# Patient Record
Sex: MALE | Race: WHITE | NOT HISPANIC OR LATINO | Employment: OTHER | URBAN - METROPOLITAN AREA
[De-identification: names, ages, dates, MRNs, and addresses within clinical notes are randomized per-mention and may not be internally consistent; named-entity substitution may affect disease eponyms.]

---

## 2014-10-08 LAB — CRC RECOMMENDATION EXT: NORMAL

## 2018-05-15 ENCOUNTER — HISTORICAL (OUTPATIENT)
Dept: ADMINISTRATIVE | Facility: HOSPITAL | Age: 68
End: 2018-05-15

## 2018-06-08 ENCOUNTER — HISTORICAL (OUTPATIENT)
Dept: RADIOLOGY | Facility: HOSPITAL | Age: 68
End: 2018-06-08

## 2018-06-14 ENCOUNTER — HISTORICAL (OUTPATIENT)
Dept: RADIOLOGY | Facility: HOSPITAL | Age: 68
End: 2018-06-14

## 2018-06-25 ENCOUNTER — HISTORICAL (OUTPATIENT)
Dept: ADMINISTRATIVE | Facility: HOSPITAL | Age: 68
End: 2018-06-25

## 2020-05-29 ENCOUNTER — HISTORICAL (OUTPATIENT)
Dept: ADMINISTRATIVE | Facility: HOSPITAL | Age: 70
End: 2020-05-29

## 2020-05-29 LAB — POC CREATININE: 0.6 MG/DL (ref 0.6–1.3)

## 2020-06-05 ENCOUNTER — HISTORICAL (OUTPATIENT)
Dept: ADMINISTRATIVE | Facility: HOSPITAL | Age: 70
End: 2020-06-05

## 2020-06-05 LAB
ALBUMIN SERPL-MCNC: 3.7 G/DL (ref 3.8–4.8)
ALBUMIN/GLOB SERPL: 1 {RATIO} (ref 1.2–2.2)
ALP SERPL-CCNC: 113 IU/L (ref 39–117)
ALT SERPL-CCNC: 19 IU/L (ref 0–44)
AST SERPL-CCNC: 17 IU/L (ref 0–40)
BILIRUB SERPL-MCNC: 0.3 MG/DL (ref 0–1.2)
BUN SERPL-MCNC: 7 MG/DL (ref 8–27)
CALCIUM SERPL-MCNC: 9.2 MG/DL (ref 8.6–10.2)
CHLORIDE SERPL-SCNC: 104 MMOL/L (ref 96–106)
CHOLEST SERPL-MCNC: 128 MG/DL (ref 100–199)
CHOLEST/HDLC SERPL: 3.7 RATIO (ref 0–5)
CO2 SERPL-SCNC: 21 MMOL/L (ref 20–29)
CREAT SERPL-MCNC: 0.69 MG/DL (ref 0.76–1.27)
CREAT/UREA NIT SERPL: 10 (ref 10–24)
GLOBULIN SER-MCNC: 3.6 G/DL (ref 1.5–4.5)
GLUCOSE SERPL-MCNC: 100 MG/DL (ref 65–99)
HDLC SERPL-MCNC: 35 MG/DL
LDLC SERPL CALC-MCNC: 70 MG/DL (ref 0–99)
POTASSIUM SERPL-SCNC: 4.4 MMOL/L (ref 3.5–5.2)
PROT SERPL-MCNC: 7.3 G/DL (ref 6–8.5)
SODIUM SERPL-SCNC: 141 MMOL/L (ref 134–144)
TRIGL SERPL-MCNC: 115 MG/DL (ref 0–149)
TSH SERPL-ACNC: 0.03 MIU/ML (ref 0.45–4.5)
VLDLC SERPL CALC-MCNC: 23 MG/DL (ref 5–40)

## 2020-07-02 ENCOUNTER — HISTORICAL (OUTPATIENT)
Dept: INFUSION THERAPY | Facility: HOSPITAL | Age: 70
End: 2020-07-02

## 2020-07-02 LAB
ALBUMIN SERPL-MCNC: 3.1 GM/DL (ref 3.4–5)
ALP SERPL-CCNC: 113 UNIT/L (ref 40–150)
ALT SERPL-CCNC: 14 UNIT/L (ref 0–55)
AST SERPL-CCNC: 15 UNIT/L (ref 5–34)
BILIRUB SERPL-MCNC: 0.5 MG/DL
BILIRUBIN DIRECT+TOT PNL SERPL-MCNC: 0.2 MG/DL (ref 0–0.5)
BILIRUBIN DIRECT+TOT PNL SERPL-MCNC: 0.3 MG/DL (ref 0–0.8)
LIVER PROFILE INTERP: ABNORMAL
PROT SERPL-MCNC: 7.9 GM/DL (ref 5.8–7.6)

## 2020-07-09 ENCOUNTER — HISTORICAL (OUTPATIENT)
Dept: INFUSION THERAPY | Facility: HOSPITAL | Age: 70
End: 2020-07-09

## 2020-07-09 LAB
ALBUMIN SERPL-MCNC: 3.3 GM/DL (ref 3.4–5)
ALP SERPL-CCNC: 108 UNIT/L (ref 40–150)
ALT SERPL-CCNC: 40 UNIT/L (ref 0–55)
AST SERPL-CCNC: 35 UNIT/L (ref 5–34)
BILIRUB SERPL-MCNC: 0.5 MG/DL
BILIRUBIN DIRECT+TOT PNL SERPL-MCNC: 0.1 MG/DL (ref 0–0.5)
BILIRUBIN DIRECT+TOT PNL SERPL-MCNC: 0.4 MG/DL (ref 0–0.8)
LIVER PROFILE INTERP: ABNORMAL
PROT SERPL-MCNC: 8.3 GM/DL (ref 5.8–7.6)

## 2020-07-16 ENCOUNTER — HISTORICAL (OUTPATIENT)
Dept: INFUSION THERAPY | Facility: HOSPITAL | Age: 70
End: 2020-07-16

## 2020-07-23 ENCOUNTER — HISTORICAL (OUTPATIENT)
Dept: INFUSION THERAPY | Facility: HOSPITAL | Age: 70
End: 2020-07-23

## 2020-08-19 ENCOUNTER — HISTORICAL (OUTPATIENT)
Dept: INFUSION THERAPY | Facility: HOSPITAL | Age: 70
End: 2020-08-19

## 2020-09-09 ENCOUNTER — HISTORICAL (OUTPATIENT)
Dept: INFUSION THERAPY | Facility: HOSPITAL | Age: 70
End: 2020-09-09

## 2020-09-30 ENCOUNTER — HISTORICAL (OUTPATIENT)
Dept: INFUSION THERAPY | Facility: HOSPITAL | Age: 70
End: 2020-09-30

## 2020-10-21 ENCOUNTER — HISTORICAL (OUTPATIENT)
Dept: INFUSION THERAPY | Facility: HOSPITAL | Age: 70
End: 2020-10-21

## 2020-11-11 ENCOUNTER — HISTORICAL (OUTPATIENT)
Dept: INFUSION THERAPY | Facility: HOSPITAL | Age: 70
End: 2020-11-11

## 2020-12-02 ENCOUNTER — HISTORICAL (OUTPATIENT)
Dept: INFUSION THERAPY | Facility: HOSPITAL | Age: 70
End: 2020-12-02

## 2020-12-23 ENCOUNTER — HISTORICAL (OUTPATIENT)
Dept: INFUSION THERAPY | Facility: HOSPITAL | Age: 70
End: 2020-12-23

## 2021-10-07 ENCOUNTER — HISTORICAL (OUTPATIENT)
Dept: CARDIOLOGY | Facility: HOSPITAL | Age: 71
End: 2021-10-07

## 2022-04-07 ENCOUNTER — HISTORICAL (OUTPATIENT)
Dept: ADMINISTRATIVE | Facility: HOSPITAL | Age: 72
End: 2022-04-07

## 2022-04-23 ENCOUNTER — HISTORICAL (OUTPATIENT)
Dept: ADMINISTRATIVE | Facility: HOSPITAL | Age: 72
End: 2022-04-23
Payer: MEDICARE

## 2022-04-23 VITALS
SYSTOLIC BLOOD PRESSURE: 136 MMHG | HEIGHT: 67 IN | BODY MASS INDEX: 29.34 KG/M2 | DIASTOLIC BLOOD PRESSURE: 90 MMHG | OXYGEN SATURATION: 96 % | WEIGHT: 186.94 LBS

## 2022-04-26 ENCOUNTER — HISTORICAL (OUTPATIENT)
Dept: ADMINISTRATIVE | Facility: HOSPITAL | Age: 72
End: 2022-04-26
Payer: MEDICARE

## 2022-04-26 ENCOUNTER — HOSPITAL ENCOUNTER (INPATIENT)
Facility: HOSPITAL | Age: 72
LOS: 7 days | Discharge: NURSING, REHAB OR LONG-TERM CARE | DRG: 065 | End: 2022-05-03
Attending: INTERNAL MEDICINE | Admitting: INTERNAL MEDICINE
Payer: MEDICARE

## 2022-04-26 PROCEDURE — 81001 URINALYSIS AUTO W/SCOPE: CPT

## 2022-04-26 PROCEDURE — 84439 ASSAY OF FREE THYROXINE: CPT

## 2022-04-26 PROCEDURE — 99284 EMERGENCY DEPT VISIT MOD MDM: CPT | Mod: 25

## 2022-04-26 PROCEDURE — 96360 HYDRATION IV INFUSION INIT: CPT

## 2022-04-26 PROCEDURE — 85025 COMPLETE CBC W/AUTO DIFF WBC: CPT

## 2022-04-26 PROCEDURE — 80053 COMPREHEN METABOLIC PANEL: CPT

## 2022-04-26 PROCEDURE — 70450 CT HEAD/BRAIN W/O DYE: CPT

## 2022-04-26 PROCEDURE — 84443 ASSAY THYROID STIM HORMONE: CPT

## 2022-04-26 PROCEDURE — 99990 CHARGE CONVERSION: CPT | Mod: 25

## 2022-04-26 PROCEDURE — 83690 ASSAY OF LIPASE: CPT

## 2022-04-26 PROCEDURE — 94761 N-INVAS EAR/PLS OXIMETRY MLT: CPT

## 2022-04-26 PROCEDURE — 93005 ELECTROCARDIOGRAM TRACING: CPT

## 2022-04-26 PROCEDURE — 84481 FREE ASSAY (FT-3): CPT

## 2022-04-26 PROCEDURE — 36415 COLL VENOUS BLD VENIPUNCTURE: CPT

## 2022-04-27 PROCEDURE — 84481 FREE ASSAY (FT-3): CPT

## 2022-04-27 PROCEDURE — 80048 BASIC METABOLIC PNL TOTAL CA: CPT

## 2022-04-27 PROCEDURE — 99990 CHARGE CONVERSION: CPT | Mod: GO

## 2022-04-27 PROCEDURE — 93880 EXTRACRANIAL BILAT STUDY: CPT

## 2022-04-27 PROCEDURE — 97166 OT EVAL MOD COMPLEX 45 MIN: CPT | Mod: GO

## 2022-04-27 PROCEDURE — 85025 COMPLETE CBC W/AUTO DIFF WBC: CPT

## 2022-04-27 PROCEDURE — 93306 TTE W/DOPPLER COMPLETE: CPT

## 2022-04-27 PROCEDURE — 70551 MRI BRAIN STEM W/O DYE: CPT | Mod: 52

## 2022-04-27 PROCEDURE — 84443 ASSAY THYROID STIM HORMONE: CPT

## 2022-04-27 PROCEDURE — 97162 PT EVAL MOD COMPLEX 30 MIN: CPT | Mod: GP

## 2022-04-27 PROCEDURE — A9152 SINGLE VITAMIN NOS: HCPCS

## 2022-04-27 PROCEDURE — A9150 MISC/EXPER NON-PRESCRIPT DRU: HCPCS

## 2022-04-27 PROCEDURE — 36415 COLL VENOUS BLD VENIPUNCTURE: CPT

## 2022-04-28 PROCEDURE — A9150 MISC/EXPER NON-PRESCRIPT DRU: HCPCS

## 2022-04-28 PROCEDURE — 97535 SELF CARE MNGMENT TRAINING: CPT | Mod: GO

## 2022-04-28 PROCEDURE — 80061 LIPID PANEL: CPT

## 2022-04-28 PROCEDURE — 36415 COLL VENOUS BLD VENIPUNCTURE: CPT

## 2022-04-28 PROCEDURE — 70498 CT ANGIOGRAPHY NECK: CPT

## 2022-04-28 PROCEDURE — A9152 SINGLE VITAMIN NOS: HCPCS

## 2022-04-28 PROCEDURE — 92523 SPEECH SOUND LANG COMPREHEN: CPT | Mod: GN

## 2022-04-28 PROCEDURE — 70496 CT ANGIOGRAPHY HEAD: CPT

## 2022-04-28 PROCEDURE — 97110 THERAPEUTIC EXERCISES: CPT | Mod: GP

## 2022-04-28 PROCEDURE — 84439 ASSAY OF FREE THYROXINE: CPT

## 2022-04-28 PROCEDURE — 97116 GAIT TRAINING THERAPY: CPT | Mod: GP

## 2022-04-28 PROCEDURE — 99990 CHARGE CONVERSION: CPT | Mod: GN

## 2022-04-28 PROCEDURE — 83036 HEMOGLOBIN GLYCOSYLATED A1C: CPT

## 2022-04-28 PROCEDURE — 94761 N-INVAS EAR/PLS OXIMETRY MLT: CPT

## 2022-04-29 DIAGNOSIS — E05.90 THYROTOXICOSIS WITHOUT THYROID STORM, UNSPECIFIED THYROTOXICOSIS TYPE: ICD-10-CM

## 2022-04-29 DIAGNOSIS — I10 PRIMARY HYPERTENSION: ICD-10-CM

## 2022-04-29 DIAGNOSIS — I25.5 ISCHEMIC CARDIOMYOPATHY: ICD-10-CM

## 2022-04-29 DIAGNOSIS — R19.7 DIARRHEA: ICD-10-CM

## 2022-04-29 DIAGNOSIS — I63.522: Primary | ICD-10-CM

## 2022-04-29 DIAGNOSIS — I48.11 LONGSTANDING PERSISTENT ATRIAL FIBRILLATION: ICD-10-CM

## 2022-04-29 PROCEDURE — 99990 CHARGE CONVERSION: CPT

## 2022-04-29 PROCEDURE — A9152 SINGLE VITAMIN NOS: HCPCS

## 2022-04-29 PROCEDURE — 97535 SELF CARE MNGMENT TRAINING: CPT | Mod: GO

## 2022-04-29 PROCEDURE — A9150 MISC/EXPER NON-PRESCRIPT DRU: HCPCS

## 2022-04-29 PROCEDURE — 36415 COLL VENOUS BLD VENIPUNCTURE: CPT

## 2022-04-29 PROCEDURE — 97530 THERAPEUTIC ACTIVITIES: CPT | Mod: GP

## 2022-04-29 PROCEDURE — 80048 BASIC METABOLIC PNL TOTAL CA: CPT

## 2022-04-29 PROCEDURE — 85027 COMPLETE CBC AUTOMATED: CPT

## 2022-04-29 PROCEDURE — 97116 GAIT TRAINING THERAPY: CPT | Mod: GP

## 2022-04-29 NOTE — OP NOTE
Patient:   Quan Contreras            MRN: [id number]            FIN: [id number]               Age:   67 years     Sex:  Male     :  1950   Associated Diagnoses:   None   Author:   Mark Bhagat MD      Cardiologist: Dr. Mark Bhagat    Assistant: _    Preoperative Diagnosis(es):  [ X ] Artial fibrillation  [ _ ] Artial flutter  [ _ ] Artial tachycardia    Postoperative Diagnosis(es):  [ _ ] Normal Sinus rhythm  [ _ ] Sinus bradycardia  [ X ] Artial fibrillation after initially converting to sinus rhythmon 2 occasions  [ _ ] Sinus with third-degree atrioventricular block  [ _ ] A-V sequential pacing/capture    Procedure(s):  Electrical cardioversion    Complications:  None    Description of Procedure:  After informed consent was obtained and permit signed, the patient was transported to the Cardiac Electrophysiology Laboratory. The EKG electrodes and [ _ ]cardioverter / [ _ ]defibrillator pads were applied to the patient. Pulse oximetry and ventilation were monitored, with oxygen and ventilation assistance given as needed. Adequate sedation for the procedure was accomplished by [X]the Anesthsia service/[_] IV Versend and Fentanyl. Synchronized direct-current energy was delivered at a level of 200 joules biphasic. Additional energy was [_] not indicated/[X] indicated at 200 joules biphasic. Sinus rhythm[_]was/[X] was not restored. Upon awakening, the patient was transferred in a stable condition to a monitored bed for recovery.

## 2022-04-29 NOTE — OP NOTE
Patient:   Quan Contreras            MRN: [id number]            FIN: [id number]               Age:   67 years     Sex:  Male     :  1950   Associated Diagnoses:   None   Author:   Mark Bhagat MD      Cardiologist: Dr. Mark Bhagat    Assistant: _    Preoperative Diagnosis(es):  [ X ] Artial fibrillation  [ _ ] Artial flutter  [ _ ] Artial tachycardia    Postoperative Diagnosis(es):  [ _ ] Normal Sinus rhythm  [ _ ] Sinus bradycardia  [ X ] Artial fibrillation  [ _ ] Sinus with third-degree atrioventricular block  [ _ ] A-V sequential pacing/capture    Procedure(s):  Electrical cardioversion    Complications:  None    Description of Procedure:  After informed consent was obtained and permit signed, the patient was transported to the Cardiac Electrophysiology Laboratory. The EKG electrodes and [ _ ]cardioverter / [ _ ]defibrillator pads were applied to the patient. Pulse oximetry and ventilation were monitored, with oxygen and ventilation assistance given as needed. Adequate sedation for the procedure was accomplished by [X]the Anesthsia service/[_] IV Versend and Fentanyl. Synchronized direct-current energy was delivered at a level of 200 joules biphasic. Additional energy was [_] not indicated/[X] indicated at 200 joules biphasic. Sinus rhythm[_]was/[X] was not restored. Upon awakening, the patient was transferred in a stable condition to a monitored bed for recovery.

## 2022-04-30 PROCEDURE — 99990 CHARGE CONVERSION: CPT

## 2022-04-30 PROCEDURE — A9152 SINGLE VITAMIN NOS: HCPCS

## 2022-04-30 PROCEDURE — 36415 COLL VENOUS BLD VENIPUNCTURE: CPT

## 2022-04-30 PROCEDURE — 97530 THERAPEUTIC ACTIVITIES: CPT | Mod: GP

## 2022-04-30 PROCEDURE — 97116 GAIT TRAINING THERAPY: CPT | Mod: GP

## 2022-04-30 PROCEDURE — 80048 BASIC METABOLIC PNL TOTAL CA: CPT

## 2022-04-30 PROCEDURE — A9150 MISC/EXPER NON-PRESCRIPT DRU: HCPCS

## 2022-04-30 PROCEDURE — 11000001 HC ACUTE MED/SURG PRIVATE ROOM

## 2022-04-30 RX ORDER — CHOLECALCIFEROL (VITAMIN D3) 25 MCG
1000 TABLET ORAL DAILY
Status: DISCONTINUED | OUTPATIENT
Start: 2022-04-30 | End: 2022-05-03 | Stop reason: HOSPADM

## 2022-04-30 RX ORDER — METHIMAZOLE 10 MG/1
20 TABLET ORAL DAILY
Status: DISCONTINUED | OUTPATIENT
Start: 2022-04-30 | End: 2022-05-02

## 2022-04-30 RX ORDER — ACETAMINOPHEN 650 MG/20.3ML
650 LIQUID ORAL EVERY 6 HOURS PRN
Status: DISCONTINUED | OUTPATIENT
Start: 2022-04-30 | End: 2022-05-03 | Stop reason: HOSPADM

## 2022-04-30 RX ORDER — EZETIMIBE 10 MG/1
10 TABLET ORAL NIGHTLY
Status: DISCONTINUED | OUTPATIENT
Start: 2022-04-30 | End: 2022-05-03 | Stop reason: HOSPADM

## 2022-04-30 RX ORDER — PANTOPRAZOLE SODIUM 40 MG/1
40 TABLET, DELAYED RELEASE ORAL DAILY
Status: ON HOLD | COMMUNITY
End: 2022-06-28

## 2022-04-30 RX ORDER — ONDANSETRON 2 MG/ML
4 INJECTION INTRAMUSCULAR; INTRAVENOUS EVERY 4 HOURS PRN
Status: DISCONTINUED | OUTPATIENT
Start: 2022-04-30 | End: 2022-05-03 | Stop reason: HOSPADM

## 2022-04-30 RX ORDER — EZETIMIBE 10 MG/1
1 TABLET ORAL DAILY
Status: ON HOLD | COMMUNITY
Start: 2022-03-09 | End: 2022-07-14

## 2022-04-30 RX ORDER — ZIPRASIDONE MESYLATE 20 MG/ML
20 INJECTION, POWDER, LYOPHILIZED, FOR SOLUTION INTRAMUSCULAR EVERY 6 HOURS PRN
Status: DISCONTINUED | OUTPATIENT
Start: 2022-04-30 | End: 2022-05-03 | Stop reason: HOSPADM

## 2022-04-30 RX ORDER — ACETAMINOPHEN 500 MG
1000 TABLET ORAL EVERY 6 HOURS PRN
Status: DISCONTINUED | OUTPATIENT
Start: 2022-04-30 | End: 2022-05-03 | Stop reason: HOSPADM

## 2022-04-30 RX ORDER — PROPRANOLOL HYDROCHLORIDE 60 MG/1
60 CAPSULE, EXTENDED RELEASE ORAL 2 TIMES DAILY
Status: DISCONTINUED | OUTPATIENT
Start: 2022-04-30 | End: 2022-05-03 | Stop reason: HOSPADM

## 2022-04-30 RX ORDER — BACLOFEN 10 MG/1
1 TABLET ORAL NIGHTLY
Status: ON HOLD | COMMUNITY
Start: 2021-11-09 | End: 2022-05-03 | Stop reason: HOSPADM

## 2022-04-30 RX ORDER — METOPROLOL SUCCINATE 100 MG/1
100 TABLET, EXTENDED RELEASE ORAL 2 TIMES DAILY
Status: ON HOLD | COMMUNITY
End: 2022-05-03 | Stop reason: HOSPADM

## 2022-04-30 RX ORDER — ATORVASTATIN CALCIUM 40 MG/1
40 TABLET, FILM COATED ORAL DAILY
Status: DISCONTINUED | OUTPATIENT
Start: 2022-04-30 | End: 2022-05-03 | Stop reason: HOSPADM

## 2022-04-30 RX ORDER — DIGOXIN 125 MCG
0.25 TABLET ORAL DAILY
Status: DISCONTINUED | OUTPATIENT
Start: 2022-04-30 | End: 2022-05-03 | Stop reason: HOSPADM

## 2022-04-30 RX ORDER — TRAZODONE HYDROCHLORIDE 100 MG/1
100 TABLET ORAL NIGHTLY
Status: DISCONTINUED | OUTPATIENT
Start: 2022-04-30 | End: 2022-05-03 | Stop reason: HOSPADM

## 2022-04-30 RX ORDER — METHIMAZOLE 10 MG/1
4 TABLET ORAL DAILY
Status: ON HOLD | COMMUNITY
Start: 2021-12-10 | End: 2022-07-06 | Stop reason: HOSPADM

## 2022-04-30 RX ORDER — DULOXETINE HYDROCHLORIDE 30 MG/1
1 CAPSULE, DELAYED RELEASE ORAL DAILY
Status: ON HOLD | COMMUNITY
Start: 2022-03-09 | End: 2022-07-14

## 2022-04-30 RX ORDER — MONTELUKAST SODIUM 10 MG/1
1 TABLET ORAL DAILY
Status: ON HOLD | COMMUNITY
Start: 2022-03-09 | End: 2022-07-14

## 2022-04-30 RX ORDER — RIVAROXABAN 20 MG/1
1 TABLET, FILM COATED ORAL DAILY
Status: ON HOLD | COMMUNITY
Start: 2022-03-09 | End: 2022-05-03 | Stop reason: HOSPADM

## 2022-04-30 RX ORDER — DIPHENHYDRAMINE HCL 50 MG
50 CAPSULE ORAL NIGHTLY PRN
Status: ON HOLD | COMMUNITY
End: 2022-05-03 | Stop reason: HOSPADM

## 2022-04-30 RX ORDER — HYDROCODONE BITARTRATE AND ACETAMINOPHEN 10; 325 MG/1; MG/1
1 TABLET ORAL EVERY 8 HOURS PRN
Status: DISCONTINUED | OUTPATIENT
Start: 2022-04-30 | End: 2022-05-03 | Stop reason: HOSPADM

## 2022-04-30 RX ORDER — CHOLECALCIFEROL (VITAMIN D3) 25 MCG
1000 TABLET ORAL DAILY
COMMUNITY
End: 2022-05-12 | Stop reason: SDUPTHER

## 2022-04-30 RX ORDER — PANTOPRAZOLE SODIUM 40 MG/1
40 TABLET, DELAYED RELEASE ORAL DAILY
Status: DISCONTINUED | OUTPATIENT
Start: 2022-04-30 | End: 2022-05-03 | Stop reason: HOSPADM

## 2022-04-30 RX ORDER — ASPIRIN 325 MG
325 TABLET, DELAYED RELEASE (ENTERIC COATED) ORAL DAILY
Status: DISCONTINUED | OUTPATIENT
Start: 2022-04-30 | End: 2022-05-03 | Stop reason: HOSPADM

## 2022-04-30 RX ORDER — ROSUVASTATIN CALCIUM 40 MG/1
40 TABLET, COATED ORAL NIGHTLY
Status: ON HOLD | COMMUNITY
End: 2022-07-14

## 2022-05-01 PROCEDURE — 11000001 HC ACUTE MED/SURG PRIVATE ROOM

## 2022-05-01 PROCEDURE — 25000003 PHARM REV CODE 250

## 2022-05-01 PROCEDURE — 99999 HC NO LEVEL OF SERVICE - ED ONLY: CPT

## 2022-05-01 PROCEDURE — 99990 CHARGE CONVERSION: CPT

## 2022-05-01 RX ADMIN — ATORVASTATIN CALCIUM 40 MG: 40 TABLET, FILM COATED ORAL at 08:05

## 2022-05-01 RX ADMIN — DIGOXIN 0.25 MG: 125 TABLET ORAL at 08:05

## 2022-05-01 RX ADMIN — PROPRANOLOL HYDROCHLORIDE 60 MG: 60 CAPSULE, EXTENDED RELEASE ORAL at 08:05

## 2022-05-01 RX ADMIN — Medication 1000 UNITS: at 08:05

## 2022-05-01 RX ADMIN — TRAZODONE HYDROCHLORIDE 100 MG: 100 TABLET ORAL at 09:05

## 2022-05-01 RX ADMIN — PROPRANOLOL HYDROCHLORIDE 60 MG: 60 CAPSULE, EXTENDED RELEASE ORAL at 09:05

## 2022-05-01 RX ADMIN — PANTOPRAZOLE SODIUM 40 MG: 40 TABLET, DELAYED RELEASE ORAL at 08:05

## 2022-05-01 RX ADMIN — EZETIMIBE 10 MG: 10 TABLET ORAL at 09:05

## 2022-05-01 RX ADMIN — METHIMAZOLE 20 MG: 10 TABLET ORAL at 02:05

## 2022-05-01 RX ADMIN — ASPIRIN 325 MG: 325 TABLET, COATED ORAL at 08:05

## 2022-05-01 NOTE — PROGRESS NOTES
[facility] Hospital Medicine Progress Note        Chief Complaint: Inpatient Follow-up for acute stroke     HPI:   Patient is a 71-year-old  male with a medical history of hypertension, hyperlipidemia, atrial fibrillation on Xarelto, coronary artery disease, ischemic cardiomyopathy, hyperthyroidism, rheumatoid arthritis on Cimzia infusions monthly and anxiety/depression who presents to the ED with complaints of thyroid problems and left extremity weakness. Patient's wife who is at bedside gives most of history stating approximately 3 weeks ago patient ran out of his thyroid medication for about a week. He presented to his PCP about 2 weeks ago and methimazole was increased from 30 mg daily to 40 mg daily. She states for the last week he has been confused and experiencing diarrhea. He presented to the ED on 4/23/2022 for confusion and diarrhea and he was was told to be dehydrated. CT of the head was negative for acute processes. He received IV fluid hydration and discharged home. Over the last 3 days, patient's wife states patient's left leg and arm has been weak. Patient has been holding his arm near his chest. On exam, patient is not talking much but wife states patient has been speaking fine prior to exam. At baseline patient ambulates independently and completes activities of daily living without assistance.  Initial vitals upon presentation to the ED patient temperature 99.7F, tachycardic 109, normotensive and SPO2 96% on room air. Labs notable for WBC 14, glucose 78. Other indices of the CBC and CMP unremarkable. TSH less than 0.0083, Free T4 2.50 and Free T3 11.42. CT of the head negative for acute intracranial process but revealed right frontal encephalomalacia/chronic infarct. UA negative for infectious processes. EKG with atrial fibrillation with rapid ventricular rate of 107 and age undetermined inferior and anterior infarct, prolonged QT interval. ED patient  "received 6 mg of IV Decadron and methimazole 25 mg by mouth. Patient is admitted to hospital medicine services for further medical management.  On 4/27, patient pulled out his IV line, still confused and wearing mittens, brain MRI shows left anterior cerebral artery acute infarct 4 x 2 cm    Interval Hx:   Laying in bed, no complaints. Had a good BM this am, appetite is good   Daughter is at bedside  She has discussed with patient's nurse on the sole    Objective/physical exam:  Vitals can be found below  General: In no acute distress, afebrile, elderly male sitting in bed  Chest: Clear to auscultation bilaterally  Heart: S1, S2, irregularly irregular  Abdomen: Soft, nontender, BS +  MSK: Warm, no lower extremity edema, no clubbing or cyanosis, ulnar deviation both hands noted  Neurologic: Alert and oriented x4    Blood pressure (!) 145/78, pulse 75, temperature 97.9 °F (36.6 °C), resp. rate 18, height 5' 7" (1.702 m), weight 79 kg (174 lb 2.6 oz), SpO2 98 %.    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 hyperthyroid medication  Leukocytosis likely secondary to above  Left extremity weakness  Right frontal encephalomalacia  Essential hypertension  Atrial fibrillation with rapid ventricular rate-was on Xarelto at home  Ischemic cardiomyopathy  Rheumatoid arthritis on Cimzia  Anxiety/depression  Moderate malnutrition  Mild Hypokalemia-resolved      Admitted to " hospitalist service as inpatient on 4/26/2022  MRI brain --> Acute infarct in the left [place] vascular territory  Neuro stroke on board, stroke w/u done  Echo--> bubble study negative  Carotid US--> <50% stenosis B.L  PT/OT--> inpatient rehab once medically cleared. Probably after Monday after neuro stroke evaluate the patient  Cont Methimazole 20mg daily  Decadron discontinued  Cont Propranolol 60mg daily  Cont home Lipitor  Case personally discussed with Dr. Montgomery, recommended to discontinued Plavix and continue aspirin daily. She will reevaluate the patient on Monday and if stable will probably start Eliqujo ann Montgomery we will discuss the images with neuroradiology, given concern for multiple small stroke, concerning for embolic phenomena  Wife confirmed pt was taking his Xarelto at home daily, ? Xarelto failure ?  Lipid profile at goal, HDL very low  Cont Telemetry monitoring  Resume appropriate home medications  Morning CBC wo, BMP ordered       Anticipated discharge and Disposition:  Planned discharge early next week to inpatient rehab once cleared by neurology    All diagnosis and differential diagnosis have been reviewed; assessment and plan has been documented; I have personally reviewed the labs and test results that are presently available; I have reviewed the patients medication list; I have reviewed the consulting providers response and recommendations. I have reviewed or attempted to review medical records based upon their availability    All of the patient's questions have been  addressed and answered. Patient's is agreeable to the above stated plan. I will continue to monitor closely and make adjustments to medical management as needed.      Nutrition Status:  Measurements:  Height: 170 cm (04/26/2022 17:11)   Weight: 76.02 kg (04/28/2022 06:00)   BMI: 26.06 kg/m2 (04/26/2022 17:11)     Malnutrition Acute Illness or Injury (04/28/2022 13:27)  Degree of Malnutrition: Non-severe (moderate)  malnutrition  Energy Intake: <75% of estimated energy requirement for >7 days  Interpretation of Weight Loss: >2% in 1 week  Body Fat: Does not meet criteria  Muscle Mass: Mild  Areas Of Muscle Mass Loss: Temples (temporalis muscle), Interosseous muscles    Malnutrition Score: 2 (04/26/2022 17:11)     Patient has been screened and assessed by RD. See nutrition recommendations documented in Adult Nutrition Powerform. RD will follow patient.        Evaristo Motta MD   05/01/2022

## 2022-05-02 LAB
ANION GAP SERPL CALC-SCNC: 10 MEQ/L
BUN SERPL-MCNC: 10.7 MG/DL (ref 8.4–25.7)
CALCIUM SERPL-MCNC: 9.7 MG/DL (ref 8.8–10)
CHLORIDE SERPL-SCNC: 106 MMOL/L (ref 98–107)
CO2 SERPL-SCNC: 21 MMOL/L (ref 23–31)
CREAT SERPL-MCNC: 0.57 MG/DL (ref 0.73–1.18)
CREAT/UREA NIT SERPL: 19
ERYTHROCYTE [DISTWIDTH] IN BLOOD BY AUTOMATED COUNT: 12.6 % (ref 11.5–17)
GLUCOSE SERPL-MCNC: 98 MG/DL (ref 82–115)
HCT VFR BLD AUTO: 46.2 % (ref 42–52)
HGB BLD-MCNC: 14.9 GM/DL (ref 14–18)
MCH RBC QN AUTO: 29.2 PG (ref 27–31)
MCHC RBC AUTO-ENTMCNC: 32.3 MG/DL (ref 33–36)
MCV RBC AUTO: 90.4 FL (ref 80–94)
NRBC BLD AUTO-RTO: 0 %
PLATELET # BLD AUTO: 418 X10(3)/MCL (ref 130–400)
PMV BLD AUTO: 10.4 FL (ref 9.4–12.4)
POTASSIUM SERPL-SCNC: 4.1 MMOL/L (ref 3.5–5.1)
RBC # BLD AUTO: 5.11 X10(6)/MCL (ref 4.7–6.1)
SODIUM SERPL-SCNC: 137 MMOL/L (ref 136–145)
WBC # SPEC AUTO: 11.1 X10(3)/MCL (ref 4.5–11.5)

## 2022-05-02 PROCEDURE — 97116 GAIT TRAINING THERAPY: CPT | Mod: CQ

## 2022-05-02 PROCEDURE — 94761 N-INVAS EAR/PLS OXIMETRY MLT: CPT

## 2022-05-02 PROCEDURE — 80048 BASIC METABOLIC PNL TOTAL CA: CPT | Performed by: INTERNAL MEDICINE

## 2022-05-02 PROCEDURE — 25000003 PHARM REV CODE 250

## 2022-05-02 PROCEDURE — 97530 THERAPEUTIC ACTIVITIES: CPT | Mod: CQ

## 2022-05-02 PROCEDURE — 11000001 HC ACUTE MED/SURG PRIVATE ROOM

## 2022-05-02 PROCEDURE — 97535 SELF CARE MNGMENT TRAINING: CPT | Mod: CO

## 2022-05-02 PROCEDURE — 85027 COMPLETE CBC AUTOMATED: CPT | Performed by: INTERNAL MEDICINE

## 2022-05-02 PROCEDURE — 36415 COLL VENOUS BLD VENIPUNCTURE: CPT | Performed by: INTERNAL MEDICINE

## 2022-05-02 RX ORDER — METHIMAZOLE 10 MG/1
40 TABLET ORAL DAILY
Status: DISCONTINUED | OUTPATIENT
Start: 2022-05-03 | End: 2022-05-03 | Stop reason: HOSPADM

## 2022-05-02 RX ADMIN — DIGOXIN 0.25 MG: 125 TABLET ORAL at 03:05

## 2022-05-02 RX ADMIN — PROPRANOLOL HYDROCHLORIDE 60 MG: 60 CAPSULE, EXTENDED RELEASE ORAL at 09:05

## 2022-05-02 RX ADMIN — Medication 1000 UNITS: at 10:05

## 2022-05-02 RX ADMIN — PANTOPRAZOLE SODIUM 40 MG: 40 TABLET, DELAYED RELEASE ORAL at 10:05

## 2022-05-02 RX ADMIN — TRAZODONE HYDROCHLORIDE 100 MG: 100 TABLET ORAL at 09:05

## 2022-05-02 RX ADMIN — METHIMAZOLE 20 MG: 10 TABLET ORAL at 03:05

## 2022-05-02 RX ADMIN — PROPRANOLOL HYDROCHLORIDE 60 MG: 60 CAPSULE, EXTENDED RELEASE ORAL at 10:05

## 2022-05-02 RX ADMIN — ASPIRIN 325 MG: 325 TABLET, COATED ORAL at 10:05

## 2022-05-02 RX ADMIN — EZETIMIBE 10 MG: 10 TABLET ORAL at 09:05

## 2022-05-02 RX ADMIN — ATORVASTATIN CALCIUM 40 MG: 40 TABLET, FILM COATED ORAL at 10:05

## 2022-05-02 NOTE — PROGRESS NOTES
[facility] Hospital Medicine Progress Note        Chief Complaint: Inpatient Follow-up for acute stroke     HPI:   Patient is a 71-year-old  male with a medical history of hypertension, hyperlipidemia, atrial fibrillation on Xarelto, coronary artery disease, ischemic cardiomyopathy, hyperthyroidism, rheumatoid arthritis on Cimzia infusions monthly and anxiety/depression who presents to the ED with complaints of thyroid problems and left extremity weakness. Patient's wife who is at bedside gives most of history stating approximately 3 weeks ago patient ran out of his thyroid medication for about a week. He presented to his PCP about 2 weeks ago and methimazole was increased from 30 mg daily to 40 mg daily. She states for the last week he has been confused and experiencing diarrhea. He presented to the ED on 4/23/2022 for confusion and diarrhea and he was was told to be dehydrated. CT of the head was negative for acute processes. He received IV fluid hydration and discharged home. Over the last 3 days, patient's wife states patient's left leg and arm has been weak. Patient has been holding his arm near his chest. On exam, patient is not talking much but wife states patient has been speaking fine prior to exam. At baseline patient ambulates independently and completes activities of daily living without assistance.  Initial vitals upon presentation to the ED patient temperature 99.7F, tachycardic 109, normotensive and SPO2 96% on room air. Labs notable for WBC 14, glucose 78. Other indices of the CBC and CMP unremarkable. TSH less than 0.0083, Free T4 2.50 and Free T3 11.42. CT of the head negative for acute intracranial process but revealed right frontal encephalomalacia/chronic infarct. UA negative for infectious processes. EKG with atrial fibrillation with rapid ventricular rate of 107 and age undetermined inferior and anterior infarct, prolonged QT interval. ED patient  "received 6 mg of IV Decadron and methimazole 25 mg by mouth. Patient is admitted to hospital medicine services for further medical management.  On 4/27, patient pulled out his IV line, still confused and wearing mittens, brain MRI shows left anterior cerebral artery acute infarct 4 x 2 cm    Interval Hx:   Laying in bed, no complaints. Had a good BM this am, appetite is good   Daughter is at bedside  She has discussed with patient's nurse on the sole    Objective/physical exam:  Vitals can be found below  General: In no acute distress, afebrile, elderly male sitting in bed  Chest: Clear to auscultation bilaterally  Heart: S1, S2, irregularly irregular  Abdomen: Soft, nontender, BS +  MSK: Warm, no lower extremity edema, no clubbing or cyanosis, ulnar deviation both hands noted  Neurologic: Alert and oriented x4    Blood pressure 116/80, pulse 91, temperature 97.9 °F (36.6 °C), resp. rate 20, height 5' 7" (1.702 m), weight 79 kg (174 lb 2.6 oz), SpO2 97 %.    Admission on 04/26/2022   Component Date Value    WBC 05/02/2022 11.1     RBC 05/02/2022 5.11     Hgb 05/02/2022 14.9     Hct 05/02/2022 46.2     Platelet 05/02/2022 418 (A)    MCV 05/02/2022 90.4     MCH 05/02/2022 29.2     MCHC 05/02/2022 32.3 (A)    RDW 05/02/2022 12.6     MPV 05/02/2022 10.4     NRBC% 05/02/2022 0.0     Sodium Level 05/02/2022 137     Potassium Level 05/02/2022 4.1     Chloride 05/02/2022 106     Carbon Dioxide 05/02/2022 21 (A)    Glucose Level 05/02/2022 98     Blood Urea Nitrogen 05/02/2022 10.7     Creatinine 05/02/2022 0.57 (A)    BUN/Creatinine Ratio 05/02/2022 19     Calcium Level Total 05/02/2022 9.7     Estimated GFR-Non Letty* 05/02/2022 >60     Anion Gap 05/02/2022 10.0          Scheduled Med:   aspirin  325 mg Oral Daily    atorvastatin  40 mg Oral Daily    digoxin  0.25 mg Oral Daily    ezetimibe  10 mg Oral QHS    methIMAzole  20 mg Oral Daily    pantoprazole  40 mg Oral Daily    propranoloL  60 mg " Oral BID    trazodone  100 mg Oral QHS    vitamin D  1,000 Units Oral Daily      Continuous Infusions: none      PRN Meds:  acetaminophen, acetaminophen, HYDROcodone-acetaminophen, ondansetron, zinc oxide-cod liver oil, ziprasidone       Assessment/Plan:  Left anterior cerebral artery acute infarct  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 hyperthyroid medication  Leukocytosis likely secondary to above  Left extremity weakness  Right frontal encephalomalacia  Essential hypertension  Atrial fibrillation with rapid ventricular rate-was on Xarelto at home  Ischemic cardiomyopathy  Rheumatoid arthritis on Cimzia  Anxiety/depression  Moderate malnutrition  Mild Hypokalemia-resolved      Admitted to hospitalist service as inpatient on 4/26/2022  MRI brain --> Acute infarct in the left [place] vascular territory  Neuro stroke on board, stroke w/u done  Echo--> bubble study negative  Carotid US--> <50% stenosis B.L  PT/OT--> inpatient rehab once medically cleared. Probably after Monday after neuro stroke evaluate the patient  Cont Methimazole 20mg daily  Decadron discontinued  Cont Propranolol 60mg daily  Cont home Lipitor  Case personally discussed with Dr. Montgomery, recommended to discontinued Plavix and continue aspirin daily. She will reevaluate the patient on Monday and if stable will probably start Ramses Montgomery we will discuss the images with neuroradiology, given concern for multiple small stroke, concerning for embolic phenomena  Wife confirmed pt was taking his Xarelto at home daily, ? Xarelto failure ?  Lipid profile at goal, HDL very low  Cont Telemetry monitoring  Resume appropriate home medications  Morning labs stable       Anticipated discharge and Disposition:  Planned discharge early next week to inpatient rehab once cleared by neurology    All diagnosis and differential diagnosis have been reviewed; assessment and plan has been documented; I have personally reviewed the labs and test results  that are presently available; I have reviewed the patients medication list; I have reviewed the consulting providers response and recommendations. I have reviewed or attempted to review medical records based upon their availability    All of the patient's questions have been  addressed and answered. Patient's is agreeable to the above stated plan. I will continue to monitor closely and make adjustments to medical management as needed.      Nutrition Status:  Measurements:  Height: 170 cm (04/26/2022 17:11)   Weight: 76.02 kg (04/28/2022 06:00)   BMI: 26.06 kg/m2 (04/26/2022 17:11)     Malnutrition Acute Illness or Injury (04/28/2022 13:27)  Degree of Malnutrition: Non-severe (moderate) malnutrition  Energy Intake: <75% of estimated energy requirement for >7 days  Interpretation of Weight Loss: >2% in 1 week  Body Fat: Does not meet criteria  Muscle Mass: Mild  Areas Of Muscle Mass Loss: Temples (temporalis muscle), Interosseous muscles    Malnutrition Score: 2 (04/26/2022 17:11)     Patient has been screened and assessed by RD. See nutrition recommendations documented in Adult Nutrition Powerform. RD will follow patient.        Evaristo Motta MD   05/02/2022

## 2022-05-02 NOTE — PT/OT/SLP PROGRESS
Physical Therapy  Treatment    Quan Contreras   MRN: [id number]   Admitting Diagnosis: <principal problem not specified>       PT Start Time: 1030     PT Stop Time: 1054    PT Total Time (min): 24 min       Billable Minutes:  Gait Training 12 and Therapeutic Activity 12    Treatment Type: Treatment  PT/PTA: PTA     PTA Visit Number: 4       General Precautions: Standard,    Orthopedic Precautions:     Braces:    Respiratory Status: Room air         Subjective:  Communicated with NSG prior to session.    Pain/Comfort  Pain Rating 1: 0/10    Objective:    Seen BS. Pt sitting up in bed with wife present in room. Pt reports no issues and agreeable session.     Functional Mobility:  Bed Mobility: Pt able to come to sitting EOB with SBA. LE therex completed in sitting x 15 reps       Transfers: Pt completed sit to/from stand t/f with Min A.         Gait: Pt ambulated ~100ft x 2 bouts. No AD utilized. Pt completed side stepping in alfreda directions with HHA x 1 and backwards walking all for 15ft. Speeding walking x 30s. Some unsteadiness but no overt LOB. Pt demos good improvement with balance.             AM-PAC 6 CLICK MOBILITY  How much help from another person does this patient currently need?   1 = Unable, Total/Dependent Assistance  2 = A lot, Maximum/Moderate Assistance  3 = A little, Minimum/Contact Guard/Supervision  4 = None, Modified [place]/Independent    Turning over in bed (including adjusting bedclothes, sheets and blankets)?: 4  Sitting down on and standing up from a chair with arms (e.g., wheelchair, bedside commode, etc.): 4  Moving from lying on back to sitting on the side of the bed?: 3  Moving to and from a bed to a chair (including a wheelchair)?: 3  Need to walk in hospital room?: 3  Climbing 3-5 steps with a railing?: 1  Basic Mobility Total Score: 18    AM-PAC Raw Score CMS G-Code Modifier Level of Impairment Assistance   6 % Total / Unable   7 - 9 CM 80 - 100% Maximal Assist   10 - 14 CL  60 - 80% Moderate Assist   15 - 19 CK 40 - 60% Moderate Assist   20 - 22 CJ 20 - 40% Minimal Assist   23 CI 1-20% SBA / CGA   24 CH 0% Independent/ Mod I     Patient left up in chair with call button in reach.        Rehab identified problem list/impairments: Rehab identified problem list/impairments: impaired balance, impaired cognition    Rehab potential is excellent.    Activity tolerance: Excellent    Discharge recommendations: Discharge Facility/Level of Care Needs: rehabilitation facility            05/02/2022

## 2022-05-03 VITALS
HEART RATE: 90 BPM | OXYGEN SATURATION: 99 % | BODY MASS INDEX: 27.34 KG/M2 | TEMPERATURE: 98 F | HEIGHT: 67 IN | WEIGHT: 174.19 LBS | RESPIRATION RATE: 18 BRPM | DIASTOLIC BLOOD PRESSURE: 72 MMHG | SYSTOLIC BLOOD PRESSURE: 107 MMHG

## 2022-05-03 PROBLEM — I10 PRIMARY HYPERTENSION: Status: ACTIVE | Noted: 2022-05-03

## 2022-05-03 PROBLEM — E05.90 THYROTOXICOSIS: Status: ACTIVE | Noted: 2022-05-03

## 2022-05-03 PROBLEM — I63.522: Status: ACTIVE | Noted: 2022-05-03

## 2022-05-03 PROBLEM — I25.5 ISCHEMIC CARDIOMYOPATHY: Status: ACTIVE | Noted: 2022-05-03

## 2022-05-03 PROBLEM — I48.91 ATRIAL FIBRILLATION: Status: ACTIVE | Noted: 2022-05-03

## 2022-05-03 PROCEDURE — 97535 SELF CARE MNGMENT TRAINING: CPT | Mod: CO

## 2022-05-03 PROCEDURE — 97116 GAIT TRAINING THERAPY: CPT | Mod: CQ

## 2022-05-03 PROCEDURE — 97110 THERAPEUTIC EXERCISES: CPT | Mod: CQ

## 2022-05-03 PROCEDURE — 25000003 PHARM REV CODE 250

## 2022-05-03 RX ORDER — PROPRANOLOL HYDROCHLORIDE 60 MG/1
60 CAPSULE, EXTENDED RELEASE ORAL 2 TIMES DAILY
Qty: 60 CAPSULE | Refills: 11 | Status: ON HOLD
Start: 2022-05-03 | End: 2022-07-14

## 2022-05-03 RX ADMIN — ASPIRIN 325 MG: 325 TABLET, COATED ORAL at 09:05

## 2022-05-03 RX ADMIN — Medication 1000 UNITS: at 09:05

## 2022-05-03 RX ADMIN — ATORVASTATIN CALCIUM 40 MG: 40 TABLET, FILM COATED ORAL at 09:05

## 2022-05-03 RX ADMIN — PANTOPRAZOLE SODIUM 40 MG: 40 TABLET, DELAYED RELEASE ORAL at 09:05

## 2022-05-03 RX ADMIN — DIGOXIN 0.25 MG: 125 TABLET ORAL at 09:05

## 2022-05-03 RX ADMIN — PROPRANOLOL HYDROCHLORIDE 60 MG: 60 CAPSULE, EXTENDED RELEASE ORAL at 09:05

## 2022-05-03 NOTE — DISCHARGE SUMMARY
[facility] Hospital Medicine Discharge Summary    Admit Date: 4/26/2022  Discharge Date and Time: 5/3/62492:37 AM  Admitting Physician: Dr Bernardo  Discharging Physician: Evaristo Motta MD.  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 hyperthyroid medication  Leukocytosis likely secondary to above  Left extremity weakness  Right frontal encephalomalacia  Essential hypertension  Atrial fibrillation with rapid ventricular rate-was on Xarelto at home  Ischemic cardiomyopathy  Rheumatoid arthritis on Cimzia  Anxiety/depression  Moderate malnutrition  Mild Hypokalemia-resolved    Hospital Course:   Patient is a 71-year-old  male with a medical history of hypertension, hyperlipidemia, atrial fibrillation on Xarelto, coronary artery disease, ischemic cardiomyopathy, hyperthyroidism, rheumatoid arthritis on Cimzia infusions monthly and anxiety/depression who presents to the ED with complaints of thyroid problems and left extremity weakness. Patient's wife who is at bedside gives most of history stating approximately 3 weeks ago patient ran out of his thyroid medication for about a week. He presented to his PCP about 2 weeks ago and methimazole was increased from 30 mg daily to 40 mg daily. She states for the last week he has been confused and experiencing diarrhea. He presented to the ED on 4/23/2022 for confusion and diarrhea and he was was told to be dehydrated. CT of the head was negative for acute processes. He received IV fluid hydration and discharged home. Over the last 3 days, patient's wife states patient's left leg and arm has been weak. Patient has been holding his arm near his chest. On exam, patient is not talking much but wife states patient has been speaking fine prior to exam. At baseline patient ambulates independently and completes  activities of daily living without assistance.  Initial vitals upon presentation to the ED patient temperature 99.7F, tachycardic 109, normotensive and SPO2 96% on room air. Labs notable for WBC 14, glucose 78. Other indices of the CBC and CMP unremarkable. TSH less than 0.0083, Free T4 2.50 and Free T3 11.42. CT of the head negative for acute intracranial process but revealed right frontal encephalomalacia/chronic infarct. UA negative for infectious processes. EKG with atrial fibrillation with rapid ventricular rate of 107 and age undetermined inferior and anterior infarct, prolonged QT interval. ED patient received 6 mg of IV Decadron and methimazole 25 mg by mouth. Patient is admitted to hospital medicine services for further medical management.  On 4/27, patient pulled out his IV line, still confused and wearing mittens, brain MRI shows left anterior cerebral artery acute infarct 4 x 2 cm  Admitted to hospitalist service as inpatient on 4/26/2022  MRI brain --> Acute infarct in the left KINGA vascular territory  Neuro stroke on board, stroke w/u done  Echo--> bubble study negative  Carotid US--> <50% stenosis B.L  PT/OT--> inpatient rehab once medically cleared. Probably after Monday after neuro stroke evaluate the patient  Cont Methimazole 40mg daily  Decadron discontinued  Cont Propranolol 60mg daily  Cont home crestor  Case personally discussed with Dr. Montgomery, recommended to discontinued Plavix and continue aspirin daily. She reevaluate the patient on Monday and recommended to start Eliquis 5 mg BID given concern for multiple small stroke, concerning for embolic phenomena,  Xarelto failure ?  Wife confirmed pt was taking his Xarelto at home daily  Lipid profile at goal, HDL very low  Pt was seen and examined today, had been walking with physical therapy in the halls.  Are long conversation with patient's wife in the room agrees with the discharge plan.  Understands that patient needs to be on Eliquis.  Also  "advised patient's wife to make an appoint her doctor on Friday for follow-up and update.  Verbalized understanding    Vitals:  Blood pressure 122/83, pulse 93, temperature 98.2 °F (36.8 °C), temperature source Oral, resp. rate 18, height 5' 7" (1.702 m), weight 79 kg (174 lb 2.6 oz), SpO2 96 %..    Physical Exam:  General: In no acute distress, afebrile, elderly male sitting in bed  Chest: Clear to auscultation bilaterally  Heart: S1, S2, irregularly irregular  Abdomen: Soft, nontender, BS +  MSK: Warm, no lower extremity edema, no clubbing or cyanosis, ulnar deviation both hands noted  Neurologic: Alert and oriented x4    Procedures Performed: No admission procedures for hospital encounter.     Significant Diagnostic Studies: See Full reports for all details  Admission on 04/26/2022   Component Date Value    WBC 05/02/2022 11.1     RBC 05/02/2022 5.11     Hgb 05/02/2022 14.9     Hct 05/02/2022 46.2     Platelet 05/02/2022 418 (A)    MCV 05/02/2022 90.4     MCH 05/02/2022 29.2     MCHC 05/02/2022 32.3 (A)    RDW 05/02/2022 12.6     MPV 05/02/2022 10.4     NRBC% 05/02/2022 0.0     Sodium Level 05/02/2022 137     Potassium Level 05/02/2022 4.1     Chloride 05/02/2022 106     Carbon Dioxide 05/02/2022 21 (A)    Glucose Level 05/02/2022 98     Blood Urea Nitrogen 05/02/2022 10.7     Creatinine 05/02/2022 0.57 (A)    BUN/Creatinine Ratio 05/02/2022 19     Calcium Level Total 05/02/2022 9.7     Estimated GFR-Non Letty* 05/02/2022 >60     Anion Gap 05/02/2022 10.0         Microbiology Results (last 7 days)     ** No results found for the last 168 hours. **           No results found.- pulls last radiology orders     Explained in detail to the patient about the discharge plan, medications, and follow-up visits. Pt understands and agrees with the treatment plan  Discharged Condition: stable  Diet- cardiac  Medications Per DC med rec  Activities as tolerated  Follow-up - with your PCP in 2 weeks, with " Dr. Breen in 2 weeks, Dr. Montgomery within 2 weeks   Follow-up Information     Bran Hansen MD. Schedule an appointment as soon as possible for a visit in 2 week(s).    Specialty: Internal Medicine  Contact information:  [address] [place] [address]  [phone]             Karen Breen MD Follow up in 1 week(s).    Specialties: Cardiovascular Disease, Cardiology  Why: post hospital d'/c, note pt is switched to eliquis due to treatment failure on Xarelto  Contact information:  [address] [facility]  [address]  [place] [address]  [phone]             Damian Montgomery MD. Schedule an appointment as soon as possible for a visit in 2 week(s).    Specialty: Neurology  Why: post hospital discharge  Contact information:  [address] [place] [address] [address]  [place] [address]  [phone]                       For further questions contact hospitalist office    Discharge time 33 minutes    For worsening symptoms, chest pain, shortness of breath, increased abdominal pain, high grade fever, stroke or stroke like symptoms, immediately go to the nearest Emergency Room or call 911 as soon as possible.      Evaristo Muhammad M.D on 5/3/2022. at 9:37 AM.

## 2022-05-03 NOTE — PT/OT/SLP PROGRESS
Physical Therapy         Treatment        Quan Contreras   MRN: [id number]        PT Start Time: 0900     PT Stop Time: 0924    PT Total Time (min): 24 min       Billable Minutes:  Gait [id number] and Therapeutic Activity 12  Total Minutes: 24    Treatment Type: Treatment  PT/PTA: PTA     PTA Visit Number: 4       General Precautions: Standard,    Orthopedic Precautions:     Braces:           Subjective:  Communicated with NSG prior to session.    Pain/Comfort  Pain Rating 1: 0/10    Objective:  Patient found sitting UIC watching television.    Functional Mobility:  Bed Mobility:   Supine to sit: Modified Independent   Sit to supine: Modified Independent   Rolling: Modified Independent   Scooting: Modified Independent    Balance:   Static Sit: NORMAL: No deviations seen in posture held statically  Dynamic Sit:  NORMAL: No deviations seen in posture held dynamically  Static Stand: NORMAL: No deviations seen in posture held statically  Dynamic stand: GOOD: Needs SUPERVISION only during gait and able to self right with moderate LOB    Transfer Training:  Sit to stand:Stand-by Assistance with No Assistive Device to t/f.      Gait Training:  Patient gait trained FWB/WBAT: bilateral lower extremity 300  feet on level tile with No Assistive Device with Stand-by Assistance.  Pt with demonstarting a  Step through gait pattern. Slow but steady gait. Pt demos an antalgic gait pattern.           Additional Treatment: Pt completed standing there ex x 10 reps.     Activity Tolerance:  Patient tolerated treatment well    Patient left up in chair with call button in reach and wife present.    Assessment:  Quan Contreras is a 71 y.o. male with a medical diagnosis of Acute left arterial ischemic stroke, KINGA (anterior cerebral artery).    Rehab potential is excellent.    Activity tolerance: Excellent    Discharge recommendations: Discharge Facility/Level of Care Needs: rehabilitation facility     Equipment recommendations:       GOALS:    Multidisciplinary Problems     Physical Therapy Goals     Not on file                PLAN:    Patient to be seen daily  to address the above listed problems via gait training, therapeutic activities, therapeutic exercises  Plan of Care expires:    Plan of Care reviewed with:           5/3/2022

## 2022-05-03 NOTE — PLAN OF CARE
Spoke to Hanane at [facility] states that she did not get my referral in [facility] and that the fax number connected is not their fax number. Will hard fax requested documents. Notified hospitalist that patient potentially can be admitted today.

## 2022-05-03 NOTE — PT/OT/SLP PROGRESS
Occupational Therapy  Treatment    Quan Contreras   MRN: [id number]   Admitting Diagnosis: Acute left arterial ischemic stroke, KINGA (anterior cerebral artery)    OT Date of Treatment: 05/03/22   OT Start Time: 0957  OT Stop Time: 1020  OT Total Time (min): 23 min     Billable Minutes:  Self Care/Home Management 23  Total Minutes: 23     OT/VERONICA: VERONICA     VERONICA Visit Number: 3    General Precautions: Standard, fall  Orthopedic Precautions:    Braces:           Subjective:  Communicated with RN prior to session.  Pt. Presented alert, distracted at times with redirection required.    Objective:  Patient found with: telemetry    Functional Mobility:  Pt. Up in chair upon entry.  Transfer Training:   Toilet Transfer:  Pt Step Transfer with Supervision or Set-up Assistance with Rolling Walker .    Grooming:  Patient performed oral hygeine with Supervision or Set-up Assistance at standing at sink.    UE Dressing:  Pt. Performed UB dressing with assistance required for setup seated in BS chair.    LE Dressing:  Patient performed don/doffed pants with Supervision or Set-up Assistance with CGA for standing balance with RW for UE support with balance.    Toilet Training:  Pt. Performing toilet t/f with CGA for balance with Max A for pericare in supported stance.    Balance:   Static Stand: SBA  Dynamic stand: CGA with RW  Additional Treatment:      Patient left up in chair with all lines intact    ASSESSMENT:  Quan Contreras is a 71 y.o. male with a medical diagnosis of Acute left arterial ischemic stroke, KINGA (anterior cerebral artery) and presents with safety deficits.    Rehab potential is excellent    Activity tolerance: Excellent    Discharge recommendations: rehabilitation facility     Equipment recommendations:       GOALS:   Goals previewed in previous EMR.    Plan:  Patient to be seen 5 x/week to address the above listed problems via    Plan of Care expires:    Plan of Care reviewed with:           05/03/2022

## 2022-05-11 PROBLEM — K21.9 ESOPHAGEAL REFLUX: Chronic | Status: ACTIVE | Noted: 2022-05-11

## 2022-05-11 PROBLEM — M86.10 ACUTE OSTEOMYELITIS: Status: ACTIVE | Noted: 2022-05-11

## 2022-05-11 PROBLEM — I48.91 ATRIAL FIBRILLATION: Chronic | Status: ACTIVE | Noted: 2022-05-03

## 2022-05-11 PROBLEM — M06.9 RHEUMATOID ARTHRITIS: Status: ACTIVE | Noted: 2022-05-11

## 2022-05-11 PROBLEM — E78.2 MIXED HYPERLIPIDEMIA: Status: ACTIVE | Noted: 2022-05-11

## 2022-05-11 PROBLEM — E05.90 THYROTOXICOSIS: Chronic | Status: ACTIVE | Noted: 2022-05-03

## 2022-05-11 PROBLEM — M86.10 ACUTE OSTEOMYELITIS: Chronic | Status: ACTIVE | Noted: 2022-05-11

## 2022-05-11 PROBLEM — I42.8 OTHER CARDIOMYOPATHY: Status: ACTIVE | Noted: 2022-05-11

## 2022-05-11 PROBLEM — E78.2 MIXED HYPERLIPIDEMIA: Chronic | Status: ACTIVE | Noted: 2022-05-11

## 2022-05-11 PROBLEM — I63.522: Chronic | Status: ACTIVE | Noted: 2022-05-03

## 2022-05-11 PROBLEM — I25.5 ISCHEMIC CARDIOMYOPATHY: Chronic | Status: ACTIVE | Noted: 2022-05-03

## 2022-05-11 PROBLEM — G60.9 IDIOPATHIC PERIPHERAL NEUROPATHY: Chronic | Status: ACTIVE | Noted: 2022-05-11

## 2022-05-11 PROBLEM — I25.10 ATHEROSCLEROSIS OF CORONARY ARTERY: Status: ACTIVE | Noted: 2022-05-11

## 2022-05-11 PROBLEM — G60.9 IDIOPATHIC PERIPHERAL NEUROPATHY: Status: ACTIVE | Noted: 2022-05-11

## 2022-05-11 PROBLEM — I10 PRIMARY HYPERTENSION: Chronic | Status: ACTIVE | Noted: 2022-05-03

## 2022-05-11 PROBLEM — I25.10 ATHEROSCLEROSIS OF CORONARY ARTERY: Chronic | Status: ACTIVE | Noted: 2022-05-11

## 2022-05-11 PROBLEM — M06.9 RHEUMATOID ARTHRITIS: Chronic | Status: ACTIVE | Noted: 2022-05-11

## 2022-05-11 PROBLEM — K21.9 ESOPHAGEAL REFLUX: Status: ACTIVE | Noted: 2022-05-11

## 2022-05-12 PROBLEM — I42.8 OTHER CARDIOMYOPATHY: Chronic | Status: ACTIVE | Noted: 2022-05-11

## 2022-05-15 ENCOUNTER — HOSPITAL ENCOUNTER (INPATIENT)
Facility: HOSPITAL | Age: 72
LOS: 2 days | Discharge: HOME | DRG: 101 | End: 2022-05-17
Attending: EMERGENCY MEDICINE | Admitting: HOSPITALIST
Payer: MEDICARE

## 2022-05-15 DIAGNOSIS — I69.30 SEQUELA, POST-STROKE: ICD-10-CM

## 2022-05-15 DIAGNOSIS — R56.9 FOCAL SEIZURE: Primary | ICD-10-CM

## 2022-05-15 LAB
ALBUMIN SERPL-MCNC: 2.6 GM/DL (ref 3.4–4.8)
ALBUMIN/GLOB SERPL: 0.6 RATIO (ref 1.1–2)
ALP SERPL-CCNC: 109 UNIT/L (ref 40–150)
ALT SERPL-CCNC: 21 UNIT/L (ref 0–55)
APPEARANCE UR: CLEAR
AST SERPL-CCNC: 25 UNIT/L (ref 5–34)
BACTERIA #/AREA URNS AUTO: NORMAL /HPF
BASOPHILS # BLD AUTO: 0.03 X10(3)/MCL (ref 0–0.2)
BASOPHILS NFR BLD AUTO: 0.3 %
BILIRUB UR QL STRIP.AUTO: NEGATIVE MG/DL
BILIRUBIN DIRECT+TOT PNL SERPL-MCNC: 0.5 MG/DL
BUN SERPL-MCNC: 9.9 MG/DL (ref 8.4–25.7)
CALCIUM SERPL-MCNC: 9.3 MG/DL (ref 8.8–10)
CHLORIDE SERPL-SCNC: 107 MMOL/L (ref 98–107)
CO2 SERPL-SCNC: 24 MMOL/L (ref 23–31)
COLOR UR AUTO: ABNORMAL
CREAT SERPL-MCNC: 0.55 MG/DL (ref 0.73–1.18)
EOSINOPHIL # BLD AUTO: 0.17 X10(3)/MCL (ref 0–0.9)
EOSINOPHIL NFR BLD AUTO: 1.6 %
ERYTHROCYTE [DISTWIDTH] IN BLOOD BY AUTOMATED COUNT: 12.9 % (ref 11.5–17)
GLOBULIN SER-MCNC: 4.5 GM/DL (ref 2.4–3.5)
GLUCOSE SERPL-MCNC: 77 MG/DL (ref 82–115)
GLUCOSE UR QL STRIP.AUTO: NEGATIVE MG/DL
HCT VFR BLD AUTO: 43.1 % (ref 42–52)
HGB BLD-MCNC: 13.7 GM/DL (ref 14–18)
IMM GRANULOCYTES # BLD AUTO: 0.03 X10(3)/MCL (ref 0–0.02)
IMM GRANULOCYTES NFR BLD AUTO: 0.3 % (ref 0–0.43)
KETONES UR QL STRIP.AUTO: NEGATIVE MG/DL
LEUKOCYTE ESTERASE UR QL STRIP.AUTO: ABNORMAL UNIT/L
LYMPHOCYTES # BLD AUTO: 3.02 X10(3)/MCL (ref 0.6–4.6)
LYMPHOCYTES NFR BLD AUTO: 29.2 %
MCH RBC QN AUTO: 28.7 PG (ref 27–31)
MCHC RBC AUTO-ENTMCNC: 31.8 MG/DL (ref 33–36)
MCV RBC AUTO: 90.2 FL (ref 80–94)
MONOCYTES # BLD AUTO: 1.19 X10(3)/MCL (ref 0.1–1.3)
MONOCYTES NFR BLD AUTO: 11.5 %
NEUTROPHILS # BLD AUTO: 5.9 X10(3)/MCL (ref 2.1–9.2)
NEUTROPHILS NFR BLD AUTO: 57.1 %
NITRITE UR QL STRIP.AUTO: NEGATIVE
NRBC BLD AUTO-RTO: 0 %
PH UR STRIP.AUTO: 7 [PH]
PLATELET # BLD AUTO: 274 X10(3)/MCL (ref 130–400)
PMV BLD AUTO: 9.9 FL (ref 9.4–12.4)
POTASSIUM SERPL-SCNC: 3.7 MMOL/L (ref 3.5–5.1)
PROT SERPL-MCNC: 7.1 GM/DL (ref 5.8–7.6)
PROT UR QL STRIP.AUTO: NEGATIVE MG/DL
RBC # BLD AUTO: 4.78 X10(6)/MCL (ref 4.7–6.1)
RBC #/AREA URNS AUTO: <5 /HPF
RBC UR QL AUTO: NEGATIVE UNIT/L
SODIUM SERPL-SCNC: 140 MMOL/L (ref 136–145)
SP GR UR STRIP.AUTO: 1.02 (ref 1–1.03)
SQUAMOUS #/AREA URNS AUTO: <4 /LPF
TROPONIN I SERPL-MCNC: <0.01 NG/ML (ref 0–0.04)
UROBILINOGEN UR STRIP-ACNC: 1 MG/DL
WBC # SPEC AUTO: 10.3 X10(3)/MCL (ref 4.5–11.5)
WBC #/AREA URNS AUTO: <5 /HPF

## 2022-05-15 PROCEDURE — 11000001 HC ACUTE MED/SURG PRIVATE ROOM

## 2022-05-15 PROCEDURE — 85025 COMPLETE CBC W/AUTO DIFF WBC: CPT | Performed by: STUDENT IN AN ORGANIZED HEALTH CARE EDUCATION/TRAINING PROGRAM

## 2022-05-15 PROCEDURE — 25000003 PHARM REV CODE 250: Performed by: HOSPITALIST

## 2022-05-15 PROCEDURE — 81001 URINALYSIS AUTO W/SCOPE: CPT | Performed by: STUDENT IN AN ORGANIZED HEALTH CARE EDUCATION/TRAINING PROGRAM

## 2022-05-15 PROCEDURE — 63600175 PHARM REV CODE 636 W HCPCS: Performed by: STUDENT IN AN ORGANIZED HEALTH CARE EDUCATION/TRAINING PROGRAM

## 2022-05-15 PROCEDURE — 99285 EMERGENCY DEPT VISIT HI MDM: CPT | Mod: 25

## 2022-05-15 PROCEDURE — 80053 COMPREHEN METABOLIC PANEL: CPT | Performed by: STUDENT IN AN ORGANIZED HEALTH CARE EDUCATION/TRAINING PROGRAM

## 2022-05-15 PROCEDURE — 36415 COLL VENOUS BLD VENIPUNCTURE: CPT | Performed by: STUDENT IN AN ORGANIZED HEALTH CARE EDUCATION/TRAINING PROGRAM

## 2022-05-15 PROCEDURE — 96374 THER/PROPH/DIAG INJ IV PUSH: CPT

## 2022-05-15 PROCEDURE — 84484 ASSAY OF TROPONIN QUANT: CPT | Performed by: STUDENT IN AN ORGANIZED HEALTH CARE EDUCATION/TRAINING PROGRAM

## 2022-05-15 RX ORDER — METHIMAZOLE 10 MG/1
40 TABLET ORAL DAILY
Status: DISCONTINUED | OUTPATIENT
Start: 2022-05-16 | End: 2022-05-17 | Stop reason: HOSPADM

## 2022-05-15 RX ORDER — PREDNISONE 5 MG/1
5 TABLET ORAL DAILY
Status: DISCONTINUED | OUTPATIENT
Start: 2022-05-16 | End: 2022-05-17 | Stop reason: HOSPADM

## 2022-05-15 RX ORDER — LORAZEPAM 2 MG/ML
1 INJECTION INTRAMUSCULAR
Status: DISCONTINUED | OUTPATIENT
Start: 2022-05-15 | End: 2022-05-17 | Stop reason: HOSPADM

## 2022-05-15 RX ORDER — ATORVASTATIN CALCIUM 40 MG/1
40 TABLET, FILM COATED ORAL DAILY
Status: DISCONTINUED | OUTPATIENT
Start: 2022-05-15 | End: 2022-05-17 | Stop reason: HOSPADM

## 2022-05-15 RX ORDER — DIGOXIN 250 MCG
0.25 TABLET ORAL DAILY
Status: DISCONTINUED | OUTPATIENT
Start: 2022-05-16 | End: 2022-05-17 | Stop reason: HOSPADM

## 2022-05-15 RX ORDER — TALC
6 POWDER (GRAM) TOPICAL NIGHTLY PRN
Status: DISCONTINUED | OUTPATIENT
Start: 2022-05-15 | End: 2022-05-17 | Stop reason: HOSPADM

## 2022-05-15 RX ORDER — ASPIRIN 325 MG
325 TABLET ORAL DAILY
Status: DISCONTINUED | OUTPATIENT
Start: 2022-05-16 | End: 2022-05-17 | Stop reason: HOSPADM

## 2022-05-15 RX ORDER — MONTELUKAST SODIUM 10 MG/1
10 TABLET ORAL DAILY
Status: DISCONTINUED | OUTPATIENT
Start: 2022-05-16 | End: 2022-05-17 | Stop reason: HOSPADM

## 2022-05-15 RX ORDER — PANTOPRAZOLE SODIUM 40 MG/1
40 TABLET, DELAYED RELEASE ORAL DAILY
Status: DISCONTINUED | OUTPATIENT
Start: 2022-05-16 | End: 2022-05-17 | Stop reason: HOSPADM

## 2022-05-15 RX ORDER — PROPRANOLOL HYDROCHLORIDE 60 MG/1
60 CAPSULE, EXTENDED RELEASE ORAL 2 TIMES DAILY
Status: DISCONTINUED | OUTPATIENT
Start: 2022-05-15 | End: 2022-05-17 | Stop reason: HOSPADM

## 2022-05-15 RX ORDER — SODIUM CHLORIDE 0.9 % (FLUSH) 0.9 %
10 SYRINGE (ML) INJECTION
Status: DISCONTINUED | OUTPATIENT
Start: 2022-05-15 | End: 2022-05-17 | Stop reason: HOSPADM

## 2022-05-15 RX ORDER — LEVETIRACETAM 500 MG/1
500 TABLET ORAL 2 TIMES DAILY
Status: DISCONTINUED | OUTPATIENT
Start: 2022-05-15 | End: 2022-05-17 | Stop reason: HOSPADM

## 2022-05-15 RX ORDER — LEVETIRACETAM 500 MG/5ML
1000 INJECTION, SOLUTION, CONCENTRATE INTRAVENOUS
Status: COMPLETED | OUTPATIENT
Start: 2022-05-15 | End: 2022-05-15

## 2022-05-15 RX ADMIN — LEVETIRACETAM 500 MG: 500 TABLET, FILM COATED ORAL at 08:05

## 2022-05-15 RX ADMIN — APIXABAN 5 MG: 5 TABLET, FILM COATED ORAL at 08:05

## 2022-05-15 RX ADMIN — ATORVASTATIN CALCIUM 40 MG: 40 TABLET, FILM COATED ORAL at 06:05

## 2022-05-15 RX ADMIN — PROPRANOLOL HYDROCHLORIDE 60 MG: 60 CAPSULE, EXTENDED RELEASE ORAL at 08:05

## 2022-05-15 RX ADMIN — LEVETIRACETAM 1000 MG: 100 INJECTION, SOLUTION INTRAVENOUS at 04:05

## 2022-05-15 RX ADMIN — MELATONIN TAB 3 MG 6 MG: 3 TAB at 08:05

## 2022-05-15 NOTE — ED PROVIDER NOTES
Encounter Date: 5/15/2022       History     Chief Complaint   Patient presents with    Extremity Weakness     Pt to ED via [facility] for tingling to L hand and pain to L lateral rib cage lasting a few mins, resolved at present. PMH includes CVA about 6 weeks ago, no new deficits, pt at baseline. Canones neg .     HPI  Review of patient's allergies indicates:   Allergen Reactions    Ace inhibitors Swelling     Lip swelling    Morphine sulfate     Nafcillin Itching    Pradaxa [dabigatran etexilate] Other (See Comments)     Abdominal cramping    Sulfamethoxazole Rash     Past Medical History:   Diagnosis Date    Acute left arterial ischemic stroke, KINGA (anterior cerebral artery) 5/3/2022    Acute osteomyelitis 5/11/2022    Atherosclerosis of coronary artery 5/11/2022    Atrial fibrillation 5/3/2022    Benign essential hypertension 5/3/2022    Cardiomyopathy     Coronary artery disease     Diverticulosis     Esophageal reflux 5/11/2022    Fatigue     Generalized anxiety disorder     GERD (gastroesophageal reflux disease)     Graves disease     HTN (hypertension)     Hyperthyroidism     Idiopathic peripheral neuropathy 5/11/2022    Irritable bowel syndrome without diarrhea     Ischemic cardiomyopathy 5/3/2022    Mixed hyperlipidemia 5/11/2022    Paroxysmal atrial fibrillation     Rheumatoid arthritis 5/11/2022    Rheumatoid arthritis, unspecified     Shingles     Staph aureus infection     Stroke     Thyroiditis, unspecified     Thyrotoxicosis 5/3/2022     Past Surgical History:   Procedure Laterality Date    CATARACT EXTRACTION      CYST REMOVAL Left     TONSILLECTOMY      TOTAL KNEE ARTHROPLASTY      VASECTOMY       Family History   Family history unknown: Yes     Social History     Tobacco Use    Smoking status: Never Smoker    Smokeless tobacco: Never Used   Substance Use Topics    Alcohol use: Never    Drug use: Never     Review of Systems    Physical Exam     Initial  Vitals [05/15/22 1306]   BP Pulse Resp Temp SpO2   118/89 97 18 98.6 °F (37 °C) 97 %      MAP       --         Physical Exam    ED Course   Procedures  Labs Reviewed   COMPREHENSIVE METABOLIC PANEL - Abnormal; Notable for the following components:       Result Value    Glucose Level 77 (*)     Creatinine 0.55 (*)     Albumin Level 2.6 (*)     Globulin 4.5 (*)     Albumin/Globulin Ratio 0.6 (*)     All other components within normal limits   CBC WITH DIFFERENTIAL - Abnormal; Notable for the following components:    Hgb 13.7 (*)     MCHC 31.8 (*)     IG# 0.03 (*)     All other components within normal limits   CBC W/ AUTO DIFFERENTIAL    Narrative:     The following orders were created for panel order CBC auto differential.  Procedure                               Abnormality         Status                     ---------                               -----------         ------                     CBC with Differential[[id number]]        Abnormal            Final result                 Please view results for these tests on the individual orders.   TROPONIN I   URINALYSIS, REFLEX TO URINE CULTURE          Imaging Results          CT Head Without Contrast (Final result)  Result time 05/15/22 14:29:34    Final result by Michel Ge MD (05/15/22 14:29:34)                 Impression:      No acute intracranial abnormalities.      Electronically signed by: Michel Ge  Date:    05/15/2022  Time:    14:29             Narrative:    EXAMINATION:  CT HEAD WITHOUT CONTRAST    CLINICAL HISTORY:  Memory loss;    TECHNIQUE:  Axial scans were obtained from skull base to the vertex.    Coronal and sagittal reconstructions obtained from the axial data.    Automatic exposure control was utilized to limit radiation dose.    Contrast: None    Radiation Dose:    Total DLP: 1136 mGy*cm    COMPARISON:  Head CT/CTA 04/28/2022    FINDINGS:  Scalp/Skull:    No abnormalities.    Brain sulci: Appropriate for patient's age.    Ventricles:  Normal in size and configuration. No hydrocephalus.    Extra-axial spaces:    No masses or fluid collections.    Parenchyma:    Bilateral (R>L) superior frontal foci of cortical based encephalomalacia compatible with remote insults.    Mild-moderate chronic microangiopathy in the supratentorial white matter.    No mass, hemorrhage or CT evidence of an acute vascular insult.    Dural sinuses: No abnormal densities.    Sellar/Suprasellar region: No abnormalities.    Skull base and Craniocervical junction: No abnormalities.    Incidental findings:    Carotid siphon and vertebrobasilar atherosclerotic vascular calcifications.    Status post bilateral cataract surgery.                                 Medications   levETIRAcetam injection 1,000 mg (has no administration in time range)                Attending Attestation:   Physician Attestation Statement for Resident:  As the supervising MD   Physician Attestation Statement: I have personally seen and examined this patient.   I agree with the above history. -: Pt presents with intermittent confusion and body tremors since being discharged last week after CVA.  Currently asymptomatic.  Wife notes episode of urinary incontinence, which is new.   As the supervising MD I agree with the above PE.   -: Left hemiparesis   As the supervising MD I agree with the above treatment, course, plan, and disposition.   -: Strongly suspect focal seizures.  Will load with Keppra and D/C on same - pt has neurology F/U on 5/18.  I provided tthe substantive car for this patient encounter.  I have reviewed and agree with the residents interpretation of the following: lab data and CT scans.  I have reviewed the following: CT reports.                ED Course as of 05/17/22 2330   Sun May 15, 2022   1511 CT head w/out negative.  From wife's story and patients recent stroke history he is likely having focal seizures.  Will load with Keppra 1g IV in ED and send home with 500mg po bid.  Pt has  follow up with Neurology on 5/18/2022. [TP]   1624 Wife does not feel comfortable taking patient home at this time.  Would like for neurology work up inpatient.  [TP]      ED Course User Index  [TP] Hyacinth Nolasco MD             Clinical Impression:   Final diagnoses:  [R56.9] Focal seizure (Primary)                 Jean Mitchell MD  05/17/22 5728

## 2022-05-15 NOTE — ED PROVIDER NOTES
"Encounter Date: 5/15/2022       History     Chief Complaint   Patient presents with    Extremity Weakness     Pt to ED via [facility] for tingling to L hand and pain to L lateral rib cage lasting a few mins, resolved at present. PMH includes CVA about 6 weeks ago, no new deficits, pt at baseline. Polo neg .     70 yo male hypertension, hyperlipidemia, atrial fibrillation on Xarelto, coronary artery disease, ischemic cardiomyopathy, hyperthyroidism, rheumatoid arthritis on Cimzia infusions monthly and anxiety/depression presents to ED by EMS by his wife stating that he has been acting differently over the last 2 days.  He wet the bed last night.  Arguing with her over little things.  Also having what she describes as "twitches on left side" over the last two days. In ED patient has no complaints and states that nothing is bothering him currently.  He is unsure why he is here.  Upon chart review patient was recently discharged from [facility] on 5/2 after being treated for stroke and was sent to neuro rehab.  Has neuro f/u on 5/18        The history is provided by the patient, the spouse, the EMS personnel and medical records. No  was used.     Review of patient's allergies indicates:   Allergen Reactions    Ace inhibitors Swelling     Lip swelling    Morphine sulfate     Nafcillin Itching    Pradaxa [dabigatran etexilate] Other (See Comments)     Abdominal cramping    Sulfamethoxazole Rash     Past Medical History:   Diagnosis Date    Acute left arterial ischemic stroke, KINGA (anterior cerebral artery) 5/3/2022    Acute osteomyelitis 5/11/2022    Atherosclerosis of coronary artery 5/11/2022    Atrial fibrillation 5/3/2022    Benign essential hypertension 5/3/2022    Cardiomyopathy     Coronary artery disease     Diverticulosis     Esophageal reflux 5/11/2022    Fatigue     Generalized anxiety disorder     GERD (gastroesophageal reflux disease)     Graves disease     HTN " (hypertension)     Hyperthyroidism     Idiopathic peripheral neuropathy 5/11/2022    Irritable bowel syndrome without diarrhea     Ischemic cardiomyopathy 5/3/2022    Mixed hyperlipidemia 5/11/2022    Paroxysmal atrial fibrillation     Rheumatoid arthritis 5/11/2022    Rheumatoid arthritis, unspecified     Shingles     Staph aureus infection     Stroke     Thyroiditis, unspecified     Thyrotoxicosis 5/3/2022     Past Surgical History:   Procedure Laterality Date    CATARACT EXTRACTION      CYST REMOVAL Left     TONSILLECTOMY      TOTAL KNEE ARTHROPLASTY      VASECTOMY       Family History   Family history unknown: Yes     Social History     Tobacco Use    Smoking status: Never Smoker    Smokeless tobacco: Never Used   Substance Use Topics    Alcohol use: Never    Drug use: Never     Review of Systems   Constitutional: Positive for activity change. Negative for appetite change, chills, fatigue and fever.   HENT: Negative for congestion, ear pain, postnasal drip, rhinorrhea, sinus pressure, sinus pain, sneezing and sore throat.    Eyes: Negative for discharge.   Respiratory: Negative for cough, choking, chest tightness, shortness of breath, wheezing and stridor.    Cardiovascular: Negative for chest pain, palpitations and leg swelling.   Gastrointestinal: Negative for abdominal distention, abdominal pain, blood in stool, constipation, diarrhea, nausea and vomiting.   Genitourinary: Negative for dysuria, hematuria and urgency.   Musculoskeletal: Negative for neck pain.   Skin: Negative for color change.   Neurological: Negative for dizziness, tremors, seizures, syncope, speech difficulty, weakness, numbness and headaches.   Psychiatric/Behavioral: Positive for agitation, behavioral problems and confusion.       Physical Exam     Initial Vitals [05/15/22 1306]   BP Pulse Resp Temp SpO2   118/89 97 18 98.6 °F (37 °C) 97 %      MAP       --         Physical Exam    Nursing note and vitals  reviewed.  Constitutional: He appears well-developed and well-nourished. He is not diaphoretic. No distress.   HENT:   Head: Normocephalic and atraumatic.   Right Ear: External ear normal.   Left Ear: External ear normal.   Nose: Nose normal.   Mouth/Throat: Oropharynx is clear and moist. No oropharyngeal exudate.   Eyes: Conjunctivae and EOM are normal. Pupils are equal, round, and reactive to light. Right eye exhibits no discharge. Left eye exhibits no discharge. No scleral icterus.   Neck: Neck supple. No JVD present.   Normal range of motion.  Cardiovascular: Normal rate, regular rhythm, normal heart sounds and intact distal pulses. Exam reveals no gallop and no friction rub.    No murmur heard.  Pulmonary/Chest: Breath sounds normal. No respiratory distress. He has no wheezes. He has no rhonchi. He has no rales. He exhibits no tenderness.   Abdominal: Abdomen is soft. Bowel sounds are normal. He exhibits no distension and no mass. There is no abdominal tenderness. There is no rebound and no guarding.   Musculoskeletal:         General: No tenderness or edema. Normal range of motion.      Cervical back: Normal range of motion and neck supple.      Comments: 4/5 strength LUE.  5/5 strength RUE, LLE and RLE.     Neurological: He is alert and oriented to person, place, and time. He has normal strength. No cranial nerve deficit.   Skin: Skin is warm and dry. Capillary refill takes less than 2 seconds. No erythema. No pallor.   Psychiatric: He has a normal mood and affect.         ED Course   Procedures  Labs Reviewed   COMPREHENSIVE METABOLIC PANEL - Abnormal; Notable for the following components:       Result Value    Glucose Level 77 (*)     Creatinine 0.55 (*)     Albumin Level 2.6 (*)     Globulin 4.5 (*)     Albumin/Globulin Ratio 0.6 (*)     All other components within normal limits   CBC WITH DIFFERENTIAL - Abnormal; Notable for the following components:    Hgb 13.7 (*)     MCHC 31.8 (*)     IG# 0.03 (*)      All other components within normal limits   TROPONIN I - Normal   CBC W/ AUTO DIFFERENTIAL    Narrative:     The following orders were created for panel order CBC auto differential.  Procedure                               Abnormality         Status                     ---------                               -----------         ------                     CBC with Differential[[id number]]        Abnormal            Final result                 Please view results for these tests on the individual orders.   URINALYSIS, REFLEX TO URINE CULTURE          Imaging Results          CT Head Without Contrast (Final result)  Result time 05/15/22 14:29:34    Final result by Michel Ge MD (05/15/22 14:29:34)                 Impression:      No acute intracranial abnormalities.      Electronically signed by: Michel Ge  Date:    05/15/2022  Time:    14:29             Narrative:    EXAMINATION:  CT HEAD WITHOUT CONTRAST    CLINICAL HISTORY:  Memory loss;    TECHNIQUE:  Axial scans were obtained from skull base to the vertex.    Coronal and sagittal reconstructions obtained from the axial data.    Automatic exposure control was utilized to limit radiation dose.    Contrast: None    Radiation Dose:    Total DLP: 1136 mGy*cm    COMPARISON:  Head CT/CTA 04/28/2022    FINDINGS:  Scalp/Skull:    No abnormalities.    Brain sulci: Appropriate for patient's age.    Ventricles: Normal in size and configuration. No hydrocephalus.    Extra-axial spaces:    No masses or fluid collections.    Parenchyma:    Bilateral (R>L) superior frontal foci of cortical based encephalomalacia compatible with remote insults.    Mild-moderate chronic microangiopathy in the supratentorial white matter.    No mass, hemorrhage or CT evidence of an acute vascular insult.    Dural sinuses: No abnormal densities.    Sellar/Suprasellar region: No abnormalities.    Skull base and Craniocervical junction: No abnormalities.    Incidental findings:    Carotid  siphon and vertebrobasilar atherosclerotic vascular calcifications.    Status post bilateral cataract surgery.                                 Medications   levETIRAcetam injection 1,000 mg (1,000 mg Intravenous Given 5/15/22 1611)     Medical Decision Making:   History:   Old Records Summarized: records from clinic visits and records from previous admission(s).  Differential Diagnosis:   UTI, stroke, dehydration  Clinical Tests:   Lab Tests: Ordered  Radiological Study: Ordered  Other:   I have discussed this case with another health care provider.       <> Summary of the Discussion: hospitalist agree to admit with EEG in AM             ED Course as of 05/15/22 1625   Sun May 15, 2022   1511 CT head w/out negative.  From wife's story and patients recent stroke history he is likely having focal seizures.  Will load with Keppra 1g IV in ED and send home with 500mg po bid.  Pt has follow up with Neurology on 5/18/2022. [TP]   1624 Wife does not feel comfortable taking patient home at this time.  Would like for neurology work up inpatient.  [TP]      ED Course User Index  [TP] Hyacinth Nolasco MD             Clinical Impression:   Final diagnoses:  [R56.9] Focal seizure (Primary)  [I69.30] Sequela, post-stroke          ED Disposition Condition    Admit               Hyacinth Nolasco MD  Resident  05/15/22 162

## 2022-05-15 NOTE — H&P
[facility] Hospital Medicine History & Physical Examination       Patient Name: Quan Contreras  MRN: [id number]  Patient Class: IP- Inpatient   Admission Date: 5/15/2022   Admitting Physician: [facility]   Length of Stay: 0  Attending Physician: Franklin Dave MD  Primary Care Provider: Bran Hansen MD  Face-to-Face encounter date: 05/15/2022  Code Status:<CODESTATUS>   Chief Complaint: Extremity Weakness (Pt to ED via [facility] for tingling to L hand and pain to L lateral rib cage lasting a few mins, resolved at present. PMH includes CVA about 6 weeks ago, no new deficits, pt at baseline. Ismay neg .)        Patient information was obtained from patient, patient's family, past medical records and ER records.     HISTORY OF PRESENT ILLNESS:   Quan Contreras is a 71 y.o. male who  has a past medical history of Acute left arterial ischemic stroke, KINGA (anterior cerebral artery) (5/3/2022), Acute osteomyelitis (5/11/2022), Atherosclerosis of coronary artery (5/11/2022), Atrial fibrillation (5/3/2022), Benign essential hypertension (5/3/2022), Cardiomyopathy, Coronary artery disease, Diverticulosis, Esophageal reflux (5/11/2022), Fatigue, Generalized anxiety disorder, GERD (gastroesophageal reflux disease), Graves disease, HTN (hypertension), Hyperthyroidism, Idiopathic peripheral neuropathy (5/11/2022), Irritable bowel syndrome without diarrhea, Ischemic cardiomyopathy (5/3/2022), Mixed hyperlipidemia (5/11/2022), Paroxysmal atrial fibrillation, Rheumatoid arthritis (5/11/2022), Rheumatoid arthritis, unspecified, Shingles, Staph aureus infection, Stroke, Thyroiditis, unspecified, and Thyrotoxicosis (5/3/2022).. The patient presented to [facility] on [facility]/15/2022 with a primary complaint of numbness to L hand.  Patient recently admitted for acute CVA on a month and a half ago.  Wife is at the bedside providing majority of the medical history.  She states that after he was discharged he  was sent to rehab for several weeks and then discharged home.  Upon discharge from rehab he was ambulating freely and is at his mental baseline.  She states 3 days prior to admission he had an episode of extreme weakness where he could get out of the bathtub.  Neighbors and EMS were called.  Seem to be favoring his right side.  Denies any jerking movements.  There was again a melatonin and bed.  States over the weekend he continued to have some bizarre thought processes and movement.  She noticed some twitching on the left side and some violent jerks.  He did confused and episodes last 15-20 minutes.  She states that she tried giving him his home baclofen for spasms within seem to help.  This morning it was very confused and combative.  Was disoriented and not moving his left side.  She decided to call EMS and bring him back to the hospital.  Patient had some witnessed jerking movements on the left in the emergency room and he was loaded with Keppra.  His mental status still waxing waning.  He is oriented to person at the time of the exam but sluggish to respond.  CT of the head done in the ER not show any acute findings.  Weakness on the left side comes and goes.  Denies any incontinence of bowel or bladder.  He does have a follow-up appointment with Neurology scheduled for later this week the due to concern on his confusion decision was made to admit have Neurology see him here.  The hospitalist group was asked to assume care.    PAST MEDICAL HISTORY:     Past Medical History:   Diagnosis Date    Acute left arterial ischemic stroke, KINGA (anterior cerebral artery) 5/3/2022    Acute osteomyelitis 5/11/2022    Atherosclerosis of coronary artery 5/11/2022    Atrial fibrillation 5/3/2022    Benign essential hypertension 5/3/2022    Cardiomyopathy     Coronary artery disease     Diverticulosis     Esophageal reflux 5/11/2022    Fatigue     Generalized anxiety disorder     GERD (gastroesophageal reflux  disease)     Graves disease     HTN (hypertension)     Hyperthyroidism     Idiopathic peripheral neuropathy 5/11/2022    Irritable bowel syndrome without diarrhea     Ischemic cardiomyopathy 5/3/2022    Mixed hyperlipidemia 5/11/2022    Paroxysmal atrial fibrillation     Rheumatoid arthritis 5/11/2022    Rheumatoid arthritis, unspecified     Shingles     Staph aureus infection     Stroke     Thyroiditis, unspecified     Thyrotoxicosis 5/3/2022       PAST SURGICAL HISTORY:     Past Surgical History:   Procedure Laterality Date    CATARACT EXTRACTION      CYST REMOVAL Left     TONSILLECTOMY      TOTAL KNEE ARTHROPLASTY      VASECTOMY         ALLERGIES:   Ace inhibitors, Morphine sulfate, Nafcillin, Pradaxa [dabigatran etexilate], and Sulfamethoxazole    FAMILY HISTORY:   Reviewed and negative    SOCIAL HISTORY:     Social History     Tobacco Use    Smoking status: Never Smoker    Smokeless tobacco: Never Used   Substance Use Topics    Alcohol use: Never        HOME MEDICATIONS:     Prior to Admission medications    Medication Sig Start Date End Date Taking? Authorizing Provider   apixaban (ELIQUIS) 5 mg Tab Take 1 tablet (5 mg total) by mouth 2 (two) times daily. 5/3/22   Evaristo Motta MD   apixaban (ELIQUIS) 5 mg Tab Take 5 mg by mouth 2 (two) times daily.    Historical Provider   aspirin 325 MG tablet Take 325 mg by mouth once daily.    Historical Provider   certolizumab pegol (CIMZIA SUBQ) Inject into the skin.    Historical Provider   DIGITEK 250 mcg (0.25 mg) tablet Take 1 tablet by mouth Daily. 3/9/22   Historical Provider   ergocalciferol (ERGOCALCIFEROL) 50,000 unit Cap Take 50,000 Units by mouth every 7 days. 5/10/22   Historical Provider   ezetimibe (ZETIA) 10 mg tablet Take 1 tablet by mouth once daily at 6am. 3/9/22   Historical Provider   methIMAzole (TAPAZOLE) 10 MG Tab Take 4 tablets by mouth Daily. 12/10/21   Historical Provider   montelukast (SINGULAIR) 10 mg tablet Take 1 tablet  by mouth Daily. 3/9/22   Historical Provider   pantoprazole (PROTONIX) 40 MG tablet Take 40 mg by mouth once daily.    Historical Provider   predniSONE (DELTASONE) 5 MG tablet Take 5 mg by mouth once daily. At bedtime    Historical Provider   propranoloL (INDERAL LA) 60 MG 24 hr capsule Take 1 capsule (60 mg total) by mouth 2 (two) times a day. 5/3/22 5/3/23  Evaristo Motta MD   rosuvastatin (CRESTOR) 40 MG Tab Take 10 mg by mouth every evening.    Historical Provider   baclofen (LIORESAL) 10 MG tablet Take 1 tablet by mouth nightly. 11/9/21 5/3/22  Historical Provider   metoprolol succinate (TOPROL-XL) 100 MG 24 hr tablet Take 100 mg by mouth 2 (two) times daily.  5/3/22  Historical Provider       REVIEW OF SYSTEMS:   Except as documented, all other systems reviewed and negative     PHYSICAL EXAM:     VITAL SIGNS: 24 HRS MIN & MAX LAST   Temp  Min: 98.6 °F (37 °C)  Max: 98.6 °F (37 °C) 98.6 °F (37 °C)   BP  Min: 118/89  Max: 129/72 126/85   Pulse  Min: 87  Max: 97  87   Resp  Min: 18  Max: 18 18   SpO2  Min: 95 %  Max: 97 % 95 %       General appearance: Well-developed, well-nourished male in no apparent distress.  HENT: Atraumatic head. Moist mucous membranes of oral cavity.  Eyes: Normal extraocular movements.   Neck: Supple.   Lungs: Clear to auscultation bilaterally. No wheezing present.   Heart: Regular rate and rhythm. S1 and S2 present with no murmurs/gallop/rub. No pedal edema. No JVD present.   Abdomen: Soft, non-distended, non-tender. No rebound tenderness/guarding. Bowel sounds are normal.   Extremities: No cyanosis, clubbing, or edema.  Skin: No Rash.   Neuro: Motor and sensory exams grossly intact. Good tone. Muscle strength 4/5 on the left upper and lower extremities.  Right side normal.  Oriented to person and place.  Recognizes his wife.  States that nothing wrong.  No recollection of events.      Psych/mental status: Appropriate mood and affect. Responds appropriately to questions.     LABS AND  IMAGING:     Recent Labs   Lab 05/15/22  1448   WBC 10.3   RBC 4.78   HGB 13.7*   HCT 43.1   MCV 90.2   MCH 28.7   MCHC 31.8*   RDW 12.9      MPV 9.9       Recent Labs   Lab 05/15/22  1448      K 3.7   CO2 24   BUN 9.9   CREATININE 0.55*   CALCIUM 9.3   ALBUMIN 2.6*   ALKPHOS 109   ALT 21   AST 25   BILITOT 0.5       Microbiology Results (last 7 days)     ** No results found for the last 168 hours. **           CT Head Without Contrast  Narrative: EXAMINATION:  CT HEAD WITHOUT CONTRAST    CLINICAL HISTORY:  Memory loss;    TECHNIQUE:  Axial scans were obtained from skull base to the vertex.    Coronal and sagittal reconstructions obtained from the axial data.    Automatic exposure control was utilized to limit radiation dose.    Contrast: None    Radiation Dose:    Total DLP: 1136 mGy*cm    COMPARISON:  Head CT/CTA 04/28/2022    FINDINGS:  Scalp/Skull:    No abnormalities.    Brain sulci: Appropriate for patient's age.    Ventricles: Normal in size and configuration. No hydrocephalus.    Extra-axial spaces:    No masses or fluid collections.    Parenchyma:    Bilateral (R>L) superior frontal foci of cortical based encephalomalacia compatible with remote insults.    Mild-moderate chronic microangiopathy in the supratentorial white matter.    No mass, hemorrhage or CT evidence of an acute vascular insult.    Dural sinuses: No abnormal densities.    Sellar/Suprasellar region: No abnormalities.    Skull base and Craniocervical junction: No abnormalities.    Incidental findings:    Carotid siphon and vertebrobasilar atherosclerotic vascular calcifications.    Status post bilateral cataract surgery.  Impression: No acute intracranial abnormalities.    Electronically signed by: Michel Ge  Date:    05/15/2022  Time:    14:29        ASSESSMENT & PLAN:   Ischemic encephalopathy  History of CVA 6 weeks ago  Possible focal seizures  History of:  Generalized anxiety disorder, thyrotoxicosis/Graves disease,  peripheral neuropathy, IBS, ischemic cardiomyopathy, coronary artery disease, hypertension, atrial fibrillation    Initial workup in emergency room pretty benign.  Vital signs are stable.  Atrial fibrillation rate controlled.  Currently sinus.  Wife reports compliance with his blood thinners.  Concern for possible newly developed seizure disorder in setting of recent previous stroke.  Loaded with Keppra in the emergency room.  Neurology has been consulted.  Will follow up any further recommendations.  May benefit from a repeat MRI.  Will continue his home medications for now including blood pressure meds and blood thinners.  EEG has been ordered.  Continue methimazole for history of Graves disease.  No current cardiac symptoms.  Need to find previous echocardiogram.  Wife states he got 1 at previous admit.        VTE Prophylaxis: Eliquis, home med  Patient condition:  Stable  __________________________________________________________________________  INPATIENT LIST OF MEDICATIONS   No current facility-administered medications for this encounter.    Current Outpatient Medications:     apixaban (ELIQUIS) 5 mg Tab, Take 1 tablet (5 mg total) by mouth 2 (two) times daily., Disp: , Rfl:     apixaban (ELIQUIS) 5 mg Tab, Take 5 mg by mouth 2 (two) times daily., Disp: , Rfl:     aspirin 325 MG tablet, Take 325 mg by mouth once daily., Disp: , Rfl:     certolizumab pegol (CIMZIA SUBQ), Inject into the skin., Disp: , Rfl:     DIGITEK 250 mcg (0.25 mg) tablet, Take 1 tablet by mouth Daily., Disp: , Rfl:     ergocalciferol (ERGOCALCIFEROL) 50,000 unit Cap, Take 50,000 Units by mouth every 7 days., Disp: , Rfl:     ezetimibe (ZETIA) 10 mg tablet, Take 1 tablet by mouth once daily at 6am., Disp: , Rfl:     methIMAzole (TAPAZOLE) 10 MG Tab, Take 4 tablets by mouth Daily., Disp: , Rfl:     montelukast (SINGULAIR) 10 mg tablet, Take 1 tablet by mouth Daily., Disp: , Rfl:     pantoprazole (PROTONIX) 40 MG tablet, Take 40 mg  by mouth once daily., Disp: , Rfl:     predniSONE (DELTASONE) 5 MG tablet, Take 5 mg by mouth once daily. At bedtime, Disp: , Rfl:     propranoloL (INDERAL LA) 60 MG 24 hr capsule, Take 1 capsule (60 mg total) by mouth 2 (two) times a day., Disp: 60 capsule, Rfl: 11    rosuvastatin (CRESTOR) 40 MG Tab, Take 10 mg by mouth every evening., Disp: , Rfl:     All diagnosis and differential diagnosis have been reviewed; assessment and plan has been documented; I have personally reviewed the labs and test results that are presently available; I have reviewed the patients medication list; I have reviewed the consulting providers response and recommendations. I have reviewed or attempted to review medical records based upon their availability.    All of the patient and family questions have been addressed and answered. Patient's is agreeable to the above stated plan. I will continue to monitor closely and make adjustments to medical management as needed.    Franklin Dave MD   05/15/2022

## 2022-05-15 NOTE — ED NOTES
Patient family at bedside. Reports patient altered mental status, last known well Friday. States patient has had obsessive behaviors and acting strange.

## 2022-05-15 NOTE — ED NOTES
Assumed care of patient, patient arrived via EMS after complaints of family.  EMS reports patient having left arm tingling and numbness an hour ago. Patient denies any pain or left arm tingling at this moment. Patient hx of previous MI and of previous stroke with residual left sided deficits that are mostly resolved through therapy. Currently on eliquis.

## 2022-05-16 PROCEDURE — 97162 PT EVAL MOD COMPLEX 30 MIN: CPT

## 2022-05-16 PROCEDURE — 97165 OT EVAL LOW COMPLEX 30 MIN: CPT

## 2022-05-16 PROCEDURE — 94761 N-INVAS EAR/PLS OXIMETRY MLT: CPT

## 2022-05-16 PROCEDURE — 25000003 PHARM REV CODE 250: Performed by: HOSPITALIST

## 2022-05-16 PROCEDURE — 11000001 HC ACUTE MED/SURG PRIVATE ROOM

## 2022-05-16 PROCEDURE — 63600175 PHARM REV CODE 636 W HCPCS: Performed by: HOSPITALIST

## 2022-05-16 RX ADMIN — PROPRANOLOL HYDROCHLORIDE 60 MG: 60 CAPSULE, EXTENDED RELEASE ORAL at 08:05

## 2022-05-16 RX ADMIN — PREDNISONE 5 MG: 5 TABLET ORAL at 08:05

## 2022-05-16 RX ADMIN — ASPIRIN 325 MG ORAL TABLET 325 MG: 325 PILL ORAL at 08:05

## 2022-05-16 RX ADMIN — ATORVASTATIN CALCIUM 40 MG: 40 TABLET, FILM COATED ORAL at 04:05

## 2022-05-16 RX ADMIN — DIGOXIN 0.25 MG: 250 TABLET ORAL at 04:05

## 2022-05-16 RX ADMIN — METHIMAZOLE 40 MG: 10 TABLET ORAL at 04:05

## 2022-05-16 RX ADMIN — APIXABAN 5 MG: 5 TABLET, FILM COATED ORAL at 08:05

## 2022-05-16 RX ADMIN — MELATONIN TAB 3 MG 6 MG: 3 TAB at 11:05

## 2022-05-16 RX ADMIN — PANTOPRAZOLE SODIUM 40 MG: 40 TABLET, DELAYED RELEASE ORAL at 08:05

## 2022-05-16 RX ADMIN — MONTELUKAST 10 MG: 10 TABLET, FILM COATED ORAL at 04:05

## 2022-05-16 RX ADMIN — LEVETIRACETAM 500 MG: 500 TABLET, FILM COATED ORAL at 08:05

## 2022-05-16 NOTE — PROGRESS NOTES
[facility]  Hospital Medicine Progress Note        HPI:   71 y.o. male who  has a past medical history of Acute left arterial ischemic stroke, KINGA (anterior cerebral artery) (5/3/2022), Acute osteomyelitis (5/11/2022), Atherosclerosis of coronary artery (5/11/2022), Atrial fibrillation (5/3/2022), Benign essential hypertension (5/3/2022), Cardiomyopathy, Coronary artery disease, Diverticulosis, Esophageal reflux (5/11/2022), Fatigue, Generalized anxiety disorder, GERD (gastroesophageal reflux disease), Graves disease, HTN (hypertension), Hyperthyroidism, Idiopathic peripheral neuropathy (5/11/2022), Irritable bowel syndrome without diarrhea, Ischemic cardiomyopathy (5/3/2022), Mixed hyperlipidemia (5/11/2022), Paroxysmal atrial fibrillation, Rheumatoid arthritis (5/11/2022), Rheumatoid arthritis, unspecified, Shingles, Staph aureus infection, Stroke, Thyroiditis, unspecified, and Thyrotoxicosis (5/3/2022).. The patient presented to [facility] on 5/15/2022 with a primary complaint of numbness to L hand.  Patient recently admitted for acute CVA on a month and a half ago.  Wife is at the bedside providing majority of the medical history.  She states that after he was discharged he was sent to rehab for several weeks and then discharged home.  Upon discharge from rehab he was ambulating freely and is at his mental baseline.  She states 3 days prior to admission he had an episode of extreme weakness where he could get out of the bathtub.  Neighbors and EMS were called.  Seem to be favoring his right side.  Denies any jerking movements.  There was again a melatonin and bed.  States over the weekend he continued to have some bizarre thought processes and movement.  She noticed some twitching on the left side and some violent jerks.  He did confused and episodes last 15-20 minutes.  She states that she tried giving him his home baclofen for spasms within seem to help.  This morning it was very  "confused and combative.  Was disoriented and not moving his left side.  She decided to call EMS and bring him back to the hospital.  Patient had some witnessed jerking movements on the left in the emergency room and he was loaded with Keppra.  His mental status still waxing waning.  He is oriented to person at the time of the exam but sluggish to respond.  CT of the head done in the ER not show any acute findings.  Weakness on the left side comes and goes.  Denies any incontinence of bowel or bladder.  He does have a follow-up appointment with Neurology scheduled for later this week the due to concern on his confusion decision was made to admit have Neurology see him here.  The hospitalist group was asked to assume care.  Eeg perform the evening of 5/15 was negative for any seizure    Interval Hx:   Activity.  Patient doing well this morning.  Seems to be at baseline.  His wife is at bedside reports that he does have some weakness on the left side that seems to be stable.  Mental status also stable overnight.  He is awake alert oriented x3.  Still with some twitching but not this severe jerking motions that wife had noticed last week.    Vitals:  Blood pressure 124/74, pulse 92, temperature 98.1 °F (36.7 °C), resp. rate 20, height 5' 9.02" (1.753 m), weight 74.4 kg (164 lb), SpO2 (!) 93 %.    Objective/physical exam:  Gen: NC AT, Awake, alert, and oriented x4  HEENT: PERRL EOMI normal conjunctiva, neck supple  Cardiac: Regular rhythm and rate, no murmur, rubs, or gallops  Respiratory: Clear to auscultation bilaterally. No wheezes, rales, or crackles  Gastrointestinal: soft, nondistended, nontender, +bowel sounds  Musculoskeletal: Normal ROM, no pertinent deformities  Neuro:  Left-sided weakness 4/5, lower greater than upper  Psych: appropriate mood/affect      Latest labs:    Recent Labs   Lab 05/15/22  1448   WBC 10.3   RBC 4.78   HGB 13.7*   HCT 43.1   MCV 90.2   MCH 28.7   MCHC 31.8*   RDW 12.9      MPV " 9.9       Recent Labs   Lab 05/15/22  1448      K 3.7   CO2 24   BUN 9.9   CREATININE 0.55*   CALCIUM 9.3   ALBUMIN 2.6*   ALKPHOS 109   ALT 21   AST 25   BILITOT 0.5          Scheduled Med:   apixaban  5 mg Oral BID    aspirin  325 mg Oral Daily    atorvastatin  40 mg Oral Daily    digoxin  0.25 mg Oral Daily    levETIRAcetam  500 mg Oral BID    methIMAzole  40 mg Oral Daily    montelukast  10 mg Oral Daily    pantoprazole  40 mg Oral Daily    predniSONE  5 mg Oral Daily    propranoloL  60 mg Oral BID      Continuous Infusions:     PRN Meds:  lorazepam, melatonin, sodium chloride 0.9%       Assessment/Plan:  Ischemic encephalopathy  History of CVA 6 weeks ago  Possible focal seizures  History of:  Generalized anxiety disorder, thyrotoxicosis/Graves disease, peripheral neuropathy, IBS, ischemic cardiomyopathy, coronary artery disease, hypertension, atrial fibrillation     EEG negative for any seizure activity.  He was loaded with Keppra in the emergency room which was continued on the floor.  No seizure activity over night her patient's nurse and wife.  Vital signs are stable and patient seems to be closer to baseline.  Neurology has been consulted.  Will see if have any further recommendations.  Does have an appointment in the Neurology Clinic in a couple days.  Otherwise will continue his home medications.  PT and OT have been consulted.  A follow-up therapy evaluation.  He was getting home health with therapy services as an outpatient.  Continue his Eliquis for atrial fibrillation.      Franklin Dave MD       All diagnosis and differential diagnosis have been reviewed; assessment and plan has been documented; I have personally reviewed the labs and test results that are presently available; I have reviewed the patients medication list; I have reviewed the consulting providers response and recommendations. I have reviewed or attempted to review medical records based upon their availability    All  of the patient's questions have been  addressed and answered. Patient's is agreeable to the above stated plan. I will continue to monitor closely and make adjustments to medical management as needed.

## 2022-05-16 NOTE — PLAN OF CARE
Problem: Adult Inpatient Plan of Care  Goal: Plan of Care Review  Outcome: Ongoing, Progressing  Goal: Patient-Specific Goal (Individualized)  Outcome: Ongoing, Progressing  Goal: Absence of Hospital-Acquired Illness or Injury  Outcome: Ongoing, Progressing  Goal: Optimal Comfort and Wellbeing  Outcome: Ongoing, Progressing  Goal: Readiness for Transition of Care  Outcome: Ongoing, Progressing     Problem: Fall Injury Risk  Goal: Absence of Fall and Fall-Related Injury  Outcome: Ongoing, Progressing     Problem: Mobility Impairment  Goal: Optimal Mobility  Outcome: Ongoing, Progressing     Problem: Seizure, Active Management  Goal: Absence of Seizure/Seizure-Related Injury  Outcome: Ongoing, Progressing

## 2022-05-16 NOTE — PROCEDURES
Quan Contreras is a 71 y.o. male patient.    This is Dr. ANDRADE dictating an EEG for this the daughter did treat of the study was done for seizure workup this study was done using 10/20 system using 21 channels the background activity is consistent of 9 hertz of moderate amplitude noted as does her physical form discharges conclusion this is a normal awake drowsy and sleep EEG no evidence of any epileptiform discharges clinical correlation suggested .       Procedures    5/15/2022

## 2022-05-16 NOTE — PT/OT/SLP EVAL
"Occupational Therapy   Evaluation    Name: Quan Contreras  MRN: [id number]  Admitting Diagnosis:  Ischemic encephalopathy, hx of CVA 6 weeks ago, hx of anxiety, Grave's disease, HTN, possible focal seizures  Recent Surgery: * No surgery found *      Recommendations:     Discharge Recommendations: home with home health  Discharge Equipment Recommendations:  none  Barriers to discharge:  None    Assessment:     Quan Contreras is a 71 y.o. male with a medical diagnosis of Ischemic encephalopathy, hx of CVA 6 weeks ago, hx of anxiety, Grave's disease, HTN, possible focal seizures. Pt lives w/ wife in a SS home, and is IND w/ all ADLs except bathing, and ambulates w/ a cane. Pt was recently d/c from [facility] after experiencing a stroke, and was not using any AD for ambulation. Wife reports pt has appeared more unsteady and "not like himself" over the past few days. Pt's wife describes "obsessive" behavior and muscle spasms. Performance deficits noted during OT eval affecting function: weakness, impaired endurance, impaired balance, impaired self care skills, impaired functional mobilty. Pt performed all mobility w/ SBA. Pt reports feeling "zero percent" at his baseline. Pt would benefit from 1-2 additional skilled OT sessions in order to ensure safety and IND w/ ADLs and mobility, and ensure safe d/c dispo.     Rehab Prognosis: Good; patient would benefit from acute skilled OT services to address these deficits and reach maximum level of function.       Plan:     Patient to be seen 1 x/week, 2 x/week to address the above listed problems via self-care/home management, therapeutic activities, therapeutic exercises  · Plan of Care Expires: 05/30/22  · Plan of Care Reviewed with: patient, spouse    Subjective     Patient/Family Comments/goals: To get well    Occupational Profile:  Living Environment: Lives in SS home w/ wife, 1 step to enter, walk-in shower  Previous level of function: IND w/ ADLs, except bathing in which pt " requires mod A, ambulates w/ cane  Equipment Used at Home:  cane, straight, walker, rolling, wheelchair, bath bench, shower chair  Assistance upon Discharge: HH and assistance from wife    Pain/Comfort:  · Pain Rating 1: 0/10      Objective:     Communicated with: RN prior to session.  Patient found supine with telemetry upon OT entry to room.    General Precautions: Standard, fall, Hard of hearing   Respiratory Status: Room air    Occupational Performance:    Bed Mobility:    · Patient completed Supine to Sit with stand by assistance  · Patient completed Sit to Supine with stand by assistance    Functional Mobility/Transfers:  · Patient completed Sit <> Stand Transfer with stand by assistance  with  rolling walker   · Functional Mobility: Pt ambulated approx. 4 steps forward and backward requiring SBA using RW. Pt declined further mobility 2/2 fatigue    Activities of Daily Living:  · Lower Body Dressing: stand by assistance to don/dof socks while seated EOB    Cognitive/Visual Perceptual:  Cognitive/Psychosocial Skills:     -       Oriented to: Person, Place, Time and Situation   -       Follows Commands/attention:Follows multistep  commands  -       Safety awareness/insight to disability: intact   -       Mood/Affect: Flat, Alert, Appropriate  Visual/Perceptual:      -Impaired  Pt reports chronic double vision 2/2 issues w/ thyroid    Physical Exam:  Balance: -       SBA for all static and dynamic balance  Sensation:    -       Intact  Upper Extremity Range of Motion:     -       Right Upper Extremity: WNL  -       Left Upper Extremity: WFL  Upper Extremity Strength:    -       Right Upper Extremity: 4/5  -       Left Upper Extremity: 4-/5    AMPAC 6 Click ADL:  AMPAC Total Score: 19      Patient left supine with call button in reach, RN notified and wife present    GOALS:   Multidisciplinary Problems     Occupational Therapy Goals        Problem: Occupational Therapy    Goal Priority Disciplines Outcome  Interventions   Occupational Therapy Goal     OT, PT/OT Ongoing, Progressing    Description: Goals to be met by: 05/30/2022    Patient will increase functional independence with ADLs by performing:    Grooming while standing with Modified [place].  Lower body dressing with Modified [place].   Pt will be able to perform dynamic standing balance with Modified [place] during functional ax by time of d/c.                     History:     Past Medical History:   Diagnosis Date    Acute left arterial ischemic stroke, KINGA (anterior cerebral artery) 5/3/2022    Acute osteomyelitis 5/11/2022    Atherosclerosis of coronary artery 5/11/2022    Atrial fibrillation 5/3/2022    Benign essential hypertension 5/3/2022    Cardiomyopathy     Coronary artery disease     Diverticulosis     Esophageal reflux 5/11/2022    Fatigue     Generalized anxiety disorder     GERD (gastroesophageal reflux disease)     Graves disease     HTN (hypertension)     Hyperthyroidism     Idiopathic peripheral neuropathy 5/11/2022    Irritable bowel syndrome without diarrhea     Ischemic cardiomyopathy 5/3/2022    Mixed hyperlipidemia 5/11/2022    Paroxysmal atrial fibrillation     Rheumatoid arthritis 5/11/2022    Rheumatoid arthritis, unspecified     Shingles     Staph aureus infection     Stroke     Thyroiditis, unspecified     Thyrotoxicosis 5/3/2022       Past Surgical History:   Procedure Laterality Date    CATARACT EXTRACTION      CYST REMOVAL Left     TONSILLECTOMY      TOTAL KNEE ARTHROPLASTY      VASECTOMY         Time Tracking:     OT Date of Treatment: 05/16/22  OT Start Time: 1050  OT Stop Time: 1110  OT Total Time (min): 20 min    Billable Minutes:Evaluation 20 Minute Mod Complex    5/16/2022

## 2022-05-16 NOTE — PLAN OF CARE
Problem: Physical Therapy  Goal: Physical Therapy Goal  Description: Goals to be met by: 22     Patient will increase functional independence with mobility by performin. Sit to stand transfer with Modified [place]  2. Gait  x 200 feet with Modified [place] using Rolling Walker.     Outcome: Ongoing, Progressing

## 2022-05-16 NOTE — PT/OT/SLP EVAL
Physical Therapy Evaluation    Patient Name:  Quan Contreras   MRN:  [id number]    Recommendations:     Discharge Recommendations:  home with home health   Discharge Equipment Recommendations: none   Barriers to discharge: impaired mobility    Assessment:     Quan Contreras is a 71 y.o. male admitted with a medical diagnosis of AMS, recent h/o CVA.  He presents with the following impairments/functional limitations:  weakness, impaired endurance, impaired functional mobilty, gait instability, impaired balance, decreased safety awareness. Per wife, when patient d/c from inpatient rehab last week, he was ambulatory with RW and supervision. Patient with acute mental status change at home, prompting visit to hospital. Upon evaluation, patient follows all commands, presents with slightly weaker LLE as compared to RLE, mobilizes w/ Roshni and RW. PT recommending d/c home vs IP rehab pending progress.     Rehab Prognosis: Good; patient would benefit from acute skilled PT services to address these deficits and reach maximum level of function.    Recent Surgery: * No surgery found *      Plan:     During this hospitalization, patient to be seen daily to address the identified rehab impairments via gait training, therapeutic activities, therapeutic exercises, neuromuscular re-education and progress toward the following goals:    · Plan of Care Expires:  06/06/22    Subjective     Chief Complaint: none stated  Patient/Family Comments/goals: to return to independent gait, PLOF  Pain/Comfort:  · Pain Rating 1: 0/10    Patients cultural, spiritual, Buddhist conflicts given the current situation: no    Living Environment:  Pt lives in [facility] with wife.   Prior to admission, patients level of function was independent with gait and RW.  Equipment used at home: cane, straight, walker, rolling, wheelchair, shower chair.  DME owned (not currently used): none.  Upon discharge, patient will have assistance from wife.    Objective:      Communicated with RN prior to session.  Patient found supine with peripheral IV, telemetry  upon PT entry to room.    General Precautions: Standard, fall   Orthopedic Precautions:    Braces:    Respiratory Status: Room air    Exams:  · Cognitive Exam:  Patient is oriented to Person, Place, Time and Situation  · Sensation: -       Intact  · RLE ROM: WFL  · RLE Strength: 5/5  · LLE ROM: WFL  · LLE Strength: 4/5    Functional Mobility:  · Bed Mobility:  Supine to Sit: stand by assistance  · Transfers:  Sit to Stand:  minimum assistance with rolling walker  · Gait: Pt ambulated in hallway x80ft with Roshni, decreased gait speed, cues for attention to environment.      AM-PAC 6 CLICK MOBILITY  Total Score:18     Patient left supine with all lines intact, call button in reach and wife present.    GOALS:   Multidisciplinary Problems     Physical Therapy Goals        Problem: Physical Therapy    Goal Priority Disciplines Outcome Goal Variances Interventions   Physical Therapy Goal     PT, PT/OT Ongoing, Progressing     Description: Goals to be met by: 22     Patient will increase functional independence with mobility by performin. Sit to stand transfer with Modified [place]  2. Gait  x 200 feet with Modified [place] using Rolling Walker.                      History:     Past Medical History:   Diagnosis Date    Acute left arterial ischemic stroke, KINGA (anterior cerebral artery) 5/3/2022    Acute osteomyelitis 2022    Atherosclerosis of coronary artery 2022    Atrial fibrillation 5/3/2022    Benign essential hypertension 5/3/2022    Cardiomyopathy     Coronary artery disease     Diverticulosis     Esophageal reflux 2022    Fatigue     Generalized anxiety disorder     GERD (gastroesophageal reflux disease)     Graves disease     HTN (hypertension)     Hyperthyroidism     Idiopathic peripheral neuropathy 2022    Irritable bowel syndrome without diarrhea      Ischemic cardiomyopathy 5/3/2022    Mixed hyperlipidemia 5/11/2022    Paroxysmal atrial fibrillation     Rheumatoid arthritis 5/11/2022    Rheumatoid arthritis, unspecified     Shingles     Staph aureus infection     Stroke     Thyroiditis, unspecified     Thyrotoxicosis 5/3/2022       Past Surgical History:   Procedure Laterality Date    CATARACT EXTRACTION      CYST REMOVAL Left     TONSILLECTOMY      TOTAL KNEE ARTHROPLASTY      VASECTOMY         Time Tracking:     PT Received On: 05/16/22  PT Start Time: 0940     PT Stop Time: 1000  PT Total Time (min): 20 min     Billable Minutes: Evaluation Mod complexity, 20min.       05/16/2022

## 2022-05-16 NOTE — PLAN OF CARE
Problem: Occupational Therapy  Goal: Occupational Therapy Goal  Description: Goals to be met by: 05/30/2022    Patient will increase functional independence with ADLs by performing:    Grooming while standing with Modified [place].  Lower body dressing with Modified [place].   Pt will be able to perform dynamic standing balance with Modified [place] during functional ax by time of d/c.    Outcome: Ongoing, Progressing

## 2022-05-16 NOTE — PLAN OF CARE
EEG completed this afternoon. No seizure activity reported or noted during shift. Pt tolerated PT/OT. No complaints offered during shift. POC discussed with patient and spouse who remains at bedside.

## 2022-05-17 VITALS
HEIGHT: 69 IN | SYSTOLIC BLOOD PRESSURE: 122 MMHG | TEMPERATURE: 98 F | RESPIRATION RATE: 18 BRPM | DIASTOLIC BLOOD PRESSURE: 65 MMHG | OXYGEN SATURATION: 96 % | WEIGHT: 164 LBS | BODY MASS INDEX: 24.29 KG/M2 | HEART RATE: 92 BPM

## 2022-05-17 PROBLEM — I69.998 OTHER SEQUELAE FOLLOWING UNSPECIFIED CEREBROVASCULAR DISEASE: Status: ACTIVE | Noted: 2022-05-09

## 2022-05-17 PROBLEM — I69.998 OTHER SEQUELAE FOLLOWING UNSPECIFIED CEREBROVASCULAR DISEASE: Chronic | Status: ACTIVE | Noted: 2022-05-09

## 2022-05-17 PROBLEM — R56.9 FOCAL SEIZURE: Status: ACTIVE | Noted: 2022-05-17

## 2022-05-17 PROBLEM — R56.9 FOCAL SEIZURE: Chronic | Status: ACTIVE | Noted: 2022-05-17

## 2022-05-17 PROCEDURE — 97530 THERAPEUTIC ACTIVITIES: CPT

## 2022-05-17 PROCEDURE — 63600175 PHARM REV CODE 636 W HCPCS: Performed by: HOSPITALIST

## 2022-05-17 PROCEDURE — 25000003 PHARM REV CODE 250: Performed by: HOSPITALIST

## 2022-05-17 RX ORDER — LEVETIRACETAM 500 MG/1
500 TABLET ORAL 2 TIMES DAILY
Qty: 60 TABLET | Refills: 11 | Status: ON HOLD | OUTPATIENT
Start: 2022-05-17 | End: 2022-07-06

## 2022-05-17 RX ORDER — LOPERAMIDE HYDROCHLORIDE 2 MG/1
2 CAPSULE ORAL 4 TIMES DAILY PRN
Status: DISCONTINUED | OUTPATIENT
Start: 2022-05-17 | End: 2022-05-17 | Stop reason: HOSPADM

## 2022-05-17 RX ADMIN — LOPERAMIDE HYDROCHLORIDE 2 MG: 2 CAPSULE ORAL at 09:05

## 2022-05-17 RX ADMIN — PROPRANOLOL HYDROCHLORIDE 60 MG: 60 CAPSULE, EXTENDED RELEASE ORAL at 09:05

## 2022-05-17 RX ADMIN — PANTOPRAZOLE SODIUM 40 MG: 40 TABLET, DELAYED RELEASE ORAL at 09:05

## 2022-05-17 RX ADMIN — LEVETIRACETAM 500 MG: 500 TABLET, FILM COATED ORAL at 09:05

## 2022-05-17 RX ADMIN — APIXABAN 5 MG: 5 TABLET, FILM COATED ORAL at 09:05

## 2022-05-17 RX ADMIN — PREDNISONE 5 MG: 5 TABLET ORAL at 09:05

## 2022-05-17 RX ADMIN — ASPIRIN 325 MG ORAL TABLET 325 MG: 325 PILL ORAL at 09:05

## 2022-05-17 NOTE — DISCHARGE SUMMARY
[facility] Hospital Medicine Discharge Summary    Admit Date: 5/15/2022  Discharge Date and Time: 5/17/20228:35 AM  Admitting Physician: Hospitalist team   Discharging Physician: Franklin Dave MD.  Primary Care Physician: Bran Hansen MD      Discharge Diagnoses:  Focal seizure  History of CVA 6 weeks ago  Possible focal seizures  History of:  Generalized anxiety disorder, thyrotoxicosis/Graves disease, peripheral neuropathy, IBS, ischemic cardiomyopathy, coronary artery disease, hypertension, atrial fibrillation    Hospital Course:   71 y.o. male who  has a past medical history of Acute left arterial ischemic stroke, KINGA (anterior cerebral artery) (5/3/2022), Acute osteomyelitis (5/11/2022), Atherosclerosis of coronary artery (5/11/2022), Atrial fibrillation (5/3/2022), Benign essential hypertension (5/3/2022), Cardiomyopathy, Coronary artery disease, Diverticulosis, Esophageal reflux (5/11/2022), Fatigue, Generalized anxiety disorder, GERD (gastroesophageal reflux disease), Graves disease, HTN (hypertension), Hyperthyroidism, Idiopathic peripheral neuropathy (5/11/2022), Irritable bowel syndrome without diarrhea, Ischemic cardiomyopathy (5/3/2022), Mixed hyperlipidemia (5/11/2022), Paroxysmal atrial fibrillation, Rheumatoid arthritis (5/11/2022), Rheumatoid arthritis, unspecified, Shingles, Staph aureus infection, Stroke, Thyroiditis, unspecified, and Thyrotoxicosis (5/3/2022).. The patient presented to [facility] on 5/15/2022 with a primary complaint of numbness to L hand.  Patient recently admitted for acute CVA on a month and a half ago.  Wife is at the bedside providing majority of the medical history.  She states that after he was discharged he was sent to rehab for several weeks and then discharged home.  Upon discharge from rehab he was ambulating freely and is at his mental baseline.  She states 3 days prior to admission he had an episode of extreme weakness where he could  "get out of the bathtub.  Neighbors and EMS were called.  Seem to be favoring his right side.  Denies any jerking movements.  There was again a melatonin and bed.  States over the weekend he continued to have some bizarre thought processes and movement.  She noticed some twitching on the left side and some violent jerks.  He did confused and episodes last 15-20 minutes.  She states that she tried giving him his home baclofen for spasms within seem to help.  This morning it was very confused and combative.  Was disoriented and not moving his left side.  She decided to call EMS and bring him back to the hospital.  Patient had some witnessed jerking movements on the left in the emergency room and he was loaded with Keppra.  His mental status still waxing waning.  He is oriented to person at the time of the exam but sluggish to respond.  CT of the head done in the ER not show any acute findings.  Weakness on the left side comes and goes.  Denies any incontinence of bowel or bladder.  He does have a follow-up appointment with Neurology scheduled for later this week the due to concern on his confusion decision was made to admit have Neurology see him here.  The hospitalist group was asked to assume care.  Eeg perform the evening of 5/15 was negative for any seizure.  Neurology evaluated the patient and reviewed the EKG.  Recommending continue Keppra 500 mg b.i.d. for now.  DC home trazodone as this can lower seizure threshold.  They have an appointment with their regular neurologist tomorrow morning.  Plan to discuss further monitoring as an outpatient.  Currently patient is feeling well and at baseline.  His wife said bedside rate 80 take patient home.  All questions were answered to the best my ability.  They are in agreement with discharge plan    Vitals:  Blood pressure 122/65, pulse 92, temperature 98 °F (36.7 °C), temperature source Oral, resp. rate 18, height 5' 9.02" (1.753 m), weight 74.4 kg (164 lb), SpO2 96 " %..    Physical Exam:  Awake, Alert, Oriented x 3, No new focal Neurologic deficit, Normal Affect  NC/AT, PERRLA, EOMI  Supple neck, no JVD, No cervical lymphadenopathy  Symmetrical chest, Good air entry bilaterally. No rhonchi, wheezes, crackles appreciated  RRR, No gallop, rub or murmur  +ve Bowel sounds x4, Abd soft and non tender, no rebound, guarding or rigidity  No Cyanosis, cludding or edema, No new rash or bruises    Procedures Performed: No admission procedures for hospital encounter.     Significant Diagnostic Studies: See Full reports for all details  Admission on 05/15/2022   Component Date Value    Sodium Level 05/15/2022 140     Potassium Level 05/15/2022 3.7     Chloride 05/15/2022 107     Carbon Dioxide 05/15/2022 24     Glucose Level 05/15/2022 77 (A)    Blood Urea Nitrogen 05/15/2022 9.9     Creatinine 05/15/2022 0.55 (A)    Calcium Level Total 05/15/2022 9.3     Protein Total 05/15/2022 7.1     Albumin Level 05/15/2022 2.6 (A)    Globulin 05/15/2022 4.5 (A)    Albumin/Globulin Ratio 05/15/2022 0.6 (A)    Bilirubin Total 05/15/2022 0.5     Alkaline Phosphatase 05/15/2022 109     Alanine Aminotransferase 05/15/2022 21     Aspartate Aminotransfera* 05/15/2022 25     Estimated GFR-Non Letty* 05/15/2022 >60     Troponin-I 05/15/2022 <0.010     WBC 05/15/2022 10.3     RBC 05/15/2022 4.78     Hgb 05/15/2022 13.7 (A)    Hct 05/15/2022 43.1     MCV 05/15/2022 90.2     MCH 05/15/2022 28.7     MCHC 05/15/2022 31.8 (A)    RDW 05/15/2022 12.9     Platelet 05/15/2022 274     MPV 05/15/2022 9.9     Neut % 05/15/2022 57.1     Lymph % 05/15/2022 29.2     Mono Auto 05/15/2022 11.5     Eos Auto 05/15/2022 1.6     Basophil Auto 05/15/2022 0.3     Lymph # 05/15/2022 3.02     Neut # 05/15/2022 5.9     Abs Mono 05/15/2022 1.19     Abs Eos 05/15/2022 0.17     Abs Baso 05/15/2022 0.03     IG# 05/15/2022 0.03 (A)    IG% 05/15/2022 0.3     NRBC% 05/15/2022 0.0     Color, UA  05/15/2022 Dark Yellow (A)    Appearance, UA 05/15/2022 Clear     Specific Gravity, UA 05/15/2022 1.020     pH, UA 05/15/2022 7.0     Protein, UA 05/15/2022 Negative     Glucose, UA 05/15/2022 Negative     Ketones, UA 05/15/2022 Negative     Blood, UA 05/15/2022 Negative     Bilirubin, UA 05/15/2022 Negative     Urobilinogen, UA 05/15/2022 1.0     Nitrites, UA 05/15/2022 Negative     Leukocyte Esterase, UA 05/15/2022 Trace (A)    RBC, UA 05/15/2022 <5     WBC, UA 05/15/2022 <5     Squamous Epithelial Cell* 05/15/2022 <4     Bacteria, UA 05/15/2022 None Seen         Microbiology Results (last 7 days)     ** No results found for the last 168 hours. **           CT Head Without Contrast    Result Date: 5/15/2022  EXAMINATION: CT HEAD WITHOUT CONTRAST CLINICAL HISTORY: Memory loss; TECHNIQUE: Axial scans were obtained from skull base to the vertex. Coronal and sagittal reconstructions obtained from the axial data. Automatic exposure control was utilized to limit radiation dose. Contrast: None Radiation Dose: Total DLP: 1136 mGy*cm COMPARISON: Head CT/CTA 04/28/2022 FINDINGS: Scalp/Skull: No abnormalities. Brain sulci: Appropriate for patient's age. Ventricles: Normal in size and configuration. No hydrocephalus. Extra-axial spaces: No masses or fluid collections. Parenchyma: Bilateral (R>L) superior frontal foci of cortical based encephalomalacia compatible with remote insults. Mild-moderate chronic microangiopathy in the supratentorial white matter. No mass, hemorrhage or CT evidence of an acute vascular insult. Dural sinuses: No abnormal densities. Sellar/Suprasellar region: No abnormalities. Skull base and Craniocervical junction: No abnormalities. Incidental findings: Carotid siphon and vertebrobasilar atherosclerotic vascular calcifications. Status post bilateral cataract surgery.     No acute intracranial abnormalities. Electronically signed by: Michel Ge Date:    05/15/2022 Time:    14:29  -  pulls last radiology orders        Medication List      START taking these medications    levETIRAcetam 500 MG Tab  Commonly known as: KEPPRA  Take 1 tablet (500 mg total) by mouth 2 (two) times daily.        CHANGE how you take these medications    apixaban 5 mg Tab  Commonly known as: ELIQUIS  Take 1 tablet (5 mg total) by mouth 2 (two) times daily.  What changed: Another medication with the same name was removed. Continue taking this medication, and follow the directions you see here.        CONTINUE taking these medications    aspirin 325 MG tablet     CIMZIA SUBQ     DIGITEK 250 mcg tablet  Generic drug: digoxin     ergocalciferol 50,000 unit Cap  Commonly known as: ERGOCALCIFEROL     ezetimibe 10 mg tablet  Commonly known as: ZETIA     methIMAzole 10 MG Tab  Commonly known as: TAPAZOLE     montelukast 10 mg tablet  Commonly known as: SINGULAIR     pantoprazole 40 MG tablet  Commonly known as: PROTONIX     predniSONE 5 MG tablet  Commonly known as: DELTASONE     propranoloL 60 MG 24 hr capsule  Commonly known as: INDERAL LA  Take 1 capsule (60 mg total) by mouth 2 (two) times a day.     rosuvastatin 40 MG Tab  Commonly known as: CRESTOR        STOP taking these medications    baclofen 10 MG tablet  Commonly known as: LIORESAL     metoprolol succinate 100 MG 24 hr tablet  Commonly known as: TOPROL-XL           Where to Get Your Medications      These medications were sent to [facility] [place] - [address]  [address] [address]    Phone: [phone]   · levETIRAcetam 500 MG Tab          Explained in detail to the patient about the discharge plan, medications, and follow-up visits. Pt understands and agrees with the treatment plan  Discharged Condition: stable  Diet: cardiac  Disposition: Home    Medications Per DC med rec  Activities as tolerated  Follow up with your PCP in 2 wks   For further questions contact hospitalist office    Discharge time 33 minutes    For  worsening symptoms, chest pain, shortness of breath, increased abdominal pain, high grade fever, stroke or stroke like symptoms, immediately go to the nearest Emergency Room or call 911 as soon as possible.      Franklin Dasilva M.D, on 5/17/2022. at 8:35 AM.

## 2022-05-17 NOTE — CONSULTS
[facility]                       [address] [place] [id number]    PATIENT NAME:       PETER HERNANDEZ  YOB: 1950  CSN:                [id number]   MRN:                [id number]  ADMIT DATE:         05/15/2022 13:17:00  PHYSICIAN:          Eliud Woody MD                            CONSULTATION    DATE OF CONSULT:      STUDY:  EEG.    REASON FOR STUDY:  Seizure workup.    DESCRIPTION:  This electroencephalogram was done using 10/20 system using 21   channels.  The background activity is consistent with between 8.5-9 hertz of   moderate amplitude.  No clear evidence of epileptiform discharges.    CONCLUSION:  This is a normal awake, drowsy, and sleep EEG.  No evidence of   epileptiform discharges.  Clinical correlation suggested.        ______________________________  MD VIMAL Simmons/AQS  DD:  05/16/2022  Time:  10:14PM  DT:  05/17/2022  Time:  01:39AM  Job #:  [id number]      CONSULTATION

## 2022-05-17 NOTE — PT/OT/SLP PROGRESS
Physical Therapy Treatment    Patient Name:  Quan Contreras   MRN:  [id number]    Recommendations:     Discharge Recommendations:  home with home health   Discharge Equipment Recommendations: none   Barriers to discharge: impaired mobility    Assessment:     Quan Contreras is a 71 y.o. male admitted with a medical diagnosis of Focal seizure.  He presents with the following impairments/functional limitations:  impaired endurance, impaired functional mobilty, gait instability.Per patient and wife, they are expected to d/c home with RW and  PT services. Patient and wife are comfortable with patient's mobility level. PT educated patient and wife on importance of safety with mobility and fall precautions upon d/c home.     Rehab Prognosis: Good; patient would benefit from acute skilled PT services to address these deficits and reach maximum level of function.    Recent Surgery: * No surgery found *      Plan:     During this hospitalization, patient to be seen 5 x/week to address the identified rehab impairments via gait training, therapeutic activities, therapeutic exercises, neuromuscular re-education and progress toward the following goals:    · Plan of Care Expires:  06/06/22    Subjective     Chief Complaint: none stated  Patient/Family Comments/goals: to get stronger  Pain/Comfort:  · Pain Rating 1: 0/10  · Pain Rating Post-Intervention 1: 0/10      Objective:     Communicated with RN prior to session.  Patient found up in chair with peripheral IV, telemetry upon PT entry to room.     General Precautions: Standard, fall   Orthopedic Precautions:    Braces:    Respiratory Status: Room air     Functional Mobility:  · Transfers:  Sit to Stand:  stand by assistance with rolling walker  · Gait: Pt ambulated 200ft w/ RW and SBA, decreased gait speed, 1 standing rest break.       AM-PAC 6 CLICK MOBILITY  Turning over in bed (including adjusting bedclothes, sheets and blankets)?: 4  Sitting down on and standing up from a  chair with arms (e.g., wheelchair, bedside commode, etc.): 4  Moving from lying on back to sitting on the side of the bed?: 4  Moving to and from a bed to a chair (including a wheelchair)?: 4  Need to walk in hospital room?: 4  Climbing 3-5 steps with a railing?: 3  Basic Mobility Total Score: 23       Patient left up in chair with all lines intact and wife present..    GOALS:   Multidisciplinary Problems     Physical Therapy Goals        Problem: Physical Therapy    Goal Priority Disciplines Outcome Goal Variances Interventions   Physical Therapy Goal     PT, PT/OT Ongoing, Progressing     Description: Goals to be met by: 22     Patient will increase functional independence with mobility by performin. Sit to stand transfer with Modified [place]  2. Gait  x 200 feet with Modified [place] using Rolling Walker.                      Time Tracking:     PT Received On: 22  PT Start Time: 1025     PT Stop Time: 1035  PT Total Time (min): 10 min     Billable Minutes: Therapeutic Activity 10min    Treatment Type: Treatment  PT/PTA: PT     PTA Visit Number: 1     2022

## 2022-05-17 NOTE — PLAN OF CARE
Problem: Adult Inpatient Plan of Care  Goal: Plan of Care Review  Outcome: Ongoing, Progressing  Goal: Patient-Specific Goal (Individualized)  Outcome: Ongoing, Progressing  Goal: Absence of Hospital-Acquired Illness or Injury  Outcome: Ongoing, Progressing  Goal: Optimal Comfort and Wellbeing  Outcome: Ongoing, Progressing  Goal: Readiness for Transition of Care  Outcome: Ongoing, Progressing     Problem: Skin Injury Risk Increased  Goal: Skin Health and Integrity  Outcome: Ongoing, Progressing     Problem: Fall Injury Risk  Goal: Absence of Fall and Fall-Related Injury  Outcome: Ongoing, Progressing     Problem: Mobility Impairment  Goal: Optimal Mobility  Outcome: Ongoing, Progressing     Problem: Seizure, Active Management  Goal: Absence of Seizure/Seizure-Related Injury  Outcome: Ongoing, Progressing

## 2022-05-17 NOTE — PT/OT/SLP DISCHARGE
Occupational Therapy Discharge Summary    Quan Contreras  MRN: [id number]   Principal Problem: Focal seizure      Patient Discharged from acute Occupational Therapy on 05/17/2022.  Please refer to prior OT note dated 05/17/2022 for functional status.    Assessment:      Goals partially met. Pt is at his baseline functional status.    Objective:     GOALS:   Multidisciplinary Problems     Occupational Therapy Goals        Problem: Occupational Therapy    Goal Priority Disciplines Outcome Interventions   Occupational Therapy Goal     OT, PT/OT Discontinued    Description: Goals to be met by: 05/30/2022    Patient will increase functional independence with ADLs by performing:    Grooming while standing with Modified [place].  Lower body dressing with Modified [place].   Pt will be able to perform dynamic standing balance with Modified [place] during functional ax by time of d/c.                     Reasons for Discontinuation of Therapy Services  Therapist determines that the patient will no longer benefit from therapy services.      Plan:     Patient Discharged to: Home with Home Health Service and assistance from wife    5/17/2022

## 2022-05-17 NOTE — PT/OT/SLP PROGRESS
Occupational Therapy   Treatment    Name: Quan Contreras  MRN: [id number]  Admitting Diagnosis:  Focal seizure       Recommendations:     Discharge Recommendations: home with home health  Discharge Equipment Recommendations:  none  Barriers to discharge:  None    Assessment:     Quan Contreras is a 71 y.o. male with a medical diagnosis of Focal seizure.  Performance deficits affecting function are gait instability, weakness, impaired endurance, impaired functional mobilty. Pt at his functional baseline. Pt cont to require SBA w/ RW, cues for technique w/ RW. Discussed recs for d/c including home w/ HH services, cont assistance from wife, and potential need for appointment to an opthalmologist to address issues w/ diplopia. Pt and pt's wife do not report any concerns regarding mobility or ADL performance once d/c home. Acute skilled OT services to sign off.    Rehab Prognosis:  N/A     Plan:     Patient to be discharged from acute skilled OT services  · Plan of Care Expires: 05/30/22  · Plan of Care Reviewed with: patient, spouse    Subjective     Pain/Comfort:  · Pain Rating 1: 0/10    Objective:     Communicated with: RN prior to session.  Patient found up in chair with telemetry upon OT entry to room.    General Precautions: Standard, fall   Respiratory Status: Room air     Occupational Performance:     Functional Mobility/Transfers:  · Patient completed Sit <> Stand Transfer with supervision  with  rolling walker   · Functional Mobility: Pt ambulated approx. 150 feet w/ SBA and RW. No LOB noted    [facility] 6 Click ADL: 23    Treatment & Education:  Discussed d/c recs, home safety strategies, and safety technique w/ RW    Patient left up in chair with call button in reach and wife presentEducation:      GOALS:   Multidisciplinary Problems     Occupational Therapy Goals        Problem: Occupational Therapy    Goal Priority Disciplines Outcome Interventions   Occupational Therapy Goal     OT, PT/OT Discontinued     Description: Goals to be met by: 05/30/2022    Patient will increase functional independence with ADLs by performing:    Grooming while standing with Modified [place].  Lower body dressing with Modified [place].   Pt will be able to perform dynamic standing balance with Modified [place] during functional ax by time of d/c.                     Time Tracking:     OT Date of Treatment: 05/17/22  OT Start Time: 1000  OT Stop Time: 1015  OT Total Time (min): 15 min    Billable Minutes:Therapeutic Activity 15 Minutes    OT/VERONICA: OT     VERONICA Visit Number: 1    5/17/2022

## 2022-05-17 NOTE — CONSULTS
[facility]                       [address] [id number]    PATIENT NAME:       PETER HERNANDEZ  YOB: 1950  CSN:                [id number]   MRN:                [id number]  ADMIT DATE:         05/15/2022 13:17:00  PHYSICIAN:          Eliud Woody MD                            CONSULTATION    DATE OF CONSULT:      HISTORY OF PRESENT ILLNESS:  This patient is 71 years old, had a stroke last   month affecting his left upper extremity and lower extremity and was in rehab.    Apparently, the wife witnessed that the patient had some jerking movement and   twitching activity in the left arm and left leg and maybe the left face.    Patient never had that, was admitted to the hospital for further workup for   seizure-like activity.  CT scan of the brain came back negative for any acute   abnormality.  It showed the old stroke that the patient had.  Patient has been   started on Keppra 500 mg b.i.d.  Patient follows simple commands.  His speech is   fine.  He denied having any loss of vision.  No diplopia.  No dysarthria, no   dysphagia.  He is moving all extremities, except for some weakness in the left   arm and left leg according to him.  Gait was not tested.    PAST MEDICAL HISTORY:  Has been reviewed.    FAMILY HISTORY:  Has been reviewed.    SOCIAL HISTORY:  Has been reviewed.    MEDICATIONS:  Reviewed. alert awake oriented x3 current.    EXAMINATION:  NEURO:  Alert, awake, oriented x3.  Cranial nerves 2-12 are   intact.  He does have some left pronator drift.  I witnessed a few shaking   activity and jerking movement, looks like myoclonus activity on the left upper   extremity.  Babinski sign is negative on the right, positive on the left.  Gait   was not tested.  Finger-to-nose with mild dysmetria on the left.    ASSESSMENT:  Possible seizure like activity.  This could be secondary to the   stroke, could be  myoclonus seizure which is not epileptic, but we need to rule   out epilepsy.    PLAN:    1. Continue on the Keppra for now.    2. We will do the EEG.  If the EEG is negative then we will keep him on the   Keppra just as a precaution, but if the EEG is positive, we might need to raise   the Keppra dose and put him also on another agent.    3. If it keeps happening, could give him Ativan for breakthrough seizure.    4. DVT prophylaxis.    5. Aspiration precaution.    6. Continue with physical therapy.        ______________________________  MD VIMAL Simmons/FELI  DD:  05/16/2022  Time:  10:06PM  DT:  05/17/2022  Time:  01:09AM  Job #:  [id number]      CONSULTATION

## 2022-05-17 NOTE — NURSING
Informed of follow up appointments and importance of attending. Educated on signs and symptoms to report to healthcare provider. Instructed to seek medical attention should signs and symptoms return or worsen. Verbalized understanding.

## 2022-05-18 ENCOUNTER — OFFICE VISIT (OUTPATIENT)
Dept: NEUROLOGY | Facility: CLINIC | Age: 72
End: 2022-05-18
Payer: MEDICARE

## 2022-05-18 ENCOUNTER — PATIENT OUTREACH (OUTPATIENT)
Dept: ADMINISTRATIVE | Facility: CLINIC | Age: 72
End: 2022-05-18
Payer: MEDICARE

## 2022-05-18 VITALS
SYSTOLIC BLOOD PRESSURE: 110 MMHG | HEIGHT: 68 IN | WEIGHT: 162 LBS | BODY MASS INDEX: 24.55 KG/M2 | DIASTOLIC BLOOD PRESSURE: 84 MMHG

## 2022-05-18 DIAGNOSIS — I69.398 SEIZURE AS LATE EFFECT OF CEREBROVASCULAR ACCIDENT (CVA): ICD-10-CM

## 2022-05-18 DIAGNOSIS — I63.9 CEREBROVASCULAR ACCIDENT (CVA), UNSPECIFIED MECHANISM: Primary | ICD-10-CM

## 2022-05-18 DIAGNOSIS — R56.9 SEIZURE AS LATE EFFECT OF CEREBROVASCULAR ACCIDENT (CVA): ICD-10-CM

## 2022-05-18 PROCEDURE — 99999 PR PBB SHADOW E&M-EST. PATIENT-LVL III: CPT | Mod: PBBFAC,,, | Performed by: NURSE PRACTITIONER

## 2022-05-18 PROCEDURE — 99213 OFFICE O/P EST LOW 20 MIN: CPT | Mod: S$PBB,,, | Performed by: NURSE PRACTITIONER

## 2022-05-18 PROCEDURE — 99213 PR OFFICE/OUTPT VISIT, EST, LEVL III, 20-29 MIN: ICD-10-PCS | Mod: S$PBB,,, | Performed by: NURSE PRACTITIONER

## 2022-05-18 PROCEDURE — 99213 OFFICE O/P EST LOW 20 MIN: CPT | Mod: PBBFAC | Performed by: NURSE PRACTITIONER

## 2022-05-18 PROCEDURE — 99999 PR PBB SHADOW E&M-EST. PATIENT-LVL III: ICD-10-PCS | Mod: PBBFAC,,, | Performed by: NURSE PRACTITIONER

## 2022-05-18 NOTE — PROGRESS NOTES
C3 nurse attempted to contact Quan Contreras for a TCC post hospital discharge follow up call. No answer, left voicemail. The patient has a scheduled HOSFU appointment with Bran Hansen MD on 5/23 @ 2pm, F/U appt with Dr. Montgomery on 5/18 @ 1:30pm & a F/U appt with Dr. Breen on 5/25 @ 11:15am.

## 2022-05-18 NOTE — PROGRESS NOTES
Subjective:       Patient ID: Quan Contreras is a 71 y.o. male.    Chief Complaint:  Hospital Follow Up: CVA (Patient [place] [facility] [place]2022 due to CVA, readmitted on 05/15/2022 due to extremity weakness and seizure episode. Denies history of stroke. Denies history of seizure episode. )      History of Present Illness  71 year old male with medical history of HTN, HLD, RA, Afib (on Xarelto), CAD, Cardiomyopathy and hyperthyroidism presents for hospital follow up. On 4/26/22 patient presented to [facility] for left sided weakness. MRI brain revealed a left cingulate cortical/subcortical acute infarct in the left KINGA. Echo bubble study was negative, CUS revealed bilaterally <50% stenosis. Stroke thought to be from ? Xarelto failure as stroke looked embolic in nature. Patient was discharged to rehab. On 5/15, patient presented to ED with witnessed jerking movements and twitching of the left arm, leg and face. Repeat CT head negative. Patient started on Keppra 500 mg BID. Since then patient has had no more episodes of jerking/seizure activity. Denies any new onset of numbness, tingling or weakness.         Review of Systems  Review of Systems   Constitutional: Negative.    HENT: Negative.    Eyes: Negative.    Respiratory: Negative.    Cardiovascular: Negative.    Gastrointestinal: Negative.    Endocrine: Negative.    Genitourinary: Negative.    Musculoskeletal: Negative.    Skin: Negative.    Allergic/Immunologic: Negative.    Neurological: Positive for seizures and weakness.   Hematological: Negative.    Psychiatric/Behavioral: Negative.        Objective:      Neurologic Exam     Mental Status   Oriented to person, place, and time.   Follows 3 step commands.   Attention: normal. Concentration: normal.   Speech: speech is normal   Level of consciousness: alert  Knowledge: good.     Cranial Nerves     CN II   Visual fields full to confrontation.     CN III, IV, VI   Pupils are equal, round, and reactive to light.    CN  V   Facial sensation intact.     CN VIII   Hearing: impaired    CN XII   CN XII normal.     Gait, Coordination, and Reflexes     Gait  Gait: normal      Physical Exam  Vitals and nursing note reviewed.   Constitutional:       Appearance: Normal appearance. He is normal weight.   HENT:      Head: Normocephalic.   Eyes:      Extraocular Movements: Extraocular movements intact.      Conjunctiva/sclera: Conjunctivae normal.      Pupils: Pupils are equal, round, and reactive to light.   Pulmonary:      Effort: Pulmonary effort is normal.   Musculoskeletal:         General: Normal range of motion.      Cervical back: Normal range of motion.   Skin:     General: Skin is warm and dry.   Neurological:      General: No focal deficit present.      Mental Status: He is alert and oriented to person, place, and time. Mental status is at baseline.      Cranial Nerves: Cranial nerves are intact.      Sensory: Sensation is intact.      Motor: Motor function is intact.      Coordination: Coordination is intact.      Gait: Gait is intact.      Deep Tendon Reflexes: Reflexes are normal and symmetric.   Psychiatric:         Attention and Perception: Attention normal.         Mood and Affect: Mood normal.         Speech: Speech normal.         Behavior: Behavior normal. Behavior is cooperative.         Thought Content: Thought content normal.         Cognition and Memory: Cognition normal.         Judgment: Judgment normal.           Assessment:        1. Cerebrovascular accident (CVA), unspecified mechanism    2. Seizure as late effect of cerebrovascular accident (CVA)        Plan:     -discussed with patient and wife that patient should remain on Keppra for 6 months to 1 year. If no seizure activity may consider dc of keppra  -Advised no driving for 6 months following seizure activity. Advised no tub baths. Take medications as prescribed to prevent any breakthrough seizure activity.   -Secondary stroke prevention measures discussed.  Education provided on signs and symptoms of stroke; advised to call 9-1-1 with any new onset of numbness, tingling or weakness, difficulty with speech or facial droop.   -Continue Eliquis/ASA; keep follow up with Dr. Breen regarding anticoagulation  -continue statin        MDM low

## 2022-05-19 NOTE — PROGRESS NOTES
C3 nurse spoke with Quan Contreras   for a TCC post hospital discharge follow up call. The patient has a scheduled HOSFU appointment with Bran Hansen MD   on June 1,2022 @ 0130pm.

## 2022-05-20 PROBLEM — M86.10 ACUTE OSTEOMYELITIS: Chronic | Status: RESOLVED | Noted: 2022-05-11 | Resolved: 2022-05-20

## 2022-05-23 PROBLEM — I69.354 HEMIPLGA FOLLOWING CEREBRAL INFRC AFFECTING LEFT NONDOM SIDE: Status: ACTIVE | Noted: 2022-05-08

## 2022-05-23 PROBLEM — I69.354 HEMIPLGA FOLLOWING CEREBRAL INFRC AFFECTING LEFT NONDOM SIDE: Chronic | Status: ACTIVE | Noted: 2022-05-08

## 2022-06-06 ENCOUNTER — HOSPITAL ENCOUNTER (EMERGENCY)
Facility: HOSPITAL | Age: 72
Discharge: HOME | End: 2022-06-07
Attending: STUDENT IN AN ORGANIZED HEALTH CARE EDUCATION/TRAINING PROGRAM
Payer: MEDICARE

## 2022-06-06 DIAGNOSIS — Z86.73 HISTORY OF CARDIOEMBOLIC CEREBROVASCULAR ACCIDENT (CVA): ICD-10-CM

## 2022-06-06 DIAGNOSIS — R05.9 COUGH: ICD-10-CM

## 2022-06-06 DIAGNOSIS — R41.0 CONFUSION: ICD-10-CM

## 2022-06-06 DIAGNOSIS — J18.9 PNEUMONIA OF LEFT LOWER LOBE DUE TO INFECTIOUS ORGANISM: Primary | ICD-10-CM

## 2022-06-06 DIAGNOSIS — R50.9 FEVER, UNSPECIFIED FEVER CAUSE: ICD-10-CM

## 2022-06-06 LAB
ALBUMIN SERPL-MCNC: 2.5 GM/DL (ref 3.4–4.8)
ALBUMIN/GLOB SERPL: 0.5 RATIO (ref 1.1–2)
ALP SERPL-CCNC: 95 UNIT/L (ref 40–150)
ALT SERPL-CCNC: 26 UNIT/L (ref 0–55)
AST SERPL-CCNC: 37 UNIT/L (ref 5–34)
BASOPHILS # BLD AUTO: 0.03 X10(3)/MCL (ref 0–0.2)
BASOPHILS NFR BLD AUTO: 0.2 %
BILIRUBIN DIRECT+TOT PNL SERPL-MCNC: 0.6 MG/DL
BUN SERPL-MCNC: 9.3 MG/DL (ref 8.4–25.7)
CALCIUM SERPL-MCNC: 9.1 MG/DL (ref 8.8–10)
CHLORIDE SERPL-SCNC: 99 MMOL/L (ref 98–107)
CO2 SERPL-SCNC: 23 MMOL/L (ref 23–31)
CREAT SERPL-MCNC: 0.65 MG/DL (ref 0.73–1.18)
EOSINOPHIL # BLD AUTO: 0.02 X10(3)/MCL (ref 0–0.9)
EOSINOPHIL NFR BLD AUTO: 0.2 %
ERYTHROCYTE [DISTWIDTH] IN BLOOD BY AUTOMATED COUNT: 14.1 % (ref 11.5–17)
GLOBULIN SER-MCNC: 4.8 GM/DL (ref 2.4–3.5)
GLUCOSE SERPL-MCNC: 101 MG/DL (ref 82–115)
HCT VFR BLD AUTO: 37.6 % (ref 42–52)
HGB BLD-MCNC: 12.1 GM/DL (ref 14–18)
IMM GRANULOCYTES # BLD AUTO: 0.07 X10(3)/MCL (ref 0–0.02)
IMM GRANULOCYTES NFR BLD AUTO: 0.6 % (ref 0–0.43)
LACTATE SERPL-SCNC: 1.4 MMOL/L (ref 0.5–2.2)
LYMPHOCYTES # BLD AUTO: 4.04 X10(3)/MCL (ref 0.6–4.6)
LYMPHOCYTES NFR BLD AUTO: 32.2 %
MAGNESIUM SERPL-MCNC: 1.6 MG/DL (ref 1.6–2.6)
MCH RBC QN AUTO: 28.7 PG (ref 27–31)
MCHC RBC AUTO-ENTMCNC: 32.2 MG/DL (ref 33–36)
MCV RBC AUTO: 89.1 FL (ref 80–94)
MONOCYTES # BLD AUTO: 1.21 X10(3)/MCL (ref 0.1–1.3)
MONOCYTES NFR BLD AUTO: 9.6 %
NEUTROPHILS # BLD AUTO: 7.2 X10(3)/MCL (ref 2.1–9.2)
NEUTROPHILS NFR BLD AUTO: 57.2 %
NRBC BLD AUTO-RTO: 0 %
PLATELET # BLD AUTO: 329 X10(3)/MCL (ref 130–400)
PMV BLD AUTO: 10.3 FL (ref 9.4–12.4)
POTASSIUM SERPL-SCNC: 3.7 MMOL/L (ref 3.5–5.1)
PROT SERPL-MCNC: 7.3 GM/DL (ref 5.8–7.6)
RBC # BLD AUTO: 4.22 X10(6)/MCL (ref 4.7–6.1)
SODIUM SERPL-SCNC: 134 MMOL/L (ref 136–145)
TROPONIN I SERPL-MCNC: 0.01 NG/ML (ref 0–0.04)
WBC # SPEC AUTO: 12.6 X10(3)/MCL (ref 4.5–11.5)

## 2022-06-06 PROCEDURE — 84484 ASSAY OF TROPONIN QUANT: CPT | Performed by: NURSE PRACTITIONER

## 2022-06-06 PROCEDURE — 87040 BLOOD CULTURE FOR BACTERIA: CPT | Performed by: NURSE PRACTITIONER

## 2022-06-06 PROCEDURE — 83605 ASSAY OF LACTIC ACID: CPT | Performed by: NURSE PRACTITIONER

## 2022-06-06 PROCEDURE — 36415 COLL VENOUS BLD VENIPUNCTURE: CPT | Performed by: NURSE PRACTITIONER

## 2022-06-06 PROCEDURE — 85025 COMPLETE CBC W/AUTO DIFF WBC: CPT | Performed by: NURSE PRACTITIONER

## 2022-06-06 PROCEDURE — 83735 ASSAY OF MAGNESIUM: CPT | Performed by: NURSE PRACTITIONER

## 2022-06-06 PROCEDURE — 99285 EMERGENCY DEPT VISIT HI MDM: CPT | Mod: 25

## 2022-06-06 PROCEDURE — 80053 COMPREHEN METABOLIC PANEL: CPT | Performed by: NURSE PRACTITIONER

## 2022-06-07 VITALS
TEMPERATURE: 100 F | OXYGEN SATURATION: 96 % | DIASTOLIC BLOOD PRESSURE: 76 MMHG | SYSTOLIC BLOOD PRESSURE: 112 MMHG | BODY MASS INDEX: 23.54 KG/M2 | HEIGHT: 67 IN | RESPIRATION RATE: 16 BRPM | WEIGHT: 150 LBS | HEART RATE: 99 BPM

## 2022-06-07 LAB
APPEARANCE UR: CLEAR
BACTERIA #/AREA URNS AUTO: NORMAL /HPF
BILIRUB UR QL STRIP.AUTO: NEGATIVE MG/DL
COLOR UR AUTO: YELLOW
FLUAV AG UPPER RESP QL IA.RAPID: NOT DETECTED
FLUBV AG UPPER RESP QL IA.RAPID: NOT DETECTED
GLUCOSE UR QL STRIP.AUTO: NEGATIVE MG/DL
KETONES UR QL STRIP.AUTO: NEGATIVE MG/DL
LEUKOCYTE ESTERASE UR QL STRIP.AUTO: NEGATIVE UNIT/L
NITRITE UR QL STRIP.AUTO: NEGATIVE
PH UR STRIP.AUTO: 6.5 [PH]
PROT UR QL STRIP.AUTO: NEGATIVE MG/DL
RBC #/AREA URNS AUTO: <5 /HPF
RBC UR QL AUTO: NEGATIVE UNIT/L
SARS-COV-2 RNA RESP QL NAA+PROBE: NOT DETECTED
SP GR UR STRIP.AUTO: 1.01 (ref 1–1.03)
SQUAMOUS #/AREA URNS AUTO: <4 /LPF
UROBILINOGEN UR STRIP-ACNC: 0.2 MG/DL
WBC #/AREA URNS AUTO: <5 /HPF

## 2022-06-07 PROCEDURE — 63600175 PHARM REV CODE 636 W HCPCS: Performed by: NURSE PRACTITIONER

## 2022-06-07 PROCEDURE — 81001 URINALYSIS AUTO W/SCOPE: CPT | Performed by: NURSE PRACTITIONER

## 2022-06-07 PROCEDURE — 87636 SARSCOV2 & INF A&B AMP PRB: CPT | Performed by: NURSE PRACTITIONER

## 2022-06-07 RX ORDER — LEVOFLOXACIN 500 MG/1
750 TABLET, FILM COATED ORAL DAILY
Qty: 11 TABLET | Refills: 0 | Status: ON HOLD | OUTPATIENT
Start: 2022-06-07 | End: 2022-06-27 | Stop reason: HOSPADM

## 2022-06-07 RX ADMIN — SODIUM CHLORIDE, POTASSIUM CHLORIDE, SODIUM LACTATE AND CALCIUM CHLORIDE 1000 ML: 600; 310; 30; 20 INJECTION, SOLUTION INTRAVENOUS at 06:06

## 2022-06-07 NOTE — ED NOTES
Pt to ED with complaints of fever and increased confusion that began on 06/06/22. Reports that was recently on ABx for gum abscess. Finished ABx on 06/05/22. Pt denies pain. GCS 14 at this time; pt and wife updated on plan of care, verbalizes understanding

## 2022-06-07 NOTE — ED PROVIDER NOTES
Encounter Date: 6/6/2022    SCRIBE #1 NOTE: I, Steven Vang , am scribing for, and in the presence of,  Larry Dior . I have scribed the following portions of the note - Other sections scribed: HPI, ROS, PE, MDM.       History     Chief Complaint   Patient presents with    Fever     Family called 911 for fever and confusion; recently finished abx for oral abscess; pt is aaox4; hx of stroke     I, Larrysienna Rader, assumed care at 0603    72 y/o M with hx of a stroke in April and seizures presents to ED for c/o worsening confusion. Pt wife reports that the pt was in rehab doing better since being discharged from the hospital the last time, but began to become more confused throughout this week. She reports the pt has been doing strange things like going to bed around 4 PM and his eating has also decreased. She also reports that the pt has been running a fever; home health came to see them on Monday morning to check temp and it was around 102, so they were advised to call a doctor who informed them to come into the ED. Pt also has a dry cough, strong urine smell, left sided weakness (from prior stroke), and teeth fritz. Pt wife also reports giving the pt tylenol around 5 PM (6/6/22). Pt states that he is feeling fine.     The history is provided by the patient and the spouse. No  was used.   Fever  Primary symptoms of the febrile illness include fever and cough. Primary symptoms do not include shortness of breath, abdominal pain, nausea, vomiting, dysuria or rash. This is a new problem. The problem has been resolved.   The maximum temperature recorded prior to his arrival was 102 to 102.9 F.   The cough is chronic. The cough is dry. There is nondescript sputum produced. Primary symptoms comment: confusion .     Review of patient's allergies indicates:   Allergen Reactions    Ace inhibitors Swelling     Lip swelling    Morphine     Morphine sulfate     Nafcillin Itching    Pradaxa [dabigatran  etexilate] Other (See Comments)     Abdominal cramping    Sulfamethoxazole Rash     Past Medical History:   Diagnosis Date    Acute left arterial ischemic stroke, KINGA (anterior cerebral artery) 5/3/2022    Acute osteomyelitis 5/11/2022    Atherosclerosis of coronary artery 5/11/2022    Atrial fibrillation 5/3/2022    Benign essential hypertension 5/3/2022    Cardiomyopathy     Coronary artery disease     Diverticulosis     Esophageal reflux 5/11/2022    Fatigue     Focal seizure 5/17/2022    Generalized anxiety disorder     GERD (gastroesophageal reflux disease)     Graves disease     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/2022    HTN (hypertension)     Hyperthyroidism     Idiopathic peripheral neuropathy 5/11/2022    Irritable bowel syndrome without diarrhea     Ischemic cardiomyopathy 5/3/2022    Mixed hyperlipidemia 5/11/2022    Other sequelae following unspecified cerebrovascular disease 5/9/2022    Paroxysmal atrial fibrillation     Rheumatoid arthritis 5/11/2022    Rheumatoid arthritis, unspecified     Shingles     Staph aureus infection     Stroke     Thyroiditis, unspecified     Thyrotoxicosis 5/3/2022     Past Surgical History:   Procedure Laterality Date    CATARACT EXTRACTION      CYST REMOVAL Left     TONSILLECTOMY      TOTAL KNEE ARTHROPLASTY      VASECTOMY       Family History   Family history unknown: Yes     Social History     Tobacco Use    Smoking status: Never Smoker    Smokeless tobacco: Never Used   Substance Use Topics    Alcohol use: Never    Drug use: Never     Review of Systems   Constitutional: Positive for appetite change and fever. Negative for chills.        Decreased eating    HENT: Negative for congestion, rhinorrhea and sore throat.         Teeth fritz    Eyes: Negative for visual disturbance.   Respiratory: Positive for cough. Negative for shortness of breath.    Cardiovascular: Negative for chest pain.    Gastrointestinal: Negative for abdominal pain, nausea and vomiting.   Genitourinary: Negative for dysuria and hematuria.   Musculoskeletal: Negative for joint swelling.   Skin: Negative for rash.   Neurological: Positive for weakness (chronic).        Worsening confusion       Psychiatric/Behavioral: Positive for confusion.   All other systems reviewed and are negative.      Physical Exam     Initial Vitals [06/06/22 1918]   BP Pulse Resp Temp SpO2   126/78 89 18 99.5 °F (37.5 °C) 98 %      MAP       --         Physical Exam    Nursing note and vitals reviewed.  Constitutional: He is not diaphoretic. No distress.   HENT:   Head: Normocephalic and atraumatic.   No intraoral swelling   Eyes: EOM are normal. Pupils are equal, round, and reactive to light.   Neck: Neck supple.   Normal range of motion.  Cardiovascular: Normal rate and regular rhythm.   No murmur heard.  Pulmonary/Chest: Breath sounds normal. No respiratory distress. He has no wheezes. He has no rales.   Abdominal: Abdomen is soft. He exhibits no distension. There is no abdominal tenderness.   Musculoskeletal:         General: No edema. Normal range of motion.      Cervical back: Normal range of motion and neck supple.     Neurological: He is alert. No cranial nerve deficit.   Residual left side weakness, no facial swelling    Skin: Skin is warm. No rash noted.   Psychiatric: He has a normal mood and affect.         ED Course   Procedures  Labs Reviewed   CBC WITH DIFFERENTIAL - Abnormal; Notable for the following components:       Result Value    WBC 12.6 (*)     RBC 4.22 (*)     Hgb 12.1 (*)     Hct 37.6 (*)     MCHC 32.2 (*)     IG# 0.07 (*)     IG% 0.6 (*)     All other components within normal limits   COMPREHENSIVE METABOLIC PANEL - Abnormal; Notable for the following components:    Sodium Level 134 (*)     Creatinine 0.65 (*)     Albumin Level 2.5 (*)     Globulin 4.8 (*)     Albumin/Globulin Ratio 0.5 (*)     Aspartate Aminotransferase 37 (*)      All other components within normal limits   URINALYSIS, REFLEX TO URINE CULTURE - Normal   TROPONIN I - Normal   LACTIC ACID, PLASMA - Normal   MAGNESIUM - Normal   COVID/FLU A&B PCR - Normal   URINALYSIS, MICROSCOPIC - Normal   BLOOD CULTURE OLG   BLOOD CULTURE OLG          Imaging Results          X-Ray Chest 1 View (Final result)  Result time 06/07/22 07:51:44    Final result by Den Minaya MD (06/07/22 07:51:44)                 Impression:      Blunting of the right costophrenic angle is similar to prior. Trace effusion is suspected.      Electronically signed by: Den Minaya  Date:    06/07/2022  Time:    07:51             Narrative:    EXAMINATION:  XR CHEST 1 VIEW    CLINICAL HISTORY:  fever;    TECHNIQUE:  Single view of the chest    COMPARISON:  02/20/2021    FINDINGS:  Blunting of the right costophrenic angle is similar to prior.  Trace effusion is suspected.    The cardiomediastinal silhouette is within normal limits.    No acute osseous abnormality.                              X-Rays:   Independently Interpreted Readings:   Chest X-Ray: LLL  silhouette sign      Medications   lactated ringers bolus 1,000 mL (1,000 mLs Intravenous New Bag 6/7/22 0620)     Medical Decision Making:   History:   Old Medical Records: I decided to obtain old medical records.  Initial Assessment:   70 yo with history of CVA, residual L sided weakness - presenting for intermittent confusion, fevers, cough. CXR with ossible LLL pneumonia. Will treat outpatient, have follow up with PCP. Well appearing, normal vitals , at baseline neuro status on exam.  No increased WOB or hypoxia.   Independently Interpreted Test(s):   I have ordered and independently interpreted X-rays - see prior notes.  I have ordered and independently interpreted EKG Reading(s) - see prior notes  Clinical Tests:   Lab Tests: Ordered and Reviewed  Radiological Study: Ordered and Reviewed          Scribe Attestation:   Scribe #1: I performed the  above scribed service and the documentation accurately describes the services I performed. I attest to the accuracy of the note.    Attending Attestation:           Physician Attestation for Scribe:  Physician Attestation Statement for Scribe #1: ILarry, reviewed documentation, as scribed by Steven Vang  in my presence, and it is both accurate and complete.             ED Course as of 06/07/22 0938   Tue Jun 07, 2022   0827 CXR with possible LLL PNA - otherwise workup unrevealing. Afebrile and well appearing here. Will discharge with PO abx.  [AC]      ED Course User Index  [AC] Larry Rader IV, MD             Clinical Impression:   Final diagnoses:  [R41.0] Confusion  [R05.9] Cough  [R50.9] Fever, unspecified fever cause  [J18.9] Pneumonia of left lower lobe due to infectious organism (Primary)  [Z86.73] History of cardioembolic cerebrovascular accident (CVA)          ED Disposition Condition    Discharge Stable        ED Prescriptions     Medication Sig Dispense Start Date End Date Auth. Provider    levoFLOXacin (LEVAQUIN) 500 MG tablet Take 1.5 tablets (750 mg total) by mouth once daily. for 7 days 11 tablet 6/7/2022 6/14/2022 Larry Rader IV, MD        Follow-up Information     Follow up With Specialties Details Why Contact Info    Bran Hansen MD Internal Medicine Schedule an appointment as soon as possible for a visit   [address] .  [facility] [address]  [phone]      [facility] - Emergency Dept Emergency Medicine Go to  If symptoms worsen [facility] [facility] [id number] [phone]      Larry LANE MD personally performed the history, PE, MDM, and procedures as documented above and agree with the scribe's documentation.        Larry Rader IV, MD  06/07/22 7942

## 2022-06-07 NOTE — FIRST PROVIDER EVALUATION
"Medical screening exam completed.  I have conducted a focused provider triage encounter, findings are as follows:  Chief Complaint   Patient presents with    Fever     Family called 911 for fever and confusion; recently finished abx for oral abscess; pt is aaox4; hx of stroke       Brief history of present illness:  70 y/o male (who is currently confused) per EMS is here per wife for confusion and fever. Recently finished abx per EMS    Vitals:    06/06/22 1918   BP: 126/78   Pulse: 89   Resp: 18   Temp: 99.5 °F (37.5 °C)   TempSrc: Oral   SpO2: 98%   Weight: 68 kg (150 lb)   Height: 5' 7" (1.702 m)       Pertinent physical exam:  Alert, pleasantly confused, figidity                          Brief workup plan:  Labs, ekg, imaging    Preliminary workup initiated; this workup will be continued and followed by the physician or advanced practice provider that is assigned to the patient when roomed.  "

## 2022-06-12 LAB
BACTERIA BLD CULT: NORMAL
BACTERIA BLD CULT: NORMAL

## 2022-06-13 ENCOUNTER — HOSPITAL ENCOUNTER (INPATIENT)
Facility: HOSPITAL | Age: 72
LOS: 14 days | Discharge: OTHER INSTITUTION | DRG: 094 | End: 2022-06-27
Attending: STUDENT IN AN ORGANIZED HEALTH CARE EDUCATION/TRAINING PROGRAM | Admitting: INTERNAL MEDICINE
Payer: MEDICARE

## 2022-06-13 DIAGNOSIS — R50.9 FEVER: ICD-10-CM

## 2022-06-13 DIAGNOSIS — R41.82 ALTERED MENTAL STATUS, UNSPECIFIED ALTERED MENTAL STATUS TYPE: ICD-10-CM

## 2022-06-13 DIAGNOSIS — R41.0 CONFUSION: ICD-10-CM

## 2022-06-13 DIAGNOSIS — G93.40 ENCEPHALOPATHY: ICD-10-CM

## 2022-06-13 DIAGNOSIS — J18.9 PNEUMONIA: ICD-10-CM

## 2022-06-13 DIAGNOSIS — K04.7 DENTAL ABSCESS: ICD-10-CM

## 2022-06-13 DIAGNOSIS — J18.9 PNEUMONIA DUE TO INFECTIOUS ORGANISM, UNSPECIFIED LATERALITY, UNSPECIFIED PART OF LUNG: Primary | ICD-10-CM

## 2022-06-13 DIAGNOSIS — R50.9 FEVER, UNSPECIFIED FEVER CAUSE: ICD-10-CM

## 2022-06-13 LAB
ALBUMIN SERPL-MCNC: 2.5 GM/DL (ref 3.4–4.8)
ALBUMIN/GLOB SERPL: 0.6 RATIO (ref 1.1–2)
ALP SERPL-CCNC: 91 UNIT/L (ref 40–150)
ALT SERPL-CCNC: 53 UNIT/L (ref 0–55)
APPEARANCE UR: CLEAR
AST SERPL-CCNC: 59 UNIT/L (ref 5–34)
BACTERIA #/AREA URNS AUTO: NORMAL /HPF
BASOPHILS # BLD AUTO: 0.04 X10(3)/MCL (ref 0–0.2)
BASOPHILS NFR BLD AUTO: 0.3 %
BILIRUB UR QL STRIP.AUTO: ABNORMAL MG/DL
BILIRUBIN DIRECT+TOT PNL SERPL-MCNC: 0.5 MG/DL
BNP BLD-MCNC: 274.2 PG/ML
BUN SERPL-MCNC: 10.1 MG/DL (ref 8.4–25.7)
CALCIUM SERPL-MCNC: 8.7 MG/DL (ref 8.8–10)
CHLORIDE SERPL-SCNC: 103 MMOL/L (ref 98–107)
CO2 SERPL-SCNC: 22 MMOL/L (ref 23–31)
COLOR UR AUTO: ABNORMAL
CREAT SERPL-MCNC: 0.58 MG/DL (ref 0.73–1.18)
EOSINOPHIL # BLD AUTO: 0.03 X10(3)/MCL (ref 0–0.9)
EOSINOPHIL NFR BLD AUTO: 0.2 %
ERYTHROCYTE [DISTWIDTH] IN BLOOD BY AUTOMATED COUNT: 14.4 % (ref 11.5–17)
FLUAV AG UPPER RESP QL IA.RAPID: NOT DETECTED
FLUBV AG UPPER RESP QL IA.RAPID: NOT DETECTED
GLOBULIN SER-MCNC: 4.1 GM/DL (ref 2.4–3.5)
GLUCOSE SERPL-MCNC: 158 MG/DL (ref 82–115)
GLUCOSE UR QL STRIP.AUTO: NEGATIVE MG/DL
HCT VFR BLD AUTO: 38.8 % (ref 42–52)
HGB BLD-MCNC: 12.7 GM/DL (ref 14–18)
IMM GRANULOCYTES # BLD AUTO: 0.1 X10(3)/MCL (ref 0–0.02)
IMM GRANULOCYTES NFR BLD AUTO: 0.8 % (ref 0–0.43)
KETONES UR QL STRIP.AUTO: NEGATIVE MG/DL
LACTATE SERPL-SCNC: 1.5 MMOL/L (ref 0.5–2.2)
LEUKOCYTE ESTERASE UR QL STRIP.AUTO: NEGATIVE UNIT/L
LYMPHOCYTES # BLD AUTO: 2.91 X10(3)/MCL (ref 0.6–4.6)
LYMPHOCYTES NFR BLD AUTO: 22.7 %
MCH RBC QN AUTO: 28.5 PG (ref 27–31)
MCHC RBC AUTO-ENTMCNC: 32.7 MG/DL (ref 33–36)
MCV RBC AUTO: 87.2 FL (ref 80–94)
MONOCYTES # BLD AUTO: 1.11 X10(3)/MCL (ref 0.1–1.3)
MONOCYTES NFR BLD AUTO: 8.7 %
NEUTROPHILS # BLD AUTO: 8.6 X10(3)/MCL (ref 2.1–9.2)
NEUTROPHILS NFR BLD AUTO: 67.3 %
NITRITE UR QL STRIP.AUTO: NEGATIVE
NRBC BLD AUTO-RTO: 0 %
PH UR STRIP.AUTO: 6.5 [PH]
PLATELET # BLD AUTO: 376 X10(3)/MCL (ref 130–400)
PMV BLD AUTO: 10.1 FL (ref 9.4–12.4)
POTASSIUM SERPL-SCNC: 3.6 MMOL/L (ref 3.5–5.1)
PROT SERPL-MCNC: 6.6 GM/DL (ref 5.8–7.6)
PROT UR QL STRIP.AUTO: ABNORMAL MG/DL
RBC # BLD AUTO: 4.45 X10(6)/MCL (ref 4.7–6.1)
RBC #/AREA URNS AUTO: <5 /HPF
RBC UR QL AUTO: NEGATIVE UNIT/L
SARS-COV-2 RNA RESP QL NAA+PROBE: NOT DETECTED
SODIUM SERPL-SCNC: 137 MMOL/L (ref 136–145)
SP GR UR STRIP.AUTO: 1.02 (ref 1–1.03)
SQUAMOUS #/AREA URNS AUTO: <4 /LPF
TROPONIN I SERPL-MCNC: <0.01 NG/ML (ref 0–0.04)
UROBILINOGEN UR STRIP-ACNC: 1 MG/DL
WBC # SPEC AUTO: 12.8 X10(3)/MCL (ref 4.5–11.5)
WBC #/AREA URNS AUTO: <5 /HPF

## 2022-06-13 PROCEDURE — 11000001 HC ACUTE MED/SURG PRIVATE ROOM

## 2022-06-13 PROCEDURE — 96365 THER/PROPH/DIAG IV INF INIT: CPT

## 2022-06-13 PROCEDURE — 25000003 PHARM REV CODE 250: Performed by: STUDENT IN AN ORGANIZED HEALTH CARE EDUCATION/TRAINING PROGRAM

## 2022-06-13 PROCEDURE — 83880 ASSAY OF NATRIURETIC PEPTIDE: CPT | Performed by: STUDENT IN AN ORGANIZED HEALTH CARE EDUCATION/TRAINING PROGRAM

## 2022-06-13 PROCEDURE — 84484 ASSAY OF TROPONIN QUANT: CPT | Performed by: STUDENT IN AN ORGANIZED HEALTH CARE EDUCATION/TRAINING PROGRAM

## 2022-06-13 PROCEDURE — 87040 BLOOD CULTURE FOR BACTERIA: CPT | Performed by: STUDENT IN AN ORGANIZED HEALTH CARE EDUCATION/TRAINING PROGRAM

## 2022-06-13 PROCEDURE — 99285 EMERGENCY DEPT VISIT HI MDM: CPT | Mod: 25

## 2022-06-13 PROCEDURE — 81001 URINALYSIS AUTO W/SCOPE: CPT | Performed by: STUDENT IN AN ORGANIZED HEALTH CARE EDUCATION/TRAINING PROGRAM

## 2022-06-13 PROCEDURE — 80053 COMPREHEN METABOLIC PANEL: CPT | Performed by: STUDENT IN AN ORGANIZED HEALTH CARE EDUCATION/TRAINING PROGRAM

## 2022-06-13 PROCEDURE — 36415 COLL VENOUS BLD VENIPUNCTURE: CPT | Performed by: STUDENT IN AN ORGANIZED HEALTH CARE EDUCATION/TRAINING PROGRAM

## 2022-06-13 PROCEDURE — 87636 SARSCOV2 & INF A&B AMP PRB: CPT | Performed by: STUDENT IN AN ORGANIZED HEALTH CARE EDUCATION/TRAINING PROGRAM

## 2022-06-13 PROCEDURE — 83605 ASSAY OF LACTIC ACID: CPT | Performed by: STUDENT IN AN ORGANIZED HEALTH CARE EDUCATION/TRAINING PROGRAM

## 2022-06-13 PROCEDURE — 85025 COMPLETE CBC W/AUTO DIFF WBC: CPT | Performed by: STUDENT IN AN ORGANIZED HEALTH CARE EDUCATION/TRAINING PROGRAM

## 2022-06-13 PROCEDURE — 63600175 PHARM REV CODE 636 W HCPCS: Performed by: STUDENT IN AN ORGANIZED HEALTH CARE EDUCATION/TRAINING PROGRAM

## 2022-06-13 PROCEDURE — 93005 ELECTROCARDIOGRAM TRACING: CPT

## 2022-06-13 RX ORDER — ACETAMINOPHEN 325 MG/1
650 TABLET ORAL EVERY 6 HOURS PRN
Status: DISCONTINUED | OUTPATIENT
Start: 2022-06-13 | End: 2022-06-27 | Stop reason: HOSPADM

## 2022-06-13 RX ORDER — ONDANSETRON 2 MG/ML
4 INJECTION INTRAMUSCULAR; INTRAVENOUS EVERY 6 HOURS PRN
Status: DISCONTINUED | OUTPATIENT
Start: 2022-06-13 | End: 2022-06-27 | Stop reason: HOSPADM

## 2022-06-13 RX ADMIN — ACYCLOVIR SODIUM 730 MG: 500 INJECTION, SOLUTION INTRAVENOUS at 09:06

## 2022-06-13 RX ADMIN — CEFTRIAXONE SODIUM 2 G: 2 INJECTION, POWDER, FOR SOLUTION INTRAMUSCULAR; INTRAVENOUS at 08:06

## 2022-06-13 RX ADMIN — VANCOMYCIN HYDROCHLORIDE 2000 MG: 1 INJECTION, POWDER, LYOPHILIZED, FOR SOLUTION INTRAVENOUS at 11:06

## 2022-06-13 NOTE — ED PROVIDER NOTES
Encounter Date: 6/13/2022    SCRIBE #1 NOTE: I, Maria L House, am scribing for, and in the presence of,  Dr. Goodwin. I have scribed the entire note.       History     Chief Complaint   Patient presents with    Altered Mental Status     From home, wife called ems for fever of 102, confusion; received tylenol prior to ems arrival; currently on levaquin for pneumonia     71 year old male with a hx of CVA, A fib, and dementia presents to the ED via EMS for altered mental status. Pt's wife called EMS services on the pt when she noticed the pt had a fever of 102 degrees Fahrenheit. Pt is currently on day 6 of 7 of Levaquin for his pneumonia. Pt was given Tylenol PTA. Pt denies any current pain. Pt's ROS is limited due to his altered mental status.    The history is provided by the patient and the EMS personnel. The history is limited by the condition of the patient. No  was used.   Altered Mental Status  This is a chronic problem. The current episode started more than 2 days ago. The problem occurs constantly. The problem has not changed since onset.Nothing aggravates the symptoms. Nothing relieves the symptoms. He has tried acetaminophen for the symptoms. The treatment provided mild relief.     Review of patient's allergies indicates:   Allergen Reactions    Ace inhibitors Swelling     Lip swelling    Morphine     Morphine sulfate     Nafcillin Itching    Pradaxa [dabigatran etexilate] Other (See Comments)     Abdominal cramping    Sulfamethoxazole Rash     Past Medical History:   Diagnosis Date    Acute left arterial ischemic stroke, KINGA (anterior cerebral artery) 5/3/2022    Acute osteomyelitis 5/11/2022    Atherosclerosis of coronary artery 5/11/2022    Atrial fibrillation 5/3/2022    Benign essential hypertension 5/3/2022    Cardiomyopathy     Coronary artery disease     Diverticulosis     Esophageal reflux 5/11/2022    Fatigue     Focal seizure 5/17/2022    Generalized anxiety  disorder     GERD (gastroesophageal reflux disease)     Graves disease     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/2022    HTN (hypertension)     Hyperthyroidism     Idiopathic peripheral neuropathy 5/11/2022    Irritable bowel syndrome without diarrhea     Ischemic cardiomyopathy 5/3/2022    Mixed hyperlipidemia 5/11/2022    Other sequelae following unspecified cerebrovascular disease 5/9/2022    Paroxysmal atrial fibrillation     Rheumatoid arthritis 5/11/2022    Rheumatoid arthritis, unspecified     Shingles     Staph aureus infection     Stroke     Thyroiditis, unspecified     Thyrotoxicosis 5/3/2022     Past Surgical History:   Procedure Laterality Date    CATARACT EXTRACTION      CYST REMOVAL Left     TONSILLECTOMY      TOTAL KNEE ARTHROPLASTY      VASECTOMY       Family History   Family history unknown: Yes     Social History     Tobacco Use    Smoking status: Never Smoker    Smokeless tobacco: Never Used   Substance Use Topics    Alcohol use: Never    Drug use: Never     Review of Systems   Unable to perform ROS: Mental status change   Constitutional: Positive for fever.   HENT: Negative.    Eyes: Negative.    Respiratory: Negative.    Cardiovascular: Negative.    Gastrointestinal: Negative.    Endocrine: Negative.    Genitourinary: Negative.    Musculoskeletal: Negative.    Skin: Negative.    Allergic/Immunologic: Negative.    Neurological: Negative.         Altered mental status   Hematological: Negative.    All other systems reviewed and are negative.      Physical Exam     Initial Vitals [06/13/22 1536]   BP Pulse Resp Temp SpO2   122/65 100 14 98.6 °F (37 °C) 98 %      MAP       --         Physical Exam    Nursing note and vitals reviewed.  Constitutional: He appears well-developed and well-nourished. He is not diaphoretic. He does not appear ill. No distress.   HENT:   Head: Normocephalic and atraumatic.   Right Ear: External ear normal.   Left Ear:  External ear normal.   Small peridental ulcer to left central incisor   Eyes: Conjunctivae and EOM are normal. Pupils are equal, round, and reactive to light.   Neck: Neck supple.   Normal range of motion.  Cardiovascular: Normal rate, normal heart sounds and intact distal pulses. An irregularly irregular rhythm present.    No murmur heard.  Tachycardiac.    Pulmonary/Chest: Breath sounds normal. No respiratory distress. He has no wheezes. He has no rales.   Abdominal: Abdomen is soft. He exhibits no distension. There is no abdominal tenderness.   Musculoskeletal:         General: No tenderness or edema.      Cervical back: Normal range of motion and neck supple.      Lumbar back: Normal. No tenderness. Normal range of motion.     Neurological: He is alert. No cranial nerve deficit or sensory deficit.   Oriented to person   Skin: Skin is warm and dry. Capillary refill takes less than 2 seconds. No rash noted. No erythema.   Psychiatric: He has a normal mood and affect. His mood appears not anxious.         ED Course   Procedures  Labs Reviewed   COMPREHENSIVE METABOLIC PANEL - Abnormal; Notable for the following components:       Result Value    Carbon Dioxide 22 (*)     Glucose Level 158 (*)     Creatinine 0.58 (*)     Calcium Level Total 8.7 (*)     Albumin Level 2.5 (*)     Globulin 4.1 (*)     Albumin/Globulin Ratio 0.6 (*)     Aspartate Aminotransferase 59 (*)     All other components within normal limits   B-TYPE NATRIURETIC PEPTIDE - Abnormal; Notable for the following components:    Natriuretic Peptide 274.2 (*)     All other components within normal limits   URINALYSIS, REFLEX TO URINE CULTURE - Abnormal; Notable for the following components:    Color, UA Dark Yellow (*)     Protein, UA Trace (*)     Bilirubin, UA 1+ (*)     All other components within normal limits   CBC WITH DIFFERENTIAL - Abnormal; Notable for the following components:    WBC 12.8 (*)     RBC 4.45 (*)     Hgb 12.7 (*)     Hct 38.8 (*)      MCHC 32.7 (*)     IG# 0.10 (*)     IG% 0.8 (*)     All other components within normal limits   TROPONIN I - Normal   COVID/FLU A&B PCR - Normal   LACTIC ACID, PLASMA - Normal   URINALYSIS, MICROSCOPIC - Normal   CBC W/ AUTO DIFFERENTIAL    Narrative:     The following orders were created for panel order CBC auto differential.  Procedure                               Abnormality         Status                     ---------                               -----------         ------                     CBC with Differential[[id number]]        Abnormal            Final result                 Please view results for these tests on the individual orders.     EKG Readings: (Independently Interpreted)   Initial Reading: No STEMI. Rhythm: Atrial Fibrillation. Heart Rate: 98.   Done on 6/13/22 at 1550.     ECG Results          EKG 12-lead (Edited Result - FINAL)  Result time 06/13/22 17:03:12    Edited Result - FINAL by Interface, Lab In [facility] (06/13/22 17:03:12)                 Narrative:    Test Reason : R41.0,    Vent. Rate : 098 BPM     Atrial Rate : 000 BPM     P-R Int : 000 ms          QRS Dur : 074 ms      QT Int : 356 ms       P-R-T Axes : 000 -08 230 degrees     QTc Int : 454 ms    Coase  Atrial fibrillation  Nonspecific ST and T wave abnormality  consider inferior ischemia  Abnormal ECG  No previous ECGs available  Reconfirmed by Jose Mejia MD ([id number]) on 6/13/2022 5:03:04 PM    Referred By: [email or web address]   SELF           Confirmed By:Jose Mejia MD                             EKG 12-LEAD (Final result)  Result time 07/01/22 11:03:02    Final result by Unknown User (07/01/22 11:03:02)                                Imaging Results          CT Head Without Contrast (Final result)  Result time 06/13/22 17:27:43    Final result by Ethan Molina MD (06/13/22 17:27:43)                 Impression:      1.  No acute intracranial findings identified.    2.  Old infarcts, chronic microangiopathic ischemia and  atrophy.      Electronically signed by: Ethan Molina  Date:    06/13/2022  Time:    17:27             Narrative:    EXAMINATION:  CT HEAD WITHOUT CONTRAST    CLINICAL HISTORY:  Mental status change, unknown cause;    TECHNIQUE:  Sequential axial images were performed of the brain without contrast.    Dose product length of 1167 mGycm. Automated exposure control was utilized to minimize radiation dose.    COMPARISON:  CT brain without contrast May 15, 2022 in MRI brain April 27, 2022.    FINDINGS:  There is no intracranial mass effect, midline shift, hydrocephalus or hemorrhage. There is no sulcal effacement or low attenuation changes to suggest recent large vessel territory infarction.  There are old infarcts which involve the right frontal lobe and the left cingulate cortical/subcortical location.  Chronic microvascular ischemic changes are mild.  The ventricular system and sulcal markings prominence is consistent with atrophy. There is no acute extra axial fluid collection. Visualized paranasal sinuses are clear without mucosal thickening, polypoidal abnormality or air-fluid levels. Mastoid air cells aeration is optimal.                               X-Ray Chest AP Portable (Final result)  Result time 06/13/22 17:09:45    Final result by Ethan Molina MD (06/13/22 17:09:45)                 Impression:      NO ACUTE CARDIOPULMONARY PROCESS IDENTIFIED.      Electronically signed by: Ethan Molina  Date:    06/13/2022  Time:    17:09             Narrative:    EXAMINATION:  XR CHEST AP PORTABLE    CLINICAL HISTORY:  Fever, unspecified    TECHNIQUE:  One view    COMPARISON:  June 6, 2022.    FINDINGS:  Cardiopericardial silhouette is within normal limits.  No acute dense focal or segmental consolidation, congestive process, pleural effusions or pneumothorax.                               RADIOLOGY REPORT (Final result)  Result time 06/30/22 14:55:27    Final result by Unknown User (06/30/22 14:55:27)                                    Medications   dextrose 5 % and 0.45 % NaCl with KCl 20 mEq infusion (0 mL/hr Intravenous Stopped 6/16/22 1540)   (Magic mouthwash) 1:1:1 diphenhydramine(Benadryl) 12.5mg/5ml liq, aluminum & magnesium hydroxide-simethicone (Maalox), LIDOcaine viscous 2% (10 mLs Swish & Spit Not Given 6/21/22 1651)   (Magic mouthwash) 1:1:1 diphenhydramine(Benadryl) 12.5mg/5ml liq, aluminum & magnesium hydroxide-simethicone (Maalox), LIDOcaine viscous 2% (10 mLs Swish & Spit Given 6/26/22 1256)   vancomycin (VANCOCIN) 2,000 mg in dextrose 5 % 500 mL IVPB (0 mg Intravenous Stopped 6/14/22 0117)   cefTRIAXone (ROCEPHIN) 2 g in dextrose 5 % in water (D5W) 5 % 50 mL IVPB (MB+) (0 g Intravenous Stopped 6/13/22 2104)   haloperidol lactate injection 2 mg (2 mg Intramuscular Given 6/14/22 0035)   vancomycin (VANCOCIN) 2,000 mg in dextrose 5 % 500 mL IVPB (0 mg Intravenous Stopped 6/15/22 1856)   iopamidoL (ISOVUE-370) injection 100 mL (100 mLs Intravenous Given 6/16/22 1432)   potassium chloride SA CR tablet 40 mEq (40 mEq Oral Given 6/18/22 1555)   magnesium sulfate 2g in water 50mL IVPB (premix) (0 g Intravenous Stopped 6/19/22 1335)   metoprolol injection 5 mg (5 mg Intravenous Given 6/20/22 1356)   levETIRAcetam (KEPPRA) 500 mg/5 mL injection (500 mg  Given 6/20/22 2300)     Medical Decision Making:   ED Management:  Patient is a 72 y/o male presents for fever, altered mental status. Currently on Tx for PNA with levaquin.  See HPI/exam as noted.  Labs and imaging as noted.  No obvious source of infection.  Consideration for LP to r/o meningitis; patient did take eliquis earlier today. Will hold eliquis and defer LP at this time. Started on abx/treatment.  All results discussed with patient and family.  Verbalized understanding and agreed to plan.            Scribe Attestation:   Scribe #1: I performed the above scribed service and the documentation accurately describes the services I performed. I attest to the accuracy of  the note.    Attending Attestation:           Physician Attestation for Scribe:  Physician Attestation Statement for Scribe #1: I, Dr. Goodwin , reviewed documentation, as scribed by Maria L House in my presence, and it is both accurate and complete.             ED Course as of 07/01/22 2215 Mon Jun 13, 2022 1945 Consented for LP.  Unable to perform due to on eliquis. Did take today.  [RP]   2038 Hospital medicine paged [RT]   2127 Hospitalist will admit patient [RT]      ED Course User Index  [RP] Sourav Goodwin MD  [RT] Maria L House             Clinical Impression:   Final diagnoses:  [R50.9] Fever  [R41.82] Altered mental status, unspecified altered mental status type  [R41.0] Confusion  [K04.7] Dental abscess          ED Disposition Condition    Admit               Sourav Goodwin MD  07/01/22 2213

## 2022-06-14 PROBLEM — R50.9 FEVER: Status: ACTIVE | Noted: 2022-06-14

## 2022-06-14 LAB
ALBUMIN SERPL-MCNC: 2.5 GM/DL (ref 3.4–4.8)
ALBUMIN/GLOB SERPL: 0.6 RATIO (ref 1.1–2)
ALP SERPL-CCNC: 85 UNIT/L (ref 40–150)
ALT SERPL-CCNC: 50 UNIT/L (ref 0–55)
APTT PPP: 35.3 SECONDS (ref 23.2–33.7)
AST SERPL-CCNC: 53 UNIT/L (ref 5–34)
BASOPHILS # BLD AUTO: 0.02 X10(3)/MCL (ref 0–0.2)
BASOPHILS NFR BLD AUTO: 0.2 %
BILIRUBIN DIRECT+TOT PNL SERPL-MCNC: 0.6 MG/DL
BUN SERPL-MCNC: 7.8 MG/DL (ref 8.4–25.7)
CALCIUM SERPL-MCNC: 8.9 MG/DL (ref 8.8–10)
CHLORIDE SERPL-SCNC: 102 MMOL/L (ref 98–107)
CO2 SERPL-SCNC: 22 MMOL/L (ref 23–31)
CREAT SERPL-MCNC: 0.49 MG/DL (ref 0.73–1.18)
EOSINOPHIL # BLD AUTO: 0.04 X10(3)/MCL (ref 0–0.9)
EOSINOPHIL NFR BLD AUTO: 0.3 %
ERYTHROCYTE [DISTWIDTH] IN BLOOD BY AUTOMATED COUNT: 14.5 % (ref 11.5–17)
GLOBULIN SER-MCNC: 4 GM/DL (ref 2.4–3.5)
GLUCOSE SERPL-MCNC: 119 MG/DL (ref 82–115)
HCT VFR BLD AUTO: 38.1 % (ref 42–52)
HGB BLD-MCNC: 12.5 GM/DL (ref 14–18)
IMM GRANULOCYTES # BLD AUTO: 0.08 X10(3)/MCL (ref 0–0.02)
IMM GRANULOCYTES NFR BLD AUTO: 0.7 % (ref 0–0.43)
LYMPHOCYTES # BLD AUTO: 2.34 X10(3)/MCL (ref 0.6–4.6)
LYMPHOCYTES NFR BLD AUTO: 19.6 %
MCH RBC QN AUTO: 28.7 PG (ref 27–31)
MCHC RBC AUTO-ENTMCNC: 32.8 MG/DL (ref 33–36)
MCV RBC AUTO: 87.4 FL (ref 80–94)
MONOCYTES # BLD AUTO: 1.01 X10(3)/MCL (ref 0.1–1.3)
MONOCYTES NFR BLD AUTO: 8.5 %
NEUTROPHILS # BLD AUTO: 8.4 X10(3)/MCL (ref 2.1–9.2)
NEUTROPHILS NFR BLD AUTO: 70.7 %
NRBC BLD AUTO-RTO: 0 %
PLATELET # BLD AUTO: 367 X10(3)/MCL (ref 130–400)
PMV BLD AUTO: 10 FL (ref 9.4–12.4)
POTASSIUM SERPL-SCNC: 3.6 MMOL/L (ref 3.5–5.1)
PROT SERPL-MCNC: 6.5 GM/DL (ref 5.8–7.6)
RBC # BLD AUTO: 4.36 X10(6)/MCL (ref 4.7–6.1)
SODIUM SERPL-SCNC: 134 MMOL/L (ref 136–145)
T3FREE SERPL-MCNC: 4.61 PG/ML (ref 1.57–3.91)
T4 FREE SERPL-MCNC: 2.21 NG/DL (ref 0.7–1.48)
TSH SERPL-ACNC: <0.0083 UIU/ML (ref 0.35–4.94)
WBC # SPEC AUTO: 11.9 X10(3)/MCL (ref 4.5–11.5)

## 2022-06-14 PROCEDURE — 84481 FREE ASSAY (FT-3): CPT | Performed by: INTERNAL MEDICINE

## 2022-06-14 PROCEDURE — 25000003 PHARM REV CODE 250: Performed by: INTERNAL MEDICINE

## 2022-06-14 PROCEDURE — 11000001 HC ACUTE MED/SURG PRIVATE ROOM

## 2022-06-14 PROCEDURE — 85025 COMPLETE CBC W/AUTO DIFF WBC: CPT | Performed by: NURSE PRACTITIONER

## 2022-06-14 PROCEDURE — 25000003 PHARM REV CODE 250: Performed by: STUDENT IN AN ORGANIZED HEALTH CARE EDUCATION/TRAINING PROGRAM

## 2022-06-14 PROCEDURE — 63600175 PHARM REV CODE 636 W HCPCS: Performed by: INTERNAL MEDICINE

## 2022-06-14 PROCEDURE — 84439 ASSAY OF FREE THYROXINE: CPT | Performed by: INTERNAL MEDICINE

## 2022-06-14 PROCEDURE — 80053 COMPREHEN METABOLIC PANEL: CPT | Performed by: NURSE PRACTITIONER

## 2022-06-14 PROCEDURE — 36415 COLL VENOUS BLD VENIPUNCTURE: CPT | Performed by: NURSE PRACTITIONER

## 2022-06-14 PROCEDURE — 85730 THROMBOPLASTIN TIME PARTIAL: CPT | Performed by: NURSE PRACTITIONER

## 2022-06-14 PROCEDURE — 63600175 PHARM REV CODE 636 W HCPCS: Performed by: STUDENT IN AN ORGANIZED HEALTH CARE EDUCATION/TRAINING PROGRAM

## 2022-06-14 PROCEDURE — 92610 EVALUATE SWALLOWING FUNCTION: CPT

## 2022-06-14 PROCEDURE — 36415 COLL VENOUS BLD VENIPUNCTURE: CPT | Performed by: INTERNAL MEDICINE

## 2022-06-14 PROCEDURE — 84443 ASSAY THYROID STIM HORMONE: CPT | Performed by: INTERNAL MEDICINE

## 2022-06-14 RX ORDER — EZETIMIBE 10 MG/1
10 TABLET ORAL DAILY
Status: DISCONTINUED | OUTPATIENT
Start: 2022-06-15 | End: 2022-06-27 | Stop reason: HOSPADM

## 2022-06-14 RX ORDER — TALC
6 POWDER (GRAM) TOPICAL NIGHTLY PRN
Status: DISCONTINUED | OUTPATIENT
Start: 2022-06-14 | End: 2022-06-27 | Stop reason: HOSPADM

## 2022-06-14 RX ORDER — MONTELUKAST SODIUM 10 MG/1
10 TABLET ORAL DAILY
Status: DISCONTINUED | OUTPATIENT
Start: 2022-06-14 | End: 2022-06-27 | Stop reason: HOSPADM

## 2022-06-14 RX ORDER — PROPRANOLOL HYDROCHLORIDE 60 MG/1
60 CAPSULE, EXTENDED RELEASE ORAL 2 TIMES DAILY
Status: DISCONTINUED | OUTPATIENT
Start: 2022-06-14 | End: 2022-06-17

## 2022-06-14 RX ORDER — HALOPERIDOL 5 MG/ML
5 INJECTION INTRAMUSCULAR
Status: DISCONTINUED | OUTPATIENT
Start: 2022-06-14 | End: 2022-06-14

## 2022-06-14 RX ORDER — SODIUM CHLORIDE 0.9 % (FLUSH) 0.9 %
10 SYRINGE (ML) INJECTION
Status: DISCONTINUED | OUTPATIENT
Start: 2022-06-14 | End: 2022-06-27 | Stop reason: HOSPADM

## 2022-06-14 RX ORDER — ACETAMINOPHEN 325 MG/1
650 TABLET ORAL EVERY 8 HOURS PRN
Status: DISCONTINUED | OUTPATIENT
Start: 2022-06-14 | End: 2022-06-27 | Stop reason: HOSPADM

## 2022-06-14 RX ORDER — ASPIRIN 325 MG
325 TABLET ORAL DAILY
Status: DISCONTINUED | OUTPATIENT
Start: 2022-06-14 | End: 2022-06-27 | Stop reason: HOSPADM

## 2022-06-14 RX ORDER — METHIMAZOLE 10 MG/1
40 TABLET ORAL DAILY
Status: DISCONTINUED | OUTPATIENT
Start: 2022-06-14 | End: 2022-06-16

## 2022-06-14 RX ORDER — ATORVASTATIN CALCIUM 10 MG/1
10 TABLET, FILM COATED ORAL NIGHTLY
Status: DISCONTINUED | OUTPATIENT
Start: 2022-06-14 | End: 2022-06-27 | Stop reason: HOSPADM

## 2022-06-14 RX ORDER — TRAZODONE HYDROCHLORIDE 100 MG/1
100 TABLET ORAL NIGHTLY
Status: ON HOLD | COMMUNITY
End: 2022-06-14

## 2022-06-14 RX ORDER — VANCOMYCIN HCL IN 5 % DEXTROSE 1G/250ML
1000 PLASTIC BAG, INJECTION (ML) INTRAVENOUS
Status: CANCELLED | OUTPATIENT
Start: 2022-06-14

## 2022-06-14 RX ORDER — HYDROCODONE BITARTRATE AND ACETAMINOPHEN 5; 325 MG/1; MG/1
1 TABLET ORAL EVERY 8 HOURS PRN
Status: DISCONTINUED | OUTPATIENT
Start: 2022-06-14 | End: 2022-06-21

## 2022-06-14 RX ORDER — PREDNISONE 5 MG/1
5 TABLET ORAL DAILY
Status: DISCONTINUED | OUTPATIENT
Start: 2022-06-14 | End: 2022-06-27 | Stop reason: HOSPADM

## 2022-06-14 RX ORDER — HALOPERIDOL 5 MG/ML
2 INJECTION INTRAMUSCULAR
Status: COMPLETED | OUTPATIENT
Start: 2022-06-14 | End: 2022-06-14

## 2022-06-14 RX ORDER — LEVETIRACETAM 500 MG/1
500 TABLET ORAL 2 TIMES DAILY
Status: DISCONTINUED | OUTPATIENT
Start: 2022-06-14 | End: 2022-06-20

## 2022-06-14 RX ORDER — PANTOPRAZOLE SODIUM 40 MG/1
40 TABLET, DELAYED RELEASE ORAL DAILY
Status: DISCONTINUED | OUTPATIENT
Start: 2022-06-14 | End: 2022-06-27 | Stop reason: HOSPADM

## 2022-06-14 RX ORDER — DIGOXIN 250 MCG
0.25 TABLET ORAL DAILY
Status: DISCONTINUED | OUTPATIENT
Start: 2022-06-14 | End: 2022-06-27 | Stop reason: HOSPADM

## 2022-06-14 RX ADMIN — VANCOMYCIN HYDROCHLORIDE 1000 MG: 1 INJECTION, POWDER, LYOPHILIZED, FOR SOLUTION INTRAVENOUS at 11:06

## 2022-06-14 RX ADMIN — AMPICILLIN SODIUM 2 G: 2 INJECTION, POWDER, FOR SOLUTION INTRAMUSCULAR; INTRAVENOUS at 06:06

## 2022-06-14 RX ADMIN — ACYCLOVIR SODIUM 730 MG: 500 INJECTION, SOLUTION INTRAVENOUS at 11:06

## 2022-06-14 RX ADMIN — PROPRANOLOL HYDROCHLORIDE 60 MG: 60 CAPSULE, EXTENDED RELEASE ORAL at 11:06

## 2022-06-14 RX ADMIN — PREDNISONE 5 MG: 5 TABLET ORAL at 11:06

## 2022-06-14 RX ADMIN — CEFTRIAXONE SODIUM 2 G: 2 INJECTION, POWDER, FOR SOLUTION INTRAMUSCULAR; INTRAVENOUS at 09:06

## 2022-06-14 RX ADMIN — DIGOXIN 0.25 MG: 250 TABLET ORAL at 05:06

## 2022-06-14 RX ADMIN — AMPICILLIN SODIUM 2 G: 2 INJECTION, POWDER, FOR SOLUTION INTRAMUSCULAR; INTRAVENOUS at 09:06

## 2022-06-14 RX ADMIN — ACYCLOVIR SODIUM 730 MG: 500 INJECTION, SOLUTION INTRAVENOUS at 04:06

## 2022-06-14 RX ADMIN — CEFTRIAXONE SODIUM 2 G: 2 INJECTION, POWDER, FOR SOLUTION INTRAMUSCULAR; INTRAVENOUS at 10:06

## 2022-06-14 RX ADMIN — VANCOMYCIN HYDROCHLORIDE 1000 MG: 1 INJECTION, POWDER, LYOPHILIZED, FOR SOLUTION INTRAVENOUS at 01:06

## 2022-06-14 RX ADMIN — LEVETIRACETAM 500 MG: 500 TABLET, FILM COATED ORAL at 10:06

## 2022-06-14 RX ADMIN — AMPICILLIN SODIUM 2 G: 2 INJECTION, POWDER, FOR SOLUTION INTRAMUSCULAR; INTRAVENOUS at 11:06

## 2022-06-14 RX ADMIN — MONTELUKAST SODIUM 10 MG: 10 TABLET, COATED ORAL at 05:06

## 2022-06-14 RX ADMIN — PROPRANOLOL HYDROCHLORIDE 60 MG: 60 CAPSULE, EXTENDED RELEASE ORAL at 10:06

## 2022-06-14 RX ADMIN — AMPICILLIN SODIUM 2 G: 2 INJECTION, POWDER, FOR SOLUTION INTRAMUSCULAR; INTRAVENOUS at 01:06

## 2022-06-14 RX ADMIN — ATORVASTATIN CALCIUM 10 MG: 10 TABLET, FILM COATED ORAL at 10:06

## 2022-06-14 RX ADMIN — PANTOPRAZOLE SODIUM 40 MG: 40 TABLET, DELAYED RELEASE ORAL at 11:06

## 2022-06-14 RX ADMIN — LEVETIRACETAM 500 MG: 500 TABLET, FILM COATED ORAL at 11:06

## 2022-06-14 RX ADMIN — HYDROCODONE BITARTRATE AND ACETAMINOPHEN 1 TABLET: 5; 325 TABLET ORAL at 03:06

## 2022-06-14 RX ADMIN — ASPIRIN 325 MG ORAL TABLET 325 MG: 325 PILL ORAL at 11:06

## 2022-06-14 RX ADMIN — HALOPERIDOL LACTATE 2 MG: 5 INJECTION, SOLUTION INTRAMUSCULAR at 12:06

## 2022-06-14 RX ADMIN — METHIMAZOLE 40 MG: 10 TABLET ORAL at 05:06

## 2022-06-14 RX ADMIN — ACYCLOVIR SODIUM 730 MG: 500 INJECTION, SOLUTION INTRAVENOUS at 07:06

## 2022-06-14 RX ADMIN — HYDROCODONE BITARTRATE AND ACETAMINOPHEN 1 TABLET: 5; 325 TABLET ORAL at 11:06

## 2022-06-14 NOTE — PROGRESS NOTES
Pharmacokinetic Initial Assessment: IV Vancomycin    Assessment/Plan:    Initiate intravenous vancomycin with loading dose of 2000 mg once followed by a maintenance dose of vancomycin 1000mg IV every 8 hours  Desired empiric serum trough concentration is 15 to 20 mcg/mL  Draw vancomycin trough level 60 min prior to fourth dose on 6/15 at approximately 0600  Pharmacy will continue to follow and monitor vancomycin.      Please contact pharmacy at extension [phone] with any questions regarding this assessment.     Thank you for the consult,   Dayanna Mendoza       Patient brief summary:  Quan Contreras is a 71 y.o. male initiated on antimicrobial therapy with IV Vancomycin for treatment of suspected meningitis    Drug Allergies:   Review of patient's allergies indicates:   Allergen Reactions    Ace inhibitors Swelling     Lip swelling    Morphine     Morphine sulfate     Nafcillin Itching    Pradaxa [dabigatran etexilate] Other (See Comments)     Abdominal cramping    Sulfamethoxazole Rash       Actual Body Weight:   90.7 kg    Renal Function:   Estimated Creatinine Clearance: 134.3 mL/min (A) (based on SCr of 0.58 mg/dL (L)).,     Dialysis Method (if applicable):  N/A    CBC (last 72 hours):  Recent Labs   Lab Result Units 06/13/22  1618   WBC x10(3)/mcL 12.8*   Hgb gm/dL 12.7*   Hct % 38.8*   Platelet x10(3)/mcL 376   Mono % % 8.7   Eos % % 0.2   Basophil % % 0.3       Metabolic Panel (last 72 hours):  Recent Labs   Lab Result Units 06/13/22  1618 06/13/22  1650   Sodium Level mmol/L 137  --    Potassium Level mmol/L 3.6  --    Chloride mmol/L 103  --    Carbon Dioxide mmol/L 22*  --    Glucose Level mg/dL 158*  --    Glucose, UA mg/dL  --  Negative   Blood Urea Nitrogen mg/dL 10.1  --    Creatinine mg/dL 0.58*  --    Albumin Level gm/dL 2.5*  --    Bilirubin Total mg/dL 0.5  --    Alkaline Phosphatase unit/L 91  --    Aspartate Aminotransferase unit/L 59*  --    Alanine Aminotransferase unit/L 53  --        Drug  levels (last 3 results):  No results for input(s): VANCOMYCINRA, VANCORANDOM, VANCOMYCINPE, VANCOPEAK, VANCOMYCINTR, VANCOTROUGH in the last 72 hours.    Microbiologic Results:  Microbiology Results (last 7 days)       Procedure Component Value Units Date/Time    Cryptococcal antigen, CSF (Tube 3) [[id number]]     Order Status: Sent Specimen: CSF (Spinal Fluid) from CSF Tap, Tube 3     Blood culture #1 **CANNOT BE ORDERED STAT** [[id number]] Collected: 06/13/22 1618    Order Status: Resulted Specimen: Blood Updated: 06/13/22 1630    Blood culture #2 **CANNOT BE ORDERED STAT** [[id number]] Collected: 06/13/22 1618    Order Status: Resulted Specimen: Blood Updated: 06/13/22 1623

## 2022-06-14 NOTE — H&P
[facility] Hospital Medicine History & Physical Examination       Patient Name: Quan Contreras  MRN: [id number]  Patient Class: IP- Inpatient   Admission Date: 6/13/2022  3:39 PM  Length of Stay: 1  Admitting Service: Hospital Medicine   Attending Physician: Jean Ferguson MD   Primary Care Provider: Bran Hansen MD  History source: EMR, patient and/or patient's family    CHIEF COMPLAINT   Altered mental status and fever    HISTORY OF PRESENT ILLNESS:   Patient is a 71-year-old male with multiple medical comorbidities including paroxysmal atrial fibrillation on Eliquis, CAD, HTN, cardiomyopathy, hyperthyroidism on methimazole, prior CVA/TIAs, seizure disorder and rheumatoid arthritis on Cimzia/prednisone who was hospitalized in late April with an acute CVA as well as thyrotoxicosis secondary to noncompliance with methimazole.  He was discharged to rehab facility and when he returned home started to have twitching and worsening altered mental status not behaving himself so was brought back to the ER in mid May where he was seen by Neurology and had a negative EEG but was started on Keppra out of concern for possible seizure-like activity in the setting of his recent stroke.  He then was brought back to the ER last week when he developed fevers and worsening of his baseline confusion and his chest x-ray was significant for a possible left lower lobe infiltrate and patient was started on oral Levaquin and discharged to follow-up with PCP.  Wife at bedside states that since then he has been having episodic on and off fevers as high as 102. This morning home health nurse noted patient was extremely off from his baseline mental status and had a fever of 102 so was referred to the ER for further evaluation.      He received Tylenol prior to arrival in here he has been afebrile, hemodynamically stable maintaining normal sats on room air.  His laboratory work was overall  unremarkable, SARS-COVID-2 testing and influenza negative.  Blood cultures were obtained.  Due to him being on oral anticoagulation on LP was not performed in the ER.  CT of the head was unremarkable for acute process.    PAST MEDICAL HISTORY:   Paroxysmal atrial fibrillation on Eliquis  Coronary artery disease  Hypertension  Hyperlipidemia  Ischemic cardiomyopathy with preserved EF TTE 04/2022  Hyperthyroidism on methimazole  Recent CVAs and prior TIAs  Seizure disorder  Rheumatoid arthritis on Cimzia/prednisone    PAST SURGICAL HISTORY:   Cataract surgery  Knee replacement  Cyst removal  Vasectomy    ALLERGIES:   Ace inhibitors, Morphine, Morphine sulfate, Nafcillin, Pradaxa [dabigatran etexilate], and Sulfamethoxazole    FAMILY HISTORY:   Reviewed and non-contributory     SOCIAL HISTORY:   No current tobacco drug or alcohol use    HOME MEDICATIONS:     apixaban (ELIQUIS) 5 mg Tab Take 1 tablet (5 mg total) by mouth 2 (two) times daily.   aspirin 325 MG tablet Take 325 mg by mouth once daily.   certolizumab pegol (CIMZIA SUBQ) Inject into the skin.   DIGITEK 250 mcg (0.25 mg) tablet Take 1 tablet by mouth Daily.   ergocalciferol (ERGOCALCIFEROL) 50,000 unit Cap Take 50,000 Units by mouth every 7 days.   ezetimibe (ZETIA) 10 mg tablet Take 1 tablet by mouth once daily at 6am.   HYDROcodone-acetaminophen (NORCO)  mg per tablet Take 1 tablet by mouth 3 (three) times daily. PRN   levETIRAcetam (KEPPRA) 500 MG Tab Take 1 tablet (500 mg total) by mouth 2 (two) times daily.   levoFLOXacin (LEVAQUIN) 500 MG tablet Take 1.5 tablets (750 mg total) by mouth once daily. for 7 days   methIMAzole (TAPAZOLE) 10 MG Tab Take 4 tablets by mouth Daily.   montelukast (SINGULAIR) 10 mg tablet Take 1 tablet by mouth Daily.   mv, min #36-iron,carbonyl-FA 16 mg iron- 0.38 mg Tab Take 1 tablet by mouth once daily.   pantoprazole (PROTONIX) 40 MG tablet Take 40 mg by mouth once daily.   predniSONE (DELTASONE) 5 MG tablet Take 5 mg by  mouth once daily. At bedtime   propranoloL (INDERAL LA) 60 MG 24 hr capsule Take 1 capsule (60 mg total) by mouth 2 (two) times a day.   rosuvastatin (CRESTOR) 40 MG Tab Take 40 mg by mouth every evening.   traZODone (DESYREL) 100 MG tablet Take 100 mg by mouth every evening.   baclofen (LIORESAL) 10 MG tablet Take 1 tablet by mouth nightly.   metoprolol succinate (TOPROL-XL) 100 MG 24 hr tablet Take 100 mg by mouth 2 (two) times daily.       REVIEW OF SYSTEMS:   Except as documented, all other systems reviewed and negative     PHYSICAL EXAM:   T 99.5 °F (37.5 °C)   BP (!) 151/85   P 96   RR 16   O2 97 %  GENERAL: awake, alert, orientedx2, slowed mentation  HEENT: normocephalic atraumatic   NECK: supple   LUNGS: Clear bilaterally, no wheezing or rales, no accessory muscle use   CVS:  Irregularly irregular, normal peripheral perfusion  ABD: Soft, non-tender, non-distended, bowel sounds present  EXTREMITIES: no clubbing or cyanosis  SKIN: Warm, dry.   NEURO:  Confused, grossly without lateralizing deficits  PSYCHIATRIC: Cooperative    LABS AND IMAGIN/13/22  1618 22  0521   WBC 12.8* 11.9*   RBC 4.45* 4.36*   HGB 12.7* 12.5*   HCT 38.8* 38.1*   MCV 87.2 87.4   MCH 28.5 28.7   MCHC 32.7* 32.8*   RDW 14.4 14.5    367       22  1619   LACTIC 1.5        22  1618      K 3.6   CHLORIDE 103   CO2 22*   BUN 10.1   CREATININE 0.58*   GLUCOSE 158*   CALCIUM 8.7*   ALBUMIN 2.5*   GLOBULIN 4.1*   ALKPHOS 91   ALT 53   AST 59*   BILITOT 0.5       22  1618   .2*   TROPONINI <0.010        CT Head Without Contrast  Impression: 1.  No acute intracranial findings identified.  2.  Old infarcts, chronic microangiopathic ischemia and atrophy.    X-Ray Chest AP Portable  Impression: NO ACUTE CARDIOPULMONARY PROCESS IDENTIFIED.      ASSESSMENT & PLAN:   Febrile illness of uncertain etiology  Acute metabolic encephalopathy on top of vascular dementia  Paroxysmal atrial fibrillation on  Eliquis  Coronary artery disease  Hypertension  Hyperlipidemia  Ischemic cardiomyopathy with preserved EF TTE 04/2022  Hyperthyroidism on methimazole  Recent CVAs and prior TIAs  Seizure disorder  Rheumatoid arthritis on Cimzia/prednisone    - suspect the patient has dementia likely vascular dementia that is being worsened now secondary to febrile illness with possible sources including recent diagnosis of tooth abscess for which he completed antibiotics and is awaiting a root canal, recent diagnosis of pneumonia for which he completed a course of oral Levaquin, but since these have all been treated expect that they would be resolved.  It is reasonable to hold his anticoagulation and obtain a lumbar puncture to evaluate for possible meningitis; note he is immunocompromised secondary to chronic prednisone/Cimzia use.   - hold anticoagulation today, consult IR tomorrow for LP  - in the meantime continue vancomycin/ceftriaxone/ampicillin/acyclovir  - will have speech re-evaluate for possible aspiration  - check TSH/t3/t4  - resume home medications as appropriate    DVT prophylaxis: holding OAC for LP   Code status: full code    If patient was admitted under observational status it is with my approval/permission.     At least 55 min was spent on this history and physical.  Time seen: 4 AM   Jean Ferguson MD

## 2022-06-14 NOTE — PT/OT/SLP EVAL
"Speech Language Pathology Evaluation  Bedside Swallow    Patient Name:  Quan Contreras   MRN:  [id number]  Admitting Diagnosis: Fever    Recommendations:                 General Recommendations:  Cognitive-linguistic evaluation  Diet recommendations:  Easy to Chew Diet - IDDSI Level 7, Thin liquids - IDDSI Level 0   Aspiration Precautions: HOB elevated   General Precautions: Standard,    Communication strategies:  pt is hard of hearing    History:     Past Medical History:   Diagnosis Date    Acute left arterial ischemic stroke, KINGA (anterior cerebral artery) 5/3/2022    Acute osteomyelitis 5/11/2022    Atherosclerosis of coronary artery 5/11/2022    Atrial fibrillation 5/3/2022    Benign essential hypertension 5/3/2022    Cardiomyopathy     Coronary artery disease     Diverticulosis     Esophageal reflux 5/11/2022    Fatigue     Focal seizure 5/17/2022    Generalized anxiety disorder     GERD (gastroesophageal reflux disease)     Graves disease     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/2022    HTN (hypertension)     Hyperthyroidism     Idiopathic peripheral neuropathy 5/11/2022    Irritable bowel syndrome without diarrhea     Ischemic cardiomyopathy 5/3/2022    Mixed hyperlipidemia 5/11/2022    Other sequelae following unspecified cerebrovascular disease 5/9/2022    Paroxysmal atrial fibrillation     Rheumatoid arthritis 5/11/2022    Rheumatoid arthritis, unspecified     Shingles     Staph aureus infection     Stroke     Thyroiditis, unspecified     Thyrotoxicosis 5/3/2022       Past Surgical History:   Procedure Laterality Date    CATARACT EXTRACTION      CYST REMOVAL Left     TONSILLECTOMY      TOTAL KNEE ARTHROPLASTY      VASECTOMY         Social History: Patient lives with wife.      Prior diet: easy to chew solids, thin liquids.        Subjective       Patient goals: Per wife, "To get better."     Pain/Comfort:  · Pain Rating 1: 0/10    Pt known to " this department from recent admission in 4/28/22 at which time pt tolerating regular solids and thin liquids.  Speech, language, cognitive evaluation completed and results WFL.  Wife at bedside reports pt with no difficulty eating/drinking.  Pt occasionally eats/drinks when not in upright position at home.    Objective:     Oral Musculature Evaluation  · Oral Musculature: WFL  · Oral Labial Strength and Mobility: WFL  · Voice Prior to PO Intake: adequate    Bedside Swallow Eval:   Consistencies Assessed:  · Thin liquids via cup and straw, 4oz each  · Puree via spoon  · Solids easy to chew     Oral Phase:   · Prolonged mastication    Pharyngeal Phase:   · no overt clinical signs/symptoms of aspiration    Compensatory Strategies  · None        Assessment:     Quan Contreras is a 71 y.o. male with an SLP diagnosis of mild Dysphagia characterized by prolonged mastication.  Rec: easy to chew solids, thin liquids, meds per pt preference.    Goals:   Multidisciplinary Problems     SLP Goals     Not on file                Plan:     · Patient to be seen:    6/15/22  · Plan of Care expires:     · Plan of Care reviewed with:  patient, spouse   · SLP Follow-Up:  Yes       Discharge recommendations:      Barriers to Discharge:  severity of impairment    Time Tracking:     SLP Treatment Date:   06/14/22  Speech Start Time:  1000  Speech Stop Time:  1020     Speech Total Time (min):  20 min    Billable Minutes: Eval Swallow and Oral Function 20 minutes    06/14/2022

## 2022-06-15 PROBLEM — G93.40 ENCEPHALOPATHY: Status: ACTIVE | Noted: 2022-06-15

## 2022-06-15 LAB
ALBUMIN SERPL-MCNC: 2.5 GM/DL (ref 3.4–4.8)
ALBUMIN SERPL-MCNC: 2.6 GM/DL (ref 3.4–4.8)
ALBUMIN/GLOB SERPL: 0.6 RATIO (ref 1.1–2)
ALBUMIN/GLOB SERPL: 0.6 RATIO (ref 1.1–2)
ALP SERPL-CCNC: 88 UNIT/L (ref 40–150)
ALP SERPL-CCNC: 91 UNIT/L (ref 40–150)
ALT SERPL-CCNC: 54 UNIT/L (ref 0–55)
ALT SERPL-CCNC: 60 UNIT/L (ref 0–55)
AMMONIA PLAS-MSCNC: 49 UMOL/L (ref 18–72)
APTT PPP: 31.1 SECONDS (ref 23.2–33.7)
AST SERPL-CCNC: 59 UNIT/L (ref 5–34)
AST SERPL-CCNC: 62 UNIT/L (ref 5–34)
BASOPHILS # BLD AUTO: 0.03 X10(3)/MCL (ref 0–0.2)
BASOPHILS NFR BLD AUTO: 0.3 %
BILIRUBIN DIRECT+TOT PNL SERPL-MCNC: 0.6 MG/DL
BILIRUBIN DIRECT+TOT PNL SERPL-MCNC: 0.8 MG/DL
BUN SERPL-MCNC: 6 MG/DL (ref 8.4–25.7)
BUN SERPL-MCNC: 6.5 MG/DL (ref 8.4–25.7)
CALCIUM SERPL-MCNC: 9 MG/DL (ref 8.8–10)
CALCIUM SERPL-MCNC: 9.3 MG/DL (ref 8.8–10)
CHLORIDE SERPL-SCNC: 101 MMOL/L (ref 98–107)
CHLORIDE SERPL-SCNC: 102 MMOL/L (ref 98–107)
CO2 SERPL-SCNC: 21 MMOL/L (ref 23–31)
CO2 SERPL-SCNC: 22 MMOL/L (ref 23–31)
CREAT SERPL-MCNC: 0.46 MG/DL (ref 0.73–1.18)
CREAT SERPL-MCNC: 0.48 MG/DL (ref 0.73–1.18)
CRYPTOC AG CSF QL IA.RAPID: NEGATIVE
CRYPTOC AG TITR CSF IA: NORMAL {TITER}
EOSINOPHIL # BLD AUTO: 0.07 X10(3)/MCL (ref 0–0.9)
EOSINOPHIL NFR BLD AUTO: 0.7 %
ERYTHROCYTE [DISTWIDTH] IN BLOOD BY AUTOMATED COUNT: 14.5 % (ref 11.5–17)
GLOBULIN SER-MCNC: 4.3 GM/DL (ref 2.4–3.5)
GLOBULIN SER-MCNC: 4.5 GM/DL (ref 2.4–3.5)
GLUCOSE CSF-MCNC: 35 MG/DL (ref 40–70)
GLUCOSE SERPL-MCNC: 82 MG/DL (ref 82–115)
GLUCOSE SERPL-MCNC: 83 MG/DL (ref 82–115)
HCT VFR BLD AUTO: 41.4 % (ref 42–52)
HGB BLD-MCNC: 13.5 GM/DL (ref 14–18)
IMM GRANULOCYTES # BLD AUTO: 0.05 X10(3)/MCL (ref 0–0.02)
IMM GRANULOCYTES NFR BLD AUTO: 0.5 % (ref 0–0.43)
INR BLD: 1.35 (ref 0–1.3)
LDH CSF-CCNC: 61 U/L
LYMPHOCYTES # BLD AUTO: 3.59 X10(3)/MCL (ref 0.6–4.6)
LYMPHOCYTES NFR BLD AUTO: 33.7 %
MCH RBC QN AUTO: 28.2 PG (ref 27–31)
MCHC RBC AUTO-ENTMCNC: 32.6 MG/DL (ref 33–36)
MCV RBC AUTO: 86.6 FL (ref 80–94)
MONOCYTES # BLD AUTO: 0.99 X10(3)/MCL (ref 0.1–1.3)
MONOCYTES NFR BLD AUTO: 9.3 %
NEUTROPHILS # BLD AUTO: 5.9 X10(3)/MCL (ref 2.1–9.2)
NEUTROPHILS NFR BLD AUTO: 55.5 %
NRBC BLD AUTO-RTO: 0 %
PLATELET # BLD AUTO: 379 X10(3)/MCL (ref 130–400)
PMV BLD AUTO: 10.4 FL (ref 9.4–12.4)
POTASSIUM SERPL-SCNC: 3.9 MMOL/L (ref 3.5–5.1)
POTASSIUM SERPL-SCNC: 4 MMOL/L (ref 3.5–5.1)
PROT CSF-MCNC: 91.4 MG/DL (ref 15–45)
PROT SERPL-MCNC: 6.8 GM/DL (ref 5.8–7.6)
PROT SERPL-MCNC: 7.1 GM/DL (ref 5.8–7.6)
PROTHROMBIN TIME: 16.5 SECONDS (ref 12.5–14.5)
RBC # BLD AUTO: 4.78 X10(6)/MCL (ref 4.7–6.1)
SODIUM SERPL-SCNC: 135 MMOL/L (ref 136–145)
SODIUM SERPL-SCNC: 136 MMOL/L (ref 136–145)
VANCOMYCIN TROUGH SERPL-MCNC: 5.9 UG/ML (ref 15–20)
WBC # SPEC AUTO: 10.7 X10(3)/MCL (ref 4.5–11.5)

## 2022-06-15 PROCEDURE — 85610 PROTHROMBIN TIME: CPT | Performed by: INTERNAL MEDICINE

## 2022-06-15 PROCEDURE — 80053 COMPREHEN METABOLIC PANEL: CPT | Performed by: INTERNAL MEDICINE

## 2022-06-15 PROCEDURE — 85025 COMPLETE CBC W/AUTO DIFF WBC: CPT | Performed by: INTERNAL MEDICINE

## 2022-06-15 PROCEDURE — 80053 COMPREHEN METABOLIC PANEL: CPT | Performed by: HOSPITALIST

## 2022-06-15 PROCEDURE — 63600175 PHARM REV CODE 636 W HCPCS: Performed by: INTERNAL MEDICINE

## 2022-06-15 PROCEDURE — 87070 CULTURE OTHR SPECIMN AEROBIC: CPT

## 2022-06-15 PROCEDURE — 87102 FUNGUS ISOLATION CULTURE: CPT

## 2022-06-15 PROCEDURE — 99223 1ST HOSP IP/OBS HIGH 75: CPT | Mod: ,,, | Performed by: PSYCHIATRY & NEUROLOGY

## 2022-06-15 PROCEDURE — 83615 LACTATE (LD) (LDH) ENZYME: CPT | Performed by: INTERNAL MEDICINE

## 2022-06-15 PROCEDURE — 85730 THROMBOPLASTIN TIME PARTIAL: CPT | Performed by: INTERNAL MEDICINE

## 2022-06-15 PROCEDURE — 87483 CNS DNA AMP PROBE TYPE 12-25: CPT | Performed by: INTERNAL MEDICINE

## 2022-06-15 PROCEDURE — 25000003 PHARM REV CODE 250: Performed by: INTERNAL MEDICINE

## 2022-06-15 PROCEDURE — 82945 GLUCOSE OTHER FLUID: CPT | Performed by: INTERNAL MEDICINE

## 2022-06-15 PROCEDURE — 89051 BODY FLUID CELL COUNT: CPT

## 2022-06-15 PROCEDURE — 87899 AGENT NOS ASSAY W/OPTIC: CPT | Performed by: INTERNAL MEDICINE

## 2022-06-15 PROCEDURE — 63600175 PHARM REV CODE 636 W HCPCS: Performed by: STUDENT IN AN ORGANIZED HEALTH CARE EDUCATION/TRAINING PROGRAM

## 2022-06-15 PROCEDURE — 95819 EEG AWAKE AND ASLEEP: CPT | Mod: 26,,, | Performed by: PSYCHIATRY & NEUROLOGY

## 2022-06-15 PROCEDURE — 63600175 PHARM REV CODE 636 W HCPCS: Performed by: HOSPITALIST

## 2022-06-15 PROCEDURE — 82140 ASSAY OF AMMONIA: CPT | Performed by: HOSPITALIST

## 2022-06-15 PROCEDURE — 95819 PR EEG,W/AWAKE & ASLEEP RECORD: ICD-10-PCS | Mod: 26,,, | Performed by: PSYCHIATRY & NEUROLOGY

## 2022-06-15 PROCEDURE — 36415 COLL VENOUS BLD VENIPUNCTURE: CPT | Performed by: HOSPITALIST

## 2022-06-15 PROCEDURE — 11000001 HC ACUTE MED/SURG PRIVATE ROOM

## 2022-06-15 PROCEDURE — 87206 SMEAR FLUORESCENT/ACID STAI: CPT | Performed by: PSYCHIATRY & NEUROLOGY

## 2022-06-15 PROCEDURE — 36415 COLL VENOUS BLD VENIPUNCTURE: CPT | Performed by: INTERNAL MEDICINE

## 2022-06-15 PROCEDURE — 84157 ASSAY OF PROTEIN OTHER: CPT | Performed by: INTERNAL MEDICINE

## 2022-06-15 PROCEDURE — 25000003 PHARM REV CODE 250: Performed by: STUDENT IN AN ORGANIZED HEALTH CARE EDUCATION/TRAINING PROGRAM

## 2022-06-15 PROCEDURE — 80202 ASSAY OF VANCOMYCIN: CPT | Performed by: INTERNAL MEDICINE

## 2022-06-15 PROCEDURE — 99223 PR INITIAL HOSPITAL CARE,LEVL III: ICD-10-PCS | Mod: ,,, | Performed by: PSYCHIATRY & NEUROLOGY

## 2022-06-15 PROCEDURE — 92523 SPEECH SOUND LANG COMPREHEN: CPT

## 2022-06-15 RX ORDER — DEXTROSE MONOHYDRATE, SODIUM CHLORIDE, AND POTASSIUM CHLORIDE 50; 1.49; 4.5 G/1000ML; G/1000ML; G/1000ML
INJECTION, SOLUTION INTRAVENOUS CONTINUOUS
Status: DISPENSED | OUTPATIENT
Start: 2022-06-15 | End: 2022-06-16

## 2022-06-15 RX ADMIN — CEFTRIAXONE SODIUM 2 G: 2 INJECTION, POWDER, FOR SOLUTION INTRAMUSCULAR; INTRAVENOUS at 05:06

## 2022-06-15 RX ADMIN — HYDROCODONE BITARTRATE AND ACETAMINOPHEN 1 TABLET: 5; 325 TABLET ORAL at 09:06

## 2022-06-15 RX ADMIN — PROPRANOLOL HYDROCHLORIDE 60 MG: 60 CAPSULE, EXTENDED RELEASE ORAL at 09:06

## 2022-06-15 RX ADMIN — EZETIMIBE 10 MG: 10 TABLET ORAL at 05:06

## 2022-06-15 RX ADMIN — ACYCLOVIR SODIUM 730 MG: 500 INJECTION, SOLUTION INTRAVENOUS at 02:06

## 2022-06-15 RX ADMIN — DIGOXIN 0.25 MG: 250 TABLET ORAL at 06:06

## 2022-06-15 RX ADMIN — ACYCLOVIR SODIUM 730 MG: 500 INJECTION, SOLUTION INTRAVENOUS at 09:06

## 2022-06-15 RX ADMIN — AMPICILLIN SODIUM 2 G: 2 INJECTION, POWDER, FOR SOLUTION INTRAMUSCULAR; INTRAVENOUS at 05:06

## 2022-06-15 RX ADMIN — ASPIRIN 325 MG ORAL TABLET 325 MG: 325 PILL ORAL at 01:06

## 2022-06-15 RX ADMIN — PANTOPRAZOLE SODIUM 40 MG: 40 TABLET, DELAYED RELEASE ORAL at 01:06

## 2022-06-15 RX ADMIN — ATORVASTATIN CALCIUM 10 MG: 10 TABLET, FILM COATED ORAL at 09:06

## 2022-06-15 RX ADMIN — AMPICILLIN SODIUM 2 G: 2 INJECTION, POWDER, FOR SOLUTION INTRAMUSCULAR; INTRAVENOUS at 09:06

## 2022-06-15 RX ADMIN — LEVETIRACETAM 500 MG: 500 TABLET, FILM COATED ORAL at 09:06

## 2022-06-15 RX ADMIN — POTASSIUM CHLORIDE, DEXTROSE MONOHYDRATE AND SODIUM CHLORIDE: 150; 5; 450 INJECTION, SOLUTION INTRAVENOUS at 02:06

## 2022-06-15 RX ADMIN — VANCOMYCIN HYDROCHLORIDE 2000 MG: 1 INJECTION, POWDER, LYOPHILIZED, FOR SOLUTION INTRAVENOUS at 04:06

## 2022-06-15 RX ADMIN — AMPICILLIN SODIUM 2 G: 2 INJECTION, POWDER, FOR SOLUTION INTRAMUSCULAR; INTRAVENOUS at 02:06

## 2022-06-15 RX ADMIN — AMPICILLIN SODIUM 2 G: 2 INJECTION, POWDER, FOR SOLUTION INTRAMUSCULAR; INTRAVENOUS at 06:06

## 2022-06-15 RX ADMIN — PREDNISONE 5 MG: 5 TABLET ORAL at 01:06

## 2022-06-15 RX ADMIN — METHIMAZOLE 40 MG: 10 TABLET ORAL at 06:06

## 2022-06-15 RX ADMIN — LEVETIRACETAM 500 MG: 500 TABLET, FILM COATED ORAL at 01:06

## 2022-06-15 RX ADMIN — MONTELUKAST SODIUM 10 MG: 10 TABLET, COATED ORAL at 06:06

## 2022-06-15 RX ADMIN — PROPRANOLOL HYDROCHLORIDE 60 MG: 60 CAPSULE, EXTENDED RELEASE ORAL at 01:06

## 2022-06-15 NOTE — PROGRESS NOTES
Pharmacokinetic Assessment Follow Up: IV Vancomycin    Vancomycin serum concentration assessment(s):    The trough level was drawn correctly and can be used to guide therapy at this time. The measurement is below the desired definitive target range of 15 to 20 mcg/mL.    Vancomycin Regimen Plan:    Administer one-time dose of 2000 mg IV today at 1600, then resume dosing with vancomycin 1250 mg IV Q8H tonight at 0000.  Next scheduled vancomycin trough is on 06/16 at 1500.    Drug levels (last 3 results):  Recent Labs   Lab Result Units 06/15/22  1325   Vancomycin Trough ug/ml 5.9*       Pharmacy will continue to follow and monitor vancomycin.    Please contact pharmacy at extension [phone] for questions regarding this assessment.    Thank you for the consult,   Mike Moore, PharmD       Patient brief summary:  Quan Contreras is a 71 y.o. male initiated on antimicrobial therapy with IV Vancomycin for treatment of meningitis    The patient's current regimen is vancomycin 1000 mg IV Q8H    Drug Allergies:   Review of patient's allergies indicates:   Allergen Reactions    Ace inhibitors Swelling     Lip swelling    Morphine     Morphine sulfate     Nafcillin Itching    Pradaxa [dabigatran etexilate] Other (See Comments)     Abdominal cramping    Sulfamethoxazole Rash       Actual Body Weight:   90.7 kg    Renal Function:   Estimated Creatinine Clearance: 135.7 mL/min (A) (based on SCr of 0.46 mg/dL (L)).,     Dialysis Method (if applicable):  N/A    CBC (last 72 hours):  Recent Labs   Lab Result Units 06/13/22  1618 06/14/22  0521 06/15/22  0420   WBC x10(3)/mcL 12.8* 11.9* 10.7   Hgb gm/dL 12.7* 12.5* 13.5*   Hct % 38.8* 38.1* 41.4*   Platelet x10(3)/mcL 376 367 379   Mono % % 8.7 8.5 9.3   Eos % % 0.2 0.3 0.7   Basophil % % 0.3 0.2 0.3       Metabolic Panel (last 72 hours):  Recent Labs   Lab Result Units 06/13/22  1618 06/13/22  1650 06/14/22  0521 06/15/22  0420 06/15/22  0828   Sodium Level mmol/L 137  --  134* 135*  136   Potassium Level mmol/L 3.6  --  3.6 3.9 4.0   Chloride mmol/L 103  --  102 101 102   Carbon Dioxide mmol/L 22*  --  22* 22* 21*   Glucose Level mg/dL 158*  --  119* 83 82   Glucose, UA mg/dL  --  Negative  --   --   --    Blood Urea Nitrogen mg/dL 10.1  --  7.8* 6.0* 6.5*   Creatinine mg/dL 0.58*  --  0.49* 0.48* 0.46*   Albumin Level gm/dL 2.5*  --  2.5* 2.5* 2.6*   Bilirubin Total mg/dL 0.5  --  0.6 0.6 0.8   Alkaline Phosphatase unit/L 91  --  85 88 91   Aspartate Aminotransferase unit/L 59*  --  53* 59* 62*   Alanine Aminotransferase unit/L 53  --  50 54 60*       Vancomycin Administrations:  vancomycin given in the last 96 hours                     vancomycin (VANCOCIN) 1,000 mg in dextrose 5 % 250 mL IVPB (mg) 1,000 mg New Bag 06/14/22 2341    vancomycin (VANCOCIN) 1,000 mg in dextrose 5 % 250 mL IVPB (mg) 1,000 mg New Bag 06/14/22 1345    vancomycin (VANCOCIN) 2,000 mg in dextrose 5 % 500 mL IVPB (mg) 2,000 mg New Bag 06/13/22 2320                    Microbiologic Results:  Microbiology Results (last 7 days)       Procedure Component Value Units Date/Time    Blood culture #1 **CANNOT BE ORDERED STAT** [[id number]]  (Normal) Collected: 06/13/22 1618    Order Status: Completed Specimen: Blood Updated: 06/14/22 1703     CULTURE, BLOOD ([facility]) No Growth At 24 Hours    Blood culture #2 **CANNOT BE ORDERED STAT** [[id number]]  (Normal) Collected: 06/13/22 1618    Order Status: Completed Specimen: Blood Updated: 06/14/22 1703     CULTURE, BLOOD ([facility]) No Growth At 24 Hours    Cryptococcal antigen, CSF (Tube 3) [[id number]]     Order Status: Sent Specimen: CSF (Spinal Fluid) from CSF Tap, Tube 3

## 2022-06-15 NOTE — PROGRESS NOTES
Nutrition   Progress Note      Recommendations:  Diet per SLP recs      Reason for Evaluation:  Identified at risk by screening criteria    Diagnosis:    1. Pneumonia due to infectious organism, unspecified laterality, unspecified part of lung    2. Fever    3. Pneumonia    4. Altered mental status, unspecified altered mental status type    5. Confusion    6. Dental abscess    7. Encephalopathy    8. Fever, unspecified fever cause        Relevant Medical History:    Past Medical History:   Diagnosis Date    Acute left arterial ischemic stroke, KINGA (anterior cerebral artery) 5/3/2022    Acute osteomyelitis 5/11/2022    Atherosclerosis of coronary artery 5/11/2022    Atrial fibrillation 5/3/2022    Benign essential hypertension 5/3/2022    Cardiomyopathy     Coronary artery disease     Diverticulosis     Esophageal reflux 5/11/2022    Fatigue     Focal seizure 5/17/2022    Generalized anxiety disorder     GERD (gastroesophageal reflux disease)     Graves disease     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/2022    HTN (hypertension)     Hyperthyroidism     Idiopathic peripheral neuropathy 5/11/2022    Irritable bowel syndrome without diarrhea     Ischemic cardiomyopathy 5/3/2022    Mixed hyperlipidemia 5/11/2022    Other sequelae following unspecified cerebrovascular disease 5/9/2022    Paroxysmal atrial fibrillation     Rheumatoid arthritis 5/11/2022    Rheumatoid arthritis, unspecified     Shingles     Staph aureus infection     Stroke     Thyroiditis, unspecified     Thyrotoxicosis 5/3/2022         Nutrition Diet History:    Factors affecting nutritional intake: decreased appetite    Food / Voodoo / Culture Preferences:      Nutrition Prescription Ordered:    Current Diet Order:     Appetite:  fair    PO intake: NPO      Labs / Medications / Procedures:    Nutrition Related Medications: none noted    Nutrition Related Labs:  6/15: BUN 6.5,  "Crea0.46      Anthropometrics:  Height: 5' 8" (1.727 m)  Admit Weight:  Weight: 90.7 kg (200 lb)  Latest Weight:  64.2 kg (141 lb 8 oz)    Wt Readings from Last 5 Encounters:   06/14/22 64.2 kg (141 lb 8 oz)   06/06/22 68 kg (150 lb)   05/23/22 72.5 kg (159 lb 12.8 oz)   05/18/22 73.5 kg (162 lb)   05/15/22 74.4 kg (164 lb)     IBW: 70kg  %IBW: 128%  UBW: unknown  %Weight Change: n/a  Body mass index is 21.51 kg/m².  BMI classification:  Underweight (BMI less than 22 if > 65 years of age)      Nutrition Narrative:  - pt not available, noted possible appetite and weight loss prior to admit, will attempt early follow up, NPO for procedure    Monitoring and Evaluation:    Nutrition Monitoring and Evaluation:  food and beverage intake    Nutrition Risk:  Level of Nutrition Risk:  Low  Frequency of Follow up:  Dietitian will f/up within 7 days.            "

## 2022-06-15 NOTE — CONSULTS
[facility] - 9th Floor Med Surg  Neurology  Consult Note    Patient Name: Quan Contreras  MRN: [id number]  Admission Date: 6/13/2022  Hospital Length of Stay: 2 days  Code Status: Full Code   Attending Provider: Jean Ferguson MD   Consulting Provider: Kirstie Rosenthal [facility]  Primary Care Physician: Bran Hansen MD  Principal Problem:Fever    Inpatient consult to Neurology  Consult performed by: Kirstie Rosenthal [facility]  Consult ordered by: Smith TROY MD         Subjective:     Chief Complaint:       HPI:   70 y/o M with PMH PAF on eliquis, CAD, HTN, cardiomyopathy, hyperthyroidism on methimazole, CVA, seizure d/o, and rheumatoid arthritis on prednisone/cimzia who presented to ED on 06/13 for AMS and fever of 102. Patient was being evaluated by home health nurse when he was noted to have elevated temperature and severe AMS.  Patient received Tylenol PTA, was afebrile upon arrival to the ED, COVID and influenza testing negative.  CT head unremarkable for acute intracranial findings.  Labs significant for AST/ALT 62/60, .2, TSH < 0.0083, T4 2.21, free T3 4.61.     Patient was recently hospitalized in 04/2022 for acute CVA and thyrotoxicosis 2/2 noncompliance, subsequently discharged to rehab facility.  Upon return to home from rehab facility, patient is noted to have twitching and worsening AMS, patient's wife brought him to ED for further evaluation in 5/2022.  Neurologic workup for seizures and acute stroke unrevealing during that admission, however patient was started on Keppra out of concern for possible seizures given recent history of stroke.  Patient brought to ED again last week for fevers and worsening confusion.  Workup revealed left lower lobe infiltrates, patient started on oral Levaquin, and discharged home.  Patient also has recent history of tooth abscess, recently completed and subacromial therapy for this, and is awaiting root canal.       Past Medical History:    Diagnosis Date    Acute left arterial ischemic stroke, KINGA (anterior cerebral artery) 5/3/2022    Acute osteomyelitis 5/11/2022    Atherosclerosis of coronary artery 5/11/2022    Atrial fibrillation 5/3/2022    Benign essential hypertension 5/3/2022    Cardiomyopathy     Coronary artery disease     Diverticulosis     Esophageal reflux 5/11/2022    Fatigue     Focal seizure 5/17/2022    Generalized anxiety disorder     GERD (gastroesophageal reflux disease)     Graves disease     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/2022    HTN (hypertension)     Hyperthyroidism     Idiopathic peripheral neuropathy 5/11/2022    Irritable bowel syndrome without diarrhea     Ischemic cardiomyopathy 5/3/2022    Mixed hyperlipidemia 5/11/2022    Other sequelae following unspecified cerebrovascular disease 5/9/2022    Paroxysmal atrial fibrillation     Rheumatoid arthritis 5/11/2022    Rheumatoid arthritis, unspecified     Shingles     Staph aureus infection     Stroke     Thyroiditis, unspecified     Thyrotoxicosis 5/3/2022       Past Surgical History:   Procedure Laterality Date    CATARACT EXTRACTION      CYST REMOVAL Left     TONSILLECTOMY      TOTAL KNEE ARTHROPLASTY      VASECTOMY         Review of patient's allergies indicates:   Allergen Reactions    Ace inhibitors Swelling     Lip swelling    Morphine     Morphine sulfate     Nafcillin Itching    Pradaxa [dabigatran etexilate] Other (See Comments)     Abdominal cramping    Sulfamethoxazole Rash       Current Neurological Medications:     No current facility-administered medications on file prior to encounter.     Current Outpatient Medications on File Prior to Encounter   Medication Sig    apixaban (ELIQUIS) 5 mg Tab Take 1 tablet (5 mg total) by mouth 2 (two) times daily.    aspirin 325 MG tablet Take 325 mg by mouth once daily.    certolizumab pegol (CIMZIA SUBQ) Inject into the skin.    DIGITEK 250 mcg  (0.25 mg) tablet Take 1 tablet by mouth Daily.    ergocalciferol (ERGOCALCIFEROL) 50,000 unit Cap Take 50,000 Units by mouth every 7 days.    ezetimibe (ZETIA) 10 mg tablet Take 1 tablet by mouth once daily at 6am.    HYDROcodone-acetaminophen (NORCO)  mg per tablet Take 1 tablet by mouth 3 (three) times daily. PRN    levETIRAcetam (KEPPRA) 500 MG Tab Take 1 tablet (500 mg total) by mouth 2 (two) times daily.    [] levoFLOXacin (LEVAQUIN) 500 MG tablet Take 1.5 tablets (750 mg total) by mouth once daily. for 7 days    methIMAzole (TAPAZOLE) 10 MG Tab Take 4 tablets by mouth Daily.    montelukast (SINGULAIR) 10 mg tablet Take 1 tablet by mouth Daily.    mv, min #36-iron,carbonyl-FA 16 mg iron- 0.38 mg Tab Take 1 tablet by mouth once daily.    pantoprazole (PROTONIX) 40 MG tablet Take 40 mg by mouth once daily.    predniSONE (DELTASONE) 5 MG tablet Take 5 mg by mouth once daily. At bedtime    propranoloL (INDERAL LA) 60 MG 24 hr capsule Take 1 capsule (60 mg total) by mouth 2 (two) times a day.    rosuvastatin (CRESTOR) 40 MG Tab Take 40 mg by mouth every evening.    [DISCONTINUED] baclofen (LIORESAL) 10 MG tablet Take 1 tablet by mouth nightly.    [DISCONTINUED] metoprolol succinate (TOPROL-XL) 100 MG 24 hr tablet Take 100 mg by mouth 2 (two) times daily.     Family History       Family history is unknown by patient.          Tobacco Use    Smoking status: Never Smoker    Smokeless tobacco: Never Used   Substance and Sexual Activity    Alcohol use: Never    Drug use: Never    Sexual activity: Yes     Review of Systems  A 14pt ros was reviewed & is negative unless o/w documented in the hpi    Objective:     Vital Signs (Most Recent):  Temp: 98.5 °F (36.9 °C) (06/15/22 0743)  Pulse: 93 (06/15/22 0743)  Resp: 16 (22 2341)  BP: 138/85 (06/15/22 07)  SpO2: 97 % (06/15/22 0743) Vital Signs (24h Range):  Temp:  [97.7 °F (36.5 °C)-99.1 °F (37.3 °C)] 98.5 °F (36.9 °C)  Pulse:  []  93  Resp:  [16-17] 16  SpO2:  [96 %-99 %] 97 %  BP: ()/(63-87) 138/85     Weight: 64.2 kg (141 lb 8 oz)  Body mass index is 21.51 kg/m².    Physical Exam  Constitutional:       Appearance: He is normal weight.   HENT:      Head: Normocephalic.      Nose: Nose normal.      Mouth/Throat:      Mouth: Mucous membranes are moist.   Eyes:      Extraocular Movements: Extraocular movements intact and EOM normal.      Pupils: Pupils are equal, round, and reactive to light.   Pulmonary:      Effort: Pulmonary effort is normal.   Skin:     General: Skin is warm.   Neurological:      Mental Status: He is alert and oriented to person, place, and time.   Psychiatric:         Speech: Speech normal.         Behavior: Behavior is cooperative.       NEUROLOGICAL EXAMINATION:     MENTAL STATUS   Oriented to person, place, and time.   Follows 2 step commands.   Speech: speech is normal   Level of consciousness: alert  Knowledge: good.     CRANIAL NERVES     CN II   Visual fields full to confrontation.     CN III, IV, VI   Pupils are equal, round, and reactive to light.  Extraocular motions are normal.   Conjugate gaze: present    CN V   Facial sensation intact.     CN VII   Facial expression full, symmetric.     MOTOR EXAM   Overall muscle tone: normal    Strength   Right deltoid: 4/5  Left deltoid: 4/5  Right biceps: 4/5  Left biceps: 4/5  Right triceps: 4/5  Left triceps: 4/5  Right quadriceps: 4/5  Left quadriceps: 4/5  Right hamstrin/5  Left hamstrin/5  Right glutei: 4/5  Left glutei: 4/5    GAIT AND COORDINATION     Tremor   Resting tremor: absent  Action tremor: absent    Significant Labs:   Recent Lab Results         06/15/22  0828   06/15/22  0420   06/15/22  0419        Albumin/Globulin Ratio 0.6   0.6         Albumin 2.6   2.5         Alkaline Phosphatase 91   88         ALT 60   54         Ammonia 49.0           aPTT     31.1  Comment: For Minimal Heparin Infusion, the goal aPTT 64-85 seconds corresponds to an  anti-Xa of 0.3-0.5.    For Low Intensity and High Intensity Heparin, the goal aPTT  seconds corresponds to an anti-Xa of 0.3-0.7       AST 62   59         Baso #   0.03         Basophil %   0.3         BILIRUBIN TOTAL 0.8   0.6         BUN 6.5   6.0         Calcium 9.3   9.0         Chloride 102   101         CO2 21   22         Creatinine 0.46   0.48         eGFR if non African American >60   >60         Eos #   0.07         Eosinophil %   0.7         Globulin, Total 4.5   4.3         Glucose 82   83         Hematocrit   41.4         Hemoglobin   13.5         Immature Grans (Abs)   0.05         Immature Granulocytes   0.5         INR     1.35       Lymph #   3.59         LYMPH %   33.7         MCH   28.2         MCHC   32.6         MCV   86.6         Mono #   0.99         Mono %   9.3         MPV   10.4         Neut #   5.9         Neut %   55.5         nRBC   0.0         Platelets   379         Potassium 4.0   3.9         PROTEIN TOTAL 7.1   6.8         Protime     16.5       RBC   4.78         RDW   14.5         Sodium 136   135         WBC   10.7                 Significant Imaging:   CT head w/o 6/14/2022:  FINDINGS:  There is no intracranial mass effect, midline shift, hydrocephalus or hemorrhage. There is no sulcal effacement or low attenuation changes to suggest recent large vessel territory infarction.  There are old infarcts which involve the right frontal lobe and the left cingulate cortical/subcortical location.  Chronic microvascular ischemic changes are mild.  The ventricular system and sulcal markings prominence is consistent with atrophy. There is no acute extra axial fluid collection. Visualized paranasal sinuses are clear without mucosal thickening, polypoidal abnormality or air-fluid levels. Mastoid air cells aeration is optimal.     Impression:     1.  No acute intracranial findings identified.     2.  Old infarcts, chronic microangiopathic ischemia and atrophy.    MRI brain w/o  6/15/2022:     FINDINGS:  There is no acute infarct identified.  There is encephalomalacia in the bilateral KINGA territories from prior infarct.  There are mild scattered T2/FLAIR hyperintensities in the subcortical and periventricular white matter, basal ganglia and john, likely representing chronic microvascular ischemic changes.  There is abnormal extra-axial diffusion signal along the falx and bilateral cerebral convexities, with associated abnormal FLAIR signal.  There is no corresponding acute hemorrhage on CT or T1/T2 images.     There is no mass effect or midline shift.  The basal cisterns are patent.  There is diffuse parenchymal volume loss.  There is no hydrocephalus.  The major intracranial flow voids are patent.  The paranasal sinuses and mastoid air cells are clear     Impression:     1. No acute infarct identified.  2. Abnormal diffusion signal in the extra-axial spaces raises the concern for a meningitis/infection.  Correlation is needed with CSF studies and clinical findings.  3. Bilateral KINGA territory encephalomalacia.       I have reviewed all pertinent imaging results/findings within the past 24 hours.    Assessment and Plan:     Encephalopathy  Await EEG and LP results  Continue keppra 500 mg BID  Seizure and immunosuppressed precautions              VTE Risk Mitigation (From admission, onward)         Ordered     IP VTE HIGH RISK PATIENT  Once         06/14/22 0701     Place sequential compression device  Until discontinued         06/14/22 0701                Thank you for your consult. Further recommendations to follow per MD Kirstie Rosenthal, [facility]  Inpatient Neurology  [facility] - 9th Floor Med Surg

## 2022-06-15 NOTE — SUBJECTIVE & OBJECTIVE
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/2022    HTN (hypertension)     Hyperthyroidism     Idiopathic peripheral neuropathy 5/11/2022    Irritable bowel syndrome without diarrhea     Ischemic cardiomyopathy 5/3/2022    Mixed hyperlipidemia 5/11/2022    Other sequelae following unspecified cerebrovascular disease 5/9/2022    Paroxysmal atrial fibrillation     Rheumatoid arthritis 5/11/2022    Rheumatoid arthritis, unspecified     Shingles     Staph aureus infection     Stroke     Thyroiditis, unspecified     Thyrotoxicosis 5/3/2022       Past Surgical History:   Procedure Laterality Date    CATARACT EXTRACTION      CYST REMOVAL Left     TONSILLECTOMY      TOTAL KNEE ARTHROPLASTY      VASECTOMY         Review of patient's allergies indicates:   Allergen Reactions    Ace inhibitors Swelling     Lip swelling    Morphine     Morphine sulfate     Nafcillin Itching    Pradaxa [dabigatran etexilate] Other (See Comments)     Abdominal cramping    Sulfamethoxazole Rash       Current Neurological Medications:     No current facility-administered medications on file prior to encounter.     Current Outpatient Medications on File Prior to Encounter   Medication Sig    apixaban (ELIQUIS) 5 mg Tab Take 1 tablet (5 mg total) by mouth 2 (two) times daily.    aspirin 325 MG tablet Take 325 mg by mouth once daily.    certolizumab pegol (CIMZIA SUBQ) Inject into the skin.    DIGITEK 250 mcg (0.25 mg) tablet Take 1  tablet by mouth Daily.    ergocalciferol (ERGOCALCIFEROL) 50,000 unit Cap Take 50,000 Units by mouth every 7 days.    ezetimibe (ZETIA) 10 mg tablet Take 1 tablet by mouth once daily at 6am.    HYDROcodone-acetaminophen (NORCO)  mg per tablet Take 1 tablet by mouth 3 (three) times daily. PRN    levETIRAcetam (KEPPRA) 500 MG Tab Take 1 tablet (500 mg total) by mouth 2 (two) times daily.    [] levoFLOXacin (LEVAQUIN) 500 MG tablet Take 1.5 tablets (750 mg total) by mouth once daily. for 7 days    methIMAzole (TAPAZOLE) 10 MG Tab Take 4 tablets by mouth Daily.    montelukast (SINGULAIR) 10 mg tablet Take 1 tablet by mouth Daily.    mv, min #36-iron,carbonyl-FA 16 mg iron- 0.38 mg Tab Take 1 tablet by mouth once daily.    pantoprazole (PROTONIX) 40 MG tablet Take 40 mg by mouth once daily.    predniSONE (DELTASONE) 5 MG tablet Take 5 mg by mouth once daily. At bedtime    propranoloL (INDERAL LA) 60 MG 24 hr capsule Take 1 capsule (60 mg total) by mouth 2 (two) times a day.    rosuvastatin (CRESTOR) 40 MG Tab Take 40 mg by mouth every evening.    [DISCONTINUED] baclofen (LIORESAL) 10 MG tablet Take 1 tablet by mouth nightly.    [DISCONTINUED] metoprolol succinate (TOPROL-XL) 100 MG 24 hr tablet Take 100 mg by mouth 2 (two) times daily.     Family History       Family history is unknown by patient.          Tobacco Use    Smoking status: Never Smoker    Smokeless tobacco: Never Used   Substance and Sexual Activity    Alcohol use: Never    Drug use: Never    Sexual activity: Yes     Review of Systems  A 14pt ros was reviewed & is negative unless o/w documented in the hpi    Objective:     Vital Signs (Most Recent):  Temp: 98.5 °F (36.9 °C) (06/15/22 0743)  Pulse: 93 (06/15/22 0743)  Resp: 16 (22 2341)  BP: 138/85 (06/15/22 0743)  SpO2: 97 % (06/15/22 0743) Vital Signs (24h Range):  Temp:  [97.7 °F (36.5 °C)-99.1 °F (37.3 °C)] 98.5 °F (36.9 °C)  Pulse:  [] 93  Resp:  [16-17] 16  SpO2:  [96 %-99 %]  97 %  BP: ()/(63-87) 138/85     Weight: 64.2 kg (141 lb 8 oz)  Body mass index is 21.51 kg/m².    Physical Exam  Constitutional:       Appearance: He is normal weight.   HENT:      Head: Normocephalic.      Nose: Nose normal.      Mouth/Throat:      Mouth: Mucous membranes are moist.   Eyes:      Extraocular Movements: Extraocular movements intact and EOM normal.      Pupils: Pupils are equal, round, and reactive to light.   Pulmonary:      Effort: Pulmonary effort is normal.   Skin:     General: Skin is warm.   Neurological:      Mental Status: He is alert and oriented to person, place, and time.   Psychiatric:         Speech: Speech normal.         Behavior: Behavior is cooperative.       NEUROLOGICAL EXAMINATION:     MENTAL STATUS   Oriented to person, place, and time.   Follows 2 step commands.   Speech: speech is normal   Level of consciousness: alert  Knowledge: good.     CRANIAL NERVES     CN II   Visual fields full to confrontation.     CN III, IV, VI   Pupils are equal, round, and reactive to light.  Extraocular motions are normal.   Conjugate gaze: present    CN V   Facial sensation intact.     CN VII   Facial expression full, symmetric.     MOTOR EXAM   Overall muscle tone: normal    Strength   Right deltoid: 4/5  Left deltoid: 4/5  Right biceps: 4/5  Left biceps: 4/5  Right triceps: 4/5  Left triceps: 4/5  Right quadriceps: 4/5  Left quadriceps: 4/5  Right hamstrin/5  Left hamstrin/5  Right glutei: 4/5  Left glutei: 4/5    GAIT AND COORDINATION     Tremor   Resting tremor: absent  Action tremor: absent    Significant Labs:   Recent Lab Results         06/15/22  0828   06/15/22  0420   06/15/22  0419        Albumin/Globulin Ratio 0.6   0.6         Albumin 2.6   2.5         Alkaline Phosphatase 91   88         ALT 60   54         Ammonia 49.0           aPTT     31.1  Comment: For Minimal Heparin Infusion, the goal aPTT 64-85 seconds corresponds to an anti-Xa of 0.3-0.5.    For Low Intensity  and High Intensity Heparin, the goal aPTT  seconds corresponds to an anti-Xa of 0.3-0.7       AST 62   59         Baso #   0.03         Basophil %   0.3         BILIRUBIN TOTAL 0.8   0.6         BUN 6.5   6.0         Calcium 9.3   9.0         Chloride 102   101         CO2 21   22         Creatinine 0.46   0.48         eGFR if non African American >60   >60         Eos #   0.07         Eosinophil %   0.7         Globulin, Total 4.5   4.3         Glucose 82   83         Hematocrit   41.4         Hemoglobin   13.5         Immature Grans (Abs)   0.05         Immature Granulocytes   0.5         INR     1.35       Lymph #   3.59         LYMPH %   33.7         MCH   28.2         MCHC   32.6         MCV   86.6         Mono #   0.99         Mono %   9.3         MPV   10.4         Neut #   5.9         Neut %   55.5         nRBC   0.0         Platelets   379         Potassium 4.0   3.9         PROTEIN TOTAL 7.1   6.8         Protime     16.5       RBC   4.78         RDW   14.5         Sodium 136   135         WBC   10.7                 Significant Imaging:   CT head w/o 6/14/2022:  FINDINGS:  There is no intracranial mass effect, midline shift, hydrocephalus or hemorrhage. There is no sulcal effacement or low attenuation changes to suggest recent large vessel territory infarction.  There are old infarcts which involve the right frontal lobe and the left cingulate cortical/subcortical location.  Chronic microvascular ischemic changes are mild.  The ventricular system and sulcal markings prominence is consistent with atrophy. There is no acute extra axial fluid collection. Visualized paranasal sinuses are clear without mucosal thickening, polypoidal abnormality or air-fluid levels. Mastoid air cells aeration is optimal.     Impression:     1.  No acute intracranial findings identified.     2.  Old infarcts, chronic microangiopathic ischemia and atrophy.    MRI brain w/o 6/15/2022:     FINDINGS:  There is no acute infarct  identified.  There is encephalomalacia in the bilateral KINGA territories from prior infarct.  There are mild scattered T2/FLAIR hyperintensities in the subcortical and periventricular white matter, basal ganglia and john, likely representing chronic microvascular ischemic changes.  There is abnormal extra-axial diffusion signal along the falx and bilateral cerebral convexities, with associated abnormal FLAIR signal.  There is no corresponding acute hemorrhage on CT or T1/T2 images.     There is no mass effect or midline shift.  The basal cisterns are patent.  There is diffuse parenchymal volume loss.  There is no hydrocephalus.  The major intracranial flow voids are patent.  The paranasal sinuses and mastoid air cells are clear     Impression:     1. No acute infarct identified.  2. Abnormal diffusion signal in the extra-axial spaces raises the concern for a meningitis/infection.  Correlation is needed with CSF studies and clinical findings.  3. Bilateral KINGA territory encephalomalacia.       I have reviewed all pertinent imaging results/findings within the past 24 hours.

## 2022-06-15 NOTE — ASSESSMENT & PLAN NOTE
Await EEG and LP results  Continue keppra 500 mg BID  Seizure and immunosuppressed precautions

## 2022-06-15 NOTE — PROCEDURES
EEG    Date/Time: 6/13/2022 3:39 PM  Performed by: Crystal Sifuentes MD  Authorized by: Smith TROY MD           Technical description:  A standard digital EEG was performed at [facility].  The 10 20 international system of electrode placement is used.  Standard montages were recorded.  Activation procedures were performed.    Description:  The record was dominated by a 8.5  hertz posterior dominant rhythm which attenuated with eye opening activity.  During drowsiness, identified by ocular signs and alpha attenuation, there is diffuse asynchronous theta activity.  Stage 2 sleep was identified by vertex waves, sleep spindles, and K complexes.  Photic stimulation elicited no abnormalities.  There were no epileptiform discharges or electrographic seizures    Impression:  This is a normal awake and asleep EEG.

## 2022-06-15 NOTE — PT/OT/SLP EVAL
"Speech Language Pathology Department  Cognitive-Communication Evaluation    Patient Name:  Quan Contreras   MRN:  [id number]  Admitting Diagnosis: Fever    Recommendations:     Recommendations:                General Recommendations:  Cognitive-linguistic therapy  Communication strategies:  provide increased time to answer    History:     Past Medical History:   Diagnosis Date    Acute left arterial ischemic stroke, KINGA (anterior cerebral artery) 5/3/2022    Acute osteomyelitis 5/11/2022    Atherosclerosis of coronary artery 5/11/2022    Atrial fibrillation 5/3/2022    Benign essential hypertension 5/3/2022    Cardiomyopathy     Coronary artery disease     Diverticulosis     Esophageal reflux 5/11/2022    Fatigue     Focal seizure 5/17/2022    Generalized anxiety disorder     GERD (gastroesophageal reflux disease)     Graves disease     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/2022    HTN (hypertension)     Hyperthyroidism     Idiopathic peripheral neuropathy 5/11/2022    Irritable bowel syndrome without diarrhea     Ischemic cardiomyopathy 5/3/2022    Mixed hyperlipidemia 5/11/2022    Other sequelae following unspecified cerebrovascular disease 5/9/2022    Paroxysmal atrial fibrillation     Rheumatoid arthritis 5/11/2022    Rheumatoid arthritis, unspecified     Shingles     Staph aureus infection     Stroke     Thyroiditis, unspecified     Thyrotoxicosis 5/3/2022       Past Surgical History:   Procedure Laterality Date    CATARACT EXTRACTION      CYST REMOVAL Left     TONSILLECTOMY      TOTAL KNEE ARTHROPLASTY      VASECTOMY         Previous level of Function   Wife reports prior to this hospital admission, pt with minimal memory deficits and no difficulties communicating.  Pt hard of hearing.      Subjective     Patient alert and oriented to self and year.    Patient goals: Per wife, "To get back to normal."     Pain/Comfort:  · Pain Rating 1: " 0/10      Objective:     SPEECH PRODUCTION   Phoneme Production: WFL   Voice Quality: WFL   Voice Production: WFL   Speech Rate: WFL   Loudness: WFL   Respiration: WFL   Resonance: WFL   Prosody: WFL   Speech Intelligibility  Known Context: Greater that 90%  Unknown Context: Greater that 90%    AUDITORY COMPREHENSION    Following Directions:   1-Step: 100   2-Step: 100    Yes/No Questions:   Biographical: 100   Environmental: 100   Simple: 100   Complex: 100      VERBAL EXPRESSION  Automatic Speech:   Countin-10      Confrontation Naming   Objects: 100    Wh- Questions:   Object name: 100   Object function: 50      COGNITION  Orientation:   to self and year    Attention:   Focused: impaired   Sustained: impaired    Pragmatics:   Eye contact: impaired   Personal space: impaired    Memory:   Short Term: impaired    Problem Solving   Functional simple: impaired      Assessment:     The pt presents with speech abilities WFL however cognitive linguistic impairments are noted.  Skilled SLP services warranted to tx impairments.    Nursing reports no difficulty regarding diet tolerance.    Goals:   Multidisciplinary Problems     SLP Goals        Problem: SLP    Goal Priority Disciplines Outcome   SLP Goal     SLP Ongoing, Progressing   Description: LTG:  The pt will improve cognitive linguistic abilities to return to OF.  STG:  Orientation x4, independent  Focused attention, min assist, 5 minutes without requiring redirection  Information procession, min assist  Problem solving, routine, 100% with min assist                     Plan:   · Patient to be seen:  5 x/week   · Plan of Care expires:  22  · Plan of Care reviewed with:  patient, spouse   · SLP Follow-Up:  Yes       Discharge recommendations:  Discharge Facility/Level of Care Needs: home health speech therapy (pending progress)   Barriers to Discharge:  severity of impairments    Time Tracking:   SLP Treatment Date:    06/15/22  Speech Start Time:  1440  Speech Stop Time:  1500     Speech Total Time (min):  20 min    Billable minutes:  Evaluation of Speech Sound Production with Comprehension and Expression, 20 minutes       06/15/2022

## 2022-06-15 NOTE — HPI
72 y/o M with PMH PAF on eliquis, CAD, HTN, cardiomyopathy, hyperthyroidism on methimazole, CVA, seizure d/o, and rheumatoid arthritis on prednisone/cimzia who presented to ED on 06/13 for AMS and fever of 102. Patient was being evaluated by home health nurse when he was noted to have elevated temperature and severe AMS.  Patient received Tylenol PTA, was afebrile upon arrival to the ED, COVID and influenza testing negative.  CT head unremarkable for acute intracranial findings.  Labs significant for AST/ALT 62/60, .2, TSH < 0.0083, T4 2.21, free T3 4.61.     Patient was recently hospitalized in 04/2022 for acute CVA and thyrotoxicosis 2/2 noncompliance, subsequently discharged to rehab facility.  Upon return to home from rehab facility, patient is noted to have twitching and worsening AMS, patient's wife brought him to ED for further evaluation in 5/2022.  Neurologic workup for seizures and acute stroke unrevealing during that admission, however patient was started on Keppra out of concern for possible seizures given recent history of stroke.  Patient brought to ED again last week for fevers and worsening confusion.  Workup revealed left lower lobe infiltrates, patient started on oral Levaquin, and discharged home.  Patient also has recent history of tooth abscess, recently completed and subacromial therapy for this, and is awaiting root canal.

## 2022-06-15 NOTE — PLAN OF CARE
Problem: SLP  Goal: SLP Goal  Description: LTG:  The pt will improve cognitive linguistic abilities to return to PLOF.  STG:  Orientation x4, independent  Focused attention, min assist, 5 minutes without requiring redirection  Information procession, min assist  Problem solving, routine, 100% with min assist    Outcome: Ongoing, Progressing       POC initiated and goals created.  Margareth

## 2022-06-15 NOTE — CLINICAL REVIEW
"[facility] Hospital Medicine Progress Note        Chief Complaint: Inpatient Follow-up for encephalopathy    HPI:  71-year-old male admitted on 6/13/2022 with encephalopathy.  The patient was finalizing his anti-infective treatment for pneumonia.  At that time, he was on day 6 out of 7 of Levaquin.  Previous history included anterior cerebral artery CVA, dementia, atrial fibrillation, hypertension, CAD, cardiomyopathy, rheumatoid arthritis, mixed hyperlipidemia, ischemic cardiomyopathy.  There is also a diagnosis of a seizure disorder.  Labs demonstrated improving leukocytosis of 11.9.  CT scan of the head was negative.  The patient, as part of the infectious work-up was evaluated for possible meningitis via lumbar puncture.  He was placed on anti-infective therapy with vancomycin, Rocephin, ampicillin, acyclovir.    Interval Hx:  Today: Vital signs over the last 24 hours are stable, the patient is afebrile, not hypoxic.  Labs demonstrated resolved leukocytosis, INR 1.35, sodium at 135, TSH of 0.0083 with elevated free T4 and free T3. His wife has noted no improvement in his mentation.  She noted that yesterday evening, he woke up a little bit and said a few words.  At the present time, he remains drowsy, he did wake up and responded "good" when asked how he was.  He is able to recognize family members in the room, he nods in agreement.  However, he is not aware of his current surroundings.      Objective/physical exam:  General: In no acute distress, afebrile  Chest: Clear to auscultation bilaterally  Heart: RRR, +S1, S2, no appreciable murmur  Abdomen: Soft, nontender, BS +  MSK: Warm, no lower extremity edema, no clubbing or cyanosis.  Upper extremities are contracted (which the wife attributes to his RA)  Neurologic: Alert but not oriented, CN exam limited 2/2 his current clinical state.      VITAL SIGNS: 24 HRS MIN & MAX LAST   Temp  Min: 97.7 °F (36.5 °C)  Max: 99.1 °F (37.3 °C) " 97.7 °F (36.5 °C)   BP  Min: 96/63  Max: 153/86 96/63   Pulse  Min: 83  Max: 106  84   Resp  Min: 16  Max: 17 16   SpO2  Min: 96 %  Max: 99 % 96 %       Recent Labs   Lab 06/15/22  0420   WBC 10.7   RBC 4.78   HGB 13.5*   HCT 41.4*   MCV 86.6   MCH 28.2   MCHC 32.6*   RDW 14.5      MPV 10.4          Recent Labs   Lab 06/15/22  0420   *   K 3.9   CO2 22*   BUN 6.0*   CREATININE 0.48*   CALCIUM 9.0   ALBUMIN 2.5*   ALKPHOS 88   ALT 54   AST 59*   BILITOT 0.6       Microbiology Results (last 7 days)     Procedure Component Value Units Date/Time    Blood culture #1 **CANNOT BE ORDERED STAT** [[id number]]  (Normal) Collected: 06/13/22 1618    Order Status: Completed Specimen: Blood Updated: 06/14/22 1703     CULTURE, BLOOD ([facility]) No Growth At 24 Hours    Blood culture #2 **CANNOT BE ORDERED STAT** [[id number]]  (Normal) Collected: 06/13/22 1618    Order Status: Completed Specimen: Blood Updated: 06/14/22 1703     CULTURE, BLOOD ([facility]) No Growth At 24 Hours    Cryptococcal antigen, CSF (Tube 3) [[id number]]     Order Status: Sent Specimen: CSF (Spinal Fluid) from CSF Tap, Tube 3            See below for Radiology    Scheduled Med:   acyclovir  10 mg/kg (Ideal) Intravenous Q8H    ampicillin IVPB  2 g Intravenous Q4H    aspirin  325 mg Oral Daily    atorvastatin  10 mg Oral QHS    cefTRIAXone (ROCEPHIN) IVPB  2 g Intravenous Q12H    digoxin  0.25 mg Oral Daily    ezetimibe  10 mg Oral Daily    levETIRAcetam  500 mg Oral BID    methIMAzole  40 mg Oral Daily    montelukast  10 mg Oral Daily    pantoprazole  40 mg Oral Daily    predniSONE  5 mg Oral Daily    propranoloL  60 mg Oral BID    vancomycin (VANCOCIN) IVPB  1,000 mg Intravenous Q8H        Continuous Infusions:       PRN Meds:  acetaminophen, acetaminophen, HYDROcodone-acetaminophen, melatonin, ondansetron, sodium chloride 0.9%, Pharmacy to dose Vancomycin consult **AND** vancomycin - pharmacy to dose       Assessment/Plan:    Acute  encephalopathy  Febrile illness/sepsis related to unknown source  History of thyrotoxicosis, now with a TSH of 0.083 and elevated free T3 and free T4  Vascular dementia  Atrial fibrillation on Eliquis  CAD  Essential hypertension  Mixed hyperlipidemia  Ischemic cardiomyopathy with a preserved ejection fraction  Recent CVA's  Seizure disorder  Rheumatoid arthritis  Immunosuppressed state related to the above    In this instance, his mentation has not really improved since admission.  Given his immunosuppressed state and fever, infection is being considered.  He remains on broad-spectrum anti-infective therapy.  The patient had a recent pneumonia for which he completed his anti-infective therapy.  Urinalysis is negative for any potential infectious etiologies.  X-ray of the chest is unremarkable.  Blood cultures are pending.  The patient is slated to undergo a lumbar puncture today.  We will continue with broad-spectrum anti-infective therapy.  No clear evidence of the patient withdrawing.  CVA is also a consideration.  The patient did have a CT scan of the head that was unremarkable.  We will obtain an MRI.  We will consult neurology.  Continue with neurochecks every 4 hours.  Seizure is also on the differential.  We will utilize seizure precautions, as needed Ativan.  We will obtain an EEG. No evidence of hypoxia, hypercapnia.  No need for an ABG.  Will obtain urine drug screen.  Will get an ammonia level as well.    In terms of his hyperthyroidism, the patient has a TSH of less than 0.0083 and elevated free T4 and free T3.  Unfortunately, endocrinology does not come to the hospital.  I spoke to Em (Dr. Mitchell RN) on 6/15/22 at 10:50 AM CT who will discuss the case with him and his NP and let me know how to proceed in terms of his methimazole as it may mean that the dose needs to be readjusted.  I did speak with Yessica (NP) taking care of him at 11:20 AM CT who noted that the patient was initially on 40  milligrams and was cut to 20 milligrams while he was recently hospitalized.  She noted that his dosage was increased to 40 milligrams on June 1st.  It is noted that the patient is to have a possible ablation next month with Dr. Mitchell.            Patient condition:  guarded    Anticipated discharge and Disposition:      All diagnosis and differential diagnosis have been reviewed; assessment and plan has been documented; I have personally reviewed the labs and test results that are presently available; I have reviewed the patients medication list; I have reviewed the consulting providers response and recommendations. I have reviewed or attempted to review medical records based upon their availability    All of the patient's questions have been  addressed and answered. Patient's is agreeable to the above stated plan. I will continue to monitor closely and make adjustments to medical management as needed.  _____________________________________________________________________    Nutrition Status:    Radiology:  CT Head Without Contrast  Narrative: EXAMINATION:  CT HEAD WITHOUT CONTRAST    CLINICAL HISTORY:  Mental status change, unknown cause;    TECHNIQUE:  Sequential axial images were performed of the brain without contrast.    Dose product length of 1167 mGycm. Automated exposure control was utilized to minimize radiation dose.    COMPARISON:  CT brain without contrast May 15, 2022 in MRI brain April 27, 2022.    FINDINGS:  There is no intracranial mass effect, midline shift, hydrocephalus or hemorrhage. There is no sulcal effacement or low attenuation changes to suggest recent large vessel territory infarction.  There are old infarcts which involve the right frontal lobe and the left cingulate cortical/subcortical location.  Chronic microvascular ischemic changes are mild.  The ventricular system and sulcal markings prominence is consistent with atrophy. There is no acute extra axial fluid collection. Visualized  paranasal sinuses are clear without mucosal thickening, polypoidal abnormality or air-fluid levels. Mastoid air cells aeration is optimal.  Impression: 1.  No acute intracranial findings identified.    2.  Old infarcts, chronic microangiopathic ischemia and atrophy.    Electronically signed by: Ethan Molina  Date:    06/13/2022  Time:    17:27  X-Ray Chest AP Portable  Narrative: EXAMINATION:  XR CHEST AP PORTABLE    CLINICAL HISTORY:  Fever, unspecified    TECHNIQUE:  One view    COMPARISON:  June 6, 2022.    FINDINGS:  Cardiopericardial silhouette is within normal limits.  No acute dense focal or segmental consolidation, congestive process, pleural effusions or pneumothorax.  Impression: NO ACUTE CARDIOPULMONARY PROCESS IDENTIFIED.    Electronically signed by: Ethan Molina  Date:    06/13/2022  Time:    17:09      Smith Ruano MD   06/15/2022

## 2022-06-16 LAB
APPEARANCE CSF: CLEAR
BASOPHILS # BLD AUTO: 0.02 X10(3)/MCL (ref 0–0.2)
BASOPHILS NFR BLD AUTO: 0.2 %
COLOR CSF: COLORLESS
CRYPTOCOCCUS NEOFORMANS/GATTII (PREMIER): NOT DETECTED
CYTOMEGALOVIRUS (PREMIER): NOT DETECTED
ENTEROVIRUS (PREMIER): NOT DETECTED
EOSINOPHIL # BLD AUTO: 0.12 X10(3)/MCL (ref 0–0.9)
EOSINOPHIL NFR BLD AUTO: 1.2 %
ERYTHROCYTE [DISTWIDTH] IN BLOOD BY AUTOMATED COUNT: 14.5 % (ref 11.5–17)
ESCHERICHIA COLI K1 (PREMIER): NOT DETECTED
HAEMOPHILUS INFLUENZAE (PREMIER): NOT DETECTED
HCT VFR BLD AUTO: 38.2 % (ref 42–52)
HERPES SIMPLEX VIRUS 1 (PREMIER): NOT DETECTED
HERPES SIMPLEX VIRUS 2 (PREMIER): NOT DETECTED
HGB BLD-MCNC: 12.4 GM/DL (ref 14–18)
HUMAN HERPESVIRUS 6 (PREMIER): NOT DETECTED
HUMAN PARECHOVIRUS (PREMIER): NOT DETECTED
IMM GRANULOCYTES # BLD AUTO: 0.05 X10(3)/MCL (ref 0–0.02)
IMM GRANULOCYTES NFR BLD AUTO: 0.5 % (ref 0–0.43)
LISTERIA MONOCYTOGENES (PREMIER): NOT DETECTED
LYMPHOCYTE MANUAL CSF (OHS): 76 %
LYMPHOCYTES # BLD AUTO: 3.61 X10(3)/MCL (ref 0.6–4.6)
LYMPHOCYTES NFR BLD AUTO: 36 %
MACROPHAGES NFR CSF MANUAL: 1 %
MCH RBC QN AUTO: 28.5 PG (ref 27–31)
MCHC RBC AUTO-ENTMCNC: 32.5 MG/DL (ref 33–36)
MCV RBC AUTO: 87.8 FL (ref 80–94)
MONOCYTE MANUAL CSF (OHS): 16 %
MONOCYTES # BLD AUTO: 0.93 X10(3)/MCL (ref 0.1–1.3)
MONOCYTES NFR BLD AUTO: 9.3 %
NEISSERIA MENINGITIDIS (PREMIER): NOT DETECTED
NEUTROPHILS # BLD AUTO: 5.3 X10(3)/MCL (ref 2.1–9.2)
NEUTROPHILS MAN CSF (OHS): 8 %
NEUTROPHILS NFR BLD AUTO: 52.8 %
NRBC BLD AUTO-RTO: 0 %
PLATELET # BLD AUTO: 398 X10(3)/MCL (ref 130–400)
PMV BLD AUTO: 10.4 FL (ref 9.4–12.4)
RBC # BLD AUTO: 4.35 X10(6)/MCL (ref 4.7–6.1)
RBC # CSF MANUAL: 1 /UL
SPECIMEN VOL CSF: 1 ML
STREPTOCOCCUS AGALACTIAE (PREMIER): NOT DETECTED
STREPTOCOCCUS PNEUMONIAE (PREMIER): NOT DETECTED
VANCOMYCIN TROUGH SERPL-MCNC: 26.8 UG/ML (ref 15–20)
VARICELLA ZOSTER VIRUS (PREMIER): NOT DETECTED
WBC # CSF MANUAL: 20 /UL (ref 0–5)
WBC # SPEC AUTO: 10 X10(3)/MCL (ref 4.5–11.5)

## 2022-06-16 PROCEDURE — C1751 CATH, INF, PER/CENT/MIDLINE: HCPCS

## 2022-06-16 PROCEDURE — 25500020 PHARM REV CODE 255: Performed by: INTERNAL MEDICINE

## 2022-06-16 PROCEDURE — 36415 COLL VENOUS BLD VENIPUNCTURE: CPT | Performed by: STUDENT IN AN ORGANIZED HEALTH CARE EDUCATION/TRAINING PROGRAM

## 2022-06-16 PROCEDURE — 25000003 PHARM REV CODE 250: Performed by: INTERNAL MEDICINE

## 2022-06-16 PROCEDURE — 85025 COMPLETE CBC W/AUTO DIFF WBC: CPT | Performed by: HOSPITALIST

## 2022-06-16 PROCEDURE — 63600175 PHARM REV CODE 636 W HCPCS: Performed by: INTERNAL MEDICINE

## 2022-06-16 PROCEDURE — 11000001 HC ACUTE MED/SURG PRIVATE ROOM

## 2022-06-16 PROCEDURE — 63600175 PHARM REV CODE 636 W HCPCS: Performed by: STUDENT IN AN ORGANIZED HEALTH CARE EDUCATION/TRAINING PROGRAM

## 2022-06-16 PROCEDURE — 36569 INSJ PICC 5 YR+ W/O IMAGING: CPT

## 2022-06-16 PROCEDURE — 80202 ASSAY OF VANCOMYCIN: CPT | Performed by: STUDENT IN AN ORGANIZED HEALTH CARE EDUCATION/TRAINING PROGRAM

## 2022-06-16 PROCEDURE — A4216 STERILE WATER/SALINE, 10 ML: HCPCS | Performed by: INTERNAL MEDICINE

## 2022-06-16 PROCEDURE — 36415 COLL VENOUS BLD VENIPUNCTURE: CPT | Performed by: HOSPITALIST

## 2022-06-16 RX ORDER — QUETIAPINE FUMARATE 25 MG/1
25 TABLET, FILM COATED ORAL 2 TIMES DAILY
Status: DISCONTINUED | OUTPATIENT
Start: 2022-06-16 | End: 2022-06-19

## 2022-06-16 RX ORDER — ZIPRASIDONE MESYLATE 20 MG/ML
20 INJECTION, POWDER, LYOPHILIZED, FOR SOLUTION INTRAMUSCULAR EVERY 12 HOURS PRN
Status: DISCONTINUED | OUTPATIENT
Start: 2022-06-16 | End: 2022-06-20

## 2022-06-16 RX ORDER — SODIUM CHLORIDE 0.9 % (FLUSH) 0.9 %
10 SYRINGE (ML) INJECTION EVERY 6 HOURS
Status: DISCONTINUED | OUTPATIENT
Start: 2022-06-16 | End: 2022-06-27 | Stop reason: HOSPADM

## 2022-06-16 RX ORDER — SODIUM CHLORIDE 0.9 % (FLUSH) 0.9 %
10 SYRINGE (ML) INJECTION
Status: DISCONTINUED | OUTPATIENT
Start: 2022-06-16 | End: 2022-06-27 | Stop reason: HOSPADM

## 2022-06-16 RX ORDER — METHIMAZOLE 10 MG/1
20 TABLET ORAL DAILY
Status: DISCONTINUED | OUTPATIENT
Start: 2022-06-16 | End: 2022-06-27 | Stop reason: HOSPADM

## 2022-06-16 RX ORDER — CEFTRIAXONE 2 G/50ML
2 INJECTION, SOLUTION INTRAVENOUS
Status: DISCONTINUED | OUTPATIENT
Start: 2022-06-16 | End: 2022-06-27 | Stop reason: HOSPADM

## 2022-06-16 RX ORDER — HALOPERIDOL 5 MG/ML
5 INJECTION INTRAMUSCULAR EVERY 4 HOURS PRN
Status: DISCONTINUED | OUTPATIENT
Start: 2022-06-16 | End: 2022-06-24

## 2022-06-16 RX ADMIN — ACYCLOVIR SODIUM 730 MG: 500 INJECTION, SOLUTION INTRAVENOUS at 06:06

## 2022-06-16 RX ADMIN — QUETIAPINE FUMARATE 25 MG: 25 TABLET ORAL at 09:06

## 2022-06-16 RX ADMIN — VANCOMYCIN HYDROCHLORIDE 1250 MG: 1.25 INJECTION, POWDER, LYOPHILIZED, FOR SOLUTION INTRAVENOUS at 08:06

## 2022-06-16 RX ADMIN — PREDNISONE 5 MG: 5 TABLET ORAL at 08:06

## 2022-06-16 RX ADMIN — ATORVASTATIN CALCIUM 10 MG: 10 TABLET, FILM COATED ORAL at 09:06

## 2022-06-16 RX ADMIN — AMPICILLIN SODIUM 2 G: 2 INJECTION, POWDER, FOR SOLUTION INTRAMUSCULAR; INTRAVENOUS at 10:06

## 2022-06-16 RX ADMIN — AMPICILLIN SODIUM 2 G: 2 INJECTION, POWDER, FOR SOLUTION INTRAMUSCULAR; INTRAVENOUS at 05:06

## 2022-06-16 RX ADMIN — DIGOXIN 0.25 MG: 250 TABLET ORAL at 06:06

## 2022-06-16 RX ADMIN — DIPHENHYDRAMINE HYDROCHLORIDE 10 ML: 25 SOLUTION ORAL at 06:06

## 2022-06-16 RX ADMIN — SODIUM CHLORIDE, PRESERVATIVE FREE 10 ML: 5 INJECTION INTRAVENOUS at 06:06

## 2022-06-16 RX ADMIN — AMPICILLIN SODIUM 2 G: 2 INJECTION, POWDER, FOR SOLUTION INTRAMUSCULAR; INTRAVENOUS at 12:06

## 2022-06-16 RX ADMIN — AMPICILLIN SODIUM 2 G: 2 INJECTION, POWDER, FOR SOLUTION INTRAMUSCULAR; INTRAVENOUS at 09:06

## 2022-06-16 RX ADMIN — MONTELUKAST SODIUM 10 MG: 10 TABLET, COATED ORAL at 06:06

## 2022-06-16 RX ADMIN — IOPAMIDOL 100 ML: 755 INJECTION, SOLUTION INTRAVENOUS at 02:06

## 2022-06-16 RX ADMIN — CEFTRIAXONE 2 G: 2 INJECTION, SOLUTION INTRAVENOUS at 05:06

## 2022-06-16 RX ADMIN — VANCOMYCIN HYDROCHLORIDE 1250 MG: 1.25 INJECTION, POWDER, LYOPHILIZED, FOR SOLUTION INTRAVENOUS at 06:06

## 2022-06-16 RX ADMIN — VANCOMYCIN HYDROCHLORIDE 1250 MG: 1.25 INJECTION, POWDER, LYOPHILIZED, FOR SOLUTION INTRAVENOUS at 12:06

## 2022-06-16 RX ADMIN — PROPRANOLOL HYDROCHLORIDE 60 MG: 60 CAPSULE, EXTENDED RELEASE ORAL at 09:06

## 2022-06-16 RX ADMIN — PROPRANOLOL HYDROCHLORIDE 60 MG: 60 CAPSULE, EXTENDED RELEASE ORAL at 08:06

## 2022-06-16 RX ADMIN — PANTOPRAZOLE SODIUM 40 MG: 40 TABLET, DELAYED RELEASE ORAL at 08:06

## 2022-06-16 RX ADMIN — CEFTRIAXONE 2 G: 2 INJECTION, SOLUTION INTRAVENOUS at 06:06

## 2022-06-16 RX ADMIN — LEVETIRACETAM 500 MG: 500 TABLET, FILM COATED ORAL at 09:06

## 2022-06-16 RX ADMIN — AMPICILLIN SODIUM 2 G: 2 INJECTION, POWDER, FOR SOLUTION INTRAMUSCULAR; INTRAVENOUS at 06:06

## 2022-06-16 RX ADMIN — EZETIMIBE 10 MG: 10 TABLET ORAL at 06:06

## 2022-06-16 RX ADMIN — LEVETIRACETAM 500 MG: 500 TABLET, FILM COATED ORAL at 08:06

## 2022-06-16 RX ADMIN — ASPIRIN 325 MG ORAL TABLET 325 MG: 325 PILL ORAL at 08:06

## 2022-06-16 RX ADMIN — METHIMAZOLE 20 MG: 10 TABLET ORAL at 06:06

## 2022-06-16 NOTE — PROCEDURES
"Quan Contreras is a 71 y.o. male patient.    Temp: 97.5 °F (36.4 °C) (06/16/22 1137)  Pulse: 76 (06/16/22 1137)  Resp: 18 (06/16/22 0500)  BP: 134/79 (06/16/22 1137)  SpO2: 99 % (06/16/22 0756)  Weight: 64.2 kg (141 lb 8 oz) (06/14/22 2100)  Height: 5' 8" (172.7 cm) (06/14/22 0340)    PICC  Date/Time: 6/16/2022 4:18 PM  Performed by: Mariola Maldonado RN  Consent Done: Yes  Time out: Immediately prior to procedure a time out was called to verify the correct patient, procedure, equipment, support staff and site/side marked as required  Indications: med administration and vascular access  Local anesthetic: lidocaine 1% without epinephrine  Anesthetic Total (mL): 5  Preparation: skin prepped with ChloraPrep  Skin prep agent dried: skin prep agent completely dried prior to procedure  Sterile barriers: all five maximum sterile barriers used - cap, mask, sterile gown, sterile gloves, and large sterile sheet  Hand hygiene: hand hygiene performed prior to central venous catheter insertion  Location details: right brachial  Catheter type: double lumen  Catheter size: 5 Fr  Catheter Length: 35cm    Ultrasound guidance: yes  Vessel Caliber: patent, compressibility normal  Needle advanced into vessel with real time Ultrasound guidance.  Guidewire confirmed in vessel.  Sterile sheath used.  Number of attempts: 2  Post-procedure: chlorhexidine patch, blood return through all ports and sterile dressing applied    Comments: Arm circumference 30cm          Mariola Maldonado RN  6/16/2022    "

## 2022-06-16 NOTE — PT/OT/SLP PROGRESS
SLP attempting to see pt for tx however pt completing imaging/testing at this time.  SLP to continue to follow.

## 2022-06-16 NOTE — SUBJECTIVE & OBJECTIVE
Subjective:     Interval History:   NAD. Neurologic exam unchanged. No issues overnight per nursing report. LP results d/w pt and pt's wife at bedside.     Current Neurological Medications:     Current Facility-Administered Medications   Medication Dose Route Frequency Provider Last Rate Last Admin    (Magic mouthwash) 1:1:1 diphenhydramine(Benadryl) 12.5mg/5ml liq, aluminum & magnesium hydroxide-simethicone (Maalox), LIDOcaine viscous 2%  10 mL Swish & Spit Q6H Zulay Howard MD        acetaminophen tablet 650 mg  650 mg Oral Q6H PRN Jean Ferguson MD        acetaminophen tablet 650 mg  650 mg Oral Q8H PRN Jean Ferguson MD        acyclovir (ZOVIRAX) 730 mg in dextrose 5 % 250 mL IVPB  10 mg/kg (Ideal) Intravenous Q8H Jean Ferguson MD   Stopped at 06/16/22 0806    ampicillin (OMNIPEN) 2 g in sodium chloride 0.9 % 100 mL IVPB (MB+)  2 g Intravenous Q4H Jean Ferguson MD   Stopped at 06/16/22 1011    aspirin tablet 325 mg  325 mg Oral Daily Jean Ferguson MD   325 mg at 06/16/22 0844    atorvastatin tablet 10 mg  10 mg Oral QHS Jean Freguson MD   10 mg at 06/15/22 2104    cefTRIAXone 2 gram/50 mL IVPB 2 g  2 g Intravenous Q12H Jean Ferguson MD   Stopped at 06/16/22 0613    digoxin tablet 0.25 mg  0.25 mg Oral Daily Jean Ferguson MD   0.25 mg at 06/15/22 1802    ezetimibe tablet 10 mg  10 mg Oral Daily Jean Ferguson MD   10 mg at 06/16/22 0636    haloperidol lactate injection 5 mg  5 mg Intravenous Q4H PRN Zulay Howard MD        HYDROcodone-acetaminophen 5-325 mg per tablet 1 tablet  1 tablet Oral Q8H PRN Jean Ferguson MD   1 tablet at 06/15/22 2104    levETIRAcetam tablet 500 mg  500 mg Oral BID Jean Ferguson MD   500 mg at 06/16/22 0844    lorazepam (ATIVAN) injection 1 mg  1 mg Intravenous Q4H PRN Smith TROY MD        melatonin tablet 6 mg  6 mg Oral Nightly PRN Jean Ferguson MD        methIMAzole tablet 20 mg   20 mg Oral Daily Zulay Howard MD        montelukast tablet 10 mg  10 mg Oral Daily Jean Ferguson MD   10 mg at 06/15/22 1802    ondansetron injection 4 mg  4 mg Intravenous Q6H PRN Jean Ferguson MD        pantoprazole EC tablet 40 mg  40 mg Oral Daily Jean Ferguson MD   40 mg at 06/16/22 0844    predniSONE tablet 5 mg  5 mg Oral Daily Jean Ferguson MD   5 mg at 06/16/22 0844    propranoloL 24 hr capsule 60 mg  60 mg Oral BID Jean Ferguson MD   60 mg at 06/16/22 0844    QUEtiapine tablet 25 mg  25 mg Oral BID Zulay Howard MD        sodium chloride 0.9% flush 10 mL  10 mL Intravenous PRN Jean Ferguson MD        vancomycin - pharmacy to dose   Intravenous pharmacy to manage frequency Jean Ferguson MD        vancomycin 1.25 g in dextrose 5% 250 mL IVPB (ready to mix)  1,250 mg Intravenous Q8H Sourav Goodwin MD   Stopped at 06/16/22 1015    ziprasidone injection 20 mg  20 mg Intramuscular Q12H PRN Zulay Howard MD           Review of Systems  A 14pt ros was reviewed & is negative unless o/w documented in the hpi    Objective:     Vital Signs (Most Recent):  Temp: 97.5 °F (36.4 °C) (06/16/22 1137)  Pulse: 76 (06/16/22 1137)  Resp: 18 (06/16/22 0500)  BP: 134/79 (06/16/22 1137)  SpO2: 99 % (06/16/22 0756) Vital Signs (24h Range):  Temp:  [97.4 °F (36.3 °C)-98.4 °F (36.9 °C)] 97.5 °F (36.4 °C)  Pulse:  [] 76  Resp:  [16-18] 18  SpO2:  [91 %-99 %] 99 %  BP: (117-134)/(73-85) 134/79     Weight: 64.2 kg (141 lb 8 oz)  Body mass index is 21.51 kg/m².    Physical Exam  Constitutional:       Appearance: He is normal weight.   HENT:      Head: Normocephalic.      Nose: Nose normal.      Mouth/Throat:      Mouth: Mucous membranes are moist.   Eyes:      Extraocular Movements: Extraocular movements intact and EOM normal.      Pupils: Pupils are equal, round, and reactive to light.   Pulmonary:      Effort: Pulmonary effort is normal.   Skin:     General: Skin is  warm.   Neurological:      Mental Status: He is alert and oriented to person, place, and time.   Psychiatric:         Speech: Speech normal.         Behavior: Behavior is cooperative.         NEUROLOGICAL EXAMINATION:      MENTAL STATUS   Oriented to person, place, and time.   Follows 2 step commands.   Speech: speech is normal   Level of consciousness: alert  Knowledge: good.      CRANIAL NERVES      CN II   Visual fields full to confrontation.      CN III, IV, VI   Pupils are equal, round, and reactive to light.  Extraocular motions are normal.   Conjugate gaze: present     CN V   Facial sensation intact.      CN VII   Facial expression full, symmetric.      MOTOR EXAM   Overall muscle tone: normal     Strength   Right deltoid: 4/5  Left deltoid: 4/5  Right biceps: 4/5  Left biceps: 4/5  Right triceps: 4/5  Left triceps: 4/5  Right quadriceps: 4/5  Left quadriceps: 4/5  Right hamstrin/5  Left hamstrin/5  Right glutei: 4/5  Left glutei: 4/5     GAIT AND COORDINATION      Tremor   Resting tremor: absent  Action tremor: absent          Significant Labs:   Recent Lab Results         22  0329        Baso # 0.02       Basophil % 0.2       Eos # 0.12       Eosinophil % 1.2       Hematocrit 38.2       Hemoglobin 12.4       Immature Grans (Abs) 0.05       Immature Granulocytes 0.5       Lymph # 3.61       LYMPH % 36.0       MCH 28.5       MCHC 32.5       MCV 87.8       Mono # 0.93       Mono % 9.3       MPV 10.4       Neut # 5.3       Neut % 52.8       nRBC 0.0       Platelets 398       RBC 4.35       RDW 14.5       WBC 10.0               Significant Imaging:   EEG 6/15/2022:  Description:  The record was dominated by a 8.5  hertz posterior dominant rhythm which attenuated with eye opening activity.  During drowsiness, identified by ocular signs and alpha attenuation, there is diffuse asynchronous theta activity.  Stage 2 sleep was identified by vertex waves, sleep spindles, and K complexes.  Photic  stimulation elicited no abnormalities.  There were no epileptiform discharges or electrographic seizures     Impression:  This is a normal awake and asleep EEG.    I have reviewed all pertinent imaging results/findings within the past 24 hours.

## 2022-06-16 NOTE — ASSESSMENT & PLAN NOTE
CSF: WBC 20, protein 91.4, glucose 35, encephalitis panel negative, cryptococcal antigen negative  Continue keppra 500 mg BID  Seizure and immunosuppressed precautions  No further neurologic recommendations

## 2022-06-16 NOTE — PROGRESS NOTES
[facility] Hospital Medicine Progress Note      Chief Complaint: Inpatient Follow-up for encephalopathy     HPI:  71-year-old male admitted on 6/13/2022 with encephalopathy.  The patient was finalizing his anti-infective treatment for pneumonia.  At that time, he was on day 6 out of 7 of Levaquin.  Previous history included anterior cerebral artery CVA, dementia, atrial fibrillation, hypertension, CAD, cardiomyopathy, rheumatoid arthritis, mixed hyperlipidemia, ischemic cardiomyopathy.  There is also a diagnosis of a seizure disorder.  Labs demonstrated improving leukocytosis of 11.9.  CT scan of the head was negative.  The patient, as part of the infectious work-up was evaluated for possible meningitis via lumbar puncture.  He was placed on anti-infective therapy with vancomycin, Rocephin, ampicillin, acyclovir.       Patient currently being managed for acute metabolic encephalopathy likely due to viral encephalitis    Today's information  Patient continues to act delirious at bedside trying to remove his clothes  Vitals reviewed and stable.  LP results reviews in keeping with viral encephalitis.  Continue IV acyclovir and IV fluids.  Consult ID  MRI brain reviewed no acute findings  EEG is normal.  Daughter reports seen dental abscesses with no drainage in the past.  Get CT maxillofacial to reassess if abscess present or not, if present will request ENT consult for I and D. Add on Magic mouthwash.  Add on Seroquel 25 mg b.i.d.  IM Haldol 5 mg Q 4 p.r.n. agitation  Get a picc line -okay to draw        Objective/physical exam:  General: In no acute distress, afebrile  Chest: Clear to auscultation bilaterally  Heart: RRR, +S1, S2, no appreciable murmur  Abdomen: Soft, nontender, BS +  MSK: Warm, no lower extremity edema, no clubbing or cyanosis.  Upper extremities are contracted (which the wife attributes to his RA)  Neurologic: Alert but not oriented, CN exam limited 2/2 his current  clinical state.      Assessment/Plan:  Acute encephalopathy  Viral encephalitis   Dental abscesses reported   History of thyrotoxicosis, now with a TSH of 0.083 and elevated free T3 and free T4  Vascular dementia  Atrial fibrillation on Eliquis  CAD  Essential hypertension  Mixed hyperlipidemia  Ischemic cardiomyopathy with a preserved ejection fraction  Recent CVA's  Seizure disorder  Rheumatoid arthritis  Immunosuppressed state related to the above       Plan  Vitals reviewed and stable.  LP in keeping with viral encephalitis.    Continue IV acyclovir and IV fluids.  Consult ID- to deescalate antibiotics   MRI brain reviewed no acute findings  EEG is normal.   Get CT maxillofacial to reassess if abscess present or not, if present will request ENT consult for I and D. Add on Magic mouthwash.  Add on Seroquel 25 mg b.i.d.  IM Haldol 5 mg Q 4 p.r.n. agitation  Get a picc line -okay to draw      Patient condition:  guarded    VITAL SIGNS: 24 HRS MIN & MAX LAST   Temp  Min: 97.4 °F (36.3 °C)  Max: 98.5 °F (36.9 °C) 97.5 °F (36.4 °C)   BP  Min: 106/62  Max: 134/79 134/79   Pulse  Min: 76  Max: 104  76   Resp  Min: 16  Max: 18 18   SpO2  Min: 91 %  Max: 99 % 99 %       Recent Labs   Lab 06/14/22  0521 06/15/22  0420 06/16/22  0329   WBC 11.9* 10.7 10.0   RBC 4.36* 4.78 4.35*   HGB 12.5* 13.5* 12.4*   HCT 38.1* 41.4* 38.2*   MCV 87.4 86.6 87.8   MCH 28.7 28.2 28.5   MCHC 32.8* 32.6* 32.5*   RDW 14.5 14.5 14.5    379 398   MPV 10.0 10.4 10.4       Recent Labs   Lab 06/14/22  0521 06/15/22  0420 06/15/22  0828   * 135* 136   K 3.6 3.9 4.0   CO2 22* 22* 21*   BUN 7.8* 6.0* 6.5*   CREATININE 0.49* 0.48* 0.46*   CALCIUM 8.9 9.0 9.3   ALBUMIN 2.5* 2.5* 2.6*   ALKPHOS 85 88 91   ALT 50 54 60*   AST 53* 59* 62*   BILITOT 0.6 0.6 0.8          Microbiology Results (last 7 days)     Procedure Component Value Units Date/Time    Mycobacteria and Nocardia Culture [[id number]] Collected: 06/15/22 1226    Order Status: Sent  Specimen: CSF (Spinal Fluid) from Lumbar Updated: 06/16/22 1117    Cerebrospinal Fluid Culture [[id number]] Collected: 06/15/22 1226    Order Status: Sent Specimen: CSF (Spinal Fluid) from Cerebrospinal Fluid Updated: 06/16/22 1117    AFB Smear [[id number]] Collected: 06/15/22 1226    Order Status: Sent Specimen: CSF (Spinal Fluid) from Cerebrospinal Fluid Updated: 06/16/22 1041    Fungal Culture [[id number]] Collected: 06/15/22 1226    Order Status: Sent Specimen: CSF (Spinal Fluid) from Lumbar Updated: 06/16/22 0949    Cryptococcal antigen, CSF (Tube 3) [[id number]] Collected: 06/15/22 1226    Order Status: Completed Specimen: CSF (Spinal Fluid) from CSF Tap, Tube 3 Updated: 06/15/22 1711     CRYPTOCOCCAL ANTIGEN TITER ([facility]) --     CRYPTOCOCCAL ANTIGEN, CSF ([facility]) Negative    Blood culture #1 **CANNOT BE ORDERED STAT** [[id number]]  (Normal) Collected: 06/13/22 1618    Order Status: Completed Specimen: Blood Updated: 06/15/22 1701     CULTURE, BLOOD ([facility]) No Growth At 48 Hours    Blood culture #2 **CANNOT BE ORDERED STAT** [[id number]]  (Normal) Collected: 06/13/22 1618    Order Status: Completed Specimen: Blood Updated: 06/15/22 1701     CULTURE, BLOOD ([facility]) No Growth At 48 Hours    Cryptococcal Antigen, CSF [[id number]]     Order Status: Sent Specimen: CSF (Spinal Fluid)            See below for Radiology    Scheduled Med:   acyclovir  10 mg/kg (Ideal) Intravenous Q8H    ampicillin IVPB  2 g Intravenous Q4H    aspirin  325 mg Oral Daily    atorvastatin  10 mg Oral QHS    cefTRIAXone (ROCEPHIN) IVPB  2 g Intravenous Q12H    digoxin  0.25 mg Oral Daily    ezetimibe  10 mg Oral Daily    levETIRAcetam  500 mg Oral BID    methIMAzole  20 mg Oral Daily    montelukast  10 mg Oral Daily    pantoprazole  40 mg Oral Daily    predniSONE  5 mg Oral Daily    propranoloL  60 mg Oral BID    QUEtiapine  25 mg Oral BID    vancomycin (VANCOCIN) IVPB  1,250 mg Intravenous Q8H        Continuous Infusions:   dextrose 5 % and  0.45 % NaCl with KCl 20 mEq 75 mL/hr at 06/15/22 1401        PRN Meds:  acetaminophen, acetaminophen, haloperidol lactate, HYDROcodone-acetaminophen, lorazepam, melatonin, ondansetron, sodium chloride 0.9%, Pharmacy to dose Vancomycin consult **AND** vancomycin - pharmacy to dose, ziprasidone         Patient condition:  Stable/Fair/Guarded/ Serious/ Critical    Anticipated discharge and Disposition:      All diagnosis and differential diagnosis have been reviewed; assessment and plan has been documented; I have personally reviewed the labs and test results that are presently available; I have reviewed the patients medication list; I have reviewed the consulting providers response and recommendations. I have reviewed or attempted to review medical records based upon their availability    All of the patient's questions have been  addressed and answered. Patient's is agreeable to the above stated plan. I will continue to monitor closely and make adjustments to medical management as needed.  _____________________________________________________________________    Nutrition Status:    Radiology:  FL LUMBAR PUNCTURE DIAGNOSTIC WITH IMAGING  Narrative: EXAMINATION:  FL LUMBAR PUNCTURE DIAGNOSTIC WITH IMAGING    CLINICAL HISTORY:  Meningitis;    TECHNIQUE:  The risks and benefits of the procedure were explained to the patient's wife and written informed consent was obtained. A suitable skin entry site was chosen under fluoroscopy. The lower back was then prepped and draped in sterile fashion.  Lidocaine was used for local anesthesia.    COMPARISON:  Head CT 06/13/2022    FINDINGS:  Under intermittent fluoroscopic guidance, a 22 gauge spinal needle was advanced into the thecal sac with position confirmed by flow of CSF.  Opening pressure of 11 cm CSF.  Approximately 8 mL of clear CSF collected and sent to the lab for further evaluation.  Closing pressure less than 9 cm CSF.  The needle was removed and hemostasis achieved at  the skin puncture site. No immediate complication. Estimated blood loss was negligible.    Total fluoroscopy time is 13 seconds with absorbed dose of 2.41 mGy.  Impression: Successful fluoroscopic guided lumbar puncture.    Electronically signed by: Liu Gomes  Date:    06/15/2022  Time:    13:36  MRI Brain Without Contrast  Narrative: EXAMINATION:  MRI BRAIN WITHOUT CONTRAST    CLINICAL HISTORY:  Neuro deficit, acute, stroke suspected;    TECHNIQUE:  Multiplanar, multisequence MR images of the brain were obtained without the administration of intravenous contrast.    COMPARISON:  CT head dated 06/13/2022, MRI brain dated 04/27/2022    FINDINGS:  There is no acute infarct identified.  There is encephalomalacia in the bilateral KINGA territories from prior infarct.  There are mild scattered T2/FLAIR hyperintensities in the subcortical and periventricular white matter, basal ganglia and john, likely representing chronic microvascular ischemic changes.  There is abnormal extra-axial diffusion signal along the falx and bilateral cerebral convexities, with associated abnormal FLAIR signal.  There is no corresponding acute hemorrhage on CT or T1/T2 images.    There is no mass effect or midline shift.  The basal cisterns are patent.  There is diffuse parenchymal volume loss.  There is no hydrocephalus.  The major intracranial flow voids are patent.  The paranasal sinuses and mastoid air cells are clear  Impression: 1. No acute infarct identified.  2. Abnormal diffusion signal in the extra-axial spaces raises the concern for a meningitis/infection.  Correlation is needed with CSF studies and clinical findings.  3. Bilateral KINGA territory encephalomalacia.    Electronically signed by: Victoria Corley  Date:    06/15/2022  Time:    12:00      Zulay Howard MD   06/16/2022          Critical care diagnosis bacterial meningitis, viral encephalitis  Critical care time greater than 35 minutes

## 2022-06-16 NOTE — PROGRESS NOTES
[facility] - 9th Floor Med Surg  Neurology  Progress Note    Patient Name: Quan Contreras  MRN: [id number]  Admission Date: 6/13/2022  Hospital Length of Stay: 3 days  Code Status: Full Code   Attending Provider: Jean Ferguson MD  Primary Care Physician: Bran Hansen MD   Principal Problem:Fever    HPI:   72 y/o M with PMH PAF on eliquis, CAD, HTN, cardiomyopathy, hyperthyroidism on methimazole, CVA, seizure d/o, and rheumatoid arthritis on prednisone/cimzia who presented to ED on 06/13 for AMS and fever of 102. Patient was being evaluated by home health nurse when he was noted to have elevated temperature and severe AMS.  Patient received Tylenol PTA, was afebrile upon arrival to the ED, COVID and influenza testing negative.  CT head unremarkable for acute intracranial findings.  Labs significant for AST/ALT 62/60, .2, TSH < 0.0083, T4 2.21, free T3 4.61.     Patient was recently hospitalized in 04/2022 for acute CVA and thyrotoxicosis 2/2 noncompliance, subsequently discharged to rehab facility.  Upon return to home from rehab facility, patient is noted to have twitching and worsening AMS, patient's wife brought him to ED for further evaluation in 5/2022.  Neurologic workup for seizures and acute stroke unrevealing during that admission, however patient was started on Keppra out of concern for possible seizures given recent history of stroke.  Patient brought to ED again last week for fevers and worsening confusion.  Workup revealed left lower lobe infiltrates, patient started on oral Levaquin, and discharged home.  Patient also has recent history of tooth abscess, recently completed and subacromial therapy for this, and is awaiting root canal.      Overview/Hospital Course:  No notes on file        Subjective:     Interval History:   NAD. Neurologic exam unchanged. No issues overnight per nursing report. LP results d/w pt and pt's wife at bedside.     Current Neurological  Medications:     Current Facility-Administered Medications   Medication Dose Route Frequency Provider Last Rate Last Admin    (Magic mouthwash) 1:1:1 diphenhydramine(Benadryl) 12.5mg/5ml liq, aluminum & magnesium hydroxide-simethicone (Maalox), LIDOcaine viscous 2%  10 mL Swish & Spit Q6H Zulay Howard MD        acetaminophen tablet 650 mg  650 mg Oral Q6H PRN Jean Ferguson MD        acetaminophen tablet 650 mg  650 mg Oral Q8H PRN Jean Ferguson MD        acyclovir (ZOVIRAX) 730 mg in dextrose 5 % 250 mL IVPB  10 mg/kg (Ideal) Intravenous Q8H Jean Ferguson MD   Stopped at 06/16/22 0806    ampicillin (OMNIPEN) 2 g in sodium chloride 0.9 % 100 mL IVPB (MB+)  2 g Intravenous Q4H Jean Ferguson MD   Stopped at 06/16/22 1011    aspirin tablet 325 mg  325 mg Oral Daily Jean Ferguson MD   325 mg at 06/16/22 0844    atorvastatin tablet 10 mg  10 mg Oral QHS Jean Ferguson MD   10 mg at 06/15/22 2104    cefTRIAXone 2 gram/50 mL IVPB 2 g  2 g Intravenous Q12H Jean Ferguson MD   Stopped at 06/16/22 0613    digoxin tablet 0.25 mg  0.25 mg Oral Daily Jean Ferguson MD   0.25 mg at 06/15/22 1802    ezetimibe tablet 10 mg  10 mg Oral Daily Jean Ferguson MD   10 mg at 06/16/22 0636    haloperidol lactate injection 5 mg  5 mg Intravenous Q4H PRN Zulay Howard MD        HYDROcodone-acetaminophen 5-325 mg per tablet 1 tablet  1 tablet Oral Q8H PRN Jean Ferguosn MD   1 tablet at 06/15/22 2104    levETIRAcetam tablet 500 mg  500 mg Oral BID Jean Ferguson MD   500 mg at 06/16/22 0844    lorazepam (ATIVAN) injection 1 mg  1 mg Intravenous Q4H PRN Smith TROY MD        melatonin tablet 6 mg  6 mg Oral Nightly PRN Jean Ferguson MD        methIMAzole tablet 20 mg  20 mg Oral Daily Zulay Howard MD        montelukast tablet 10 mg  10 mg Oral Daily Jean Ferguson MD   10 mg at 06/15/22 1802    ondansetron injection  4 mg  4 mg Intravenous Q6H PRN Jean Ferguson MD        pantoprazole EC tablet 40 mg  40 mg Oral Daily Jean Ferguson MD   40 mg at 06/16/22 0844    predniSONE tablet 5 mg  5 mg Oral Daily Jean Ferguson MD   5 mg at 06/16/22 0844    propranoloL 24 hr capsule 60 mg  60 mg Oral BID Jean Ferguson MD   60 mg at 06/16/22 0844    QUEtiapine tablet 25 mg  25 mg Oral BID Zulay Howard MD        sodium chloride 0.9% flush 10 mL  10 mL Intravenous PRN Jean Ferguson MD        vancomycin - pharmacy to dose   Intravenous pharmacy to manage frequency Jean Ferguson MD        vancomycin 1.25 g in dextrose 5% 250 mL IVPB (ready to mix)  1,250 mg Intravenous Q8H Sourav Goodwin MD   Stopped at 06/16/22 1015    ziprasidone injection 20 mg  20 mg Intramuscular Q12H PRN Zulay Howard MD           Review of Systems  A 14pt ros was reviewed & is negative unless o/w documented in the hpi    Objective:     Vital Signs (Most Recent):  Temp: 97.5 °F (36.4 °C) (06/16/22 1137)  Pulse: 76 (06/16/22 1137)  Resp: 18 (06/16/22 0500)  BP: 134/79 (06/16/22 1137)  SpO2: 99 % (06/16/22 0756) Vital Signs (24h Range):  Temp:  [97.4 °F (36.3 °C)-98.4 °F (36.9 °C)] 97.5 °F (36.4 °C)  Pulse:  [] 76  Resp:  [16-18] 18  SpO2:  [91 %-99 %] 99 %  BP: (117-134)/(73-85) 134/79     Weight: 64.2 kg (141 lb 8 oz)  Body mass index is 21.51 kg/m².    Physical Exam  Constitutional:       Appearance: He is normal weight.   HENT:      Head: Normocephalic.      Nose: Nose normal.      Mouth/Throat:      Mouth: Mucous membranes are moist.   Eyes:      Extraocular Movements: Extraocular movements intact and EOM normal.      Pupils: Pupils are equal, round, and reactive to light.   Pulmonary:      Effort: Pulmonary effort is normal.   Skin:     General: Skin is warm.   Neurological:      Mental Status: He is alert and oriented to person, place, and time.   Psychiatric:         Speech: Speech normal.          Behavior: Behavior is cooperative.         NEUROLOGICAL EXAMINATION:      MENTAL STATUS   Oriented to person, place, and time.   Follows 2 step commands.   Speech: speech is normal   Level of consciousness: alert  Knowledge: good.      CRANIAL NERVES      CN II   Visual fields full to confrontation.      CN III, IV, VI   Pupils are equal, round, and reactive to light.  Extraocular motions are normal.   Conjugate gaze: present     CN V   Facial sensation intact.      CN VII   Facial expression full, symmetric.      MOTOR EXAM   Overall muscle tone: normal     Strength   Right deltoid: 4/5  Left deltoid: 4/5  Right biceps: 4/5  Left biceps: 4/5  Right triceps: 4/5  Left triceps: 4/5  Right quadriceps: 4/5  Left quadriceps: 4/5  Right hamstrin/5  Left hamstrin/5  Right glutei: 4/5  Left glutei: 4/5     GAIT AND COORDINATION      Tremor   Resting tremor: absent  Action tremor: absent          Significant Labs:   Recent Lab Results         22  0329        Baso # 0.02       Basophil % 0.2       Eos # 0.12       Eosinophil % 1.2       Hematocrit 38.2       Hemoglobin 12.4       Immature Grans (Abs) 0.05       Immature Granulocytes 0.5       Lymph # 3.61       LYMPH % 36.0       MCH 28.5       MCHC 32.5       MCV 87.8       Mono # 0.93       Mono % 9.3       MPV 10.4       Neut # 5.3       Neut % 52.8       nRBC 0.0       Platelets 398       RBC 4.35       RDW 14.5       WBC 10.0               Significant Imaging:   EEG 6/15/2022:  Description:  The record was dominated by a 8.5  hertz posterior dominant rhythm which attenuated with eye opening activity.  During drowsiness, identified by ocular signs and alpha attenuation, there is diffuse asynchronous theta activity.  Stage 2 sleep was identified by vertex waves, sleep spindles, and K complexes.  Photic stimulation elicited no abnormalities.  There were no epileptiform discharges or electrographic seizures     Impression:  This is a normal awake and asleep  EEG.    I have reviewed all pertinent imaging results/findings within the past 24 hours.    Assessment and Plan:     Encephalopathy  CSF: WBC 20, protein 91.4, glucose 35, encephalitis panel negative, cryptococcal antigen negative  Continue keppra 500 mg BID  Seizure and immunosuppressed precautions  No further neurologic recommendations                VTE Risk Mitigation (From admission, onward)         Ordered     IP VTE HIGH RISK PATIENT  Once         06/14/22 0701     Place sequential compression device  Until discontinued         06/14/22 0701                [facility]  Inpatient Neurology  [facility] - 9th Floor Med Surg

## 2022-06-17 LAB
ANION GAP SERPL CALC-SCNC: 8 MEQ/L
BASOPHILS # BLD AUTO: 0.03 X10(3)/MCL (ref 0–0.2)
BASOPHILS NFR BLD AUTO: 0.3 %
BUN SERPL-MCNC: 6.2 MG/DL (ref 8.4–25.7)
CALCIUM SERPL-MCNC: 8.8 MG/DL (ref 8.8–10)
CHLORIDE SERPL-SCNC: 106 MMOL/L (ref 98–107)
CO2 SERPL-SCNC: 26 MMOL/L (ref 23–31)
CREAT SERPL-MCNC: 0.46 MG/DL (ref 0.73–1.18)
CREAT/UREA NIT SERPL: 13
EBV DNA CSF QL NAA+PROBE: NEGATIVE
EOSINOPHIL # BLD AUTO: 0.09 X10(3)/MCL (ref 0–0.9)
EOSINOPHIL NFR BLD AUTO: 0.9 %
ERYTHROCYTE [DISTWIDTH] IN BLOOD BY AUTOMATED COUNT: 14.6 % (ref 11.5–17)
GLUCOSE SERPL-MCNC: 81 MG/DL (ref 82–115)
HCT VFR BLD AUTO: 36.7 % (ref 42–52)
HGB BLD-MCNC: 12.2 GM/DL (ref 14–18)
HSV1 DNA CSF QL NAA+PROBE: NEGATIVE
HSV2 DNA CSF QL NAA+PROBE: NEGATIVE
IMM GRANULOCYTES # BLD AUTO: 0.05 X10(3)/MCL (ref 0–0.02)
IMM GRANULOCYTES NFR BLD AUTO: 0.5 % (ref 0–0.43)
LYMPHOCYTES # BLD AUTO: 2.88 X10(3)/MCL (ref 0.6–4.6)
LYMPHOCYTES NFR BLD AUTO: 28.9 %
M AVIUM PARATB TISS QL ZN STN: NORMAL
MCH RBC QN AUTO: 28.6 PG (ref 27–31)
MCHC RBC AUTO-ENTMCNC: 33.2 MG/DL (ref 33–36)
MCV RBC AUTO: 85.9 FL (ref 80–94)
MONOCYTES # BLD AUTO: 0.79 X10(3)/MCL (ref 0.1–1.3)
MONOCYTES NFR BLD AUTO: 7.9 %
NEUTROPHILS # BLD AUTO: 6.1 X10(3)/MCL (ref 2.1–9.2)
NEUTROPHILS NFR BLD AUTO: 61.5 %
NRBC BLD AUTO-RTO: 0 %
PLATELET # BLD AUTO: 353 X10(3)/MCL (ref 130–400)
PMV BLD AUTO: 10.2 FL (ref 9.4–12.4)
POTASSIUM SERPL-SCNC: 3.5 MMOL/L (ref 3.5–5.1)
RBC # BLD AUTO: 4.27 X10(6)/MCL (ref 4.7–6.1)
RHODAMINE-AURAMINE STN SPEC: NORMAL
SODIUM SERPL-SCNC: 140 MMOL/L (ref 136–145)
SPECIMEN SOURCE: NORMAL
VANCOMYCIN SERPL-MCNC: 22.3 UG/ML (ref 15–20)
WBC # SPEC AUTO: 10 X10(3)/MCL (ref 4.5–11.5)
WNV RNA CSF QL NAA+PROBE: NEGATIVE

## 2022-06-17 PROCEDURE — 63600175 PHARM REV CODE 636 W HCPCS: Performed by: STUDENT IN AN ORGANIZED HEALTH CARE EDUCATION/TRAINING PROGRAM

## 2022-06-17 PROCEDURE — 36415 COLL VENOUS BLD VENIPUNCTURE: CPT | Performed by: INTERNAL MEDICINE

## 2022-06-17 PROCEDURE — 11000001 HC ACUTE MED/SURG PRIVATE ROOM

## 2022-06-17 PROCEDURE — 80202 ASSAY OF VANCOMYCIN: CPT | Performed by: STUDENT IN AN ORGANIZED HEALTH CARE EDUCATION/TRAINING PROGRAM

## 2022-06-17 PROCEDURE — 25000003 PHARM REV CODE 250: Performed by: INTERNAL MEDICINE

## 2022-06-17 PROCEDURE — 63600175 PHARM REV CODE 636 W HCPCS: Performed by: INTERNAL MEDICINE

## 2022-06-17 PROCEDURE — 85025 COMPLETE CBC W/AUTO DIFF WBC: CPT | Performed by: INTERNAL MEDICINE

## 2022-06-17 PROCEDURE — A4216 STERILE WATER/SALINE, 10 ML: HCPCS | Performed by: INTERNAL MEDICINE

## 2022-06-17 PROCEDURE — 80048 BASIC METABOLIC PNL TOTAL CA: CPT | Performed by: INTERNAL MEDICINE

## 2022-06-17 PROCEDURE — 94761 N-INVAS EAR/PLS OXIMETRY MLT: CPT

## 2022-06-17 RX ADMIN — AMPICILLIN SODIUM 2 G: 2 INJECTION, POWDER, FOR SOLUTION INTRAMUSCULAR; INTRAVENOUS at 06:06

## 2022-06-17 RX ADMIN — VANCOMYCIN HYDROCHLORIDE 1250 MG: 1.25 INJECTION, POWDER, LYOPHILIZED, FOR SOLUTION INTRAVENOUS at 09:06

## 2022-06-17 RX ADMIN — PREDNISONE 5 MG: 5 TABLET ORAL at 09:06

## 2022-06-17 RX ADMIN — EZETIMIBE 10 MG: 10 TABLET ORAL at 06:06

## 2022-06-17 RX ADMIN — ATORVASTATIN CALCIUM 10 MG: 10 TABLET, FILM COATED ORAL at 09:06

## 2022-06-17 RX ADMIN — QUETIAPINE FUMARATE 25 MG: 25 TABLET ORAL at 09:06

## 2022-06-17 RX ADMIN — DIGOXIN 0.25 MG: 250 TABLET ORAL at 06:06

## 2022-06-17 RX ADMIN — LEVETIRACETAM 500 MG: 500 TABLET, FILM COATED ORAL at 09:06

## 2022-06-17 RX ADMIN — AMPICILLIN SODIUM 2 G: 2 INJECTION, POWDER, FOR SOLUTION INTRAMUSCULAR; INTRAVENOUS at 03:06

## 2022-06-17 RX ADMIN — METHIMAZOLE 20 MG: 10 TABLET ORAL at 06:06

## 2022-06-17 RX ADMIN — CEFTRIAXONE 2 G: 2 INJECTION, SOLUTION INTRAVENOUS at 06:06

## 2022-06-17 RX ADMIN — DIPHENHYDRAMINE HYDROCHLORIDE 10 ML: 25 SOLUTION ORAL at 03:06

## 2022-06-17 RX ADMIN — AMPICILLIN SODIUM 2 G: 2 INJECTION, POWDER, FOR SOLUTION INTRAMUSCULAR; INTRAVENOUS at 09:06

## 2022-06-17 RX ADMIN — SODIUM CHLORIDE, PRESERVATIVE FREE 10 ML: 5 INJECTION INTRAVENOUS at 06:06

## 2022-06-17 RX ADMIN — ASPIRIN 325 MG ORAL TABLET 325 MG: 325 PILL ORAL at 09:06

## 2022-06-17 RX ADMIN — AMPICILLIN SODIUM 2 G: 2 INJECTION, POWDER, FOR SOLUTION INTRAMUSCULAR; INTRAVENOUS at 02:06

## 2022-06-17 RX ADMIN — Medication 10 ML: at 06:06

## 2022-06-17 RX ADMIN — VANCOMYCIN HYDROCHLORIDE 1250 MG: 1.25 INJECTION, POWDER, LYOPHILIZED, FOR SOLUTION INTRAVENOUS at 01:06

## 2022-06-17 RX ADMIN — SODIUM CHLORIDE, PRESERVATIVE FREE 10 ML: 5 INJECTION INTRAVENOUS at 12:06

## 2022-06-17 RX ADMIN — MONTELUKAST SODIUM 10 MG: 10 TABLET, COATED ORAL at 06:06

## 2022-06-17 RX ADMIN — Medication 10 ML: at 01:06

## 2022-06-17 RX ADMIN — DIPHENHYDRAMINE HYDROCHLORIDE 10 ML: 25 SOLUTION ORAL at 06:06

## 2022-06-17 RX ADMIN — PANTOPRAZOLE SODIUM 40 MG: 40 TABLET, DELAYED RELEASE ORAL at 09:06

## 2022-06-17 NOTE — PT/OT/SLP PROGRESS
SLP attempting to see pt for tx however nursing working at bedside at this time.  SLP to continue to follow.

## 2022-06-17 NOTE — PROGRESS NOTES
[facility] Hospital Medicine Progress Note        Chief Complaint: Inpatient Follow-up for encephalopathy     HPI:  71-year-old male admitted on 6/13/2022 with encephalopathy.  The patient was finalizing his anti-infective treatment for pneumonia.  At that time, he was on day 6 out of 7 of Levaquin.  Previous history included anterior cerebral artery CVA, dementia, atrial fibrillation, hypertension, CAD, cardiomyopathy, rheumatoid arthritis, mixed hyperlipidemia, ischemic cardiomyopathy.  There is also a diagnosis of a seizure disorder.  Labs demonstrated improving leukocytosis of 11.9.  CT scan of the head was negative.  The patient, as part of the infectious work-up was evaluated for possible meningitis via lumbar puncture.  He was placed on anti-infective therapy with vancomycin, Rocephin, ampicillin, acyclovir. LP results reviews in keeping with viral encephalitis.  Continue IV acyclovir and IV fluids.  Consult ID  MRI brain reviewed no acute findings  EEG is normal.  Daughter reports seen dental abscesses with no drainage in the past.  Get CT maxillofacial to reassess if abscess present or not, if present will request ENT consult for I and D. Add on Magic mouthwash.  Add on Seroquel 25 mg b.i.d.  IM Haldol 5 mg Q 4 p.r.n. agitation        Patient currently being managed for acute metabolic encephalopathy likely due to viral encephalitis and possibly previously bacterial meningitis      Today's information  Patient seen and examined at bedside.  Family at bedside  Family reports he is better behaved and Less trashing   Blood pressure on the borderline low.  Discontinue propranolol  CT maxillofacial with no abscess informed family members.  ID recommends to continue bacterial and viral treatment for now giving a muddled picture.  Add on scheduled Tylenol for pain.     Objective/physical exam:  General: In no acute distress, afebrile  Chest: Clear to auscultation bilaterally  Heart:  RRR, +S1, S2, no appreciable murmur  Abdomen: Soft, nontender, BS +  MSK: Warm, no lower extremity edema, no clubbing or cyanosis.  Upper extremities are contracted (which the wife attributes to his RA)  Neurologic: Alert but not oriented, CN exam limited 2/2 his current clinical state.      Assessment/Plan:  Acute encephalopathy  Viral encephalitis   Bacterial meningitis   History of thyrotoxicosis, now with a TSH of 0.083 and elevated free T3 and free T4  Vascular dementia  Atrial fibrillation on Eliquis  CAD  Essential hypertension  Mixed hyperlipidemia  Ischemic cardiomyopathy with a preserved ejection fraction  Recent CVA's  Seizure disorder  Rheumatoid arthritis  Immunosuppressed state related to the above   hypotension     Plan    Discontinue propranolol  CT maxillofacial with no abscess informed family members.  ID recommends to continue bacterial and viral treatment for now giving a muddled picture.  Add on scheduled Tylenol for pain.  MRI brain reviewed no acute findings  EEG is normal.  Add on Magic mouthwash.  Add on Seroquel 25 mg b.i.d.  IM Haldol 5 mg Q 4 p.r.n. agitation     Patient condition:  guarded    VITAL SIGNS: 24 HRS MIN & MAX LAST   Temp  Min: 97.5 °F (36.4 °C)  Max: 98.6 °F (37 °C) 98.6 °F (37 °C)   BP  Min: 98/50  Max: 133/64 121/77   Pulse  Min: 89  Max: 100  100   Resp  Min: 18  Max: 18 18   SpO2  Min: 96 %  Max: 97 % 96 %       Recent Labs   Lab 06/15/22  0420 06/16/22  0329 06/17/22  0849   WBC 10.7 10.0 10.0   RBC 4.78 4.35* 4.27*   HGB 13.5* 12.4* 12.2*   HCT 41.4* 38.2* 36.7*   MCV 86.6 87.8 85.9   MCH 28.2 28.5 28.6   MCHC 32.6* 32.5* 33.2   RDW 14.5 14.5 14.6    398 353   MPV 10.4 10.4 10.2       Recent Labs   Lab 06/14/22  0521 06/15/22  0420 06/15/22  0828 06/17/22  0849   * 135* 136 140   K 3.6 3.9 4.0 3.5   CO2 22* 22* 21* 26   BUN 7.8* 6.0* 6.5* 6.2*   CREATININE 0.49* 0.48* 0.46* 0.46*   CALCIUM 8.9 9.0 9.3 8.8   ALBUMIN 2.5* 2.5* 2.6*  --    ALKPHOS 85 88 91   --    ALT 50 54 60*  --    AST 53* 59* 62*  --    BILITOT 0.6 0.6 0.8  --           Microbiology Results (last 7 days)     Procedure Component Value Units Date/Time    Cerebrospinal Fluid Culture [[id number]] Collected: 06/15/22 1226    Order Status: Completed Specimen: CSF (Spinal Fluid) from Cerebrospinal Fluid Updated: 06/17/22 0731     CULTURE, CSF ([facility]) No Growth At 24 Hours     GRAM STAIN No WBCs, No bacteria seen     AFB Smear [[id number]] Collected: 06/15/22 1226    Order Status: Completed Specimen: CSF (Spinal Fluid) from Cerebrospinal Fluid Updated: 06/17/22 0659     AFB Smear No AFB seen (Direct smear only)    Blood culture #1 **CANNOT BE ORDERED STAT** [[id number]]  (Normal) Collected: 06/13/22 1618    Order Status: Completed Specimen: Blood Updated: 06/16/22 1703     CULTURE, BLOOD ([facility]) No Growth At 72 Hours    Blood culture #2 **CANNOT BE ORDERED STAT** [[id number]]  (Normal) Collected: 06/13/22 1618    Order Status: Completed Specimen: Blood Updated: 06/16/22 1703     CULTURE, BLOOD ([facility]) No Growth At 72 Hours    Mycobacteria and Nocardia Culture [[id number]] Collected: 06/15/22 1226    Order Status: Sent Specimen: CSF (Spinal Fluid) from Cerebrospinal Fluid Updated: 06/16/22 1117    Fungal Culture [[id number]] Collected: 06/15/22 1226    Order Status: Sent Specimen: CSF (Spinal Fluid) from Lumbar Updated: 06/16/22 0949    Cryptococcal antigen, CSF (Tube 3) [[id number]] Collected: 06/15/22 1226    Order Status: Completed Specimen: CSF (Spinal Fluid) from CSF Tap, Tube 3 Updated: 06/15/22 1711     CRYPTOCOCCAL ANTIGEN TITER ([facility]) --     CRYPTOCOCCAL ANTIGEN, CSF ([facility]) Negative    Cryptococcal Antigen, CSF [[id number]]     Order Status: Sent Specimen: CSF (Spinal Fluid)            See below for Radiology    Scheduled Med:   (Magic mouthwash) 1:1:1 diphenhydramine(Benadryl) 12.5mg/5ml liq, aluminum & magnesium hydroxide-simethicone (Maalox), LIDOcaine viscous 2%  10 mL Swish & Spit Q6H    ampicillin IVPB  2 g  Intravenous Q4H    aspirin  325 mg Oral Daily    atorvastatin  10 mg Oral QHS    cefTRIAXone (ROCEPHIN) IVPB  2 g Intravenous Q12H    digoxin  0.25 mg Oral Daily    ezetimibe  10 mg Oral Daily    levETIRAcetam  500 mg Oral BID    methIMAzole  20 mg Oral Daily    montelukast  10 mg Oral Daily    pantoprazole  40 mg Oral Daily    predniSONE  5 mg Oral Daily    QUEtiapine  25 mg Oral BID    sodium chloride 0.9%  10 mL Intravenous Q6H    vancomycin (VANCOCIN) IVPB  1,250 mg Intravenous Q8H        Continuous Infusions:       PRN Meds:  acetaminophen, acetaminophen, haloperidol lactate, HYDROcodone-acetaminophen, lorazepam, melatonin, ondansetron, sodium chloride 0.9%, Flushing PICC Protocol **AND** sodium chloride 0.9% **AND** sodium chloride 0.9%, Pharmacy to dose Vancomycin consult **AND** vancomycin - pharmacy to dose, ziprasidone       Patient condition:  Stable/Fair/Guarded/ Serious/ Critical    Anticipated discharge and Disposition:      Critical care diagnosis bacterial meningitis, viral encephalitis  Critical care time greater than 35 minutes    All diagnosis and differential diagnosis have been reviewed; assessment and plan has been documented; I have personally reviewed the labs and test results that are presently available; I have reviewed the patients medication list; I have reviewed the consulting providers response and recommendations. I have reviewed or attempted to review medical records based upon their availability    All of the patient's questions have been  addressed and answered. Patient's is agreeable to the above stated plan. I will continue to monitor closely and make adjustments to medical management as needed.  _____________________________________________________________________    Nutrition Status:    Radiology:  X-Ray Chest 1 View  Narrative: EXAMINATION:  XR CHEST 1 VIEW    CLINICAL HISTORY:  PICC;    TECHNIQUE:  AP chest    COMPARISON:  Chest x-ray dated  06/13/2022    FINDINGS:  Right-sided PICC line has its tip over the superior vena cava.  The heart is normal in size.  There is no focal airspace consolidation.  There is no pleural effusion or visible pneumothorax.  Impression: Right-sided PICC line with tip over the superior vena cava.    Electronically signed by: Victoria Corley  Date:    06/16/2022  Time:    16:53  CT Maxillofacial With Contrast  Narrative: EXAMINATION:  CT MAXILLOFACIAL WITH CONTRAST    CLINICAL HISTORY:  Maxillary/facial abscess;    TECHNIQUE:  Volumetric CT acquisition of the facial bones with contrast.  Axial, coronal and sagittal reconstructions.    Automatic exposure control was utilized to limit radiation dose.    DLP: 506 mGy-cm    COMPARISON:  None    FINDINGS:  Evaluation of the oral cavity is limited by artifact from dental hardware.    There are small periapical lucencies around the right mandibular molars.  There is no acute fracture identified.  The paranasal sinuses and mastoid air cells are clear.    There is no drainable fluid collection.  The orbits and soft tissues are otherwise unremarkable.  Impression: No drainable fluid collection identified.    Electronically signed by: Victoria Corley  Date:    06/16/2022  Time:    15:13      Zulay Howard MD   06/17/2022

## 2022-06-17 NOTE — CONSULTS
Infectious Diseases Consultation       Inpatient consult to Infectious Diseases  Consult performed by: Alfreda Donnelly MD  Consult ordered by: Zulay Howard MD        Inpatient consult to Infectious Diseases  Consult performed by: Alfreda Donnelly MD  Consult ordered by: Zulay Howard MD      HPI:   71-year-old male with past medical history of paroxysmal AFib, HTN, HLD, hypothyroidism, seizure disorder, rheumatoid arthritis, CAD, with the recent hospitalizations for CVA in April, is admitted to [facility] on 06/13/2022, presenting to the ED with report of continued fevers of up to 102 at home.  Apparently had visited the ER a few times since his discharge from the rehab facility .  Including mid May with report of twitching on worsening altered mental status, and seen by Neurology, placed on care and then the week prior to this admission had developed fevers with worsening confusion and chest x-ray indicated for possible left lower lobe infiltrate, treated with oral Levaquin and discharge.  His wife tells been he had been on antibiotics continuously up until this admission including initially ampicillin and then most recently Levaquin.  He does take Cimzia and prednisone rheumatoid arthritis.  On presentation he was evaluated extensively and noted to have no fevers but by the next day had low-grade temperature of up to 99.5.  He did have leukocytosis of 12.8, anemic with low albumin.  He had a lumbar puncture with CSF analysis showing 20 WBC predodaughtminantly lymphocytes at 76%, elevated protein 91.4 and low glucose at 35. CSF cultures have remained negative with Gram stain showing no bacteria and no WBC.  CSF cryptococcal antigen and PCR panel negative.  Blood cultures have remained negative and review of records show 6/6 blood cultures which have been negative as well.  Chest x-ray on admission showed no acute cardiopulmonary process.  CT scan of the head with no acute  intracranial findings, but had old infarcts, chronic micro angiopathic ischemia and atrophy.  MRI of the brain showed no acute infarcts, abnormal diffuse signal in the extra-axial space history with concern for meningitis/infection, needed to be correlated with CSF studies and clinical findings.  CT maxillofacial showed no drainable fluid collection.  He is currently on antibiotic coverage vancomycin, ceftriaxone, ampicillin and acyclovir.    Past Medical and Surgical History  Allergies :   Ace inhibitors, Morphine, Morphine sulfate, Nafcillin, Pradaxa [dabigatran etexilate], and Sulfamethoxazole      Medical :   He has a past medical history of Acute left arterial ischemic stroke, KINGA (anterior cerebral artery) (5/3/2022), Acute osteomyelitis (5/11/2022), Atherosclerosis of coronary artery (5/11/2022), Atrial fibrillation (5/3/2022), Benign essential hypertension (5/3/2022), Cardiomyopathy, Coronary artery disease, Diverticulosis, Esophageal reflux (5/11/2022), Fatigue, Focal seizure (5/17/2022), Generalized anxiety disorder, GERD (gastroesophageal reflux disease), Graves disease, Hemiplga following cerebral infrc affecting left nondom side (5/8/2022), HTN (hypertension), Hyperthyroidism, Idiopathic peripheral neuropathy (5/11/2022), Irritable bowel syndrome without diarrhea, Ischemic cardiomyopathy (5/3/2022), Mixed hyperlipidemia (5/11/2022), Other sequelae following unspecified cerebrovascular disease (5/9/2022), Paroxysmal atrial fibrillation, Rheumatoid arthritis (5/11/2022), Rheumatoid arthritis, unspecified, Shingles, Staph aureus infection, Stroke, Thyroiditis, unspecified, and Thyrotoxicosis (5/3/2022).    Surgical :   He has a past surgical history that includes Cataract extraction; Total knee arthroplasty; Tonsillectomy; Vasectomy; and Cyst Removal (Left).     Family History  His Family history is unknown by patient.    Social History  He reports that he has never smoked. He has never used smokeless  "tobacco. He reports that he does not drink alcohol and does not use drugs.     Review of Systems   Unable to perform ROS: Mental status change       Objective   Physical Exam  Vitals reviewed.   Constitutional:       General: He is not in acute distress.     Appearance: He is ill-appearing. He is not toxic-appearing.      Comments: Wife is at the bedside   HENT:      Head: Normocephalic and atraumatic.      Ears:      Comments: Hearing loss  Eyes:      Pupils: Pupils are equal, round, and reactive to light.   Cardiovascular:      Rate and Rhythm: Normal rate and regular rhythm.      Heart sounds: Normal heart sounds.   Pulmonary:      Effort: No respiratory distress.      Breath sounds: Normal breath sounds.   Abdominal:      General: Bowel sounds are normal. There is no distension.      Palpations: Abdomen is soft.      Tenderness: There is no abdominal tenderness.   Genitourinary:     Comments: Incontinent in diapers  Musculoskeletal:      Cervical back: Neck supple.      Right lower leg: No edema.      Left lower leg: No edema.      Comments: Arthritic deformation of hand joints    Skin:     Findings: No erythema or rash.      Comments: Bilateral TKA scars noted   Neurological:      Comments: Confused   Psychiatric:      Comments: Not communicative       VITAL SIGNS: 24 HR MIN & MAX LAST    Temp  Min: 97.4 °F (36.3 °C)  Max: 98.4 °F (36.9 °C)  97.5 °F (36.4 °C)        BP  Min: 120/73  Max: 134/79  133/86     Pulse  Min: 76  Max: 104  98     Resp  Min: 18  Max: 18  18    SpO2  Min: 91 %  Max: 99 %  97 %      HT: 5' 8" (172.7 cm)  WT: 64.2 kg (141 lb 8 oz)  BMI: 21.5     Recent Results (from the past 24 hour(s))   CBC with Differential    Collection Time: 06/16/22  3:29 AM   Result Value Ref Range    WBC 10.0 4.5 - 11.5 x10(3)/mcL    RBC 4.35 (L) 4.70 - 6.10 x10(6)/mcL    Hgb 12.4 (L) 14.0 - 18.0 gm/dL    Hct 38.2 (L) 42.0 - 52.0 %    MCV 87.8 80.0 - 94.0 fL    MCH 28.5 27.0 - 31.0 pg    MCHC 32.5 (L) 33.0 - 36.0 " mg/dL    RDW 14.5 11.5 - 17.0 %    Platelet 398 130 - 400 x10(3)/mcL    MPV 10.4 9.4 - 12.4 fL    Neut % 52.8 %    Lymph % 36.0 %    Mono % 9.3 %    Eos % 1.2 %    Basophil % 0.2 %    Lymph # 3.61 0.6 - 4.6 x10(3)/mcL    Neut # 5.3 2.1 - 9.2 x10(3)/mcL    Mono # 0.93 0.1 - 1.3 x10(3)/mcL    Eos # 0.12 0 - 0.9 x10(3)/mcL    Baso # 0.02 0 - 0.2 x10(3)/mcL    IG# 0.05 (H) 0 - 0.0155 x10(3)/mcL    IG% 0.5 (H) 0 - 0.43 %    NRBC% 0.0 %   VANCOMYCIN, TROUGH    Collection Time: 06/16/22  7:01 PM   Result Value Ref Range    Vancomycin Trough 26.8 (H) 15.0 - 20.0 ug/ml       Imaging  Imaging Results          CT Head Without Contrast (Final result)  Result time 06/13/22 17:27:43    Final result by Ethan Molina MD (06/13/22 17:27:43)                 Impression:      1.  No acute intracranial findings identified.    2.  Old infarcts, chronic microangiopathic ischemia and atrophy.      Electronically signed by: Ethan Molina  Date:    06/13/2022  Time:    17:27             Narrative:    EXAMINATION:  CT HEAD WITHOUT CONTRAST    CLINICAL HISTORY:  Mental status change, unknown cause;    TECHNIQUE:  Sequential axial images were performed of the brain without contrast.    Dose product length of 1167 mGycm. Automated exposure control was utilized to minimize radiation dose.    COMPARISON:  CT brain without contrast May 15, 2022 in MRI brain April 27, 2022.    FINDINGS:  There is no intracranial mass effect, midline shift, hydrocephalus or hemorrhage. There is no sulcal effacement or low attenuation changes to suggest recent large vessel territory infarction.  There are old infarcts which involve the right frontal lobe and the left cingulate cortical/subcortical location.  Chronic microvascular ischemic changes are mild.  The ventricular system and sulcal markings prominence is consistent with atrophy. There is no acute extra axial fluid collection. Visualized paranasal sinuses are clear without mucosal thickening, polypoidal  abnormality or air-fluid levels. Mastoid air cells aeration is optimal.                               X-Ray Chest AP Portable (Final result)  Result time 06/13/22 17:09:45    Final result by Ethan Molina MD (06/13/22 17:09:45)                 Impression:      NO ACUTE CARDIOPULMONARY PROCESS IDENTIFIED.      Electronically signed by: Ethan Molina  Date:    06/13/2022  Time:    17:09             Narrative:    EXAMINATION:  XR CHEST AP PORTABLE    CLINICAL HISTORY:  Fever, unspecified    TECHNIQUE:  One view    COMPARISON:  June 6, 2022.    FINDINGS:  Cardiopericardial silhouette is within normal limits.  No acute dense focal or segmental consolidation, congestive process, pleural effusions or pneumothorax.                                 Impression  1. Acute meningitis with encephalopathy  2. SIRS with fevers   3. Immunocompromised on immunosuppressive drugs  4. Rheumatoid arthritis  5. History of CVA  6. Seizure disorder  7. Anemia  8. Protein calorie malnutrition      Recommendations  I agree with the management of this patient.  He presented with febrile illness and encephalopathy with CSF analysis consistent with meningitis with specific pathogen not know.  He is noted to have predominantly CSF lymphocytosis with hypoglycorrhachia which could be indicative of a fungal meningitis, however a pre treated bacterial meningitis is in the differentials since he has been on antibiotics recently including ampicillin and Levaquin.  CSF PCR panel with no organisms detected, negative Gram stains. We will obtain West Nile antibodies/ PCR, follow cultures including fungal and AFB.  TB meningitis less likely with no suspicious history and not usual presentation.  Moreover, he is on Cimzia and must have been screened for latent TB prior to initiation.  We will confirm with records from Rheumatology and will still get T spot and CSF MTB PCR especially if cultures remain negative.  We will deescalate on antimicrobial coverage  with discontinuation of acyclovir with plan to further deescalate on coverage if cultures remain negative.  Guarded prognosis.  Case is discussed length with patient's wife, questions solicited and answered.  I have also discussed the case with nursing staff.  I would like to thank the team very much for the opportunity to assist in care of this patient.

## 2022-06-17 NOTE — PROGRESS NOTES
Pharmacokinetic Assessment Follow Up: IV Vancomycin    Vancomycin serum concentration assessment(s):    The trough level was drawn correctly and can be used to guide therapy at this time. The measurement is above the desired definitive target range of 15 to 20 mcg/mL. However, I feel like pt may just be high due to 2g LD from 6/15.     Vancomycin Regimen Plan:    Continue regimen to Vancomycin 1250 mg IV every 8 hours for a few more doses with next serum trough concentration measured at 1200 prior to dose on 6/18     Drug levels (last 3 results):  Recent Labs   Lab Result Units 06/15/22  1325 06/16/22  1901 06/17/22  0849   Vanc Lvl Random ug/ml  --   --  22.3*   Vancomycin Trough ug/ml 5.9* 26.8*  --        Pharmacy will continue to follow and monitor vancomycin.    Please contact pharmacy at extension [phone] for questions regarding this assessment.    Thank you for the consult,   Adrian LeJeune, PharmD       Patient brief summary:  Quan Contreras is a 71 y.o. male initiated on antimicrobial therapy with IV Vancomycin for treatment of meningitis    The patient's current regimen is 1250mg q8h    Drug Allergies:   Review of patient's allergies indicates:   Allergen Reactions    Ace inhibitors Swelling     Lip swelling    Morphine     Morphine sulfate     Nafcillin Itching    Pradaxa [dabigatran etexilate] Other (See Comments)     Abdominal cramping    Sulfamethoxazole Rash       Actual Body Weight:   64.2 kg --- 26 kg change from initial wt documented when vanc was started on 6/13    Renal Function:   Estimated Creatinine Clearance: 135.7 mL/min (A) (based on SCr of 0.46 mg/dL (L)).,     Dialysis Method (if applicable):  N/A    CBC (last 72 hours):  Recent Labs   Lab Result Units 06/15/22  0420 06/16/22  0329 06/17/22  0849   WBC x10(3)/mcL 10.7 10.0 10.0   Hgb gm/dL 13.5* 12.4* 12.2*   Hct % 41.4* 38.2* 36.7*   Platelet x10(3)/mcL 379 398 353   Mono % % 9.3 9.3 7.9   Eos % % 0.7 1.2 0.9   Basophil % % 0.3 0.2 0.3        Metabolic Panel (last 72 hours):  Recent Labs   Lab Result Units 06/15/22  0420 06/15/22  0828 06/15/22  1226 06/17/22  0849   Sodium Level mmol/L 135* 136  --  140   Potassium Level mmol/L 3.9 4.0  --  3.5   Chloride mmol/L 101 102  --  106   Carbon Dioxide mmol/L 22* 21*  --  26   Glucose Level mg/dL 83 82  --  81*   Glucose CSF  mg/dL  --   --  35*  --    Blood Urea Nitrogen mg/dL 6.0* 6.5*  --  6.2*   Creatinine mg/dL 0.48* 0.46*  --  0.46*   Albumin Level gm/dL 2.5* 2.6*  --   --    Bilirubin Total mg/dL 0.6 0.8  --   --    Alkaline Phosphatase unit/L 88 91  --   --    Aspartate Aminotransferase unit/L 59* 62*  --   --    Alanine Aminotransferase unit/L 54 60*  --   --        Vancomycin Administrations:  vancomycin given in the last 96 hours                     vancomycin 1.25 g in dextrose 5% 250 mL IVPB (ready to mix) (mg) 1,250 mg New Bag 06/17/22 0955     1,250 mg New Bag  0107     1,250 mg New Bag 06/16/22 1809     1,250 mg New Bag  0845     1,250 mg New Bag  0056    vancomycin (VANCOCIN) 2,000 mg in dextrose 5 % 500 mL IVPB (mg) 2,000 mg New Bag 06/15/22 1656    vancomycin (VANCOCIN) 1,000 mg in dextrose 5 % 250 mL IVPB (mg) 1,000 mg New Bag 06/14/22 2341    vancomycin (VANCOCIN) 1,000 mg in dextrose 5 % 250 mL IVPB (mg) 1,000 mg New Bag 06/14/22 1345    vancomycin (VANCOCIN) 2,000 mg in dextrose 5 % 500 mL IVPB (mg) 2,000 mg New Bag 06/13/22 2320                    Microbiologic Results:  Microbiology Results (last 7 days)       Procedure Component Value Units Date/Time    Cerebrospinal Fluid Culture [[id number]] Collected: 06/15/22 1226    Order Status: Completed Specimen: CSF (Spinal Fluid) from Cerebrospinal Fluid Updated: 06/17/22 0731     CULTURE, CSF ([facility]) No Growth At 24 Hours     GRAM STAIN No WBCs, No bacteria seen     AFB Smear [[id number]] Collected: 06/15/22 1226    Order Status: Completed Specimen: CSF (Spinal Fluid) from Cerebrospinal Fluid Updated: 06/17/22 0659     AFB Smear No AFB  seen (Direct smear only)    Blood culture #1 **CANNOT BE ORDERED STAT** [[id number]]  (Normal) Collected: 06/13/22 1618    Order Status: Completed Specimen: Blood Updated: 06/16/22 1703     CULTURE, BLOOD ([facility]) No Growth At 72 Hours    Blood culture #2 **CANNOT BE ORDERED STAT** [[id number]]  (Normal) Collected: 06/13/22 1618    Order Status: Completed Specimen: Blood Updated: 06/16/22 1703     CULTURE, BLOOD ([facility]) No Growth At 72 Hours    Mycobacteria and Nocardia Culture [[id number]] Collected: 06/15/22 1226    Order Status: Sent Specimen: CSF (Spinal Fluid) from Cerebrospinal Fluid Updated: 06/16/22 1117    Fungal Culture [[id number]] Collected: 06/15/22 1226    Order Status: Sent Specimen: CSF (Spinal Fluid) from Lumbar Updated: 06/16/22 0949    Cryptococcal antigen, CSF (Tube 3) [[id number]] Collected: 06/15/22 1226    Order Status: Completed Specimen: CSF (Spinal Fluid) from CSF Tap, Tube 3 Updated: 06/15/22 1711     CRYPTOCOCCAL ANTIGEN TITER ([facility]) --     CRYPTOCOCCAL ANTIGEN, CSF ([facility]) Negative    Cryptococcal Antigen, CSF [[id number]]     Order Status: Sent Specimen: CSF (Spinal Fluid)

## 2022-06-17 NOTE — PROGRESS NOTES
Pharmacokinetic Assessment Follow Up: IV Vancomycin    Vancomycin serum concentration assessment(s):  The trough level was drawn incorrectly and cannot be used to guide therapy at this time.  Trough level was scheduled to be drawn on 6/17 @ 0000, but was drawn on 6/16 @ 1901 - resulting in a falsely elevated level.    Vancomycin Regimen Plan:  Continue regimen to Vancomycin 1250 mg IV every 8 hours with next serum trough concentration measured on 6/17 @ 0800.    Drug levels (last 3 results):  Recent Labs   Lab Result Units 06/15/22  1325 06/16/22  1901   Vancomycin Trough ug/ml 5.9* 26.8*       Pharmacy will continue to follow and monitor vancomycin.    Please contact pharmacy at extension [phone] for questions regarding this assessment.    Thank you for the consult,   Jimena Fonseca, DraganD       Patient brief summary:  Quan Contreras is a 71 y.o. male initiated on antimicrobial therapy with IV Vancomycin for treatment of meningitis    The patient's current regimen is 1250 mg IVPB q8h.    Drug Allergies:   Review of patient's allergies indicates:   Allergen Reactions    Ace inhibitors Swelling     Lip swelling    Morphine     Morphine sulfate     Nafcillin Itching    Pradaxa [dabigatran etexilate] Other (See Comments)     Abdominal cramping    Sulfamethoxazole Rash       Actual Body Weight:   90 kg    Renal Function:   Estimated Creatinine Clearance: 135.7 mL/min (A) (based on SCr of 0.46 mg/dL (L)).,     Dialysis Method (if applicable):  N/A    CBC (last 72 hours):  Recent Labs   Lab Result Units 06/14/22  0521 06/15/22  0420 06/16/22  0329   WBC x10(3)/mcL 11.9* 10.7 10.0   Hgb gm/dL 12.5* 13.5* 12.4*   Hct % 38.1* 41.4* 38.2*   Platelet x10(3)/mcL 367 379 398   Mono % % 8.5 9.3 9.3   Eos % % 0.3 0.7 1.2   Basophil % % 0.2 0.3 0.2       Metabolic Panel (last 72 hours):  Recent Labs   Lab Result Units 06/14/22  0521 06/15/22  0420 06/15/22  0828 06/15/22  1226   Sodium Level mmol/L 134* 135* 136  --    Potassium  Level mmol/L 3.6 3.9 4.0  --    Chloride mmol/L 102 101 102  --    Carbon Dioxide mmol/L 22* 22* 21*  --    Glucose Level mg/dL 119* 83 82  --    Glucose CSF  mg/dL  --   --   --  35*   Blood Urea Nitrogen mg/dL 7.8* 6.0* 6.5*  --    Creatinine mg/dL 0.49* 0.48* 0.46*  --    Albumin Level gm/dL 2.5* 2.5* 2.6*  --    Bilirubin Total mg/dL 0.6 0.6 0.8  --    Alkaline Phosphatase unit/L 85 88 91  --    Aspartate Aminotransferase unit/L 53* 59* 62*  --    Alanine Aminotransferase unit/L 50 54 60*  --        Vancomycin Administrations:  vancomycin given in the last 96 hours                     vancomycin 1.25 g in dextrose 5% 250 mL IVPB (ready to mix) (mg) 1,250 mg New Bag 06/16/22 1809     1,250 mg New Bag  0845     1,250 mg New Bag  0056    vancomycin (VANCOCIN) 2,000 mg in dextrose 5 % 500 mL IVPB (mg) 2,000 mg New Bag 06/15/22 1656    vancomycin (VANCOCIN) 1,000 mg in dextrose 5 % 250 mL IVPB (mg) 1,000 mg New Bag 06/14/22 2341    vancomycin (VANCOCIN) 1,000 mg in dextrose 5 % 250 mL IVPB (mg) 1,000 mg New Bag 06/14/22 1345    vancomycin (VANCOCIN) 2,000 mg in dextrose 5 % 500 mL IVPB (mg) 2,000 mg New Bag 06/13/22 2320                    Microbiologic Results:  Microbiology Results (last 7 days)       Procedure Component Value Units Date/Time    Blood culture #1 **CANNOT BE ORDERED STAT** [[id number]]  (Normal) Collected: 06/13/22 1618    Order Status: Completed Specimen: Blood Updated: 06/16/22 1703     CULTURE, BLOOD ([facility]) No Growth At 72 Hours    Blood culture #2 **CANNOT BE ORDERED STAT** [[id number]]  (Normal) Collected: 06/13/22 1618    Order Status: Completed Specimen: Blood Updated: 06/16/22 1703     CULTURE, BLOOD ([facility]) No Growth At 72 Hours    Cerebrospinal Fluid Culture [[id number]] Collected: 06/15/22 1226    Order Status: Completed Specimen: CSF (Spinal Fluid) from Cerebrospinal Fluid Updated: 06/16/22 1342     GRAM STAIN No WBCs, No bacteria seen     Mycobacteria and Nocardia Culture [[id number]]  Collected: 06/15/22 1226    Order Status: Sent Specimen: CSF (Spinal Fluid) from Cerebrospinal Fluid Updated: 06/16/22 1117    AFB Smear [[id number]] Collected: 06/15/22 1226    Order Status: Sent Specimen: CSF (Spinal Fluid) from Cerebrospinal Fluid Updated: 06/16/22 1041    Fungal Culture [[id number]] Collected: 06/15/22 1226    Order Status: Sent Specimen: CSF (Spinal Fluid) from Lumbar Updated: 06/16/22 0949    Cryptococcal antigen, CSF (Tube 3) [[id number]] Collected: 06/15/22 1226    Order Status: Completed Specimen: CSF (Spinal Fluid) from CSF Tap, Tube 3 Updated: 06/15/22 1711     CRYPTOCOCCAL ANTIGEN TITER ([facility]) --     CRYPTOCOCCAL ANTIGEN, CSF ([facility]) Negative    Cryptococcal Antigen, CSF [[id number]]     Order Status: Sent Specimen: CSF (Spinal Fluid)

## 2022-06-18 LAB
ANION GAP SERPL CALC-SCNC: 10 MEQ/L
BACTERIA BLD CULT: NORMAL
BACTERIA BLD CULT: NORMAL
BASOPHILS # BLD AUTO: 0.03 X10(3)/MCL (ref 0–0.2)
BASOPHILS NFR BLD AUTO: 0.3 %
BUN SERPL-MCNC: 5.4 MG/DL (ref 8.4–25.7)
CALCIUM SERPL-MCNC: 8.6 MG/DL (ref 8.8–10)
CHLORIDE SERPL-SCNC: 104 MMOL/L (ref 98–107)
CO2 SERPL-SCNC: 21 MMOL/L (ref 23–31)
CREAT SERPL-MCNC: 0.48 MG/DL (ref 0.73–1.18)
CREAT/UREA NIT SERPL: 11
DRUGS UR SCN NOM: NORMAL
EOSINOPHIL # BLD AUTO: 0.06 X10(3)/MCL (ref 0–0.9)
EOSINOPHIL NFR BLD AUTO: 0.6 %
ERYTHROCYTE [DISTWIDTH] IN BLOOD BY AUTOMATED COUNT: 14.4 % (ref 11.5–17)
GLUCOSE SERPL-MCNC: 109 MG/DL (ref 82–115)
HCT VFR BLD AUTO: 36.9 % (ref 42–52)
HGB BLD-MCNC: 12.3 GM/DL (ref 14–18)
IMM GRANULOCYTES # BLD AUTO: 0.05 X10(3)/MCL (ref 0–0.02)
IMM GRANULOCYTES NFR BLD AUTO: 0.5 % (ref 0–0.43)
LYMPHOCYTES # BLD AUTO: 3.21 X10(3)/MCL (ref 0.6–4.6)
LYMPHOCYTES NFR BLD AUTO: 29.8 %
MCH RBC QN AUTO: 28.2 PG (ref 27–31)
MCHC RBC AUTO-ENTMCNC: 33.3 MG/DL (ref 33–36)
MCV RBC AUTO: 84.6 FL (ref 80–94)
MONOCYTES # BLD AUTO: 0.72 X10(3)/MCL (ref 0.1–1.3)
MONOCYTES NFR BLD AUTO: 6.7 %
NEUTROPHILS # BLD AUTO: 6.7 X10(3)/MCL (ref 2.1–9.2)
NEUTROPHILS NFR BLD AUTO: 62.1 %
NRBC BLD AUTO-RTO: 0 %
PLATELET # BLD AUTO: 364 X10(3)/MCL (ref 130–400)
PMV BLD AUTO: 9.6 FL (ref 9.4–12.4)
POTASSIUM SERPL-SCNC: 2.8 MMOL/L (ref 3.5–5.1)
RBC # BLD AUTO: 4.36 X10(6)/MCL (ref 4.7–6.1)
SODIUM SERPL-SCNC: 135 MMOL/L (ref 136–145)
VANCOMYCIN TROUGH SERPL-MCNC: 26 UG/ML (ref 15–20)
WBC # SPEC AUTO: 10.8 X10(3)/MCL (ref 4.5–11.5)

## 2022-06-18 PROCEDURE — 25000003 PHARM REV CODE 250: Performed by: INTERNAL MEDICINE

## 2022-06-18 PROCEDURE — 80048 BASIC METABOLIC PNL TOTAL CA: CPT | Performed by: INTERNAL MEDICINE

## 2022-06-18 PROCEDURE — C1751 CATH, INF, PER/CENT/MIDLINE: HCPCS

## 2022-06-18 PROCEDURE — 11000001 HC ACUTE MED/SURG PRIVATE ROOM

## 2022-06-18 PROCEDURE — 80202 ASSAY OF VANCOMYCIN: CPT | Performed by: STUDENT IN AN ORGANIZED HEALTH CARE EDUCATION/TRAINING PROGRAM

## 2022-06-18 PROCEDURE — 63600175 PHARM REV CODE 636 W HCPCS: Performed by: STUDENT IN AN ORGANIZED HEALTH CARE EDUCATION/TRAINING PROGRAM

## 2022-06-18 PROCEDURE — 63600175 PHARM REV CODE 636 W HCPCS: Performed by: INTERNAL MEDICINE

## 2022-06-18 PROCEDURE — 36415 COLL VENOUS BLD VENIPUNCTURE: CPT | Performed by: INTERNAL MEDICINE

## 2022-06-18 PROCEDURE — 85025 COMPLETE CBC W/AUTO DIFF WBC: CPT | Performed by: INTERNAL MEDICINE

## 2022-06-18 PROCEDURE — 36569 INSJ PICC 5 YR+ W/O IMAGING: CPT

## 2022-06-18 PROCEDURE — 36415 COLL VENOUS BLD VENIPUNCTURE: CPT | Performed by: STUDENT IN AN ORGANIZED HEALTH CARE EDUCATION/TRAINING PROGRAM

## 2022-06-18 PROCEDURE — 25000003 PHARM REV CODE 250: Performed by: STUDENT IN AN ORGANIZED HEALTH CARE EDUCATION/TRAINING PROGRAM

## 2022-06-18 PROCEDURE — A4216 STERILE WATER/SALINE, 10 ML: HCPCS | Performed by: INTERNAL MEDICINE

## 2022-06-18 RX ORDER — POTASSIUM CHLORIDE 20 MEQ/1
40 TABLET, EXTENDED RELEASE ORAL
Status: COMPLETED | OUTPATIENT
Start: 2022-06-18 | End: 2022-06-18

## 2022-06-18 RX ADMIN — SODIUM CHLORIDE, PRESERVATIVE FREE 10 ML: 5 INJECTION INTRAVENOUS at 06:06

## 2022-06-18 RX ADMIN — LEVETIRACETAM 500 MG: 500 TABLET, FILM COATED ORAL at 09:06

## 2022-06-18 RX ADMIN — EZETIMIBE 10 MG: 10 TABLET ORAL at 05:06

## 2022-06-18 RX ADMIN — POTASSIUM CHLORIDE 40 MEQ: 1500 TABLET, EXTENDED RELEASE ORAL at 03:06

## 2022-06-18 RX ADMIN — DIPHENHYDRAMINE HYDROCHLORIDE 10 ML: 25 SOLUTION ORAL at 05:06

## 2022-06-18 RX ADMIN — SODIUM CHLORIDE, PRESERVATIVE FREE 10 ML: 5 INJECTION INTRAVENOUS at 12:06

## 2022-06-18 RX ADMIN — VANCOMYCIN HYDROCHLORIDE 1250 MG: 1.25 INJECTION, POWDER, LYOPHILIZED, FOR SOLUTION INTRAVENOUS at 06:06

## 2022-06-18 RX ADMIN — ATORVASTATIN CALCIUM 10 MG: 10 TABLET, FILM COATED ORAL at 09:06

## 2022-06-18 RX ADMIN — DIPHENHYDRAMINE HYDROCHLORIDE 10 ML: 25 SOLUTION ORAL at 06:06

## 2022-06-18 RX ADMIN — QUETIAPINE FUMARATE 25 MG: 25 TABLET ORAL at 09:06

## 2022-06-18 RX ADMIN — DIGOXIN 0.25 MG: 250 TABLET ORAL at 06:06

## 2022-06-18 RX ADMIN — QUETIAPINE FUMARATE 25 MG: 25 TABLET ORAL at 10:06

## 2022-06-18 RX ADMIN — CEFTRIAXONE 2 G: 2 INJECTION, SOLUTION INTRAVENOUS at 06:06

## 2022-06-18 RX ADMIN — ASPIRIN 325 MG ORAL TABLET 325 MG: 325 PILL ORAL at 10:06

## 2022-06-18 RX ADMIN — POTASSIUM CHLORIDE 40 MEQ: 1500 TABLET, EXTENDED RELEASE ORAL at 01:06

## 2022-06-18 RX ADMIN — VANCOMYCIN HYDROCHLORIDE 1250 MG: 1.25 INJECTION, POWDER, LYOPHILIZED, FOR SOLUTION INTRAVENOUS at 09:06

## 2022-06-18 RX ADMIN — DIPHENHYDRAMINE HYDROCHLORIDE 10 ML: 25 SOLUTION ORAL at 11:06

## 2022-06-18 RX ADMIN — LEVETIRACETAM 500 MG: 500 TABLET, FILM COATED ORAL at 10:06

## 2022-06-18 RX ADMIN — CEFTRIAXONE 2 G: 2 INJECTION, SOLUTION INTRAVENOUS at 05:06

## 2022-06-18 RX ADMIN — HYDROCODONE BITARTRATE AND ACETAMINOPHEN 1 TABLET: 5; 325 TABLET ORAL at 10:06

## 2022-06-18 RX ADMIN — VANCOMYCIN HYDROCHLORIDE 1250 MG: 1.25 INJECTION, POWDER, LYOPHILIZED, FOR SOLUTION INTRAVENOUS at 01:06

## 2022-06-18 RX ADMIN — PANTOPRAZOLE SODIUM 40 MG: 40 TABLET, DELAYED RELEASE ORAL at 10:06

## 2022-06-18 RX ADMIN — POTASSIUM CHLORIDE 40 MEQ: 1500 TABLET, EXTENDED RELEASE ORAL at 11:06

## 2022-06-18 RX ADMIN — MONTELUKAST SODIUM 10 MG: 10 TABLET, COATED ORAL at 06:06

## 2022-06-18 RX ADMIN — SODIUM CHLORIDE, PRESERVATIVE FREE 10 ML: 5 INJECTION INTRAVENOUS at 05:06

## 2022-06-18 RX ADMIN — SODIUM CHLORIDE, PRESERVATIVE FREE 10 ML: 5 INJECTION INTRAVENOUS at 01:06

## 2022-06-18 RX ADMIN — PREDNISONE 5 MG: 5 TABLET ORAL at 10:06

## 2022-06-18 RX ADMIN — METHIMAZOLE 20 MG: 10 TABLET ORAL at 06:06

## 2022-06-18 NOTE — PROGRESS NOTES
[facility] Hospital Medicine Progress Note        Chief Complaint: Inpatient Follow-up for encephalopathy     HPI:  71-year-old male admitted on 6/13/2022 with encephalopathy.  The patient was finalizing his anti-infective treatment for pneumonia.  At that time, he was on day 6 out of 7 of Levaquin.  Previous history included anterior cerebral artery CVA, dementia, atrial fibrillation, hypertension, CAD, cardiomyopathy, rheumatoid arthritis, mixed hyperlipidemia, ischemic cardiomyopathy.  There is also a diagnosis of a seizure disorder.  Labs demonstrated improving leukocytosis of 11.9.  CT scan of the head was negative.  The patient, as part of the infectious work-up was evaluated for possible meningitis via lumbar puncture.  He was placed on anti-infective therapy with vancomycin, Rocephin, ampicillin, acyclovir. LP results reviews in keeping with viral encephalitis.  Continue IV acyclovir and IV fluids.  Consult ID  MRI brain reviewed no acute findings  EEG is normal.  Daughter reports seen dental abscesses with no drainage in the past.  Get CT maxillofacial to reassess if abscess present or not, if present will request ENT consult for I and D. Add on Magic mouthwash.  Add on Seroquel 25 mg b.i.d.  IM Haldol 5 mg Q 4 p.r.n. agitation        Patient currently being managed for acute metabolic encephalopathy likely due to viral encephalitis and possibly previously bacterial meningitis . CT maxillofacial with no abscess informed family members.  ID recommends to continue bacterial and viral treatment for now giving a muddled picture       Today's information  Patient seen and examined at bedside.  no Family at bedside  Patient has no complaints.  He states he feels better today.  He is trying to sleep  Labs reviewed with significantly low potassium levels 2.8.  Replete potassium     Objective/physical exam:  General: In no acute distress, afebrile  Chest: Clear to auscultation  bilaterally  Heart: RRR, +S1, S2, no appreciable murmur  Abdomen: Soft, nontender, BS +  MSK: Warm, no lower extremity edema, no clubbing or cyanosis.  Upper extremities are contracted (which the wife attributes to his RA)  Neurologic: Alert but not oriented, CN exam limited 2/2 his current clinical state.      Assessment/Plan:  Acute encephalopathy  Viral encephalitis   Bacterial meningitis   History of thyrotoxicosis, now with a TSH of 0.083 and elevated free T3 and free T4  Vascular dementia  Atrial fibrillation on Eliquis  CAD  Essential hypertension  Mixed hyperlipidemia  Ischemic cardiomyopathy with a preserved ejection fraction  Recent CVA's  Seizure disorder  Rheumatoid arthritis  Immunosuppressed state related to the above   hypotension  Hypokalemia      Plan   Replete potassium  F/up ID recs for antibiotics   CT maxillofacial with no abscess informed family members.  Add on scheduled Tylenol for pain.  MRI brain reviewed no acute findings  EEG is normal.   Magic mouthwash.   Seroquel 25 mg b.i.d.  IM Haldol 5 mg Q 4 p.r.n. agitation     Patient condition:  guarded    VITAL SIGNS: 24 HRS MIN & MAX LAST   Temp  Min: 98 °F (36.7 °C)  Max: 98.5 °F (36.9 °C) 98.5 °F (36.9 °C)   BP  Min: 122/76  Max: 139/85 139/85   Pulse  Min: 78  Max: 94  86   Resp  Min: 18  Max: 18 18   SpO2  Min: 98 %  Max: 99 % 98 %       Recent Labs   Lab 06/16/22  0329 06/17/22  0849 06/18/22  0703   WBC 10.0 10.0 10.8   RBC 4.35* 4.27* 4.36*   HGB 12.4* 12.2* 12.3*   HCT 38.2* 36.7* 36.9*   MCV 87.8 85.9 84.6   MCH 28.5 28.6 28.2   MCHC 32.5* 33.2 33.3   RDW 14.5 14.6 14.4    353 364   MPV 10.4 10.2 9.6       Recent Labs   Lab 06/14/22  0521 06/15/22  0420 06/15/22  0828 06/17/22  0849 06/18/22  0703   * 135* 136 140 135*   K 3.6 3.9 4.0 3.5 2.8*   CO2 22* 22* 21* 26 21*   BUN 7.8* 6.0* 6.5* 6.2* 5.4*   CREATININE 0.49* 0.48* 0.46* 0.46* 0.48*   CALCIUM 8.9 9.0 9.3 8.8 8.6*   ALBUMIN 2.5* 2.5* 2.6*  --   --    ALKPHOS 85 88  91  --   --    ALT 50 54 60*  --   --    AST 53* 59* 62*  --   --    BILITOT 0.6 0.6 0.8  --   --           Microbiology Results (last 7 days)     Procedure Component Value Units Date/Time    Cerebrospinal Fluid Culture [[id number]] Collected: 06/15/22 1226    Order Status: Completed Specimen: CSF (Spinal Fluid) from Cerebrospinal Fluid Updated: 06/18/22 1005     CULTURE, CSF ([facility]) No Growth At 48 Hours     GRAM STAIN No WBCs, No bacteria seen     Blood culture #1 **CANNOT BE ORDERED STAT** [[id number]]  (Normal) Collected: 06/13/22 1618    Order Status: Completed Specimen: Blood Updated: 06/17/22 1704     CULTURE, BLOOD ([facility]) No Growth At 96 Hours    Blood culture #2 **CANNOT BE ORDERED STAT** [[id number]]  (Normal) Collected: 06/13/22 1618    Order Status: Completed Specimen: Blood Updated: 06/17/22 1704     CULTURE, BLOOD ([facility]) No Growth At 96 Hours    AFB Smear [[id number]] Collected: 06/15/22 1226    Order Status: Completed Specimen: CSF (Spinal Fluid) from Cerebrospinal Fluid Updated: 06/17/22 0659     AFB Smear No AFB seen (Direct smear only)    Mycobacteria and Nocardia Culture [[id number]] Collected: 06/15/22 1226    Order Status: Sent Specimen: CSF (Spinal Fluid) from Cerebrospinal Fluid Updated: 06/16/22 1117    Fungal Culture [[id number]] Collected: 06/15/22 1226    Order Status: Sent Specimen: CSF (Spinal Fluid) from Lumbar Updated: 06/16/22 0949    Cryptococcal antigen, CSF (Tube 3) [[id number]] Collected: 06/15/22 1226    Order Status: Completed Specimen: CSF (Spinal Fluid) from CSF Tap, Tube 3 Updated: 06/15/22 1711     CRYPTOCOCCAL ANTIGEN TITER ([facility]) --     CRYPTOCOCCAL ANTIGEN, CSF ([facility]) Negative    Cryptococcal Antigen, CSF [[id number]]     Order Status: Sent Specimen: CSF (Spinal Fluid)            See below for Radiology    Scheduled Med:   (Magic mouthwash) 1:1:1 diphenhydramine(Benadryl) 12.5mg/5ml liq, aluminum & magnesium hydroxide-simethicone (Maalox), LIDOcaine viscous 2%  10 mL Swish & Spit Q6H     aspirin  325 mg Oral Daily    atorvastatin  10 mg Oral QHS    cefTRIAXone (ROCEPHIN) IVPB  2 g Intravenous Q12H    digoxin  0.25 mg Oral Daily    ezetimibe  10 mg Oral Daily    levETIRAcetam  500 mg Oral BID    methIMAzole  20 mg Oral Daily    montelukast  10 mg Oral Daily    pantoprazole  40 mg Oral Daily    potassium chloride  40 mEq Oral Q2H    predniSONE  5 mg Oral Daily    QUEtiapine  25 mg Oral BID    sodium chloride 0.9%  10 mL Intravenous Q6H    vancomycin (VANCOCIN) IVPB  1,250 mg Intravenous Q8H        Continuous Infusions:       PRN Meds:  acetaminophen, acetaminophen, haloperidol lactate, HYDROcodone-acetaminophen, lorazepam, melatonin, ondansetron, sodium chloride 0.9%, Flushing PICC Protocol **AND** sodium chloride 0.9% **AND** sodium chloride 0.9%, Pharmacy to dose Vancomycin consult **AND** vancomycin - pharmacy to dose, ziprasidone       Patient condition:  Stable/Fair/Guarded/ Serious/ Critical    Anticipated discharge and Disposition:      All diagnosis and differential diagnosis have been reviewed; assessment and plan has been documented; I have personally reviewed the labs and test results that are presently available; I have reviewed the patients medication list; I have reviewed the consulting providers response and recommendations. I have reviewed or attempted to review medical records based upon their availability    All of the patient's questions have been  addressed and answered. Patient's is agreeable to the above stated plan. I will continue to monitor closely and make adjustments to medical management as needed.  _____________________________________________________________________    Nutrition Status:    Radiology:  X-Ray Chest 1 View  Narrative: EXAMINATION:  XR CHEST 1 VIEW    CLINICAL HISTORY:  PICC;    TECHNIQUE:  AP chest    COMPARISON:  Chest x-ray dated 06/13/2022    FINDINGS:  Right-sided PICC line has its tip over the superior vena cava.  The heart is normal in  size.  There is no focal airspace consolidation.  There is no pleural effusion or visible pneumothorax.  Impression: Right-sided PICC line with tip over the superior vena cava.    Electronically signed by: Victoria Corley  Date:    06/16/2022  Time:    16:53  CT Maxillofacial With Contrast  Narrative: EXAMINATION:  CT MAXILLOFACIAL WITH CONTRAST    CLINICAL HISTORY:  Maxillary/facial abscess;    TECHNIQUE:  Volumetric CT acquisition of the facial bones with contrast.  Axial, coronal and sagittal reconstructions.    Automatic exposure control was utilized to limit radiation dose.    DLP: 506 mGy-cm    COMPARISON:  None    FINDINGS:  Evaluation of the oral cavity is limited by artifact from dental hardware.    There are small periapical lucencies around the right mandibular molars.  There is no acute fracture identified.  The paranasal sinuses and mastoid air cells are clear.    There is no drainable fluid collection.  The orbits and soft tissues are otherwise unremarkable.  Impression: No drainable fluid collection identified.    Electronically signed by: Victoria Corley  Date:    06/16/2022  Time:    15:13      Zulay Howard MD   06/18/2022     Critical care diagnosis bacterial meningitis, viral encephalitis  Critical care time greater than 35 minutes

## 2022-06-18 NOTE — PROGRESS NOTES
MARIA DOLORES Hannon called pharmacy to clarify when vancomycin dose should be given.  Per Riddhi, she began administering vancomycin 1250 mg IVPB at 1309 but stopped the infusion due to high trough level.  Pharmacy asked to not give the dose in order to assess regimen.  Retimed regimen for vancomycin 1250 mg to be given q12h, scheduled at 0900 and 2100 with dose to be given today at 2100.  Rescheduled trough to 6/20 @ 0800.    Please call pharmacy at extension [phone] for any questions.    Jimena Fonseca, DraganD

## 2022-06-18 NOTE — PROGRESS NOTES
Infectious Diseases Progress Note  71-year-old male with past medical history of paroxysmal AFib, HTN, HLD, hypothyroidism, seizure disorder, rheumatoid arthritis, CAD, with the recent hospitalizations for CVA in April, is admitted to [facility] on 06/13/2022, presenting to the ED with report of continued fevers of up to 102 at home.  Apparently had visited the ER a few times since his discharge from the rehab facility .  Including mid May with report of twitching on worsening altered mental status, and seen by Neurology, placed on care and then the week prior to this admission had developed fevers with worsening confusion and chest x-ray indicated for possible left lower lobe infiltrate, treated with oral Levaquin and discharge.  His wife tells been he had been on antibiotics continuously up until this admission including initially ampicillin and then most recently Levaquin.  He does take Cimzia and prednisone rheumatoid arthritis.  On presentation he was evaluated extensively and noted to have no fevers but by the next day had low-grade temperature of up to 99.5.  He did have leukocytosis of 12.8, anemic with low albumin.  He had a lumbar puncture with CSF analysis showing 20 WBC predodaughtminantly lymphocytes at 76%, elevated protein 91.4 and low glucose at 35. CSF cultures have remained negative with Gram stain showing no bacteria and no WBC.  CSF cryptococcal antigen and PCR panel negative.  Blood cultures have remained negative and review of records show 6/6 blood cultures which have been negative as well.  Chest x-ray on admission showed no acute cardiopulmonary process.  CT scan of the head with no acute intracranial findings, but had old infarcts, chronic micro angiopathic ischemia and atrophy.  MRI of the brain showed no acute infarcts, abnormal diffuse signal in the extra-axial space history with concern for meningitis/infection, needed to be correlated with CSF studies and  clinical findings.  CT maxillofacial showed no drainable fluid collection.  He is currently on antibiotic coverage vancomycin, ceftriaxone, ampicillin and acyclovir.    Subjective:  No new complaints, no fevers, definitely more alert and oriented to person and place.  Lying in bed in no acute distress      Past Medical History:   Diagnosis Date    Acute left arterial ischemic stroke, KINGA (anterior cerebral artery) 5/3/2022    Acute osteomyelitis 5/11/2022    Atherosclerosis of coronary artery 5/11/2022    Atrial fibrillation 5/3/2022    Benign essential hypertension 5/3/2022    Cardiomyopathy     Coronary artery disease     Diverticulosis     Esophageal reflux 5/11/2022    Fatigue     Focal seizure 5/17/2022    Generalized anxiety disorder     GERD (gastroesophageal reflux disease)     Graves disease     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/2022    HTN (hypertension)     Hyperthyroidism     Idiopathic peripheral neuropathy 5/11/2022    Irritable bowel syndrome without diarrhea     Ischemic cardiomyopathy 5/3/2022    Mixed hyperlipidemia 5/11/2022    Other sequelae following unspecified cerebrovascular disease 5/9/2022    Paroxysmal atrial fibrillation     Rheumatoid arthritis 5/11/2022    Rheumatoid arthritis, unspecified     Shingles     Staph aureus infection     Stroke     Thyroiditis, unspecified     Thyrotoxicosis 5/3/2022     Past Surgical History:   Procedure Laterality Date    CATARACT EXTRACTION      CYST REMOVAL Left     TONSILLECTOMY      TOTAL KNEE ARTHROPLASTY      VASECTOMY       Social History     Socioeconomic History    Marital status:    Tobacco Use    Smoking status: Never Smoker    Smokeless tobacco: Never Used   Substance and Sexual Activity    Alcohol use: Never    Drug use: Never    Sexual activity: Yes       Review of Systems   Constitutional: Positive for malaise/fatigue.   HENT: Negative.    Respiratory: Negative.   "  Gastrointestinal: Negative.    Genitourinary: Negative.    Musculoskeletal: Negative.    Neurological: Positive for weakness.   Endo/Heme/Allergies: Negative.    Psychiatric/Behavioral: Negative.      All other Systems review done and negative.    Review of patient's allergies indicates:   Allergen Reactions    Ace inhibitors Swelling     Lip swelling    Morphine     Morphine sulfate     Nafcillin Itching    Pradaxa [dabigatran etexilate] Other (See Comments)     Abdominal cramping    Sulfamethoxazole Rash         Scheduled Meds:   (Magic mouthwash) 1:1:1 diphenhydramine(Benadryl) 12.5mg/5ml liq, aluminum & magnesium hydroxide-simethicone (Maalox), LIDOcaine viscous 2%  10 mL Swish & Spit Q6H    ampicillin IVPB  2 g Intravenous Q4H    aspirin  325 mg Oral Daily    atorvastatin  10 mg Oral QHS    cefTRIAXone (ROCEPHIN) IVPB  2 g Intravenous Q12H    digoxin  0.25 mg Oral Daily    ezetimibe  10 mg Oral Daily    levETIRAcetam  500 mg Oral BID    methIMAzole  20 mg Oral Daily    montelukast  10 mg Oral Daily    pantoprazole  40 mg Oral Daily    predniSONE  5 mg Oral Daily    QUEtiapine  25 mg Oral BID    sodium chloride 0.9%  10 mL Intravenous Q6H    vancomycin (VANCOCIN) IVPB  1,250 mg Intravenous Q8H     Continuous Infusions:  PRN Meds:acetaminophen, acetaminophen, haloperidol lactate, HYDROcodone-acetaminophen, lorazepam, melatonin, ondansetron, sodium chloride 0.9%, Flushing PICC Protocol **AND** sodium chloride 0.9% **AND** sodium chloride 0.9%, Pharmacy to dose Vancomycin consult **AND** vancomycin - pharmacy to dose, ziprasidone    Objective:  /76   Pulse 94   Temp 98.1 °F (36.7 °C) (Oral)   Resp 18   Ht 5' 8" (1.727 m)   Wt 64.2 kg (141 lb 8 oz)   SpO2 99%   BMI 21.51 kg/m²     Physical Exam:   Physical Exam  Vitals reviewed.   Constitutional:       General: He is not in acute distress.     Appearance: He is not toxic-appearing.   HENT:      Head: Normocephalic and atraumatic. "   Cardiovascular:      Rate and Rhythm: Normal rate and regular rhythm.      Heart sounds: Normal heart sounds.   Pulmonary:      Effort: No respiratory distress.      Breath sounds: Normal breath sounds.   Abdominal:      General: Bowel sounds are normal. There is no distension.      Palpations: Abdomen is soft.      Tenderness: There is no abdominal tenderness.   Musculoskeletal:      Cervical back: Neck supple.      Comments: Arthritic deformities of b/l hands   Skin:     Findings: No erythema or rash.   Neurological:      Mental Status: He is alert.      Comments: Follows commands   Psychiatric:      Comments: Calm and cooperative         Imaging  Imaging Results          CT Head Without Contrast (Final result)  Result time 06/13/22 17:27:43    Final result by Ethan Molina MD (06/13/22 17:27:43)                 Impression:      1.  No acute intracranial findings identified.    2.  Old infarcts, chronic microangiopathic ischemia and atrophy.      Electronically signed by: Ethan Molina  Date:    06/13/2022  Time:    17:27             Narrative:    EXAMINATION:  CT HEAD WITHOUT CONTRAST    CLINICAL HISTORY:  Mental status change, unknown cause;    TECHNIQUE:  Sequential axial images were performed of the brain without contrast.    Dose product length of 1167 mGycm. Automated exposure control was utilized to minimize radiation dose.    COMPARISON:  CT brain without contrast May 15, 2022 in MRI brain April 27, 2022.    FINDINGS:  There is no intracranial mass effect, midline shift, hydrocephalus or hemorrhage. There is no sulcal effacement or low attenuation changes to suggest recent large vessel territory infarction.  There are old infarcts which involve the right frontal lobe and the left cingulate cortical/subcortical location.  Chronic microvascular ischemic changes are mild.  The ventricular system and sulcal markings prominence is consistent with atrophy. There is no acute extra axial fluid collection.  Visualized paranasal sinuses are clear without mucosal thickening, polypoidal abnormality or air-fluid levels. Mastoid air cells aeration is optimal.                               X-Ray Chest AP Portable (Final result)  Result time 06/13/22 17:09:45    Final result by Ethan Molina MD (06/13/22 17:09:45)                 Impression:      NO ACUTE CARDIOPULMONARY PROCESS IDENTIFIED.      Electronically signed by: Ethan Molina  Date:    06/13/2022  Time:    17:09             Narrative:    EXAMINATION:  XR CHEST AP PORTABLE    CLINICAL HISTORY:  Fever, unspecified    TECHNIQUE:  One view    COMPARISON:  June 6, 2022.    FINDINGS:  Cardiopericardial silhouette is within normal limits.  No acute dense focal or segmental consolidation, congestive process, pleural effusions or pneumothorax.                                 Lab Review   Recent Results (from the past 24 hour(s))   Vancomycin, Random    Collection Time: 06/17/22  8:49 AM   Result Value Ref Range    Vanc Lvl Random 22.3 (H) 15.0 - 20.0 ug/ml   Basic Metabolic Panel    Collection Time: 06/17/22  8:49 AM   Result Value Ref Range    Sodium Level 140 136 - 145 mmol/L    Potassium Level 3.5 3.5 - 5.1 mmol/L    Chloride 106 98 - 107 mmol/L    Carbon Dioxide 26 23 - 31 mmol/L    Glucose Level 81 (L) 82 - 115 mg/dL    Blood Urea Nitrogen 6.2 (L) 8.4 - 25.7 mg/dL    Creatinine 0.46 (L) 0.73 - 1.18 mg/dL    BUN/Creatinine Ratio 13     Calcium Level Total 8.8 8.8 - 10.0 mg/dL    Estimated GFR-Non  >60 mls/min/1.73/m2    Anion Gap 8.0 mEq/L   CBC with Differential    Collection Time: 06/17/22  8:49 AM   Result Value Ref Range    WBC 10.0 4.5 - 11.5 x10(3)/mcL    RBC 4.27 (L) 4.70 - 6.10 x10(6)/mcL    Hgb 12.2 (L) 14.0 - 18.0 gm/dL    Hct 36.7 (L) 42.0 - 52.0 %    MCV 85.9 80.0 - 94.0 fL    MCH 28.6 27.0 - 31.0 pg    MCHC 33.2 33.0 - 36.0 mg/dL    RDW 14.6 11.5 - 17.0 %    Platelet 353 130 - 400 x10(3)/mcL    MPV 10.2 9.4 - 12.4 fL    Neut % 61.5 %    Lymph  % 28.9 %    Mono % 7.9 %    Eos % 0.9 %    Basophil % 0.3 %    Lymph # 2.88 0.6 - 4.6 x10(3)/mcL    Neut # 6.1 2.1 - 9.2 x10(3)/mcL    Mono # 0.79 0.1 - 1.3 x10(3)/mcL    Eos # 0.09 0 - 0.9 x10(3)/mcL    Baso # 0.03 0 - 0.2 x10(3)/mcL    IG# 0.05 (H) 0 - 0.0155 x10(3)/mcL    IG% 0.5 (H) 0 - 0.43 %    NRBC% 0.0 %             Assessment/Plan:  1. Acute meningitis with encephalopathy  2. SIRS with fevers   3. Immunocompromised on immunosuppressive drugs  4. Rheumatoid arthritis  5. History of CVA  6. Seizure disorder  7. Anemia  8. Protein calorie malnutrition       -I will discontinue ampicillin and continue vancomycin and ceftriaxone for now  -No fevers and no leukocytosis  -CSF analysis consistent with meningitis with specific pathogen not known, predominantly CSF lymphocytosis with hypoglycorrhachia which could be indicative of even a fungal meningitis, however a pre-treated bacterial meningitis more likely since improving on antibacterial coverage   -CSF PCR panel with no organisms detected including negative for [place] HSV 1, 2 and enterovirus.  CSF cryptococcal antigen negative, negative Gram stains and cultures, follow  cultures including fungal and AFB  -TB meningitis less likely with no history of TB exposure and unusual presentation.  Moreover, he is on Cimzia and must have been screened for latent TB prior to initiation.  We can confirm with records from Rheumatology  -Immunocompromised and we could evaluate further with T spot and CSF MTB PCR if cultures remain negative but again TB less likely since he is clinically improving   -Discussed with patient and nursing staff

## 2022-06-18 NOTE — PROGRESS NOTES
Pharmacokinetic Assessment Follow Up: IV Vancomycin    Vancomycin serum concentration assessment(s):    The trough level was drawn correctly and can be used to guide therapy at this time. The measurement is above the desired definitive target range of 15 to 20 mcg/mL.    Vancomycin Regimen Plan:    Change regimen to Vancomycin 1250 mg IV every 12 hours with next serum trough concentration measured at 1100 prior to 4th dose on 6/20    Drug levels (last 3 results):  Recent Labs   Lab Result Units 06/15/22  1325 06/16/22  1901 06/17/22  0849 06/18/22  1217   Vanc Lvl Random ug/ml  --   --  22.3*  --    Vancomycin Trough ug/ml 5.9* 26.8*  --  26.0*       Pharmacy will continue to follow and monitor vancomycin.    Please contact pharmacy at extension [phone] for questions regarding this assessment.    Thank you for the consult,   Adrian LeJeune, PharmD       Patient brief summary:  Quan Contreras is a 71 y.o. male initiated on antimicrobial therapy with IV Vancomycin for treatment of meningitis    The patient's current regimen is 1250 mg q12h    Drug Allergies:   Review of patient's allergies indicates:   Allergen Reactions    Ace inhibitors Swelling     Lip swelling    Morphine     Morphine sulfate     Nafcillin Itching    Pradaxa [dabigatran etexilate] Other (See Comments)     Abdominal cramping    Sulfamethoxazole Rash       Actual Body Weight:   64.2 kg    Renal Function:   Estimated Creatinine Clearance: 130 mL/min (A) (based on SCr of 0.48 mg/dL (L)).,     Dialysis Method (if applicable):  N/A    CBC (last 72 hours):  Recent Labs   Lab Result Units 06/16/22  0329 06/17/22  0849 06/18/22  0703   WBC x10(3)/mcL 10.0 10.0 10.8   Hgb gm/dL 12.4* 12.2* 12.3*   Hct % 38.2* 36.7* 36.9*   Platelet x10(3)/mcL 398 353 364   Mono % % 9.3 7.9 6.7   Eos % % 1.2 0.9 0.6   Basophil % % 0.2 0.3 0.3       Metabolic Panel (last 72 hours):  Recent Labs   Lab Result Units 06/17/22  0849 06/18/22  0703   Sodium Level mmol/L 140 135*    Potassium Level mmol/L 3.5 2.8*   Chloride mmol/L 106 104   Carbon Dioxide mmol/L 26 21*   Glucose Level mg/dL 81* 109   Blood Urea Nitrogen mg/dL 6.2* 5.4*   Creatinine mg/dL 0.46* 0.48*       Vancomycin Administrations:  vancomycin given in the last 96 hours                     vancomycin 1.25 g in dextrose 5% 250 mL IVPB (ready to mix) (mg) 1,250 mg New Bag 06/18/22 1309     1,250 mg New Bag  0609     1,250 mg New Bag 06/17/22 2123     1,250 mg New Bag  0955     1,250 mg New Bag  0107     1,250 mg New Bag 06/16/22 1809     1,250 mg New Bag  0845     1,250 mg New Bag  0056    vancomycin (VANCOCIN) 2,000 mg in dextrose 5 % 500 mL IVPB (mg) 2,000 mg New Bag 06/15/22 1656    vancomycin (VANCOCIN) 1,000 mg in dextrose 5 % 250 mL IVPB (mg) 1,000 mg New Bag 06/14/22 2341    vancomycin (VANCOCIN) 1,000 mg in dextrose 5 % 250 mL IVPB (mg) 1,000 mg New Bag 06/14/22 1345                    Microbiologic Results:  Microbiology Results (last 7 days)       Procedure Component Value Units Date/Time    Cerebrospinal Fluid Culture [[id number]] Collected: 06/15/22 1226    Order Status: Completed Specimen: CSF (Spinal Fluid) from Cerebrospinal Fluid Updated: 06/18/22 1005     CULTURE, CSF ([facility]) No Growth At 48 Hours     GRAM STAIN No WBCs, No bacteria seen     Blood culture #1 **CANNOT BE ORDERED STAT** [[id number]]  (Normal) Collected: 06/13/22 1618    Order Status: Completed Specimen: Blood Updated: 06/17/22 1704     CULTURE, BLOOD ([facility]) No Growth At 96 Hours    Blood culture #2 **CANNOT BE ORDERED STAT** [[id number]]  (Normal) Collected: 06/13/22 1618    Order Status: Completed Specimen: Blood Updated: 06/17/22 1704     CULTURE, BLOOD ([facility]) No Growth At 96 Hours    AFB Smear [[id number]] Collected: 06/15/22 1226    Order Status: Completed Specimen: CSF (Spinal Fluid) from Cerebrospinal Fluid Updated: 06/17/22 0659     AFB Smear No AFB seen (Direct smear only)    Mycobacteria and Nocardia Culture [[id number]] Collected: 06/15/22  1226    Order Status: Sent Specimen: CSF (Spinal Fluid) from Cerebrospinal Fluid Updated: 06/16/22 1117    Fungal Culture [[id number]] Collected: 06/15/22 1226    Order Status: Sent Specimen: CSF (Spinal Fluid) from Lumbar Updated: 06/16/22 0949    Cryptococcal antigen, CSF (Tube 3) [[id number]] Collected: 06/15/22 1226    Order Status: Completed Specimen: CSF (Spinal Fluid) from CSF Tap, Tube 3 Updated: 06/15/22 1711     CRYPTOCOCCAL ANTIGEN TITER ([facility]) --     CRYPTOCOCCAL ANTIGEN, CSF ([facility]) Negative    Cryptococcal Antigen, CSF [[id number]]     Order Status: Sent Specimen: CSF (Spinal Fluid)

## 2022-06-19 LAB
ANION GAP SERPL CALC-SCNC: 11 MEQ/L
BUN SERPL-MCNC: 6.4 MG/DL (ref 8.4–25.7)
CALCIUM SERPL-MCNC: 9 MG/DL (ref 8.8–10)
CHLORIDE SERPL-SCNC: 105 MMOL/L (ref 98–107)
CO2 SERPL-SCNC: 19 MMOL/L (ref 23–31)
CREAT SERPL-MCNC: 0.51 MG/DL (ref 0.73–1.18)
CREAT/UREA NIT SERPL: 13
GLUCOSE SERPL-MCNC: 88 MG/DL (ref 82–115)
MAGNESIUM SERPL-MCNC: 1.7 MG/DL (ref 1.6–2.6)
POTASSIUM SERPL-SCNC: 4.2 MMOL/L (ref 3.5–5.1)
SODIUM SERPL-SCNC: 135 MMOL/L (ref 136–145)

## 2022-06-19 PROCEDURE — 25000003 PHARM REV CODE 250: Performed by: INTERNAL MEDICINE

## 2022-06-19 PROCEDURE — 83735 ASSAY OF MAGNESIUM: CPT | Performed by: INTERNAL MEDICINE

## 2022-06-19 PROCEDURE — 80048 BASIC METABOLIC PNL TOTAL CA: CPT | Performed by: INTERNAL MEDICINE

## 2022-06-19 PROCEDURE — A4216 STERILE WATER/SALINE, 10 ML: HCPCS | Performed by: INTERNAL MEDICINE

## 2022-06-19 PROCEDURE — 63600175 PHARM REV CODE 636 W HCPCS: Performed by: STUDENT IN AN ORGANIZED HEALTH CARE EDUCATION/TRAINING PROGRAM

## 2022-06-19 PROCEDURE — 11000001 HC ACUTE MED/SURG PRIVATE ROOM

## 2022-06-19 PROCEDURE — 63600175 PHARM REV CODE 636 W HCPCS: Performed by: INTERNAL MEDICINE

## 2022-06-19 PROCEDURE — 36415 COLL VENOUS BLD VENIPUNCTURE: CPT | Performed by: INTERNAL MEDICINE

## 2022-06-19 RX ORDER — QUETIAPINE FUMARATE 25 MG/1
25 TABLET, FILM COATED ORAL NIGHTLY
Status: DISCONTINUED | OUTPATIENT
Start: 2022-06-19 | End: 2022-06-20

## 2022-06-19 RX ORDER — MAGNESIUM SULFATE HEPTAHYDRATE 40 MG/ML
2 INJECTION, SOLUTION INTRAVENOUS ONCE
Status: COMPLETED | OUTPATIENT
Start: 2022-06-19 | End: 2022-06-19

## 2022-06-19 RX ADMIN — DIGOXIN 0.25 MG: 250 TABLET ORAL at 05:06

## 2022-06-19 RX ADMIN — SODIUM CHLORIDE, PRESERVATIVE FREE 10 ML: 5 INJECTION INTRAVENOUS at 12:06

## 2022-06-19 RX ADMIN — PREDNISONE 5 MG: 5 TABLET ORAL at 08:06

## 2022-06-19 RX ADMIN — HYDROCODONE BITARTRATE AND ACETAMINOPHEN 1 TABLET: 5; 325 TABLET ORAL at 09:06

## 2022-06-19 RX ADMIN — ATORVASTATIN CALCIUM 10 MG: 10 TABLET, FILM COATED ORAL at 09:06

## 2022-06-19 RX ADMIN — VANCOMYCIN HYDROCHLORIDE 1250 MG: 1.25 INJECTION, POWDER, LYOPHILIZED, FOR SOLUTION INTRAVENOUS at 08:06

## 2022-06-19 RX ADMIN — SODIUM CHLORIDE, PRESERVATIVE FREE 10 ML: 5 INJECTION INTRAVENOUS at 11:06

## 2022-06-19 RX ADMIN — DIPHENHYDRAMINE HYDROCHLORIDE 10 ML: 25 SOLUTION ORAL at 12:06

## 2022-06-19 RX ADMIN — CEFTRIAXONE 2 G: 2 INJECTION, SOLUTION INTRAVENOUS at 05:06

## 2022-06-19 RX ADMIN — LEVETIRACETAM 500 MG: 500 TABLET, FILM COATED ORAL at 08:06

## 2022-06-19 RX ADMIN — ASPIRIN 325 MG ORAL TABLET 325 MG: 325 PILL ORAL at 08:06

## 2022-06-19 RX ADMIN — LEVETIRACETAM 500 MG: 500 TABLET, FILM COATED ORAL at 09:06

## 2022-06-19 RX ADMIN — METHIMAZOLE 20 MG: 10 TABLET ORAL at 05:06

## 2022-06-19 RX ADMIN — VANCOMYCIN HYDROCHLORIDE 1250 MG: 1.25 INJECTION, POWDER, LYOPHILIZED, FOR SOLUTION INTRAVENOUS at 09:06

## 2022-06-19 RX ADMIN — QUETIAPINE FUMARATE 25 MG: 25 TABLET ORAL at 09:06

## 2022-06-19 RX ADMIN — MAGNESIUM SULFATE HEPTAHYDRATE 2 G: 40 INJECTION, SOLUTION INTRAVENOUS at 11:06

## 2022-06-19 RX ADMIN — QUETIAPINE FUMARATE 25 MG: 25 TABLET ORAL at 08:06

## 2022-06-19 RX ADMIN — PANTOPRAZOLE SODIUM 40 MG: 40 TABLET, DELAYED RELEASE ORAL at 08:06

## 2022-06-19 RX ADMIN — DIPHENHYDRAMINE HYDROCHLORIDE 10 ML: 25 SOLUTION ORAL at 06:06

## 2022-06-19 RX ADMIN — MONTELUKAST SODIUM 10 MG: 10 TABLET, COATED ORAL at 05:06

## 2022-06-19 RX ADMIN — APIXABAN 5 MG: 5 TABLET, FILM COATED ORAL at 09:06

## 2022-06-19 RX ADMIN — CEFTRIAXONE 2 G: 2 INJECTION, SOLUTION INTRAVENOUS at 06:06

## 2022-06-19 RX ADMIN — APIXABAN 5 MG: 5 TABLET, FILM COATED ORAL at 11:06

## 2022-06-19 RX ADMIN — SODIUM CHLORIDE, PRESERVATIVE FREE 10 ML: 5 INJECTION INTRAVENOUS at 05:06

## 2022-06-19 RX ADMIN — DIPHENHYDRAMINE HYDROCHLORIDE 10 ML: 25 SOLUTION ORAL at 05:06

## 2022-06-19 RX ADMIN — EZETIMIBE 10 MG: 10 TABLET ORAL at 06:06

## 2022-06-19 RX ADMIN — SODIUM CHLORIDE, PRESERVATIVE FREE 10 ML: 5 INJECTION INTRAVENOUS at 06:06

## 2022-06-19 NOTE — PROGRESS NOTES
[facility] Hospital Medicine Progress Note        Chief Complaint: Inpatient Follow-up for encephalopathy     HPI:  71-year-old male admitted on 6/13/2022 with encephalopathy.  The patient was finalizing his anti-infective treatment for pneumonia.  At that time, he was on day 6 out of 7 of Levaquin.  Previous history included anterior cerebral artery CVA, dementia, atrial fibrillation, hypertension, CAD, cardiomyopathy, rheumatoid arthritis, mixed hyperlipidemia, ischemic cardiomyopathy.  There is also a diagnosis of a seizure disorder.  Labs demonstrated improving leukocytosis of 11.9.  CT scan of the head was negative.  The patient, as part of the infectious work-up was evaluated for possible meningitis via lumbar puncture.  He was placed on anti-infective therapy with vancomycin, Rocephin, ampicillin, acyclovir. LP results reviews in keeping with viral encephalitis.  Continue IV acyclovir and IV fluids.  Consult ID  MRI brain reviewed no acute findings  EEG is normal.  Daughter reports seen dental abscesses with no drainage in the past.  Get CT maxillofacial to reassess if abscess present or not, if present will request ENT consult for I and D. Add on Magic mouthwash.  Add on Seroquel 25 mg b.i.d.  IM Haldol 5 mg Q 4 p.r.n. agitation        Patient currently being managed for acute metabolic encephalopathy likely due to bacterial meningitis . CT maxillofacial with no abscess informed family members.  ID recommends to continue bacterial treatment for now         Today's information  Patient seen and examined at bedside. Family at bedside  Patient is very drowsy this morning would reduce Seroquel dose to at night only  Replete electrolytes  Resume home dose Eliquis  Continue current antibiotics vancomycin and ceftriaxone.  Appreciate ID input      Objective/physical exam:  General: In no acute distress, afebrile  Chest: Clear to auscultation bilaterally  Heart: RRR, +S1, S2, no  appreciable murmur  Abdomen: Soft, nontender, BS +  MSK: Warm, no lower extremity edema, no clubbing or cyanosis.  Upper extremities are contracted (which the wife attributes to his RA)  Neurologic: Alert but not oriented, CN exam limited 2/2 his current clinical state.      Assessment/Plan:  Acute encephalopathy  Extreme sedation    Bacterial meningitis   History of thyrotoxicosis, now with a TSH of 0.083 and elevated free T3 and free T4  Vascular dementia  Atrial fibrillation on Eliquis  CAD  Essential hypertension  Mixed hyperlipidemia  Ischemic cardiomyopathy with a preserved ejection fraction  Recent CVA's  Seizure disorder  Rheumatoid arthritis  Immunosuppressed state related to the above   hypotension  Hypokalemia      Plan  reduce Seroquel dose to at night only  Replete electrolytes  Resume home dose Eliquis  Continue current antibiotics vancomycin and ceftriaxone.  F/up ID recs for antibiotics   CT maxillofacial with no abscess informed family members.  Add on scheduled Tylenol for pain.  MRI brain reviewed no acute findings  EEG is normal.   Magic mouthwash.  IM Haldol 5 mg Q 4 p.r.n. agitation     Patient condition:  guarded    Critical care diagnosis bacterial meningitis,  Critical care time greater than 35 minutes      VITAL SIGNS: 24 HRS MIN & MAX LAST   Temp  Min: 98.7 °F (37.1 °C)  Max: 100 °F (37.8 °C) 99.1 °F (37.3 °C)   BP  Min: 112/70  Max: 151/81 131/86   Pulse  Min: 84  Max: 110  99   Resp  Min: 18  Max: 22 18   SpO2  Min: 97 %  Max: 98 % 97 %       Recent Labs   Lab 06/16/22  0329 06/17/22  0849 06/18/22  0703   WBC 10.0 10.0 10.8   RBC 4.35* 4.27* 4.36*   HGB 12.4* 12.2* 12.3*   HCT 38.2* 36.7* 36.9*   MCV 87.8 85.9 84.6   MCH 28.5 28.6 28.2   MCHC 32.5* 33.2 33.3   RDW 14.5 14.6 14.4    353 364   MPV 10.4 10.2 9.6       Recent Labs   Lab 06/14/22  0521 06/15/22  0420 06/15/22  0828 06/17/22  0849 06/18/22  0703 06/19/22  0454   * 135* 136 140 135* 135*   K 3.6 3.9 4.0 3.5 2.8*  4.2   CO2 22* 22* 21* 26 21* 19*   BUN 7.8* 6.0* 6.5* 6.2* 5.4* 6.4*   CREATININE 0.49* 0.48* 0.46* 0.46* 0.48* 0.51*   CALCIUM 8.9 9.0 9.3 8.8 8.6* 9.0   MG  --   --   --   --   --  1.70   ALBUMIN 2.5* 2.5* 2.6*  --   --   --    ALKPHOS 85 88 91  --   --   --    ALT 50 54 60*  --   --   --    AST 53* 59* 62*  --   --   --    BILITOT 0.6 0.6 0.8  --   --   --           Microbiology Results (last 7 days)     Procedure Component Value Units Date/Time    Cerebrospinal Fluid Culture [[id number]] Collected: 06/15/22 1226    Order Status: Completed Specimen: CSF (Spinal Fluid) from Cerebrospinal Fluid Updated: 06/19/22 1003     CULTURE, CSF ([facility]) No Growth At 72 Hours     GRAM STAIN No WBCs, No bacteria seen     Blood culture #1 **CANNOT BE ORDERED STAT** [[id number]]  (Normal) Collected: 06/13/22 1618    Order Status: Completed Specimen: Blood Updated: 06/18/22 1703     CULTURE, BLOOD ([facility]) No Growth at 5 days    Blood culture #2 **CANNOT BE ORDERED STAT** [[id number]]  (Normal) Collected: 06/13/22 1618    Order Status: Completed Specimen: Blood Updated: 06/18/22 1703     CULTURE, BLOOD ([facility]) No Growth at 5 days    AFB Smear [[id number]] Collected: 06/15/22 1226    Order Status: Completed Specimen: CSF (Spinal Fluid) from Cerebrospinal Fluid Updated: 06/17/22 0659     AFB Smear No AFB seen (Direct smear only)    Mycobacteria and Nocardia Culture [[id number]] Collected: 06/15/22 1226    Order Status: Sent Specimen: CSF (Spinal Fluid) from Cerebrospinal Fluid Updated: 06/16/22 1117    Fungal Culture [[id number]] Collected: 06/15/22 1226    Order Status: Sent Specimen: CSF (Spinal Fluid) from Lumbar Updated: 06/16/22 0949    Cryptococcal antigen, CSF (Tube 3) [[id number]] Collected: 06/15/22 1226    Order Status: Completed Specimen: CSF (Spinal Fluid) from CSF Tap, Tube 3 Updated: 06/15/22 1711     CRYPTOCOCCAL ANTIGEN TITER ([facility]) --     CRYPTOCOCCAL ANTIGEN, CSF ([facility]) Negative    Cryptococcal Antigen, CSF [[id number]]     Order  Status: Sent Specimen: CSF (Spinal Fluid)            See below for Radiology    Scheduled Med:   (Magic mouthwash) 1:1:1 diphenhydramine(Benadryl) 12.5mg/5ml liq, aluminum & magnesium hydroxide-simethicone (Maalox), LIDOcaine viscous 2%  10 mL Swish & Spit Q6H    apixaban  5 mg Oral BID    aspirin  325 mg Oral Daily    atorvastatin  10 mg Oral QHS    cefTRIAXone (ROCEPHIN) IVPB  2 g Intravenous Q12H    digoxin  0.25 mg Oral Daily    ezetimibe  10 mg Oral Daily    levETIRAcetam  500 mg Oral BID    magnesium sulfate IVPB  2 g Intravenous Once    methIMAzole  20 mg Oral Daily    montelukast  10 mg Oral Daily    pantoprazole  40 mg Oral Daily    predniSONE  5 mg Oral Daily    QUEtiapine  25 mg Oral BID    sodium chloride 0.9%  10 mL Intravenous Q6H    vancomycin (VANCOCIN) IVPB  1,250 mg Intravenous Q12H        Continuous Infusions:       PRN Meds:  acetaminophen, acetaminophen, haloperidol lactate, HYDROcodone-acetaminophen, lorazepam, melatonin, ondansetron, sodium chloride 0.9%, Flushing PICC Protocol **AND** sodium chloride 0.9% **AND** sodium chloride 0.9%, Pharmacy to dose Vancomycin consult **AND** vancomycin - pharmacy to dose, ziprasidone     Patient condition:  Stable/Fair/Guarded/ Serious/ Critical    Anticipated discharge and Disposition:      All diagnosis and differential diagnosis have been reviewed; assessment and plan has been documented; I have personally reviewed the labs and test results that are presently available; I have reviewed the patients medication list; I have reviewed the consulting providers response and recommendations. I have reviewed or attempted to review medical records based upon their availability    All of the patient's questions have been  addressed and answered. Patient's is agreeable to the above stated plan. I will continue to monitor closely and make adjustments to medical management as  needed.  _____________________________________________________________________    Nutrition Status:    Radiology:  X-Ray Chest 1 View  Narrative: EXAMINATION:  XR CHEST 1 VIEW    CPT 21195    CLINICAL HISTORY:  PICC Placement;    COMPARISON:  June 16, 2022    FINDINGS:  Cardiomediastinal silhouette improved parenchymal changes to be essentially unchanged as compared with the previous exam.    Right-sided PICC line is identified tip projecting at the junction of the superior vena cava and right atrium  Impression: No significant change since the previous exam.    PICC line as above    Electronically signed by: De Conte  Date:    06/19/2022  Time:    05:31      Zulay Howard MD   06/19/2022

## 2022-06-19 NOTE — PROCEDURES
"Quan Contreras is a 71 y.o. male patient.    Temp: 100 °F (37.8 °C) (06/18/22 2056)  Pulse: 110 (06/18/22 2056)  Resp: (!) 22 (06/18/22 2056)  BP: (!) 151/81 (06/18/22 2056)  SpO2: 98 % (06/18/22 2056)  Weight: 64.2 kg (141 lb 8 oz) (06/14/22 2100)  Height: 5' 8" (172.7 cm) (06/14/22 0340)    PICC  Date/Time: 6/18/2022 9:15 PM  Performed by: Humera Mendoza RN  Consent Done: Yes  Time out: Immediately prior to procedure a time out was called to verify the correct patient, procedure, equipment, support staff and site/side marked as required  Indications: med administration and vascular access  Anesthesia: local infiltration  Local anesthetic: lidocaine 1% without epinephrine  Anesthetic Total (mL): 5  Preparation: skin prepped with ChloraPrep  Skin prep agent dried: skin prep agent completely dried prior to procedure  Sterile barriers: all five maximum sterile barriers used - cap, mask, sterile gown, sterile gloves, and large sterile sheet  Hand hygiene: hand hygiene performed prior to central venous catheter insertion  Location details: right basilic  Catheter type: double lumen  Catheter size: 5 Fr  Catheter Length: 42cm    Ultrasound guidance: yes  Vessel Caliber: large and patent, compressibility normal  Needle advanced into vessel with real time Ultrasound guidance.  Guidewire confirmed in vessel.  Sterile sheath used.  Number of attempts: 1  Post-procedure: blood return through all ports, chlorhexidine patch and sterile dressing applied    Assessment: placement verified by x-ray          Zainab Mendoza  6/18/2022  "

## 2022-06-20 LAB
ANION GAP SERPL CALC-SCNC: 8 MEQ/L
BASOPHILS # BLD AUTO: 0.03 X10(3)/MCL (ref 0–0.2)
BASOPHILS NFR BLD AUTO: 0.3 %
BUN SERPL-MCNC: 6 MG/DL (ref 8.4–25.7)
CALCIUM SERPL-MCNC: 9.3 MG/DL (ref 8.8–10)
CHLORIDE SERPL-SCNC: 104 MMOL/L (ref 98–107)
CO2 SERPL-SCNC: 19 MMOL/L (ref 23–31)
CREAT SERPL-MCNC: 0.49 MG/DL (ref 0.73–1.18)
CREAT/UREA NIT SERPL: 12
EOSINOPHIL # BLD AUTO: 0.13 X10(3)/MCL (ref 0–0.9)
EOSINOPHIL NFR BLD AUTO: 1.1 %
ERYTHROCYTE [DISTWIDTH] IN BLOOD BY AUTOMATED COUNT: 15.4 % (ref 11.5–17)
GLUCOSE SERPL-MCNC: 90 MG/DL (ref 82–115)
HCT VFR BLD AUTO: 43.2 % (ref 42–52)
HGB BLD-MCNC: 14.5 GM/DL (ref 14–18)
IMM GRANULOCYTES # BLD AUTO: 0.05 X10(3)/MCL (ref 0–0.02)
IMM GRANULOCYTES NFR BLD AUTO: 0.4 % (ref 0–0.43)
LYMPHOCYTES # BLD AUTO: 3.95 X10(3)/MCL (ref 0.6–4.6)
LYMPHOCYTES NFR BLD AUTO: 34.4 %
MAGNESIUM SERPL-MCNC: 2.2 MG/DL (ref 1.6–2.6)
MCH RBC QN AUTO: 28.8 PG (ref 27–31)
MCHC RBC AUTO-ENTMCNC: 33.6 MG/DL (ref 33–36)
MCV RBC AUTO: 85.7 FL (ref 80–94)
MONOCYTES # BLD AUTO: 0.98 X10(3)/MCL (ref 0.1–1.3)
MONOCYTES NFR BLD AUTO: 8.5 %
NEUTROPHILS # BLD AUTO: 6.4 X10(3)/MCL (ref 2.1–9.2)
NEUTROPHILS NFR BLD AUTO: 55.3 %
NRBC BLD AUTO-RTO: 0 %
PLATELET # BLD AUTO: 256 X10(3)/MCL (ref 130–400)
PMV BLD AUTO: 10.9 FL (ref 9.4–12.4)
POTASSIUM SERPL-SCNC: 4.9 MMOL/L (ref 3.5–5.1)
RBC # BLD AUTO: 5.04 X10(6)/MCL (ref 4.7–6.1)
SODIUM SERPL-SCNC: 131 MMOL/L (ref 136–145)
VANCOMYCIN TROUGH SERPL-MCNC: 18.5 UG/ML (ref 15–20)
WBC # SPEC AUTO: 11.5 X10(3)/MCL (ref 4.5–11.5)

## 2022-06-20 PROCEDURE — 94761 N-INVAS EAR/PLS OXIMETRY MLT: CPT

## 2022-06-20 PROCEDURE — 83735 ASSAY OF MAGNESIUM: CPT | Performed by: INTERNAL MEDICINE

## 2022-06-20 PROCEDURE — 80048 BASIC METABOLIC PNL TOTAL CA: CPT | Performed by: INTERNAL MEDICINE

## 2022-06-20 PROCEDURE — 85025 COMPLETE CBC W/AUTO DIFF WBC: CPT | Performed by: INTERNAL MEDICINE

## 2022-06-20 PROCEDURE — 36415 COLL VENOUS BLD VENIPUNCTURE: CPT | Performed by: INTERNAL MEDICINE

## 2022-06-20 PROCEDURE — 25000003 PHARM REV CODE 250: Performed by: INTERNAL MEDICINE

## 2022-06-20 PROCEDURE — 63600175 PHARM REV CODE 636 W HCPCS: Performed by: NURSE PRACTITIONER

## 2022-06-20 PROCEDURE — 63600175 PHARM REV CODE 636 W HCPCS: Performed by: INTERNAL MEDICINE

## 2022-06-20 PROCEDURE — A4216 STERILE WATER/SALINE, 10 ML: HCPCS | Performed by: INTERNAL MEDICINE

## 2022-06-20 PROCEDURE — 92526 ORAL FUNCTION THERAPY: CPT

## 2022-06-20 PROCEDURE — 25000003 PHARM REV CODE 250: Performed by: NURSE PRACTITIONER

## 2022-06-20 PROCEDURE — 11000001 HC ACUTE MED/SURG PRIVATE ROOM

## 2022-06-20 PROCEDURE — 63600175 PHARM REV CODE 636 W HCPCS

## 2022-06-20 PROCEDURE — 63600175 PHARM REV CODE 636 W HCPCS: Performed by: STUDENT IN AN ORGANIZED HEALTH CARE EDUCATION/TRAINING PROGRAM

## 2022-06-20 PROCEDURE — 92610 EVALUATE SWALLOWING FUNCTION: CPT

## 2022-06-20 PROCEDURE — 80202 ASSAY OF VANCOMYCIN: CPT | Performed by: STUDENT IN AN ORGANIZED HEALTH CARE EDUCATION/TRAINING PROGRAM

## 2022-06-20 RX ORDER — PROPRANOLOL HYDROCHLORIDE 60 MG/1
60 CAPSULE, EXTENDED RELEASE ORAL 2 TIMES DAILY
Status: DISCONTINUED | OUTPATIENT
Start: 2022-06-20 | End: 2022-06-20

## 2022-06-20 RX ORDER — METOPROLOL TARTRATE 1 MG/ML
5 INJECTION, SOLUTION INTRAVENOUS ONCE
Status: COMPLETED | OUTPATIENT
Start: 2022-06-20 | End: 2022-06-20

## 2022-06-20 RX ORDER — LEVETIRACETAM 500 MG/5ML
INJECTION, SOLUTION, CONCENTRATE INTRAVENOUS
Status: COMPLETED
Start: 2022-06-20 | End: 2022-06-20

## 2022-06-20 RX ORDER — PROPRANOLOL HYDROCHLORIDE 20 MG/1
60 TABLET ORAL 2 TIMES DAILY
Status: DISCONTINUED | OUTPATIENT
Start: 2022-06-20 | End: 2022-06-27 | Stop reason: HOSPADM

## 2022-06-20 RX ORDER — METOPROLOL TARTRATE 1 MG/ML
5 INJECTION, SOLUTION INTRAVENOUS EVERY 5 MIN PRN
Status: DISCONTINUED | OUTPATIENT
Start: 2022-06-20 | End: 2022-06-27 | Stop reason: HOSPADM

## 2022-06-20 RX ADMIN — LEVETIRACETAM 500 MG: 100 INJECTION, SOLUTION, CONCENTRATE INTRAVENOUS at 11:06

## 2022-06-20 RX ADMIN — SODIUM CHLORIDE, PRESERVATIVE FREE 10 ML: 5 INJECTION INTRAVENOUS at 07:06

## 2022-06-20 RX ADMIN — DIPHENHYDRAMINE HYDROCHLORIDE 10 ML: 25 SOLUTION ORAL at 06:06

## 2022-06-20 RX ADMIN — HYDROCODONE BITARTRATE AND ACETAMINOPHEN 1 TABLET: 5; 325 TABLET ORAL at 09:06

## 2022-06-20 RX ADMIN — CEFTRIAXONE 2 G: 2 INJECTION, SOLUTION INTRAVENOUS at 05:06

## 2022-06-20 RX ADMIN — VANCOMYCIN HYDROCHLORIDE 1250 MG: 1.25 INJECTION, POWDER, LYOPHILIZED, FOR SOLUTION INTRAVENOUS at 09:06

## 2022-06-20 RX ADMIN — APIXABAN 5 MG: 5 TABLET, FILM COATED ORAL at 10:06

## 2022-06-20 RX ADMIN — LEVETIRACETAM 500 MG: 100 INJECTION, SOLUTION INTRAVENOUS at 10:06

## 2022-06-20 RX ADMIN — MONTELUKAST SODIUM 10 MG: 10 TABLET, COATED ORAL at 05:06

## 2022-06-20 RX ADMIN — LEVETIRACETAM 500 MG: 500 TABLET, FILM COATED ORAL at 09:06

## 2022-06-20 RX ADMIN — ATORVASTATIN CALCIUM 10 MG: 10 TABLET, FILM COATED ORAL at 09:06

## 2022-06-20 RX ADMIN — PREDNISONE 5 MG: 5 TABLET ORAL at 09:06

## 2022-06-20 RX ADMIN — DIPHENHYDRAMINE HYDROCHLORIDE 10 ML: 25 SOLUTION ORAL at 12:06

## 2022-06-20 RX ADMIN — PROPRANOLOL HYDROCHLORIDE 60 MG: 20 TABLET ORAL at 10:06

## 2022-06-20 RX ADMIN — PANTOPRAZOLE SODIUM 40 MG: 40 TABLET, DELAYED RELEASE ORAL at 09:06

## 2022-06-20 RX ADMIN — SODIUM CHLORIDE, PRESERVATIVE FREE 10 ML: 5 INJECTION INTRAVENOUS at 12:06

## 2022-06-20 RX ADMIN — METOPROLOL TARTRATE 5 MG: 5 INJECTION, SOLUTION INTRAVENOUS at 01:06

## 2022-06-20 RX ADMIN — SODIUM CHLORIDE, PRESERVATIVE FREE 10 ML: 5 INJECTION INTRAVENOUS at 06:06

## 2022-06-20 RX ADMIN — EZETIMIBE 10 MG: 10 TABLET ORAL at 06:06

## 2022-06-20 RX ADMIN — METHIMAZOLE 20 MG: 10 TABLET ORAL at 05:06

## 2022-06-20 RX ADMIN — DIGOXIN 0.25 MG: 250 TABLET ORAL at 05:06

## 2022-06-20 RX ADMIN — DIPHENHYDRAMINE HYDROCHLORIDE 10 ML: 25 SOLUTION ORAL at 05:06

## 2022-06-20 RX ADMIN — CEFTRIAXONE 2 G: 2 INJECTION, SOLUTION INTRAVENOUS at 06:06

## 2022-06-20 RX ADMIN — ASPIRIN 325 MG ORAL TABLET 325 MG: 325 PILL ORAL at 09:06

## 2022-06-20 RX ADMIN — APIXABAN 5 MG: 5 TABLET, FILM COATED ORAL at 09:06

## 2022-06-20 RX ADMIN — HYDROCODONE BITARTRATE AND ACETAMINOPHEN 1 TABLET: 5; 325 TABLET ORAL at 10:06

## 2022-06-20 NOTE — PROGRESS NOTES
Pharmacokinetic Assessment Follow Up: IV Vancomycin    Vancomycin serum concentration assessment(s):    The trough level was drawn incorrectly and cannot be used to guide therapy at this time.    Vancomycin Regimen Plan:    Continue regimen to Vancomycin 1250 mg IV every 12 hours with next serum trough concentration measured at 0800 on 6/22.    Drug levels (last 3 results):  Recent Labs   Lab Result Units 06/18/22  1217 06/20/22  0527   Vancomycin Trough ug/ml 26.0* 18.5       Pharmacy will continue to follow and monitor vancomycin.    Please contact pharmacy at extension [phone] for questions regarding this assessment.    Thank you for the consult,   Asia Pool       Patient brief summary:  Quan Contreras is a 71 y.o. male initiated on antimicrobial therapy with IV Vancomycin for treatment of meningitis    The patient's current regimen is 1250 mg every 12 hours    Drug Allergies:   Review of patient's allergies indicates:   Allergen Reactions    Ace inhibitors Swelling     Lip swelling    Morphine     Morphine sulfate     Nafcillin Itching    Pradaxa [dabigatran etexilate] Other (See Comments)     Abdominal cramping    Sulfamethoxazole Rash       Actual Body Weight:   64.2 kg    Renal Function:   Estimated Creatinine Clearance: 127.4 mL/min (A) (based on SCr of 0.49 mg/dL (L)).,     Dialysis Method (if applicable):  N/A    CBC (last 72 hours):  Recent Labs   Lab Result Units 06/18/22  0703 06/20/22  0527   WBC x10(3)/mcL 10.8 11.5   Hgb gm/dL 12.3* 14.5   Hct % 36.9* 43.2   Platelet x10(3)/mcL 364 256   Mono % % 6.7 8.5   Eos % % 0.6 1.1   Basophil % % 0.3 0.3       Metabolic Panel (last 72 hours):  Recent Labs   Lab Result Units 06/18/22  0703 06/19/22  0454 06/20/22  0527   Sodium Level mmol/L 135* 135* 131*   Potassium Level mmol/L 2.8* 4.2 4.9   Chloride mmol/L 104 105 104   Carbon Dioxide mmol/L 21* 19* 19*   Glucose Level mg/dL 109 88 90   Blood Urea Nitrogen mg/dL 5.4* 6.4* 6.0*   Creatinine mg/dL 0.48*  0.51* 0.49*   Magnesium Level mg/dL  --  1.70 2.20       Vancomycin Administrations:  vancomycin given in the last 96 hours                     vancomycin 1.25 g in dextrose 5% 250 mL IVPB (ready to mix) (mg) 1,250 mg New Bag 06/20/22 0908     1,250 mg New Bag 06/19/22 2136     1,250 mg New Bag  0847     1,250 mg New Bag 06/18/22 2100    vancomycin 1.25 g in dextrose 5% 250 mL IVPB (ready to mix) (mg) 1,250 mg New Bag 06/18/22 1309     1,250 mg New Bag  0609     1,250 mg New Bag 06/17/22 2123     1,250 mg New Bag  0955     1,250 mg New Bag  0107     1,250 mg New Bag 06/16/22 1809                    Microbiologic Results:  Microbiology Results (last 7 days)       Procedure Component Value Units Date/Time    Cerebrospinal Fluid Culture [[id number]] Collected: 06/15/22 1226    Order Status: Completed Specimen: CSF (Spinal Fluid) from Cerebrospinal Fluid Updated: 06/19/22 1003     CULTURE, CSF ([facility]) No Growth At 72 Hours     GRAM STAIN No WBCs, No bacteria seen     Blood culture #1 **CANNOT BE ORDERED STAT** [[id number]]  (Normal) Collected: 06/13/22 1618    Order Status: Completed Specimen: Blood Updated: 06/18/22 1703     CULTURE, BLOOD ([facility]) No Growth at 5 days    Blood culture #2 **CANNOT BE ORDERED STAT** [[id number]]  (Normal) Collected: 06/13/22 1618    Order Status: Completed Specimen: Blood Updated: 06/18/22 1703     CULTURE, BLOOD ([facility]) No Growth at 5 days    AFB Smear [[id number]] Collected: 06/15/22 1226    Order Status: Completed Specimen: CSF (Spinal Fluid) from Cerebrospinal Fluid Updated: 06/17/22 0659     AFB Smear No AFB seen (Direct smear only)    Mycobacteria and Nocardia Culture [[id number]] Collected: 06/15/22 1226    Order Status: Sent Specimen: CSF (Spinal Fluid) from Cerebrospinal Fluid Updated: 06/16/22 1117    Fungal Culture [[id number]] Collected: 06/15/22 1226    Order Status: Sent Specimen: CSF (Spinal Fluid) from Lumbar Updated: 06/16/22 0949    Cryptococcal antigen, CSF (Tube 3) [[id number]]  Collected: 06/15/22 1226    Order Status: Completed Specimen: CSF (Spinal Fluid) from CSF Tap, Tube 3 Updated: 06/15/22 1711     CRYPTOCOCCAL ANTIGEN TITER ([facility]) --     CRYPTOCOCCAL ANTIGEN, CSF ([facility]) Negative    Cryptococcal Antigen, CSF [[id number]]     Order Status: Sent Specimen: CSF (Spinal Fluid)

## 2022-06-20 NOTE — CONSULTS
Inpatient consult to Cardiology  Consult performed by: ADELE Gomes  Consult ordered by: Zulay Howard MD  Reason for consult: AFIB         [facility] - 9th Floor Med Surg  Cardiology  Consult Note    Patient Name: Quan Contreras  MRN: [id number]  Admission Date: 6/13/2022  Hospital Length of Stay: 7 days  Code Status: Full Code   Attending Provider: Jean Ferguson MD   Consulting Provider: ADELE Gomes  Primary Care Physician: Bran Hansen MD  Principal Problem:Fever    Patient information was obtained from past medical records and ER records.     Subjective:     Chief Complaint: Consultation Reason: Atrial Fibrillation     HPI:  Mr. Contreras is a 71 year old male, known to Dr. Breen & Dr. Bhagat, who presented to the hospital on 6.13.22 with encephalopathy. Patient with recent history of pneumonia, finishing antibiotics. CT Head negative. LP results reviewed possible viral encephalitis. Brain MRI revealed no acute findings. Patient is being treated for possible bacterial meningitis as well. EKG revealed AF CVR. He has a history of Persistent AF, followed by Dr. Bhagat. He is on Propranolol, Eliquis, and Digoxin outpatient. Chest XR revealed no acute findings. Troponin noted to be normal. [facility] is consulted for AF Management.    PMH: CAD/STEMI Proximal LAD Stent, KATALINA, Persistent AF (Eliquis), Failed DCCV, CVA, Thyroid Disease, Hypertension, Hyperlipidemia, GERD, ICMO with Recovered EF (55% Recently), RA, Graves Disease  PSH: LHC/PCI, Cardioversion, Colonoscopy, Lithotripsy, Knee Surgery, Tonsillectomy, Vasectomy, Cataract Extraction  Family History: Father- MI, Mother- Emphysema  Social History: Tobacco- Negative, Alcohol- Negative, Substance Abuse- Negative    Previous Cardiac Diagnostics:   Echocardiogram (4.27.22):  Bubble Study Negative x 2  EF 50-55%  AS- Mild. Peak AV Velocity 2.5 m/sec. MG 13 mmHg, BHAVYA 1.6 cm2.  MAC is Present without Stenosis.  MR- Mild.  TR- Trace.    [facility]  (10.7.21):  Demonstrated Diffuse Nonobstructive CAD. Proximal to Mid Segment LAD Stent is Patent. EF 55%. EDP 13.    Review of patient's allergies indicates:   Allergen Reactions    Ace inhibitors Swelling     Lip swelling    Morphine     Morphine sulfate     Nafcillin Itching    Pradaxa [dabigatran etexilate] Other (See Comments)     Abdominal cramping    Sulfamethoxazole Rash       No current facility-administered medications on file prior to encounter.     Current Outpatient Medications on File Prior to Encounter   Medication Sig    apixaban (ELIQUIS) 5 mg Tab Take 1 tablet (5 mg total) by mouth 2 (two) times daily.    aspirin 325 MG tablet Take 325 mg by mouth once daily.    certolizumab pegol (CIMZIA SUBQ) Inject into the skin.    DIGITEK 250 mcg (0.25 mg) tablet Take 1 tablet by mouth Daily.    ergocalciferol (ERGOCALCIFEROL) 50,000 unit Cap Take 50,000 Units by mouth every 7 days.    ezetimibe (ZETIA) 10 mg tablet Take 1 tablet by mouth once daily at 6am.    HYDROcodone-acetaminophen (NORCO)  mg per tablet Take 1 tablet by mouth 3 (three) times daily. PRN    levETIRAcetam (KEPPRA) 500 MG Tab Take 1 tablet (500 mg total) by mouth 2 (two) times daily.    methIMAzole (TAPAZOLE) 10 MG Tab Take 4 tablets by mouth Daily.    montelukast (SINGULAIR) 10 mg tablet Take 1 tablet by mouth Daily.    mv, min #36-iron,carbonyl-FA 16 mg iron- 0.38 mg Tab Take 1 tablet by mouth once daily.    pantoprazole (PROTONIX) 40 MG tablet Take 40 mg by mouth once daily.    predniSONE (DELTASONE) 5 MG tablet Take 5 mg by mouth once daily. At bedtime    propranoloL (INDERAL LA) 60 MG 24 hr capsule Take 1 capsule (60 mg total) by mouth 2 (two) times a day.    rosuvastatin (CRESTOR) 40 MG Tab Take 40 mg by mouth every evening.    [DISCONTINUED] baclofen (LIORESAL) 10 MG tablet Take 1 tablet by mouth nightly.    [DISCONTINUED] metoprolol succinate (TOPROL-XL) 100 MG 24 hr tablet Take 100 mg by mouth 2 (two)  times daily.     Review of Systems:  Review of Systems   Reason unable to perform ROS: Unable to Obtain due to Clinical Condition.       Objective:     Vital Signs (Most Recent):  Temp: 96.8 °F (36 °C) (06/20/22 1148)  Pulse: 69 (06/20/22 1148)  Resp: 17 (06/20/22 1148)  BP: (!) 101/42 (06/20/22 1356)  SpO2: 98 % (06/20/22 1148) Vital Signs (24h Range):  Temp:  [96.8 °F (36 °C)-100.2 °F (37.9 °C)] 96.8 °F (36 °C)  Pulse:  [] 69  Resp:  [17-19] 17  SpO2:  [97 %-98 %] 98 %  BP: (101-141)/(42-89) 101/42     Weight: 64.2 kg (141 lb 8 oz)  Body mass index is 21.51 kg/m².    SpO2: 98 %  O2 Device (Oxygen Therapy): room air    No intake or output data in the 24 hours ending 06/20/22 1640    Lines/Drains/Airways     Peripherally Inserted Central Catheter Line  Duration           PICC Double Lumen 06/18/22 2115 right basilic 1 day              Significant Labs:  Recent Results (from the past 72 hour(s))   Basic Metabolic Panel    Collection Time: 06/18/22  7:03 AM   Result Value Ref Range    Sodium Level 135 (L) 136 - 145 mmol/L    Potassium Level 2.8 (L) 3.5 - 5.1 mmol/L    Chloride 104 98 - 107 mmol/L    Carbon Dioxide 21 (L) 23 - 31 mmol/L    Glucose Level 109 82 - 115 mg/dL    Blood Urea Nitrogen 5.4 (L) 8.4 - 25.7 mg/dL    Creatinine 0.48 (L) 0.73 - 1.18 mg/dL    BUN/Creatinine Ratio 11     Calcium Level Total 8.6 (L) 8.8 - 10.0 mg/dL    Estimated GFR-Non  >60 mls/min/1.73/m2    Anion Gap 10.0 mEq/L   CBC with Differential    Collection Time: 06/18/22  7:03 AM   Result Value Ref Range    WBC 10.8 4.5 - 11.5 x10(3)/mcL    RBC 4.36 (L) 4.70 - 6.10 x10(6)/mcL    Hgb 12.3 (L) 14.0 - 18.0 gm/dL    Hct 36.9 (L) 42.0 - 52.0 %    MCV 84.6 80.0 - 94.0 fL    MCH 28.2 27.0 - 31.0 pg    MCHC 33.3 33.0 - 36.0 mg/dL    RDW 14.4 11.5 - 17.0 %    Platelet 364 130 - 400 x10(3)/mcL    MPV 9.6 9.4 - 12.4 fL    Neut % 62.1 %    Lymph % 29.8 %    Mono % 6.7 %    Eos % 0.6 %    Basophil % 0.3 %    Lymph # 3.21 0.6 - 4.6  x10(3)/mcL    Neut # 6.7 2.1 - 9.2 x10(3)/mcL    Mono # 0.72 0.1 - 1.3 x10(3)/mcL    Eos # 0.06 0 - 0.9 x10(3)/mcL    Baso # 0.03 0 - 0.2 x10(3)/mcL    IG# 0.05 (H) 0 - 0.0155 x10(3)/mcL    IG% 0.5 (H) 0 - 0.43 %    NRBC% 0.0 %   VANCOMYCIN, TROUGH    Collection Time: 06/18/22 12:17 PM   Result Value Ref Range    Vancomycin Trough 26.0 (H) 15.0 - 20.0 ug/ml   Basic Metabolic Panel    Collection Time: 06/19/22  4:54 AM   Result Value Ref Range    Sodium Level 135 (L) 136 - 145 mmol/L    Potassium Level 4.2 3.5 - 5.1 mmol/L    Chloride 105 98 - 107 mmol/L    Carbon Dioxide 19 (L) 23 - 31 mmol/L    Glucose Level 88 82 - 115 mg/dL    Blood Urea Nitrogen 6.4 (L) 8.4 - 25.7 mg/dL    Creatinine 0.51 (L) 0.73 - 1.18 mg/dL    BUN/Creatinine Ratio 13     Calcium Level Total 9.0 8.8 - 10.0 mg/dL    Estimated GFR-Non  >60 mls/min/1.73/m2    Anion Gap 11.0 mEq/L   Magnesium    Collection Time: 06/19/22  4:54 AM   Result Value Ref Range    Magnesium Level 1.70 1.60 - 2.60 mg/dL   Basic Metabolic Panel    Collection Time: 06/20/22  5:27 AM   Result Value Ref Range    Sodium Level 131 (L) 136 - 145 mmol/L    Potassium Level 4.9 3.5 - 5.1 mmol/L    Chloride 104 98 - 107 mmol/L    Carbon Dioxide 19 (L) 23 - 31 mmol/L    Glucose Level 90 82 - 115 mg/dL    Blood Urea Nitrogen 6.0 (L) 8.4 - 25.7 mg/dL    Creatinine 0.49 (L) 0.73 - 1.18 mg/dL    BUN/Creatinine Ratio 12     Calcium Level Total 9.3 8.8 - 10.0 mg/dL    Estimated GFR-Non  >60 mls/min/1.73/m2    Anion Gap 8.0 mEq/L   Magnesium    Collection Time: 06/20/22  5:27 AM   Result Value Ref Range    Magnesium Level 2.20 1.60 - 2.60 mg/dL   CBC with Differential    Collection Time: 06/20/22  5:27 AM   Result Value Ref Range    WBC 11.5 4.5 - 11.5 x10(3)/mcL    RBC 5.04 4.70 - 6.10 x10(6)/mcL    Hgb 14.5 14.0 - 18.0 gm/dL    Hct 43.2 42.0 - 52.0 %    MCV 85.7 80.0 - 94.0 fL    MCH 28.8 27.0 - 31.0 pg    MCHC 33.6 33.0 - 36.0 mg/dL    RDW 15.4 11.5 - 17.0  %    Platelet 256 130 - 400 x10(3)/mcL    MPV 10.9 9.4 - 12.4 fL    Neut % 55.3 %    Lymph % 34.4 %    Mono % 8.5 %    Eos % 1.1 %    Basophil % 0.3 %    Lymph # 3.95 0.6 - 4.6 x10(3)/mcL    Neut # 6.4 2.1 - 9.2 x10(3)/mcL    Mono # 0.98 0.1 - 1.3 x10(3)/mcL    Eos # 0.13 0 - 0.9 x10(3)/mcL    Baso # 0.03 0 - 0.2 x10(3)/mcL    IG# 0.05 (H) 0 - 0.0155 x10(3)/mcL    IG% 0.4 0 - 0.43 %    NRBC% 0.0 %   VANCOMYCIN, TROUGH    Collection Time: 06/20/22  5:27 AM   Result Value Ref Range    Vancomycin Trough 18.5 15.0 - 20.0 ug/ml     Significant Imaging:   Imaging Results          CT Head Without Contrast (Final result)  Result time 06/13/22 17:27:43    Final result by Ethan Molina MD (06/13/22 17:27:43)                 Impression:      1.  No acute intracranial findings identified.    2.  Old infarcts, chronic microangiopathic ischemia and atrophy.      Electronically signed by: Ethan Molina  Date:    06/13/2022  Time:    17:27             Narrative:    EXAMINATION:  CT HEAD WITHOUT CONTRAST    CLINICAL HISTORY:  Mental status change, unknown cause;    TECHNIQUE:  Sequential axial images were performed of the brain without contrast.    Dose product length of 1167 mGycm. Automated exposure control was utilized to minimize radiation dose.    COMPARISON:  CT brain without contrast May 15, 2022 in MRI brain April 27, 2022.    FINDINGS:  There is no intracranial mass effect, midline shift, hydrocephalus or hemorrhage. There is no sulcal effacement or low attenuation changes to suggest recent large vessel territory infarction.  There are old infarcts which involve the right frontal lobe and the left cingulate cortical/subcortical location.  Chronic microvascular ischemic changes are mild.  The ventricular system and sulcal markings prominence is consistent with atrophy. There is no acute extra axial fluid collection. Visualized paranasal sinuses are clear without mucosal thickening, polypoidal abnormality or air-fluid  levels. Mastoid air cells aeration is optimal.                               X-Ray Chest AP Portable (Final result)  Result time 06/13/22 17:09:45    Final result by Ethan Molina MD (06/13/22 17:09:45)                 Impression:      NO ACUTE CARDIOPULMONARY PROCESS IDENTIFIED.      Electronically signed by: Ethan Molina  Date:    06/13/2022  Time:    17:09             Narrative:    EXAMINATION:  XR CHEST AP PORTABLE    CLINICAL HISTORY:  Fever, unspecified    TECHNIQUE:  One view    COMPARISON:  June 6, 2022.    FINDINGS:  Cardiopericardial silhouette is within normal limits.  No acute dense focal or segmental consolidation, congestive process, pleural effusions or pneumothorax.                              Lab Results   Component Value Date    CHOL 105 04/28/2022     Lab Results   Component Value Date    HDL 21 04/28/2022     No results found for: LDLCALC  Lab Results   Component Value Date    TRIG 102 04/28/2022       EKG:      Results for orders placed or performed during the hospital encounter of 06/13/22   EKG 12-lead    Narrative    Test Reason : R41.0,    Vent. Rate : 098 BPM     Atrial Rate : 000 BPM     P-R Int : 000 ms          QRS Dur : 074 ms      QT Int : 356 ms       P-R-T Axes : 000 -08 230 degrees     QTc Int : 454 ms    Coase  Atrial fibrillation  Nonspecific ST and T wave abnormality  consider inferior ischemia  Abnormal ECG  No previous ECGs available  Reconfirmed by Jose Mejia MD ([id number]) on 6/13/2022 5:03:04 PM    Referred By: JAMES   SELF           Confirmed By:Jose Mejia MD       Telemetry: AF CVR    Physical Exam  Constitutional:       Appearance: Normal appearance.   HENT:      Head: Normocephalic.      Mouth/Throat:      Mouth: Mucous membranes are moist.      Pharynx: Oropharynx is clear.   Cardiovascular:      Rate and Rhythm: Normal rate. Rhythm irregular.   Pulmonary:      Effort: Pulmonary effort is normal. No respiratory distress.      Breath sounds: Normal breath  sounds.   Abdominal:      General: Abdomen is flat.      Palpations: Abdomen is soft.   Musculoskeletal:      Cervical back: Neck supple.   Skin:     General: Skin is warm and dry.   Neurological:      Comments: Obtunded.       Home Medications:   No current facility-administered medications on file prior to encounter.     Current Outpatient Medications on File Prior to Encounter   Medication Sig Dispense Refill    apixaban (ELIQUIS) 5 mg Tab Take 1 tablet (5 mg total) by mouth 2 (two) times daily.      aspirin 325 MG tablet Take 325 mg by mouth once daily.      certolizumab pegol (CIMZIA SUBQ) Inject into the skin.      DIGITEK 250 mcg (0.25 mg) tablet Take 1 tablet by mouth Daily.      ergocalciferol (ERGOCALCIFEROL) 50,000 unit Cap Take 50,000 Units by mouth every 7 days.      ezetimibe (ZETIA) 10 mg tablet Take 1 tablet by mouth once daily at 6am.      HYDROcodone-acetaminophen (NORCO)  mg per tablet Take 1 tablet by mouth 3 (three) times daily. PRN      levETIRAcetam (KEPPRA) 500 MG Tab Take 1 tablet (500 mg total) by mouth 2 (two) times daily. 60 tablet 11    methIMAzole (TAPAZOLE) 10 MG Tab Take 4 tablets by mouth Daily.      montelukast (SINGULAIR) 10 mg tablet Take 1 tablet by mouth Daily.      mv, min #36-iron,carbonyl-FA 16 mg iron- 0.38 mg Tab Take 1 tablet by mouth once daily.      pantoprazole (PROTONIX) 40 MG tablet Take 40 mg by mouth once daily.      predniSONE (DELTASONE) 5 MG tablet Take 5 mg by mouth once daily. At bedtime      propranoloL (INDERAL LA) 60 MG 24 hr capsule Take 1 capsule (60 mg total) by mouth 2 (two) times a day. 60 capsule 11    rosuvastatin (CRESTOR) 40 MG Tab Take 40 mg by mouth every evening.      [DISCONTINUED] baclofen (LIORESAL) 10 MG tablet Take 1 tablet by mouth nightly.      [DISCONTINUED] metoprolol succinate (TOPROL-XL) 100 MG 24 hr tablet Take 100 mg by mouth 2 (two) times daily.       Current Inpatient Medications:    Current  Facility-Administered Medications:     (Magic mouthwash) 1:1:1 diphenhydramine(Benadryl) 12.5mg/5ml liq, aluminum & magnesium hydroxide-simethicone (Maalox), LIDOcaine viscous 2%, 10 mL, Swish & Spit, Q6H, Zulay Howard MD, 10 mL at 06/20/22 1232    acetaminophen tablet 650 mg, 650 mg, Oral, Q6H PRN, Jean Ferguson MD    acetaminophen tablet 650 mg, 650 mg, Oral, Q8H PRN, Jean Ferguson MD    apixaban tablet 5 mg, 5 mg, Oral, BID, Zulay Howard MD, 5 mg at 06/20/22 0906    aspirin tablet 325 mg, 325 mg, Oral, Daily, Jean Ferguson MD, 325 mg at 06/20/22 0906    atorvastatin tablet 10 mg, 10 mg, Oral, QHS, Jean Ferguson MD, 10 mg at 06/19/22 2139    cefTRIAXone 2 gram/50 mL IVPB 2 g, 2 g, Intravenous, Q12H, Jean Ferguson MD, Stopped at 06/20/22 0651    digoxin tablet 0.25 mg, 0.25 mg, Oral, Daily, Jean Ferguson MD, 0.25 mg at 06/19/22 1719    ezetimibe tablet 10 mg, 10 mg, Oral, Daily, Jean Ferguson MD, 10 mg at 06/20/22 0621    haloperidol lactate injection 5 mg, 5 mg, Intravenous, Q4H PRN, Zulay Howard MD    HYDROcodone-acetaminophen 5-325 mg per tablet 1 tablet, 1 tablet, Oral, Q8H PRN, Jean Ferguson MD, 1 tablet at 06/20/22 0923    levETIRAcetam tablet 500 mg, 500 mg, Oral, BID, Jean Ferguson MD, 500 mg at 06/20/22 0906    lorazepam (ATIVAN) injection 1 mg, 1 mg, Intravenous, Q4H PRN, Smith TROY MD    melatonin tablet 6 mg, 6 mg, Oral, Nightly PRN, Jean Ferguson MD    methIMAzole tablet 20 mg, 20 mg, Oral, Daily, Zulay Howard MD, 20 mg at 06/19/22 1719    metoprolol injection 5 mg, 5 mg, Intravenous, Q5 Min PRN, Zulay Howard MD    montelukast tablet 10 mg, 10 mg, Oral, Daily, Jean Ferguson MD, 10 mg at 06/19/22 1719    ondansetron injection 4 mg, 4 mg, Intravenous, Q6H PRN, Jean Ferguson MD    pantoprazole EC tablet 40 mg, 40 mg, Oral, Daily, Jean Ferguson MD, 40 mg at  06/20/22 0906    predniSONE tablet 5 mg, 5 mg, Oral, Daily, Jean Ferguson MD, 5 mg at 06/20/22 0906    sodium chloride 0.9% flush 10 mL, 10 mL, Intravenous, PRN, Jean Ferguson MD, 10 mL at 06/17/22 0108    Flushing PICC Protocol, , , Until Discontinued **AND** sodium chloride 0.9% flush 10 mL, 10 mL, Intravenous, Q6H, 10 mL at 06/20/22 1235 **AND** sodium chloride 0.9% flush 10 mL, 10 mL, Intravenous, PRN, Zulay Howard MD, 10 mL at 06/17/22 0605    Pharmacy to dose Vancomycin consult, , , Once **AND** vancomycin - pharmacy to dose, , Intravenous, pharmacy to manage frequency, Jean Ferguson MD    vancomycin 1.25 g in dextrose 5% 250 mL IVPB (ready to mix), 1,250 mg, Intravenous, Q12H, Sourav Goodwin MD, Stopped at 06/20/22 1038         VTE Risk Mitigation (From admission, onward)         Ordered     apixaban tablet 5 mg  2 times daily         06/19/22 0844     IP VTE HIGH RISK PATIENT  Once         06/14/22 0701     Place sequential compression device  Until discontinued         06/14/22 0701              Assessment:   Persistent Atrial Fibrillation with CVR    - On Eliquis  Acute Encephalopathy Currently Obtunded  Suspected Bacterial versus Viral Meningitis    - ID Following  CAD    - History of PCI LAD   History of Ischemic Cardiomyopathy with Recovery EF 50-55%  Hypertension (Controlled)- Low Normal Readings  Hyperlipidemia  KATALINA    - Mild Nonobstructive  History of Graves Disease  Dementia  History of CVA  RA  GERD  Seizure Disorder    Plan:   Resume Home Digoxin and Propranolol (With Hold Parameters)  Continue Eliquis for Stroke Risk Reduction  Follow up with [facility]  Will be available. Please call if needed.    Thank you for your consult.     Gabbi Thomas, ADELE  Cardiology  [facility] - 9th Floor Med Surg  06/20/2022 4:40 PM

## 2022-06-20 NOTE — PLAN OF CARE
Problem: SLP  Goal: SLP Goal  Description: LT)The pt will improve cognitive linguistic abilities to return to PLOF.  2) The pt will tolerate the least restrictive PO diet with no clinical signs/sx of aspiration.  STa) Orientation x4, independent  1b) Focused attention, min assist, 5 minutes without requiring redirection  1c) Information procession, min assist  1d) Problem solving, routine, 100% with min assist    2a) tolerate minced and moist solids with no signs/sx of aspiration.  2b) tolerate easy to chew solids with no signs/sx of aspiration.    Outcome: Ongoing, Progressing     Goals updated.  Margareth

## 2022-06-20 NOTE — PROGRESS NOTES
[facility] Hospital Medicine Progress Note        Chief Complaint: Inpatient Follow-up for encephalopathy     HPI:  71-year-old male admitted on 6/13/2022 with encephalopathy.  The patient was finalizing his anti-infective treatment for pneumonia.  At that time, he was on day 6 out of 7 of Levaquin.  Previous history included anterior cerebral artery CVA, dementia, atrial fibrillation, hypertension, CAD, cardiomyopathy, rheumatoid arthritis, mixed hyperlipidemia, ischemic cardiomyopathy.  There is also a diagnosis of a seizure disorder.  Labs demonstrated improving leukocytosis of 11.9.  CT scan of the head was negative.  The patient, as part of the infectious work-up was evaluated for possible meningitis via lumbar puncture.  He was placed on anti-infective therapy with vancomycin, Rocephin, ampicillin, acyclovir. LP results reviews in keeping with viral encephalitis.  Continue IV acyclovir and IV fluids.  Consult ID  MRI brain reviewed no acute findings  EEG is normal.  Daughter reports seen dental abscesses with no drainage in the past.  Get CT maxillofacial to reassess if abscess present or not, if present will request ENT consult for I and D. Add on Magic mouthwash.  Add on Seroquel 25 mg b.i.d.  IM Haldol 5 mg Q 4 p.r.n. agitation        Patient currently being managed for acute metabolic encephalopathy likely due to bacterial meningitis . CT maxillofacial with no abscess informed family members.  ID recommends to continue bacterial treatment for now         Today's information  Patient seen and examined at bedside. Family at bedside  Patient remains encephalopathic but not agitating- discontinue all sedating meds for now including seroquel  Wife says he is in pain and takes norco 10mg at home   Complains of headache , and not eating as much   Went into afib rvr - iv lopressor given, consult cis   Continue current antibiotics vancomycin and  ceftriaxone.          Objective/physical exam:  General: In no acute distress, afebrile  Chest: Clear to auscultation bilaterally  Heart: RRR, +S1, S2, no appreciable murmur  Abdomen: Soft, nontender, BS +  MSK: Warm, no lower extremity edema, no clubbing or cyanosis.  Upper extremities are contracted (which the wife attributes to his RA)  Neurologic: Alert but not oriented, CN exam limited 2/2 his current clinical state.      Assessment/Plan:  Acute encephalopathy  Extreme sedation    Bacterial meningitis   afib RVR  History of thyrotoxicosis, now with a TSH of 0.083 and elevated free T3 and free T4  Vascular dementia  Atrial fibrillation on Eliquis  CAD  Essential hypertension  Mixed hyperlipidemia  Ischemic cardiomyopathy with a preserved ejection fraction  Recent CVA's  Seizure disorder  Rheumatoid arthritis  Immunosuppressed state related to the above   hypotension  Hypokalemia      Plan   discontinue all sedating meds for now including seroquel   iv lopressor given, consult cis   Continue current antibiotics vancomycin and ceftriaxone.  Replete electrolytes  Resume home dose Eliquis  F/up ID recs for antibiotics   CT maxillofacial with no abscess informed family members.  Add on scheduled Tylenol for pain.  MRI brain reviewed no acute findings  EEG is normal.   Magic mouthwash.  IM Haldol 5 mg Q 4 p.r.n. agitation     Patient condition:  guarded     Critical care diagnosis bacterial meningitis, a fib rvr  Critical care time greater than 35 minutes       VITAL SIGNS: 24 HRS MIN & MAX LAST   Temp  Min: 96.8 °F (36 °C)  Max: 100.2 °F (37.9 °C) 96.8 °F (36 °C)   BP  Min: 101/42  Max: 141/88 (!) 101/42   Pulse  Min: 69  Max: 116  69   Resp  Min: 17  Max: 19 17   SpO2  Min: 97 %  Max: 98 % 98 %       Recent Labs   Lab 06/17/22  0849 06/18/22  0703 06/20/22  0527   WBC 10.0 10.8 11.5   RBC 4.27* 4.36* 5.04   HGB 12.2* 12.3* 14.5   HCT 36.7* 36.9* 43.2   MCV 85.9 84.6 85.7   MCH 28.6 28.2 28.8   MCHC 33.2 33.3 33.6    RDW 14.6 14.4 15.4    364 256   MPV 10.2 9.6 10.9       Recent Labs   Lab 06/14/22  0521 06/15/22  0420 06/15/22  0828 06/17/22  0849 06/18/22  0703 06/19/22  0454 06/20/22  0527   * 135* 136   < > 135* 135* 131*   K 3.6 3.9 4.0   < > 2.8* 4.2 4.9   CO2 22* 22* 21*   < > 21* 19* 19*   BUN 7.8* 6.0* 6.5*   < > 5.4* 6.4* 6.0*   CREATININE 0.49* 0.48* 0.46*   < > 0.48* 0.51* 0.49*   CALCIUM 8.9 9.0 9.3   < > 8.6* 9.0 9.3   MG  --   --   --   --   --  1.70 2.20   ALBUMIN 2.5* 2.5* 2.6*  --   --   --   --    ALKPHOS 85 88 91  --   --   --   --    ALT 50 54 60*  --   --   --   --    AST 53* 59* 62*  --   --   --   --    BILITOT 0.6 0.6 0.8  --   --   --   --     < > = values in this interval not displayed.          Microbiology Results (last 7 days)     Procedure Component Value Units Date/Time    Cerebrospinal Fluid Culture [[id number]] Collected: 06/15/22 1226    Order Status: Completed Specimen: CSF (Spinal Fluid) from Cerebrospinal Fluid Updated: 06/20/22 1126     CULTURE, CSF ([facility]) No growth at 4 days     GRAM STAIN No WBCs, No bacteria seen     Blood culture #1 **CANNOT BE ORDERED STAT** [[id number]]  (Normal) Collected: 06/13/22 1618    Order Status: Completed Specimen: Blood Updated: 06/18/22 1703     CULTURE, BLOOD ([facility]) No Growth at 5 days    Blood culture #2 **CANNOT BE ORDERED STAT** [[id number]]  (Normal) Collected: 06/13/22 1618    Order Status: Completed Specimen: Blood Updated: 06/18/22 1703     CULTURE, BLOOD ([facility]) No Growth at 5 days    AFB Smear [[id number]] Collected: 06/15/22 1226    Order Status: Completed Specimen: CSF (Spinal Fluid) from Cerebrospinal Fluid Updated: 06/17/22 0659     AFB Smear No AFB seen (Direct smear only)    Mycobacteria and Nocardia Culture [[id number]] Collected: 06/15/22 1226    Order Status: Sent Specimen: CSF (Spinal Fluid) from Cerebrospinal Fluid Updated: 06/16/22 1117    Fungal Culture [[id number]] Collected: 06/15/22 1226    Order Status: Sent Specimen: CSF  (Spinal Fluid) from Lumbar Updated: 06/16/22 0949    Cryptococcal antigen, CSF (Tube 3) [[id number]] Collected: 06/15/22 1226    Order Status: Completed Specimen: CSF (Spinal Fluid) from CSF Tap, Tube 3 Updated: 06/15/22 1711     CRYPTOCOCCAL ANTIGEN TITER ([facility]) --     CRYPTOCOCCAL ANTIGEN, CSF ([facility]) Negative    Cryptococcal Antigen, CSF [[id number]]     Order Status: Sent Specimen: CSF (Spinal Fluid)            See below for Radiology    Scheduled Med:   (Magic mouthwash) 1:1:1 diphenhydramine(Benadryl) 12.5mg/5ml liq, aluminum & magnesium hydroxide-simethicone (Maalox), LIDOcaine viscous 2%  10 mL Swish & Spit Q6H    apixaban  5 mg Oral BID    aspirin  325 mg Oral Daily    atorvastatin  10 mg Oral QHS    cefTRIAXone (ROCEPHIN) IVPB  2 g Intravenous Q12H    digoxin  0.25 mg Oral Daily    ezetimibe  10 mg Oral Daily    levETIRAcetam  500 mg Oral BID    methIMAzole  20 mg Oral Daily    montelukast  10 mg Oral Daily    pantoprazole  40 mg Oral Daily    predniSONE  5 mg Oral Daily    QUEtiapine  25 mg Oral QHS    sodium chloride 0.9%  10 mL Intravenous Q6H    vancomycin (VANCOCIN) IVPB  1,250 mg Intravenous Q12H        Continuous Infusions:       PRN Meds:  acetaminophen, acetaminophen, haloperidol lactate, HYDROcodone-acetaminophen, lorazepam, melatonin, metoprolol, ondansetron, sodium chloride 0.9%, Flushing PICC Protocol **AND** sodium chloride 0.9% **AND** sodium chloride 0.9%, Pharmacy to dose Vancomycin consult **AND** vancomycin - pharmacy to dose, ziprasidone         Patient condition:  Stable/Fair/Guarded/ Serious/ Critical    Anticipated discharge and Disposition:      All diagnosis and differential diagnosis have been reviewed; assessment and plan has been documented; I have personally reviewed the labs and test results that are presently available; I have reviewed the patients medication list; I have reviewed the consulting providers response and recommendations. I have reviewed or attempted to  review medical records based upon their availability    All of the patient's questions have been  addressed and answered. Patient's is agreeable to the above stated plan. I will continue to monitor closely and make adjustments to medical management as needed.  _____________________________________________________________________    Nutrition Status:    Radiology:  X-Ray Chest 1 View  Narrative: EXAMINATION:  XR CHEST 1 VIEW    CPT 75307    CLINICAL HISTORY:  PICC Placement;    COMPARISON:  June 16, 2022    FINDINGS:  Cardiomediastinal silhouette improved parenchymal changes to be essentially unchanged as compared with the previous exam.    Right-sided PICC line is identified tip projecting at the junction of the superior vena cava and right atrium  Impression: No significant change since the previous exam.    PICC line as above    Electronically signed by: De Conte  Date:    06/19/2022  Time:    05:31      Zulay Howard MD   06/20/2022

## 2022-06-20 NOTE — PT/OT/SLP EVAL
"Speech Language Pathology Evaluation  Bedside Swallow    Patient Name:  Quan Contreras   MRN:  [id number]  Admitting Diagnosis: Fever    Recommendations:                 General Recommendations:  Dysphagia therapy and Cognitive-linguistic therapy  Diet recommendations:  Puree Diet - IDDSI Level 4, Thin liquids - IDDSI Level 0   Aspiration Precautions: 1 bite/sip at a time and HOB to 90 degrees   General Precautions: Standard,    Communication strategies:  pt is hard of hearing    History:     Past Medical History:   Diagnosis Date    Acute left arterial ischemic stroke, KINGA (anterior cerebral artery) 5/3/2022    Acute osteomyelitis 5/11/2022    Atherosclerosis of coronary artery 5/11/2022    Atrial fibrillation 5/3/2022    Benign essential hypertension 5/3/2022    Cardiomyopathy     Coronary artery disease     Diverticulosis     Esophageal reflux 5/11/2022    Fatigue     Focal seizure 5/17/2022    Generalized anxiety disorder     GERD (gastroesophageal reflux disease)     Graves disease     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/2022    HTN (hypertension)     Hyperthyroidism     Idiopathic peripheral neuropathy 5/11/2022    Irritable bowel syndrome without diarrhea     Ischemic cardiomyopathy 5/3/2022    Mixed hyperlipidemia 5/11/2022    Other sequelae following unspecified cerebrovascular disease 5/9/2022    Paroxysmal atrial fibrillation     Rheumatoid arthritis 5/11/2022    Rheumatoid arthritis, unspecified     Shingles     Staph aureus infection     Stroke     Thyroiditis, unspecified     Thyrotoxicosis 5/3/2022       Past Surgical History:   Procedure Laterality Date    CATARACT EXTRACTION      CYST REMOVAL Left     TONSILLECTOMY      TOTAL KNEE ARTHROPLASTY      VASECTOMY           Subjective       Patient goals: Per wife, "To eat enough."     Pain/Comfort:  · Pain Rating 1: 0/10    Respiratory Status: Room air     Wife reports chewing solids then " "expectorating.  Pt only eating "soft things."    Objective:     Oral Musculature Evaluation  · Oral Musculature: WFL  · Oral Labial Strength and Mobility: WFL  · Voice Prior to PO Intake: adequate    Bedside Swallow Eval:   Consistencies Assessed:  · Thin liquids via straw  · Puree via spoon     Oral Phase:   · bolus holding    Pharyngeal Phase:   · no overt clinical signs/symptoms of aspiration    Compensatory Strategies  · None        Assessment:     Quan Contreras is a 71 y.o. male with an SLP diagnosis of oropharyngeal dysphagia negatively impacted by reduced CASSANDRA.     Goals:   Multidisciplinary Problems     SLP Goals        Problem: SLP    Goal Priority Disciplines Outcome   SLP Goal     SLP Ongoing, Progressing   Description: LT)The pt will improve cognitive linguistic abilities to return to PLOF.  2) The pt will tolerate the least restrictive PO diet with no clinical signs/sx of aspiration.  STa) Orientation x4, independent  1b) Focused attention, min assist, 5 minutes without requiring redirection  1c) Information procession, min assist  1d) Problem solving, routine, 100% with min assist    2a) tolerate minced and moist solids with no signs/sx of aspiration.  2b) tolerate easy to chew solids with no signs/sx of aspiration.                     Plan:     · Patient to be seen:  5 x/week   · Plan of Care expires:  22  · Plan of Care reviewed with:  patient, spouse   · SLP Follow-Up:  Yes       Discharge recommendations:  home health speech therapy (pending progress)   Barriers to Discharge:  acuity of illness    Time Tracking:     SLP Treatment Date:   22  Speech Start Time:  1550  Speech Stop Time:  1610     Speech Total Time (min):  20 min    Billable Minutes: Eval Swallow and Oral Function 20 minutes    2022           "

## 2022-06-21 LAB
BACTERIA CSF CULT: NORMAL
DIGOXIN SERPL-MCNC: 1.08 NG/ML (ref 0.8–2)
GRAM STN SPEC: NORMAL

## 2022-06-21 PROCEDURE — 63600175 PHARM REV CODE 636 W HCPCS: Performed by: NURSE PRACTITIONER

## 2022-06-21 PROCEDURE — 25000003 PHARM REV CODE 250: Performed by: NURSE PRACTITIONER

## 2022-06-21 PROCEDURE — 94761 N-INVAS EAR/PLS OXIMETRY MLT: CPT

## 2022-06-21 PROCEDURE — 97163 PT EVAL HIGH COMPLEX 45 MIN: CPT

## 2022-06-21 PROCEDURE — 25000003 PHARM REV CODE 250: Performed by: INTERNAL MEDICINE

## 2022-06-21 PROCEDURE — 80162 ASSAY OF DIGOXIN TOTAL: CPT | Performed by: NURSE PRACTITIONER

## 2022-06-21 PROCEDURE — 11000001 HC ACUTE MED/SURG PRIVATE ROOM

## 2022-06-21 PROCEDURE — 63600175 PHARM REV CODE 636 W HCPCS: Performed by: STUDENT IN AN ORGANIZED HEALTH CARE EDUCATION/TRAINING PROGRAM

## 2022-06-21 PROCEDURE — 97166 OT EVAL MOD COMPLEX 45 MIN: CPT

## 2022-06-21 PROCEDURE — 36415 COLL VENOUS BLD VENIPUNCTURE: CPT | Performed by: NURSE PRACTITIONER

## 2022-06-21 PROCEDURE — A4216 STERILE WATER/SALINE, 10 ML: HCPCS | Performed by: INTERNAL MEDICINE

## 2022-06-21 PROCEDURE — 63600175 PHARM REV CODE 636 W HCPCS: Performed by: INTERNAL MEDICINE

## 2022-06-21 PROCEDURE — C1751 CATH, INF, PER/CENT/MIDLINE: HCPCS

## 2022-06-21 RX ORDER — MICONAZOLE NITRATE 2 %
POWDER (GRAM) TOPICAL 2 TIMES DAILY
Status: DISCONTINUED | OUTPATIENT
Start: 2022-06-21 | End: 2022-06-27 | Stop reason: HOSPADM

## 2022-06-21 RX ORDER — LEVETIRACETAM 500 MG/1
500 TABLET ORAL 2 TIMES DAILY
Status: DISCONTINUED | OUTPATIENT
Start: 2022-06-21 | End: 2022-06-21

## 2022-06-21 RX ORDER — HYDROCODONE BITARTRATE AND ACETAMINOPHEN 10; 325 MG/1; MG/1
1 TABLET ORAL EVERY 6 HOURS PRN
Status: DISCONTINUED | OUTPATIENT
Start: 2022-06-21 | End: 2022-06-27 | Stop reason: HOSPADM

## 2022-06-21 RX ADMIN — LEVETIRACETAM 500 MG: 100 INJECTION, SOLUTION INTRAVENOUS at 11:06

## 2022-06-21 RX ADMIN — METHIMAZOLE 20 MG: 10 TABLET ORAL at 04:06

## 2022-06-21 RX ADMIN — EZETIMIBE 10 MG: 10 TABLET ORAL at 06:06

## 2022-06-21 RX ADMIN — SODIUM CHLORIDE, PRESERVATIVE FREE 10 ML: 5 INJECTION INTRAVENOUS at 06:06

## 2022-06-21 RX ADMIN — MONTELUKAST SODIUM 10 MG: 10 TABLET, COATED ORAL at 04:06

## 2022-06-21 RX ADMIN — LEVETIRACETAM 500 MG: 100 INJECTION, SOLUTION INTRAVENOUS at 08:06

## 2022-06-21 RX ADMIN — HYDROCODONE BITARTRATE AND ACETAMINOPHEN 1 TABLET: 10; 325 TABLET ORAL at 10:06

## 2022-06-21 RX ADMIN — SODIUM CHLORIDE, PRESERVATIVE FREE 10 ML: 5 INJECTION INTRAVENOUS at 12:06

## 2022-06-21 RX ADMIN — PANTOPRAZOLE SODIUM 40 MG: 40 TABLET, DELAYED RELEASE ORAL at 08:06

## 2022-06-21 RX ADMIN — APIXABAN 5 MG: 5 TABLET, FILM COATED ORAL at 08:06

## 2022-06-21 RX ADMIN — DIPHENHYDRAMINE HYDROCHLORIDE 10 ML: 25 SOLUTION ORAL at 12:06

## 2022-06-21 RX ADMIN — MICONAZOLE NITRATE 2 % TOPICAL POWDER: at 09:06

## 2022-06-21 RX ADMIN — CEFTRIAXONE 2 G: 2 INJECTION, SOLUTION INTRAVENOUS at 06:06

## 2022-06-21 RX ADMIN — PROPRANOLOL HYDROCHLORIDE 60 MG: 20 TABLET ORAL at 09:06

## 2022-06-21 RX ADMIN — HYDROCODONE BITARTRATE AND ACETAMINOPHEN 1 TABLET: 10; 325 TABLET ORAL at 04:06

## 2022-06-21 RX ADMIN — DIGOXIN 0.25 MG: 250 TABLET ORAL at 04:06

## 2022-06-21 RX ADMIN — MELATONIN TAB 3 MG 6 MG: 3 TAB at 10:06

## 2022-06-21 RX ADMIN — PROPRANOLOL HYDROCHLORIDE 60 MG: 20 TABLET ORAL at 08:06

## 2022-06-21 RX ADMIN — DIPHENHYDRAMINE HYDROCHLORIDE 10 ML: 25 SOLUTION ORAL at 06:06

## 2022-06-21 RX ADMIN — APIXABAN 5 MG: 5 TABLET, FILM COATED ORAL at 09:06

## 2022-06-21 RX ADMIN — ATORVASTATIN CALCIUM 10 MG: 10 TABLET, FILM COATED ORAL at 09:06

## 2022-06-21 RX ADMIN — DIPHENHYDRAMINE HYDROCHLORIDE 10 ML: 25 SOLUTION ORAL at 01:06

## 2022-06-21 RX ADMIN — HYDROCODONE BITARTRATE AND ACETAMINOPHEN 1 TABLET: 5; 325 TABLET ORAL at 09:06

## 2022-06-21 RX ADMIN — PREDNISONE 5 MG: 5 TABLET ORAL at 08:06

## 2022-06-21 RX ADMIN — ASPIRIN 325 MG ORAL TABLET 325 MG: 325 PILL ORAL at 08:06

## 2022-06-21 RX ADMIN — VANCOMYCIN HYDROCHLORIDE 1250 MG: 1.25 INJECTION, POWDER, LYOPHILIZED, FOR SOLUTION INTRAVENOUS at 10:06

## 2022-06-21 NOTE — PLAN OF CARE
Problem: Physical Therapy  Goal: Physical Therapy Goal  Description: Goals to be met by: 22     Patient will increase functional independence with mobility by performin. Supine to sit with MInimal Assistance  2. Sit to supine with MInimal Assistance  3. Sit to stand transfer with Contact Guard Assistance  4. Gait  x 200 feet with Contact Guard Assistance using Rolling Walker.     2022 1200 by Dolores Starks PT  Outcome: Ongoing, Progressing

## 2022-06-21 NOTE — PROGRESS NOTES
[facility] Hospital Medicine Progress Note      Chief Complaint: Inpatient Follow-up for encephalopathy     HPI:  71-year-old male admitted on 6/13/2022 with encephalopathy.  The patient was finalizing his anti-infective treatment for pneumonia.  At that time, he was on day 6 out of 7 of Levaquin.  Previous history included anterior cerebral artery CVA, dementia, atrial fibrillation, hypertension, CAD, cardiomyopathy, rheumatoid arthritis, mixed hyperlipidemia, ischemic cardiomyopathy.  There is also a diagnosis of a seizure disorder.  Labs demonstrated improving leukocytosis of 11.9.  CT scan of the head was negative.  The patient, as part of the infectious work-up was evaluated for possible meningitis via lumbar puncture.  He was placed on anti-infective therapy with vancomycin, Rocephin, ampicillin, acyclovir. LP results reviews in keeping with viral encephalitis.  Continue IV acyclovir and IV fluids.  Consult ID. MRI brain reviewed no acute finding. EEG is normal.  Daughter reports seen dental abscesses with no drainage in the past.  Get CT maxillofacial to reassess if abscess present or not, if present will request ENT consult for I and D. Add on Magic mouthwash.    Patient currently being managed for acute metabolic encephalopathy likely due to bacterial meningitis . CT maxillofacial with no abscess informed family members.  ID recommends to continue bacterial treatment for now         Today's information  Patient seen and examined at bedside. Family at bedside  Patient is awake today.  Since discontinuation of Seroquel  Vitals reviewed and stable.  I would recommend to discontinue vancomycin.  Heart rate better controlled on digoxin and propranolol  Change Keppra to p.o. route  Wife asking for home dose pain meds for him        Objective/physical exam:  General: In no acute distress, afebrile  Chest: Clear to auscultation bilaterally  Heart: RRR, +S1, S2, no appreciable  murmur  Abdomen: Soft, nontender, BS +  MSK: Warm, no lower extremity edema, no clubbing or cyanosis.  Upper extremities are contracted (which the wife attributes to his RA)  Neurologic: Alert and oriented x 2 , CN exam limited 2/2 his current clinical state.      Assessment/Plan:  Acute encephalopathy, improving   Extreme sedation, resolved   Bacterial meningitis   afib CVR  History of thyrotoxicosis, now with a TSH of 0.083 and elevated free T3 and free T4  Vascular dementia  Atrial fibrillation on Eliquis  CAD  Essential hypertension  Mixed hyperlipidemia  Ischemic cardiomyopathy with a preserved ejection fraction  Recent CVA's  Seizure disorder  Rheumatoid arthritis  Immunosuppressed state related to the above   hypotension  Hypokalemia      Plan  discontinue vancomycin.  Change Keppra to p.o. route  on digoxin and propranolol  Resume home dose Eliquis  F/up ID recs for antibiotics   CT maxillofacial with no abscess informed family members.  Add on scheduled Tylenol for pain.  MRI brain reviewed no acute findings  EEG is normal.  Magic mouthwash.  IM Haldol 5 mg Q 4 p.r.n. agitation     Patient condition:  guarded       VITAL SIGNS: 24 HRS MIN & MAX LAST   Temp  Min: 97.6 °F (36.4 °C)  Max: 99.6 °F (37.6 °C) 97.6 °F (36.4 °C)   BP  Min: 101/42  Max: 125/74 108/68   Pulse  Min: 86  Max: 109  86   Resp  Min: 17  Max: 20 17   SpO2  Min: 97 %  Max: 98 % 98 %       Recent Labs   Lab 06/17/22  0849 06/18/22  0703 06/20/22  0527   WBC 10.0 10.8 11.5   RBC 4.27* 4.36* 5.04   HGB 12.2* 12.3* 14.5   HCT 36.7* 36.9* 43.2   MCV 85.9 84.6 85.7   MCH 28.6 28.2 28.8   MCHC 33.2 33.3 33.6   RDW 14.6 14.4 15.4    364 256   MPV 10.2 9.6 10.9       Recent Labs   Lab 06/15/22  0420 06/15/22  0828 06/17/22  0849 06/18/22  0703 06/19/22  0454 06/20/22  0527   * 136   < > 135* 135* 131*   K 3.9 4.0   < > 2.8* 4.2 4.9   CO2 22* 21*   < > 21* 19* 19*   BUN 6.0* 6.5*   < > 5.4* 6.4* 6.0*   CREATININE 0.48* 0.46*   < > 0.48*  0.51* 0.49*   CALCIUM 9.0 9.3   < > 8.6* 9.0 9.3   MG  --   --   --   --  1.70 2.20   ALBUMIN 2.5* 2.6*  --   --   --   --    ALKPHOS 88 91  --   --   --   --    ALT 54 60*  --   --   --   --    AST 59* 62*  --   --   --   --    BILITOT 0.6 0.8  --   --   --   --     < > = values in this interval not displayed.          Microbiology Results (last 7 days)     Procedure Component Value Units Date/Time    Cerebrospinal Fluid Culture [[id number]] Collected: 06/15/22 1226    Order Status: Completed Specimen: CSF (Spinal Fluid) from Cerebrospinal Fluid Updated: 06/21/22 1151     CULTURE, CSF ([facility]) No Growth at 5 days     GRAM STAIN No WBCs, No bacteria seen     Blood culture #1 **CANNOT BE ORDERED STAT** [[id number]]  (Normal) Collected: 06/13/22 1618    Order Status: Completed Specimen: Blood Updated: 06/18/22 1703     CULTURE, BLOOD ([facility]) No Growth at 5 days    Blood culture #2 **CANNOT BE ORDERED STAT** [[id number]]  (Normal) Collected: 06/13/22 1618    Order Status: Completed Specimen: Blood Updated: 06/18/22 1703     CULTURE, BLOOD ([facility]) No Growth at 5 days    AFB Smear [[id number]] Collected: 06/15/22 1226    Order Status: Completed Specimen: CSF (Spinal Fluid) from Cerebrospinal Fluid Updated: 06/17/22 0659     AFB Smear No AFB seen (Direct smear only)    Mycobacteria and Nocardia Culture [[id number]] Collected: 06/15/22 1226    Order Status: Sent Specimen: CSF (Spinal Fluid) from Cerebrospinal Fluid Updated: 06/16/22 1117    Fungal Culture [[id number]] Collected: 06/15/22 1226    Order Status: Sent Specimen: CSF (Spinal Fluid) from Lumbar Updated: 06/16/22 0949    Cryptococcal antigen, CSF (Tube 3) [[id number]] Collected: 06/15/22 1226    Order Status: Completed Specimen: CSF (Spinal Fluid) from CSF Tap, Tube 3 Updated: 06/15/22 1711     CRYPTOCOCCAL ANTIGEN TITER ([facility]) --     CRYPTOCOCCAL ANTIGEN, CSF ([facility]) Negative    Cryptococcal Antigen, CSF [[id number]]     Order Status: Sent Specimen: CSF (Spinal Fluid)             See below for Radiology    Scheduled Med:   (Magic mouthwash) 1:1:1 diphenhydramine(Benadryl) 12.5mg/5ml liq, aluminum & magnesium hydroxide-simethicone (Maalox), LIDOcaine viscous 2%  10 mL Swish & Spit Q6H    apixaban  5 mg Oral BID    aspirin  325 mg Oral Daily    atorvastatin  10 mg Oral QHS    cefTRIAXone (ROCEPHIN) IVPB  2 g Intravenous Q12H    digoxin  0.25 mg Oral Daily    ezetimibe  10 mg Oral Daily    levETIRAcetam  500 mg Oral BID    methIMAzole  20 mg Oral Daily    montelukast  10 mg Oral Daily    pantoprazole  40 mg Oral Daily    predniSONE  5 mg Oral Daily    propranoloL  60 mg Oral BID    sodium chloride 0.9%  10 mL Intravenous Q6H        Continuous Infusions:       PRN Meds:  acetaminophen, acetaminophen, haloperidol lactate, HYDROcodone-acetaminophen, lorazepam, melatonin, metoprolol, ondansetron, sodium chloride 0.9%, Flushing PICC Protocol **AND** sodium chloride 0.9% **AND** sodium chloride 0.9%         Patient condition:  Stable/Fair/Guarded/ Serious/ Critical    Anticipated discharge and Disposition:      All diagnosis and differential diagnosis have been reviewed; assessment and plan has been documented; I have personally reviewed the labs and test results that are presently available; I have reviewed the patients medication list; I have reviewed the consulting providers response and recommendations. I have reviewed or attempted to review medical records based upon their availability    All of the patient's questions have been  addressed and answered. Patient's is agreeable to the above stated plan. I will continue to monitor closely and make adjustments to medical management as needed.  _____________________________________________________________________    Nutrition Status:    Radiology:  X-Ray Chest 1 View  Narrative: EXAMINATION:  XR CHEST 1 VIEW    CPT 37832    CLINICAL HISTORY:  PICC Placement;    COMPARISON:  June 16, 2022    FINDINGS:  Cardiomediastinal silhouette  improved parenchymal changes to be essentially unchanged as compared with the previous exam.    Right-sided PICC line is identified tip projecting at the junction of the superior vena cava and right atrium  Impression: No significant change since the previous exam.    PICC line as above    Electronically signed by: De Conte  Date:    06/19/2022  Time:    05:31      Zulay Howard MD   06/21/2022

## 2022-06-21 NOTE — PROGRESS NOTES
Infectious Diseases Progress Note  71-year-old male with past medical history of paroxysmal AFib, HTN, HLD, hypothyroidism, seizure disorder, rheumatoid arthritis, CAD, with the recent hospitalizations for CVA in April, is admitted to [facility] on 06/13/2022, presenting to the ED with report of continued fevers of up to 102 at home.  Apparently had visited the ER a few times since his discharge from the rehab facility .  Including mid May with report of twitching on worsening altered mental status, and seen by Neurology, placed on care and then the week prior to this admission had developed fevers with worsening confusion and chest x-ray indicated for possible left lower lobe infiltrate, treated with oral Levaquin and discharge.  His wife tells been he had been on antibiotics continuously up until this admission including initially ampicillin and then most recently Levaquin.  He does take Cimzia and prednisone rheumatoid arthritis.  On presentation he was evaluated extensively and noted to have no fevers but by the next day had low-grade temperature of up to 99.5.  He did have leukocytosis of 12.8, anemic with low albumin.  He had a lumbar puncture with CSF analysis showing 20 WBC predodaughtminantly lymphocytes at 76%, elevated protein 91.4 and low glucose at 35. CSF cultures have remained negative with Gram stain showing no bacteria and no WBC.  CSF cryptococcal antigen and PCR panel negative.  Blood cultures have remained negative and review of records show 6/6 blood cultures which have been negative as well.  Chest x-ray on admission showed no acute cardiopulmonary process.  CT scan of the head with no acute intracranial findings, but had old infarcts, chronic micro angiopathic ischemia and atrophy.  MRI of the brain showed no acute infarcts, abnormal diffuse signal in the extra-axial space history with concern for meningitis/infection, needed to be correlated with CSF studies and  clinical findings.  CT maxillofacial showed no drainable fluid collection.    He is currently on antibiotic coverage vancomycin and ceftriaxone.    Subjective:  No new complaints, no fevers, doing about the same.  Lying in bed in no acute distress      Past Medical History:   Diagnosis Date    Acute left arterial ischemic stroke, KINGA (anterior cerebral artery) 5/3/2022    Acute osteomyelitis 5/11/2022    Atherosclerosis of coronary artery 5/11/2022    Atrial fibrillation 5/3/2022    Benign essential hypertension 5/3/2022    Cardiomyopathy     Coronary artery disease     Diverticulosis     Esophageal reflux 5/11/2022    Fatigue     Focal seizure 5/17/2022    Generalized anxiety disorder     GERD (gastroesophageal reflux disease)     Graves disease     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/2022    HTN (hypertension)     Hyperthyroidism     Idiopathic peripheral neuropathy 5/11/2022    Irritable bowel syndrome without diarrhea     Ischemic cardiomyopathy 5/3/2022    Mixed hyperlipidemia 5/11/2022    Other sequelae following unspecified cerebrovascular disease 5/9/2022    Paroxysmal atrial fibrillation     Rheumatoid arthritis 5/11/2022    Rheumatoid arthritis, unspecified     Shingles     Staph aureus infection     Stroke     Thyroiditis, unspecified     Thyrotoxicosis 5/3/2022     Past Surgical History:   Procedure Laterality Date    CATARACT EXTRACTION      CYST REMOVAL Left     TONSILLECTOMY      TOTAL KNEE ARTHROPLASTY      VASECTOMY       Social History     Socioeconomic History    Marital status:    Tobacco Use    Smoking status: Never Smoker    Smokeless tobacco: Never Used   Substance and Sexual Activity    Alcohol use: Never    Drug use: Never    Sexual activity: Yes       ROS   Constitutional: Positive for malaise/fatigue.   HENT: Negative.    Respiratory: Negative.    Gastrointestinal: Negative.    Genitourinary: Negative.  "   Musculoskeletal: Negative.    Neurological: Positive for weakness.   Endo/Heme/Allergies: Negative.    All other Systems review done and negative.    Review of patient's allergies indicates:   Allergen Reactions    Ace inhibitors Swelling     Lip swelling    Morphine     Morphine sulfate     Nafcillin Itching    Pradaxa [dabigatran etexilate] Other (See Comments)     Abdominal cramping    Sulfamethoxazole Rash         Scheduled Meds:   (Magic mouthwash) 1:1:1 diphenhydramine(Benadryl) 12.5mg/5ml liq, aluminum & magnesium hydroxide-simethicone (Maalox), LIDOcaine viscous 2%  10 mL Swish & Spit Q6H    apixaban  5 mg Oral BID    aspirin  325 mg Oral Daily    atorvastatin  10 mg Oral QHS    cefTRIAXone (ROCEPHIN) IVPB  2 g Intravenous Q12H    digoxin  0.25 mg Oral Daily    ezetimibe  10 mg Oral Daily    levetiracetam IV  500 mg Intravenous Q12H    methIMAzole  20 mg Oral Daily    montelukast  10 mg Oral Daily    pantoprazole  40 mg Oral Daily    predniSONE  5 mg Oral Daily    propranoloL  60 mg Oral BID    sodium chloride 0.9%  10 mL Intravenous Q6H    vancomycin (VANCOCIN) IVPB  1,250 mg Intravenous Q12H     Continuous Infusions:  PRN Meds:acetaminophen, acetaminophen, haloperidol lactate, HYDROcodone-acetaminophen, lorazepam, melatonin, metoprolol, ondansetron, sodium chloride 0.9%, Flushing PICC Protocol **AND** sodium chloride 0.9% **AND** sodium chloride 0.9%, Pharmacy to dose Vancomycin consult **AND** vancomycin - pharmacy to dose    Objective:  /74   Pulse 109   Temp 97.7 °F (36.5 °C) (Oral)   Resp 20   Ht 5' 8" (1.727 m)   Wt 64.2 kg (141 lb 8 oz)   SpO2 97%   BMI 21.51 kg/m²     Physical Exam:   Physical Exam   Vitals reviewed.   Constitutional:       General: He is not in acute distress.     Appearance: He is not toxic-appearing.   HENT:      Head: Normocephalic and atraumatic.   Cardiovascular:      Rate and Rhythm: Normal rate and regular rhythm.      Heart sounds: " Normal heart sounds.   Pulmonary:      Effort: No respiratory distress.      Breath sounds: Normal breath sounds.   Abdominal:      General: Bowel sounds are normal. There is no distension.      Palpations: Abdomen is soft.      Tenderness: There is no abdominal tenderness.   Musculoskeletal:      Cervical back: Neck supple.      Comments: Arthritic deformities of b/l hands   Skin:     Findings: No erythema or rash.   Neurological:      Mental Status: He is alert.      Comments: Follows commands   Psychiatric:      Comments: Calm and cooperative     Imaging  Imaging Results          CT Head Without Contrast (Final result)  Result time 06/13/22 17:27:43    Final result by Ethan Molina MD (06/13/22 17:27:43)                 Impression:      1.  No acute intracranial findings identified.    2.  Old infarcts, chronic microangiopathic ischemia and atrophy.      Electronically signed by: Ethan Molina  Date:    06/13/2022  Time:    17:27             Narrative:    EXAMINATION:  CT HEAD WITHOUT CONTRAST    CLINICAL HISTORY:  Mental status change, unknown cause;    TECHNIQUE:  Sequential axial images were performed of the brain without contrast.    Dose product length of 1167 mGycm. Automated exposure control was utilized to minimize radiation dose.    COMPARISON:  CT brain without contrast May 15, 2022 in MRI brain April 27, 2022.    FINDINGS:  There is no intracranial mass effect, midline shift, hydrocephalus or hemorrhage. There is no sulcal effacement or low attenuation changes to suggest recent large vessel territory infarction.  There are old infarcts which involve the right frontal lobe and the left cingulate cortical/subcortical location.  Chronic microvascular ischemic changes are mild.  The ventricular system and sulcal markings prominence is consistent with atrophy. There is no acute extra axial fluid collection. Visualized paranasal sinuses are clear without mucosal thickening, polypoidal abnormality or  air-fluid levels. Mastoid air cells aeration is optimal.                               X-Ray Chest AP Portable (Final result)  Result time 06/13/22 17:09:45    Final result by Ethan Molina MD (06/13/22 17:09:45)                 Impression:      NO ACUTE CARDIOPULMONARY PROCESS IDENTIFIED.      Electronically signed by: Ethan Molina  Date:    06/13/2022  Time:    17:09             Narrative:    EXAMINATION:  XR CHEST AP PORTABLE    CLINICAL HISTORY:  Fever, unspecified    TECHNIQUE:  One view    COMPARISON:  June 6, 2022.    FINDINGS:  Cardiopericardial silhouette is within normal limits.  No acute dense focal or segmental consolidation, congestive process, pleural effusions or pneumothorax.                                 Lab Review   Recent Results (from the past 24 hour(s))   Basic Metabolic Panel    Collection Time: 06/20/22  5:27 AM   Result Value Ref Range    Sodium Level 131 (L) 136 - 145 mmol/L    Potassium Level 4.9 3.5 - 5.1 mmol/L    Chloride 104 98 - 107 mmol/L    Carbon Dioxide 19 (L) 23 - 31 mmol/L    Glucose Level 90 82 - 115 mg/dL    Blood Urea Nitrogen 6.0 (L) 8.4 - 25.7 mg/dL    Creatinine 0.49 (L) 0.73 - 1.18 mg/dL    BUN/Creatinine Ratio 12     Calcium Level Total 9.3 8.8 - 10.0 mg/dL    Estimated GFR-Non  >60 mls/min/1.73/m2    Anion Gap 8.0 mEq/L   Magnesium    Collection Time: 06/20/22  5:27 AM   Result Value Ref Range    Magnesium Level 2.20 1.60 - 2.60 mg/dL   CBC with Differential    Collection Time: 06/20/22  5:27 AM   Result Value Ref Range    WBC 11.5 4.5 - 11.5 x10(3)/mcL    RBC 5.04 4.70 - 6.10 x10(6)/mcL    Hgb 14.5 14.0 - 18.0 gm/dL    Hct 43.2 42.0 - 52.0 %    MCV 85.7 80.0 - 94.0 fL    MCH 28.8 27.0 - 31.0 pg    MCHC 33.6 33.0 - 36.0 mg/dL    RDW 15.4 11.5 - 17.0 %    Platelet 256 130 - 400 x10(3)/mcL    MPV 10.9 9.4 - 12.4 fL    Neut % 55.3 %    Lymph % 34.4 %    Mono % 8.5 %    Eos % 1.1 %    Basophil % 0.3 %    Lymph # 3.95 0.6 - 4.6 x10(3)/mcL    Neut # 6.4  2.1 - 9.2 x10(3)/mcL    Mono # 0.98 0.1 - 1.3 x10(3)/mcL    Eos # 0.13 0 - 0.9 x10(3)/mcL    Baso # 0.03 0 - 0.2 x10(3)/mcL    IG# 0.05 (H) 0 - 0.0155 x10(3)/mcL    IG% 0.4 0 - 0.43 %    NRBC% 0.0 %   VANCOMYCIN, TROUGH    Collection Time: 06/20/22  5:27 AM   Result Value Ref Range    Vancomycin Trough 18.5 15.0 - 20.0 ug/ml             Assessment/Plan:  1. Acute meningitis with encephalopathy  2. SIRS with fevers   3. Immunocompromised on immunosuppressive drugs  4. Rheumatoid arthritis  5. History of CVA  6. Seizure disorder  7. Anemia  8. Protein calorie malnutrition       -We will plan to further deescalate on antimicrobial coverage with discontinuation of vancomycin in a.m. if continues to do well, keeping on ceftriaxone #5  -Low-grade  fever noted and no leukocytosis, follow  -CSF analysis consistent with meningitis with specific pathogen not known, predominantly CSF lymphocytosis with hypoglycorrhachia which could be indicative of even a fungal meningitis, however a pre-treated bacterial meningitis more likely since improving on antibacterial coverage   -CSF PCR panel with no organisms detected including negative for [place] HSV 1, 2 and enterovirus.  CSF cryptococcal antigen negative, negative Gram stains and cultures, follow  cultures including fungal and AFB  -TB meningitis less likely with no history of TB exposure and unusual presentation.  Moreover, he is on Cimzia and must have been screened for latent TB prior to initiation.  We can confirm with records from Rheumatology  -Immunocompromised and we could evaluate further with T spot and CSF MTB PCR if cultures remain negative but again TB less likely since he is clinically improving   -Discussed with patient and nursing staff

## 2022-06-21 NOTE — PT/OT/SLP EVAL
"Occupational Therapy   Evaluation    Name: Quan Contreras  MRN: [id number]  Admitting Diagnosis:  Fever, seizure disorder, RA, vascular dementia, bacterial meningitis, bilateral KINGA territorial encephalomalacia   Recent Surgery: * No surgery found *      Recommendations:     Discharge Recommendations: TBD pending pts progress- will need rehab or SNF       Assessment:     Quan Contreras is a 71 y.o. male with a medical diagnosis of Fever, seizure disorder, RA, vascular dementia, bacterial meningitis, bilateral KINGA territorial encephalomalacia. Performance deficits affecting function: impaired self care skills, impaired functional mobilty, impaired cognition, decreased safety awareness.      Rehab Prognosis: Good; patient would benefit from acute skilled OT services to address these deficits and reach maximum level of function.       Plan:     Patient to be seen 5 x/week, daily to address the above listed problems via self-care/home management  Plan of Care Expires: 07/05/22  Plan of Care Reviewed with: patient, daughter    Subjective     Chief Complaint: "no" when referring to any bed mobility or functional mobility   Patient/Family Comments/goals: none stated     Occupational Profile:  Living Environment: As per daughter, pt lives at home with his wife. Pt has two bathrooms, one has a tub and the other a walk in shower with no grab bars.   Previous level of function: As per daughter, pt had "good days and bad days" prior to hospitalization. Daughter stated that pt was able to perform toileting on his own and "get by" well on his own. Daughter also reports that pt was able to feed himself. However stated that she is not sure about UB/LB dressing but thinks that wife helps. Pt with recent stroke around April, went to rehab and recovered relatively well, ambulating and participating in ADLs.  Reports he has had several hospitalizations since CVA.  Equipment Used at Home: daughter reports pt has RW and cane that he does " "not use although recommended that he uses it  Assistance upon Discharge: Pt will have assistance form family upon discharge.     Pain/Comfort:  None reported but did yell "no" when OT attempted to assist with bed mobility.     Patients cultural, spiritual, Hindu conflicts given the current situation: no    Objective:     Patient found supine with bed alarm, RUE PICC line upon OT entry to room.    General Precautions: airborne,contact, seizure, fall pxns  Respiratory Status: Room air    Occupational Performance:    Bed Mobility:    Patient completed Supine to Sit with maximal assistance  Patient completed Sit to Supine with maximal assistance    Functional Mobility/Transfers:  Functional Mobility: Limited by pt resisting and having difficulties clearing buttocks off EOB.       Cognitive/Visual Perceptual:  Cognitive/Psychosocial Skills:     -       Oriented to: pt able to say frist name.  Poor participation in questions.  When asked who his daughter was he responded "my daughter" but did not say her name   -       Follows Commands/attention:Inattentive and Easily distracted, pt required moderate cues to perform command following and redirection to answer eval questions  -       Mood/Affect/Coping skills/emotional control: Lashes out intermittently then laughs (labile), Agitated and Guarded      Physical Exam:  Upper Extremity Strength:    -       Right Upper Extremity: 3/5, inconsistently following commands required for MMT. RA deformoties noted in bilateral digits 4 & 5  -       Left Upper Extremity: -3/5,  inconsistently following commands required for MMT.       Treatment & Education:  Upon entering room pt found with CNA and RN attempting to change brief and bed sheets. OT assisted pt to the EOB, pt required max A and was agitated with bed mobility. Pt was observed following commands inconsistently. OT attempted to assist pt in sit to stand, pt with resistance and did not clear buttock. OT to continue " assessing MMT, functional mobility, and assistance in ADLs and update. Pts daughter was in the room, she attempted to reason with pt, pt did not respond.      Patient left supine with all lines intact, call button in reach and daughter present    GOALS:   Multidisciplinary Problems       Occupational Therapy Goals          Problem: Occupational Therapy    Goal Priority Disciplines Outcome Interventions   Occupational Therapy Goal     OT, PT/OT Ongoing, Progressing    Description: Goals to be met by: 7/5/22    Patient will increase functional independence with ADLs by performing:    UE Dressing with SBA  LE Dressing with Stand-by Assistance.  Grooming while standing at sink with Stand-by Assistance.  Toileting from toilet with Stand-by Assistance for hygiene and clothing management.   Toilet transfer to toilet with Stand-by Assistance.                         History:     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/2022    HTN (hypertension)     Hyperthyroidism     Idiopathic peripheral neuropathy 5/11/2022    Irritable bowel syndrome without diarrhea     Ischemic cardiomyopathy 5/3/2022    Mixed hyperlipidemia 5/11/2022    Other sequelae following unspecified cerebrovascular disease 5/9/2022    Paroxysmal atrial fibrillation     Rheumatoid arthritis 5/11/2022    Rheumatoid arthritis, unspecified     Shingles     Staph aureus infection     Stroke     Thyroiditis, unspecified     Thyrotoxicosis  5/3/2022       Past Surgical History:   Procedure Laterality Date    CATARACT EXTRACTION      CYST REMOVAL Left     TONSILLECTOMY      TOTAL KNEE ARTHROPLASTY      VASECTOMY         Time Tracking:     OT Date of Treatment: 06/21/22  OT Start Time: 1429  OT Stop Time: 1500  OT Total Time (min): 31 min    Billable Minutes:Evaluation mod complexity     6/21/2022  Note edited and signed by OT Davina Whittaker

## 2022-06-21 NOTE — PT/OT/SLP EVAL
Physical Therapy Evaluation    Patient Name:  Quan Contreras   MRN:  [id number]    Recommendations:     Discharge Recommendations:  rehabilitation facility, nursing facility, skilled, LTACH (long-term acute care hospital)   Discharge Equipment Recommendations:  (TBD by next level of care)   Barriers to discharge: Increased need of assistance    Assessment:     Qaun Contreras is a 71 y.o. male admitted with a medical diagnosis of Fever. CSF analysis consistent with meningitis. He is on airborne and droplet precautions. Patient only oriented to person during session. Per wife, patient was hospitalized in April for acute CVA w/ residual left sided weakness. He spent time in rehab and had multiple hospitalizations since. Per spouse, prior to April, he was completely independent. At time of PT evaluation, patient presents with the following impairments/functional limitations:  weakness, impaired endurance, impaired self care skills, impaired functional mobilty, gait instability, impaired balance, impaired cognition, decreased coordination, decreased upper extremity function, decreased lower extremity function, decreased safety awareness. He required MOD A for bed mobility. MOD A for sit<>stand. Ambulated 17 ft with RW & MOD A. Very poor safety awareness. Difficulty with holding left hand on walker. Maximum assistance with steering RW. It appears patient will need some sort of placement at discharge, as wife will not be able to care for him in his current state. Will continue to assess needs.     Rehab Prognosis: Good and Fair; patient would benefit from acute skilled PT services to address these deficits and reach maximum level of function.    Recent Surgery: * No surgery found *      Plan:     During this hospitalization, patient to be seen 6 x/week to address the identified rehab impairments via gait training, therapeutic activities, therapeutic exercises, neuromuscular re-education and progress toward the following  goals:    · Plan of Care Expires:  22    Subjective     Chief Complaint: fatigue & weakness  Patient/Family Comments/goals:  Pain/Comfort:  · Pain Rating 1: 0/10    Patients cultural, spiritual, Anglican conflicts given the current situation: no    Living Environment:  Lives with spouse in single level home with 1 step to enter.   Prior to admission, patient required assistance with ADL's.  Equipment used at home: cane, straight, walker, rolling.  DME owned (not currently used): none.  Upon discharge, patient will have assistance from TBD.    Objective:     Communicated with nurse prior to session.  Patient found HOB elevated with telemetry, peripheral IV  upon PT entry to room.    General Precautions: Standard, fall, droplet, respiratory, airborne   Orthopedic Precautions:N/A   Braces: N/A  Respiratory Status: Room air    Exams:  · Cognitive Exam:  Patient is oriented to Person, Place, Time and Situation  · Sensation:    · -       Intact  · RLE ROM: WFL  · RLE Strength: Deficits: -4/5 grossly  · LLE ROM: WFL  · LLE Strength: Deficits: -3/5 grossly    Functional Mobility:  · Bed Mobility:     · Supine to Sit: moderate assistance  · Transfers:     · Sit to Stand:  moderate assistance with rolling walker  · Gait: 17 ft with RW & MOD A.  · Balance: poor    AM-PAC 6 CLICK MOBILITY  Total Score:12     Patient left HOB elevated with all lines intact, call button in reach, bed alarm on and spouse present.    GOALS:   Multidisciplinary Problems     Physical Therapy Goals        Problem: Physical Therapy    Goal Priority Disciplines Outcome Goal Variances Interventions   Physical Therapy Goal     PT, PT/OT Ongoing, Progressing     Description: Goals to be met by: 22     Patient will increase functional independence with mobility by performin. Supine to sit with MInimal Assistance  2. Sit to supine with MInimal Assistance  3. Sit to stand transfer with Contact Guard Assistance  4. Gait  x 200 feet with  Contact Guard Assistance using Rolling Walker.                      History:     Past Medical History:   Diagnosis Date    Acute left arterial ischemic stroke, KINGA (anterior cerebral artery) 5/3/2022    Acute osteomyelitis 5/11/2022    Atherosclerosis of coronary artery 5/11/2022    Atrial fibrillation 5/3/2022    Benign essential hypertension 5/3/2022    Cardiomyopathy     Coronary artery disease     Diverticulosis     Esophageal reflux 5/11/2022    Fatigue     Focal seizure 5/17/2022    Generalized anxiety disorder     GERD (gastroesophageal reflux disease)     Graves disease     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/2022    HTN (hypertension)     Hyperthyroidism     Idiopathic peripheral neuropathy 5/11/2022    Irritable bowel syndrome without diarrhea     Ischemic cardiomyopathy 5/3/2022    Mixed hyperlipidemia 5/11/2022    Other sequelae following unspecified cerebrovascular disease 5/9/2022    Paroxysmal atrial fibrillation     Rheumatoid arthritis 5/11/2022    Rheumatoid arthritis, unspecified     Shingles     Staph aureus infection     Stroke     Thyroiditis, unspecified     Thyrotoxicosis 5/3/2022       Past Surgical History:   Procedure Laterality Date    CATARACT EXTRACTION      CYST REMOVAL Left     TONSILLECTOMY      TOTAL KNEE ARTHROPLASTY      VASECTOMY         Time Tracking:     PT Received On: 06/21/22  PT Start Time: 1105     PT Stop Time: 1130  PT Total Time (min): 25 min     Billable Minutes: Evaluation 25 minutes      06/21/2022

## 2022-06-21 NOTE — PLAN OF CARE
Problem: Occupational Therapy  Goal: Occupational Therapy Goal  Description: Goals to be met by: 7/5/22    Patient will increase functional independence with ADLs by performing:    UE Dressing with SBA  LE Dressing with Stand-by Assistance.  Grooming while standing at sink with Stand-by Assistance.  Toileting from toilet with Stand-by Assistance for hygiene and clothing management.   Toilet transfer to toilet with Stand-by Assistance.    Outcome: Ongoing, Progressing

## 2022-06-22 PROCEDURE — 63600175 PHARM REV CODE 636 W HCPCS: Performed by: INTERNAL MEDICINE

## 2022-06-22 PROCEDURE — 25000003 PHARM REV CODE 250: Performed by: INTERNAL MEDICINE

## 2022-06-22 PROCEDURE — 25000003 PHARM REV CODE 250: Performed by: NURSE PRACTITIONER

## 2022-06-22 PROCEDURE — 63600175 PHARM REV CODE 636 W HCPCS: Performed by: NURSE PRACTITIONER

## 2022-06-22 PROCEDURE — 92526 ORAL FUNCTION THERAPY: CPT

## 2022-06-22 PROCEDURE — 11000001 HC ACUTE MED/SURG PRIVATE ROOM

## 2022-06-22 PROCEDURE — A4216 STERILE WATER/SALINE, 10 ML: HCPCS | Performed by: INTERNAL MEDICINE

## 2022-06-22 RX ORDER — HYDROMORPHONE HYDROCHLORIDE 2 MG/ML
1 INJECTION, SOLUTION INTRAMUSCULAR; INTRAVENOUS; SUBCUTANEOUS EVERY 6 HOURS PRN
Status: DISCONTINUED | OUTPATIENT
Start: 2022-06-22 | End: 2022-06-27 | Stop reason: HOSPADM

## 2022-06-22 RX ORDER — DIPHENHYDRAMINE HYDROCHLORIDE 50 MG/ML
12.5 INJECTION INTRAMUSCULAR; INTRAVENOUS EVERY 6 HOURS PRN
Status: DISCONTINUED | OUTPATIENT
Start: 2022-06-22 | End: 2022-06-27 | Stop reason: HOSPADM

## 2022-06-22 RX ADMIN — APIXABAN 5 MG: 5 TABLET, FILM COATED ORAL at 09:06

## 2022-06-22 RX ADMIN — DIPHENHYDRAMINE HYDROCHLORIDE 12.5 MG: 50 INJECTION, SOLUTION INTRAMUSCULAR; INTRAVENOUS at 03:06

## 2022-06-22 RX ADMIN — MICONAZOLE NITRATE 2 % TOPICAL POWDER: at 10:06

## 2022-06-22 RX ADMIN — SODIUM CHLORIDE, PRESERVATIVE FREE 10 ML: 5 INJECTION INTRAVENOUS at 06:06

## 2022-06-22 RX ADMIN — LEVETIRACETAM 500 MG: 100 INJECTION, SOLUTION INTRAVENOUS at 09:06

## 2022-06-22 RX ADMIN — HYDROCODONE BITARTRATE AND ACETAMINOPHEN 1 TABLET: 10; 325 TABLET ORAL at 09:06

## 2022-06-22 RX ADMIN — MELATONIN TAB 3 MG 6 MG: 3 TAB at 09:06

## 2022-06-22 RX ADMIN — PROPRANOLOL HYDROCHLORIDE 60 MG: 20 TABLET ORAL at 09:06

## 2022-06-22 RX ADMIN — MONTELUKAST SODIUM 10 MG: 10 TABLET, COATED ORAL at 05:06

## 2022-06-22 RX ADMIN — CEFTRIAXONE 2 G: 2 INJECTION, SOLUTION INTRAVENOUS at 06:06

## 2022-06-22 RX ADMIN — HYDROMORPHONE HYDROCHLORIDE 1 MG: 2 INJECTION INTRAMUSCULAR; INTRAVENOUS; SUBCUTANEOUS at 03:06

## 2022-06-22 RX ADMIN — SODIUM CHLORIDE, PRESERVATIVE FREE 10 ML: 5 INJECTION INTRAVENOUS at 12:06

## 2022-06-22 RX ADMIN — DIPHENHYDRAMINE HYDROCHLORIDE 10 ML: 25 SOLUTION ORAL at 12:06

## 2022-06-22 RX ADMIN — DIPHENHYDRAMINE HYDROCHLORIDE 10 ML: 25 SOLUTION ORAL at 05:06

## 2022-06-22 RX ADMIN — DIGOXIN 0.25 MG: 250 TABLET ORAL at 05:06

## 2022-06-22 RX ADMIN — ASPIRIN 325 MG ORAL TABLET 325 MG: 325 PILL ORAL at 09:06

## 2022-06-22 RX ADMIN — METHIMAZOLE 20 MG: 10 TABLET ORAL at 05:06

## 2022-06-22 RX ADMIN — CEFTRIAXONE 2 G: 2 INJECTION, SOLUTION INTRAVENOUS at 05:06

## 2022-06-22 RX ADMIN — EZETIMIBE 10 MG: 10 TABLET ORAL at 05:06

## 2022-06-22 RX ADMIN — PREDNISONE 5 MG: 5 TABLET ORAL at 09:06

## 2022-06-22 RX ADMIN — MICONAZOLE NITRATE 2 % TOPICAL POWDER: at 09:06

## 2022-06-22 RX ADMIN — PANTOPRAZOLE SODIUM 40 MG: 40 TABLET, DELAYED RELEASE ORAL at 09:06

## 2022-06-22 RX ADMIN — ATORVASTATIN CALCIUM 10 MG: 10 TABLET, FILM COATED ORAL at 09:06

## 2022-06-22 NOTE — PROGRESS NOTES
Nutrition   Progress Note      Recommendations:  Diet per SLP recs  Continue boost plus oral supplement      Reason for Evaluation:  Identified at risk by screening criteria    Diagnosis:    1. Pneumonia due to infectious organism, unspecified laterality, unspecified part of lung    2. Fever    3. Pneumonia    4. Altered mental status, unspecified altered mental status type    5. Confusion    6. Dental abscess    7. Encephalopathy    8. Fever, unspecified fever cause        Relevant Medical History:    Past Medical History:   Diagnosis Date    Acute left arterial ischemic stroke, KINGA (anterior cerebral artery) 5/3/2022    Acute osteomyelitis 5/11/2022    Atherosclerosis of coronary artery 5/11/2022    Atrial fibrillation 5/3/2022    Benign essential hypertension 5/3/2022    Cardiomyopathy     Coronary artery disease     Diverticulosis     Esophageal reflux 5/11/2022    Fatigue     Focal seizure 5/17/2022    Generalized anxiety disorder     GERD (gastroesophageal reflux disease)     Graves disease     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/2022    HTN (hypertension)     Hyperthyroidism     Idiopathic peripheral neuropathy 5/11/2022    Irritable bowel syndrome without diarrhea     Ischemic cardiomyopathy 5/3/2022    Mixed hyperlipidemia 5/11/2022    Other sequelae following unspecified cerebrovascular disease 5/9/2022    Paroxysmal atrial fibrillation     Rheumatoid arthritis 5/11/2022    Rheumatoid arthritis, unspecified     Shingles     Staph aureus infection     Stroke     Thyroiditis, unspecified     Thyrotoxicosis 5/3/2022         Nutrition Diet History:    Factors affecting nutritional intake: decreased appetite    Food / Sikhism / Culture Preferences:      Nutrition Prescription Ordered:    Current Diet Order: pureed, boost plus chocolate    Appetite:  good    PO intake: 75 - 100 %      Labs / Medications / Procedures:    Nutrition Related Medications:  "none noted    Nutrition Related Labs:  6/20: Na 131, BUN 6, Crea0.49      Anthropometrics:  Height: 5' 8" (1.727 m)  Admit Weight:  Weight: 90.7 kg (200 lb)  Latest Weight:  64.2 kg (141 lb 8 oz)    Wt Readings from Last 5 Encounters:   06/14/22 64.2 kg (141 lb 8 oz)   06/06/22 68 kg (150 lb)   05/23/22 72.5 kg (159 lb 12.8 oz)   05/18/22 73.5 kg (162 lb)   05/15/22 74.4 kg (164 lb)     IBW: 70kg  %IBW: 128%  UBW: unknown  %Weight Change: n/a  Body mass index is 21.51 kg/m².  BMI classification:  Underweight (BMI less than 22 if > 65 years of age)      Nutrition Narrative:  - pt not available, noted possible appetite and weight loss prior to admit, will attempt early follow up, NPO for procedure  6/22: pt's family member reports pt tolerating diet, eating well for most meals, drinking oral supplement, would like to switch to vanilla flavor for now    Monitoring and Evaluation:    Nutrition Monitoring and Evaluation:  food and beverage intake    Nutrition Risk:  Level of Nutrition Risk:  Low  Frequency of Follow up:  Dietitian will f/up within 7 days.            "

## 2022-06-22 NOTE — PROGRESS NOTES
[facility] Hospital Medicine Progress Note        Chief Complaint: Inpatient Follow-up for encephalopathy     HPI:  71-year-old male admitted on 6/13/2022 with encephalopathy.  The patient was finalizing his anti-infective treatment for pneumonia.  At that time, he was on day 6 out of 7 of Levaquin.  Previous history included anterior cerebral artery CVA, dementia, atrial fibrillation, hypertension, CAD, cardiomyopathy, rheumatoid arthritis, mixed hyperlipidemia, ischemic cardiomyopathy.  There is also a diagnosis of a seizure disorder.  Labs demonstrated improving leukocytosis of 11.9.  CT scan of the head was negative.  The patient, as part of the infectious work-up was evaluated for possible meningitis via lumbar puncture.  He was placed on anti-infective therapy with vancomycin, Rocephin, ampicillin, acyclovir. LP results reviews in keeping with viral encephalitis.  Continue IV acyclovir and IV fluids.  Consult ID. MRI brain reviewed no acute finding. EEG is normal.  Daughter reports seen dental abscesses with no drainage in the past.  Get CT maxillofacial to reassess if abscess present or not, if present will request ENT consult for I and D. Add on Magic mouthwash.     Patient currently being managed for acute metabolic encephalopathy likely due to bacterial meningitis . ID recommends to continue bacterial treatment for now-currently on iv ceftriaxone alone          Today's information  Patient seen and examined at bedside. Family at bedside  C/o - pain in all his joints- known to have severe arthritic pain   - adjust pain meds , consider use of IV steroid for rheumatoid flare if pain remains uncontrolled         Objective/physical exam:  General: In no acute distress, afebrile  Chest: Clear to auscultation bilaterally  Heart: RRR, +S1, S2, no appreciable murmur  Abdomen: Soft, nontender, BS +  MSK: Warm, no lower extremity edema, no clubbing or cyanosis.  Upper extremities are  contracted (which the wife attributes to his RA)  Neurologic: Alert and oriented x 2 , CN exam limited 2/2 his current clinical state.      Assessment/Plan:  Acute encephalopathy, improving   Extreme sedation, resolved   Bacterial meningitis   afib CVR  Severe Rheumatoid arthritis   History of thyrotoxicosis, now with a TSH of 0.083 and elevated free T3 and free T4  Vascular dementia  Atrial fibrillation on Eliquis  CAD  Essential hypertension  Mixed hyperlipidemia  Ischemic cardiomyopathy with a preserved ejection fraction  Recent CVA's  Seizure disorder  Rheumatoid arthritis  Immunosuppressed state related to the above   hypotension  Hypokalemia      Plan  IV Dilaudid prn severe pain  Change Keppra to p.o. route  on digoxin and propranolol  Resume home dose Eliquis  F/up ID recs for antibiotics - iv ceftriaxone 2g q24 daily   CT maxillofacial with no abscess informed family members.  Add on scheduled Tylenol for pain.  MRI brain reviewed no acute findings  EEG is normal.  Magic mouthwash.  IM Haldol 5 mg Q 4 p.r.n. agitation     Patient condition:  guarded        VITAL SIGNS: 24 HRS MIN & MAX LAST   Temp  Min: 97.2 °F (36.2 °C)  Max: 98.4 °F (36.9 °C) 97.8 °F (36.6 °C)   BP  Min: 105/73  Max: 144/80 105/73   Pulse  Min: 83  Max: 92  87   Resp  Min: 18  Max: 21 (!) 21   SpO2  Min: 97 %  Max: 99 % 97 %       Recent Labs   Lab 06/17/22  0849 06/18/22  0703 06/20/22  0527   WBC 10.0 10.8 11.5   RBC 4.27* 4.36* 5.04   HGB 12.2* 12.3* 14.5   HCT 36.7* 36.9* 43.2   MCV 85.9 84.6 85.7   MCH 28.6 28.2 28.8   MCHC 33.2 33.3 33.6   RDW 14.6 14.4 15.4    364 256   MPV 10.2 9.6 10.9       Recent Labs   Lab 06/18/22  0703 06/19/22  0454 06/20/22  0527   * 135* 131*   K 2.8* 4.2 4.9   CO2 21* 19* 19*   BUN 5.4* 6.4* 6.0*   CREATININE 0.48* 0.51* 0.49*   CALCIUM 8.6* 9.0 9.3   MG  --  1.70 2.20          Microbiology Results (last 7 days)     Procedure Component Value Units Date/Time    Cerebrospinal Fluid Culture  [[id number]] Collected: 06/15/22 1226    Order Status: Completed Specimen: CSF (Spinal Fluid) from Cerebrospinal Fluid Updated: 06/21/22 1151     CULTURE, CSF ([facility]) No Growth at 5 days     GRAM STAIN No WBCs, No bacteria seen     Blood culture #1 **CANNOT BE ORDERED STAT** [[id number]]  (Normal) Collected: 06/13/22 1618    Order Status: Completed Specimen: Blood Updated: 06/18/22 1703     CULTURE, BLOOD ([facility]) No Growth at 5 days    Blood culture #2 **CANNOT BE ORDERED STAT** [[id number]]  (Normal) Collected: 06/13/22 1618    Order Status: Completed Specimen: Blood Updated: 06/18/22 1703     CULTURE, BLOOD ([facility]) No Growth at 5 days    AFB Smear [[id number]] Collected: 06/15/22 1226    Order Status: Completed Specimen: CSF (Spinal Fluid) from Cerebrospinal Fluid Updated: 06/17/22 0659     AFB Smear No AFB seen (Direct smear only)    Mycobacteria and Nocardia Culture [[id number]] Collected: 06/15/22 1226    Order Status: Sent Specimen: CSF (Spinal Fluid) from Cerebrospinal Fluid Updated: 06/16/22 1117    Fungal Culture [[id number]] Collected: 06/15/22 1226    Order Status: Sent Specimen: CSF (Spinal Fluid) from Lumbar Updated: 06/16/22 0949    Cryptococcal antigen, CSF (Tube 3) [[id number]] Collected: 06/15/22 1226    Order Status: Completed Specimen: CSF (Spinal Fluid) from CSF Tap, Tube 3 Updated: 06/15/22 1711     CRYPTOCOCCAL ANTIGEN TITER ([facility]) --     CRYPTOCOCCAL ANTIGEN, CSF ([facility]) Negative    Cryptococcal Antigen, CSF [[id number]]     Order Status: Sent Specimen: CSF (Spinal Fluid)            See below for Radiology    Scheduled Med:   (Magic mouthwash) 1:1:1 diphenhydramine(Benadryl) 12.5mg/5ml liq, aluminum & magnesium hydroxide-simethicone (Maalox), LIDOcaine viscous 2%  10 mL Swish & Spit Q6H    apixaban  5 mg Oral BID    aspirin  325 mg Oral Daily    atorvastatin  10 mg Oral QHS    cefTRIAXone (ROCEPHIN) IVPB  2 g Intravenous Q12H    digoxin  0.25 mg Oral Daily    ezetimibe  10 mg Oral Daily     levetiracetam IV  500 mg Intravenous Q12H    methIMAzole  20 mg Oral Daily    miconazole NITRATE 2 %   Topical (Top) BID    montelukast  10 mg Oral Daily    pantoprazole  40 mg Oral Daily    predniSONE  5 mg Oral Daily    propranoloL  60 mg Oral BID    sodium chloride 0.9%  10 mL Intravenous Q6H        Continuous Infusions:       PRN Meds:  acetaminophen, acetaminophen, haloperidol lactate, HYDROcodone-acetaminophen, lorazepam, melatonin, metoprolol, ondansetron, sodium chloride 0.9%, Flushing PICC Protocol **AND** sodium chloride 0.9% **AND** sodium chloride 0.9%       Patient condition:  Stable/Fair/Guarded/ Serious/ Critical    Anticipated discharge and Disposition:      All diagnosis and differential diagnosis have been reviewed; assessment and plan has been documented; I have personally reviewed the labs and test results that are presently available; I have reviewed the patients medication list; I have reviewed the consulting providers response and recommendations. I have reviewed or attempted to review medical records based upon their availability    All of the patient's questions have been  addressed and answered. Patient's is agreeable to the above stated plan. I will continue to monitor closely and make adjustments to medical management as needed.  _____________________________________________________________________    Nutrition Status:    Radiology:  X-Ray Chest 1 View  Narrative: EXAMINATION:  XR CHEST 1 VIEW    CPT 08492    CLINICAL HISTORY:  PICC Placement;    COMPARISON:  June 16, 2022    FINDINGS:  Cardiomediastinal silhouette improved parenchymal changes to be essentially unchanged as compared with the previous exam.    Right-sided PICC line is identified tip projecting at the junction of the superior vena cava and right atrium  Impression: No significant change since the previous exam.    PICC line as above    Electronically signed by: De  Dg  Date:    06/19/2022  Time:    05:31      Zulay Howard MD   06/22/2022

## 2022-06-22 NOTE — PT/OT/SLP PROGRESS
"Speech Language Pathology Department  Dysphagia Therapy    Patient Name:  Quan Contreras   MRN:  [id number]  Admitting Diagnosis: Fever    Recommendations:                 General Recommendations:  Dysphagia therapy and Cognitive-linguistic therapy  Diet recommendations:  Puree, Liquid Diet Level: Thin liquids - IDDSI Level 0   Aspiration Precautions: small bites/sips    Discharge recommendations:  LTACH ([facility])   Barriers to Discharge:  acuity of illness    Subjective     Patient awake, alert, oriented to self and calm.    Patient goals: "Thirsty"    Pain/Comfort:  · Pain Rating 1: 0/10        Objective:     Oral Musculature Evaluation:   Oral Musculature: WFL   Oral Labial Strength and Mobility: WFL   Voice Prior to PO Intake: adequate    Consistencies Tested:   Thin liquids via cup and straw, consecutive sips.  No signs/sx of aspiration.  Chewable solids-minced and moist.  Multiple swallows and cough immediately after the swallow.    Assessment:     Pt with improved CASSANDRA today.  Awake and calm, remains confused.  Wife at bedside reports pt without difficulty tolerating current diet. Chewable solids trialed however signs/sx of aspiration noted.  Rec: continue current diet of pureed solids, thin liquids, meds crushed in pudding.  Pt continues to present with oropharyngeal dysphagia requiring a modified diet to reduce the risk of aspiration.    Goals:   Multidisciplinary Problems     SLP Goals        Problem: SLP    Goal Priority Disciplines Outcome   SLP Goal     SLP Ongoing, Progressing   Description: LT)The pt will improve cognitive linguistic abilities to return to PLOF.  2) The pt will tolerate the least restrictive PO diet with no clinical signs/sx of aspiration.  STa) Orientation x4, independent  1b) Focused attention, min assist, 5 minutes without requiring redirection  1c) Information procession, min assist  1d) Problem solving, routine, 100% with min assist    2a) " tolerate minced and moist solids with no signs/sx of aspiration.  2b) tolerate easy to chew solids with no signs/sx of aspiration.                     Plan:     Will continue to follow and tx as appropriate.    Patient to be seen:  5 x/week   Plan of Care expires:  07/14/22  Plan of Care reviewed with:  patient, spouse   SLP Follow-Up:  Yes       Time Tracking:     SLP Treatment Date:   06/22/22  Speech Start Time:  1100  Speech Stop Time:  1115     Speech Total Time (min):  15 min    Billable minutes:  Treatment of Swallow Dysfunction, 15 minutes       06/22/2022

## 2022-06-22 NOTE — PROGRESS NOTES
Infectious Diseases Progress Note  71-year-old male with past medical history of paroxysmal AFib, HTN, HLD, hypothyroidism, seizure disorder, rheumatoid arthritis, CAD, with the recent hospitalizations for CVA in April, is admitted to [facility] on 06/13/2022, presenting to the ED with report of continued fevers of up to 102 at home.  Apparently had visited the ER a few times since his discharge from the rehab facility .  Including mid May with report of twitching on worsening altered mental status, and seen by Neurology, placed on care and then the week prior to this admission had developed fevers with worsening confusion and chest x-ray indicated for possible left lower lobe infiltrate, treated with oral Levaquin and discharge.  His wife tells been he had been on antibiotics continuously up until this admission including initially ampicillin and then most recently Levaquin.  He does take Cimzia and prednisone rheumatoid arthritis.  On presentation he was evaluated extensively and noted to have no fevers but by the next day had low-grade temperature of up to 99.5.  He did have leukocytosis of 12.8, anemic with low albumin.  He had a lumbar puncture with CSF analysis showing 20 WBC predodaughtminantly lymphocytes at 76%, elevated protein 91.4 and low glucose at 35. CSF cultures have remained negative with Gram stain showing no bacteria and no WBC.  CSF cryptococcal antigen and PCR panel negative.  Blood cultures have remained negative and review of records show 6/6 blood cultures which have been negative as well.  Chest x-ray on admission showed no acute cardiopulmonary process.  CT scan of the head with no acute intracranial findings, but had old infarcts, chronic micro angiopathic ischemia and atrophy.  MRI of the brain showed no acute infarcts, abnormal diffuse signal in the extra-axial space history with concern for meningitis/infection, needed to be correlated with CSF studies and  clinical findings. CT maxillofacial showed no drainable fluid collection.   He is currently on antibiotic coverage vancomycin and ceftriaxone.    Subjective:  No new complaints, no fevers, doing about the same. Lying in bed in no acute distress    ROS  Constitutional: Positive for malaise/fatigue.   HENT: Negative.    Respiratory: Negative.    Gastrointestinal: Negative.    Genitourinary: Negative.    Musculoskeletal: Negative.    Neurological: Positive for weakness.   Endo/Heme/Allergies: Negative.    All other Systems review done and negative.    Review of patient's allergies indicates:   Allergen Reactions    Ace inhibitors Swelling     Lip swelling    Morphine     Morphine sulfate     Nafcillin Itching    Pradaxa [dabigatran etexilate] Other (See Comments)     Abdominal cramping    Sulfamethoxazole Rash       Past Medical History:   Diagnosis Date    Acute left arterial ischemic stroke, KINGA (anterior cerebral artery) 5/3/2022    Acute osteomyelitis 5/11/2022    Atherosclerosis of coronary artery 5/11/2022    Atrial fibrillation 5/3/2022    Benign essential hypertension 5/3/2022    Cardiomyopathy     Coronary artery disease     Diverticulosis     Esophageal reflux 5/11/2022    Fatigue     Focal seizure 5/17/2022    Generalized anxiety disorder     GERD (gastroesophageal reflux disease)     Graves disease     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/2022    HTN (hypertension)     Hyperthyroidism     Idiopathic peripheral neuropathy 5/11/2022    Irritable bowel syndrome without diarrhea     Ischemic cardiomyopathy 5/3/2022    Mixed hyperlipidemia 5/11/2022    Other sequelae following unspecified cerebrovascular disease 5/9/2022    Paroxysmal atrial fibrillation     Rheumatoid arthritis 5/11/2022    Rheumatoid arthritis, unspecified     Shingles     Staph aureus infection     Stroke     Thyroiditis, unspecified     Thyrotoxicosis 5/3/2022       Past  "Surgical History:   Procedure Laterality Date    CATARACT EXTRACTION      CYST REMOVAL Left     TONSILLECTOMY      TOTAL KNEE ARTHROPLASTY      VASECTOMY         Social History     Socioeconomic History    Marital status:    Tobacco Use    Smoking status: Never Smoker    Smokeless tobacco: Never Used   Substance and Sexual Activity    Alcohol use: Never    Drug use: Never    Sexual activity: Yes         Scheduled Meds:   (Magic mouthwash) 1:1:1 diphenhydramine(Benadryl) 12.5mg/5ml liq, aluminum & magnesium hydroxide-simethicone (Maalox), LIDOcaine viscous 2%  10 mL Swish & Spit Q6H    apixaban  5 mg Oral BID    aspirin  325 mg Oral Daily    atorvastatin  10 mg Oral QHS    cefTRIAXone (ROCEPHIN) IVPB  2 g Intravenous Q12H    digoxin  0.25 mg Oral Daily    ezetimibe  10 mg Oral Daily    levETIRAcetam  500 mg Oral BID    methIMAzole  20 mg Oral Daily    miconazole NITRATE 2 %   Topical (Top) BID    montelukast  10 mg Oral Daily    pantoprazole  40 mg Oral Daily    predniSONE  5 mg Oral Daily    propranoloL  60 mg Oral BID    sodium chloride 0.9%  10 mL Intravenous Q6H     Continuous Infusions:  PRN Meds:acetaminophen, acetaminophen, haloperidol lactate, HYDROcodone-acetaminophen, lorazepam, melatonin, metoprolol, ondansetron, sodium chloride 0.9%, Flushing PICC Protocol **AND** sodium chloride 0.9% **AND** sodium chloride 0.9%    Objective:  /78   Pulse 92   Temp 97.5 °F (36.4 °C) (Oral)   Resp 18   Ht 5' 8" (1.727 m)   Wt 64.2 kg (141 lb 8 oz)   SpO2 99%   BMI 21.51 kg/m²     Physical Exam:   Physical Exam  Vitals reviewed.   Constitutional:       General: He is not in acute distress.     Appearance: He is not toxic-appearing.   HENT:      Head: Normocephalic and atraumatic.   Cardiovascular:      Rate and Rhythm: Normal rate and regular rhythm.      Heart sounds: Normal heart sounds.   Pulmonary:      Effort: No respiratory distress.      Breath sounds: Normal breath " sounds.   Abdominal:      General: Bowel sounds are normal. There is no distension.      Palpations: Abdomen is soft.      Tenderness: There is no abdominal tenderness.   Musculoskeletal:      Cervical back: Neck supple.      Comments: Arthritic deformities of B/L hands   Skin:     Findings: No erythema or rash.   Neurological:      Mental Status: He is alert.      Comments: Follows commands   Psychiatric:      Comments: Calm and cooperative     Imaging      Lab Review   Recent Results (from the past 24 hour(s))   Digoxin Level    Collection Time: 06/21/22  3:51 AM   Result Value Ref Range    Digoxin Level 1.08 0.80 - 2.00 ng/mL     Assessment/Plan:  1. Acute meningitis with encephalopathy  2. SIRS with fevers   3. Immunocompromised on immunosuppressive drugs  4. Rheumatoid arthritis  5. History of CVA  6. Seizure disorder  7. Anemia  8. Protein calorie malnutrition      -Continue Ceftriaxone #6  -Low grade fevers and no leukocytosis  -CSF analysis consistent with meningitis with specific pathogen not known, predominantly CSF lymphocytosis with hypoglycorrhachia which could be indicative of even a fungal meningitis, however a pre-treated bacterial meningitis more likely since improving on antibacterial coverage   -CSF PCR panel with no organisms detected including negative for [place] HSV 1, 2 and enterovirus. CSF cryptococcal antigen negative, negative gram stains and cultures  -Immunocompromised and we could evaluate further with T-spot and CSF MTB PCR if cultures remain negative but again TB less likely since he is clinically improving   -Discussed with patient, wife and nursing staff

## 2022-06-22 NOTE — PT/OT/SLP PROGRESS
Physical Therapy      Patient Name:  Quan Contreras   MRN:  [id number]    Attempted PT session at 1430. Spouse asked for PT to return in morning as patient was in too much pain. PT to f/u tomorrow.

## 2022-06-23 PROCEDURE — 63600175 PHARM REV CODE 636 W HCPCS: Performed by: INTERNAL MEDICINE

## 2022-06-23 PROCEDURE — 11000001 HC ACUTE MED/SURG PRIVATE ROOM

## 2022-06-23 PROCEDURE — C1751 CATH, INF, PER/CENT/MIDLINE: HCPCS

## 2022-06-23 PROCEDURE — 25000003 PHARM REV CODE 250: Performed by: NURSE PRACTITIONER

## 2022-06-23 PROCEDURE — 92526 ORAL FUNCTION THERAPY: CPT

## 2022-06-23 PROCEDURE — 25000003 PHARM REV CODE 250: Performed by: INTERNAL MEDICINE

## 2022-06-23 PROCEDURE — 63600175 PHARM REV CODE 636 W HCPCS: Performed by: NURSE PRACTITIONER

## 2022-06-23 PROCEDURE — A4216 STERILE WATER/SALINE, 10 ML: HCPCS | Performed by: INTERNAL MEDICINE

## 2022-06-23 PROCEDURE — 97530 THERAPEUTIC ACTIVITIES: CPT | Mod: CQ

## 2022-06-23 PROCEDURE — 97535 SELF CARE MNGMENT TRAINING: CPT

## 2022-06-23 PROCEDURE — 97535 SELF CARE MNGMENT TRAINING: CPT | Mod: CO

## 2022-06-23 PROCEDURE — 97116 GAIT TRAINING THERAPY: CPT | Mod: CQ

## 2022-06-23 PROCEDURE — 92611 MOTION FLUOROSCOPY/SWALLOW: CPT

## 2022-06-23 RX ADMIN — HYDROCODONE BITARTRATE AND ACETAMINOPHEN 1 TABLET: 10; 325 TABLET ORAL at 09:06

## 2022-06-23 RX ADMIN — MICONAZOLE NITRATE 2 % TOPICAL POWDER: at 09:06

## 2022-06-23 RX ADMIN — ATORVASTATIN CALCIUM 10 MG: 10 TABLET, FILM COATED ORAL at 09:06

## 2022-06-23 RX ADMIN — SODIUM CHLORIDE, PRESERVATIVE FREE 10 ML: 5 INJECTION INTRAVENOUS at 12:06

## 2022-06-23 RX ADMIN — DIPHENHYDRAMINE HYDROCHLORIDE 10 ML: 25 SOLUTION ORAL at 06:06

## 2022-06-23 RX ADMIN — DIPHENHYDRAMINE HYDROCHLORIDE 10 ML: 25 SOLUTION ORAL at 12:06

## 2022-06-23 RX ADMIN — PROPRANOLOL HYDROCHLORIDE 60 MG: 20 TABLET ORAL at 09:06

## 2022-06-23 RX ADMIN — CEFTRIAXONE 2 G: 2 INJECTION, SOLUTION INTRAVENOUS at 06:06

## 2022-06-23 RX ADMIN — DIPHENHYDRAMINE HYDROCHLORIDE 12.5 MG: 50 INJECTION, SOLUTION INTRAMUSCULAR; INTRAVENOUS at 09:06

## 2022-06-23 RX ADMIN — METHIMAZOLE 20 MG: 10 TABLET ORAL at 06:06

## 2022-06-23 RX ADMIN — DIGOXIN 0.25 MG: 250 TABLET ORAL at 06:06

## 2022-06-23 RX ADMIN — MONTELUKAST SODIUM 10 MG: 10 TABLET, COATED ORAL at 06:06

## 2022-06-23 RX ADMIN — APIXABAN 5 MG: 5 TABLET, FILM COATED ORAL at 09:06

## 2022-06-23 RX ADMIN — ASPIRIN 325 MG ORAL TABLET 325 MG: 325 PILL ORAL at 09:06

## 2022-06-23 RX ADMIN — HALOPERIDOL LACTATE 5 MG: 5 INJECTION, SOLUTION INTRAMUSCULAR at 07:06

## 2022-06-23 RX ADMIN — LEVETIRACETAM 500 MG: 100 INJECTION, SOLUTION INTRAVENOUS at 09:06

## 2022-06-23 RX ADMIN — EZETIMIBE 10 MG: 10 TABLET ORAL at 06:06

## 2022-06-23 RX ADMIN — PREDNISONE 5 MG: 5 TABLET ORAL at 09:06

## 2022-06-23 RX ADMIN — PANTOPRAZOLE SODIUM 40 MG: 40 TABLET, DELAYED RELEASE ORAL at 09:06

## 2022-06-23 RX ADMIN — SODIUM CHLORIDE, PRESERVATIVE FREE 10 ML: 5 INJECTION INTRAVENOUS at 06:06

## 2022-06-23 NOTE — PROGRESS NOTES
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  He was discharged to rehab facility and when he returned home started to have twitching and worsening altered mental status not behaving himself so was brought back to the ER in mid May where he was seen by Neurology and had a negative EEG but was started on Keppra out of concern for possible seizure-like activity in the setting of his recent stroke.  He then was brought back to the ER last week when he developed fevers and worsening of his baseline confusion and his chest x-ray was significant for a possible left lower lobe infiltrate and patient was started on oral Levaquin and discharged to follow-up with PCP.  Wife at bedside stated that since then he has been having episodic on and off fevers as high as 102. Home health nurse noted patient was extremely off from his baseline mental status and had a fever of 102 so was referred to the ER for further evaluation.    Afebrile, hemodynamically stable maintaining normal sats on room air.  His laboratory work was overall unremarkable, SARS-COVID-2 testing and influenza negative.  Blood cultures were obtained.  Due to him being on oral anticoagulation on LP was not performed in the ER.  CT of the head was unremarkable for acute process.  IR was consulted for LP and he was started on broad-spectrum empiric meningitis regimen with vancomycin, ceftriaxone, ampicillin, acyclovir.  Neurology was consulted, EEG and MRI was ordered.  MRI revealed significant  atrophy, encephalomalacia with DWI intensity in falcine frontal region.  Keppra was continued.  EEG revealed no epileptiform discharges or electrographic seizures.  He continued to have delirious episode while in the hospital.  CT maxillofacial was added to reassess recently diagnosed with dental abscess.  Haldol and Seroquel were added to help with mental status.  PICC line was inserted.  ID was consulted for further assistance. CSF analysis showing 20 WBC predodaughtminantly lymphocytes at 76%, elevated protein 91.4 and low glucose at 35. CSF cultures have remained negative with Gram stain showing no bacteria and no WBC.  CSF cryptococcal antigen and PCR panel negative.  Blood cultures have remained negative and review of records show 6/6 blood cultures which have been negative as well. maxillofacial showed no drainable fluid collection.  CSF analysis was consistent with meningitis with no specific pathogen.  He CSF lymphocytosis with hypoglycorrhachia could be indicative of fungal meningitis however he was not treated for fungal meningitis in the setting of improved mental status since initiation of antibiotics.  CSF PCR was negative for organisms.  During meningitis were under differential but was less likely given his improvement.  Ampicillin was discontinued.  Vancomycin and ceftriaxone were continued initially.  CSF PCR showed no organisms and it was negative for West Nile, HSV, enterovirus, cryptococcal antigen.  Later antibiotic regimen was switched to ceftriaxone only with plans to switch to p.o. cefdinir upon discharge.  ID recommended to get T spot and CSF MTB PCR if cultures remain negative but TB was less likely.    Interval Hx:   Afebrile.  Hemodynamically stable.  When seen examined at bedside he was alert, oriented, comfortable resting in the bed.  Family ember was at bedside.  He is tolerating feeds.  Patient has no new complaints or concerns.    Objective/physical exam:  Vitals:    06/22/22 1549  06/22/22 2034 06/22/22 2148 06/22/22 2324   BP:  114/77  113/73   BP Location:       Patient Position:       Pulse:  90  85   Resp: 19 18 16 18   Temp:  98.2 °F (36.8 °C)  98 °F (36.7 °C)   TempSrc:  Oral  Oral   SpO2:  99%  97%   Weight:       Height:         General: In no acute distress, afebrile  Respiratory: Clear to auscultation bilaterally  Cardiovascular: S1, S2, no appreciable murmur  Abdomen: Soft, nontender, BS +  MSK: Warm, no lower extremity edema, no clubbing or cyanosis  Neurologic: Alert and oriented x4, moving all extremities with good strength     Lab Results   Component Value Date     (L) 06/20/2022    K 4.9 06/20/2022    CO2 19 (L) 06/20/2022    BUN 6.0 (L) 06/20/2022    CREATININE 0.49 (L) 06/20/2022    CALCIUM 9.3 06/20/2022    EGFRNONAA >60 06/20/2022      Lab Results   Component Value Date    ALT 60 (H) 06/15/2022    AST 62 (H) 06/15/2022    ALKPHOS 91 06/15/2022    BILITOT 0.8 06/15/2022      Lab Results   Component Value Date    WBC 11.5 06/20/2022    HGB 14.5 06/20/2022    HCT 43.2 06/20/2022    MCV 85.7 06/20/2022     06/20/2022       Medications:   (Magic mouthwash) 1:1:1 diphenhydramine(Benadryl) 12.5mg/5ml liq, aluminum & magnesium hydroxide-simethicone (Maalox), LIDOcaine viscous 2%  10 mL Swish & Spit Q6H    apixaban  5 mg Oral BID    aspirin  325 mg Oral Daily    atorvastatin  10 mg Oral QHS    cefTRIAXone (ROCEPHIN) IVPB  2 g Intravenous Q12H    digoxin  0.25 mg Oral Daily    ezetimibe  10 mg Oral Daily    levetiracetam IV  500 mg Intravenous Q12H    methIMAzole  20 mg Oral Daily    miconazole NITRATE 2 %   Topical (Top) BID    montelukast  10 mg Oral Daily    pantoprazole  40 mg Oral Daily    predniSONE  5 mg Oral Daily    propranoloL  60 mg Oral BID    sodium chloride 0.9%  10 mL Intravenous Q6H      acetaminophen, acetaminophen, diphenhydrAMINE, haloperidol lactate, HYDROcodone-acetaminophen, HYDROmorphone, lorazepam, melatonin, metoprolol,  ondansetron, sodium chloride 0.9%, Fl[place] PICC Protocol **AND** sodium chloride 0.9% **AND** sodium chloride 0.9%     Assessment/Plan:    Bacterial meningitis  Acute encephalopathy likely infectious causes-improving  Immunosuppressed status  Chronic rheumatoid arthritis on immunosuppression  History of thyrotoxicosis    Hx: Vascular dementia, A. fib on Eliquis, HTN, CAD, HLD, ischemic cardiomyopathy with preserved ejection fraction, recent CVA, chronic seizure disorder    Plan:  - Improving from mental status standpoint.  We will continue Keppra.  IV Dilaudid for pain control  - ID following.  Continue ceftriaxone day 8/14.  Plan to switch to cefdinir upon discharge  - Continue other home medications including digoxin, propranolol, Eliquis, methimazole, prednisone.  Needs thyroid function test repeated as outpatient      Protonix  Needs placement  PT      Shmuel Crawford MD

## 2022-06-23 NOTE — PT/OT/SLP PROGRESS
Occupational Therapy  Treatment    Quan Contreras   MRN: [id number]   Admitting Diagnosis: Fever    OT Date of Treatment: 06/23/22   OT Start Time: 1109  OT Stop Time: 1202  OT Total Time (min): 53 min     Billable Minutes:  Self Care/Home Management 53  Total Minutes: 53     OT/VERONICA: VERONICA     VERONICA Visit Number: 1    General Precautions: Standard, fall  Spiritual, Cultural Beliefs, Mormonism Practices, Values that Affect Care: no    Subjective:  Communicated with nurse prior to session.    Objective:  Patient found with: telemetry, SCD, peripheral IV.   Supine to sit from bed with Min A, scooting to edge of bed with Min A.  G/H tasks standing at sink wish washing face and shaving tasks. Pt unable to shave self due to confusion and currently on blood thinners, however patient tolerated standing for approx 15 mins.  Transfer bedside chair to toilet and back with Min A with rolling walker, Max A for toileting secondary to confusion.    Balance:   Static Sit: Good  Dynamic Sit:  good  Static Stand: good -  Dynamic stand: Fair+    Patient left up in chair with call button in reach and wife present    ASSESSMENT:  Quan Contreras is a 71 y.o. male with a medical diagnosis of Fever and presents with decreased strength, decreased Ax tolerance, decreased functional mobility and decreased ADL skills.    Rehab potential is good    Activity tolerance: Good    Discharge recommendations: rehabilitation facility     Equipment recommendations: walker, rolling     GOALS:   Multidisciplinary Problems     Occupational Therapy Goals        Problem: Occupational Therapy    Goal Priority Disciplines Outcome Interventions   Occupational Therapy Goal     OT, PT/OT Ongoing, Progressing    Description: Goals to be met by: 7/5/22    Patient will increase functional independence with ADLs by performing:    UE Dressing with SBA  LE Dressing with Stand-by Assistance.  Grooming while standing at sink with Stand-by Assistance.  Toileting from toilet  with Stand-by Assistance for hygiene and clothing management.   Toilet transfer to toilet with Stand-by Assistance.                     Plan:  Patient to be seen 5 x/week, daily to address the above listed problems via self-care/home management, therapeutic activities, therapeutic exercises  Plan of Care expires: 07/05/22  Plan of Care reviewed with: patient, spouse    06/23/2022

## 2022-06-23 NOTE — PT/OT/SLP PROGRESS
"Speech Language Pathology Treatment    Patient Name:  Quan Contreras   MRN:  [id number]  Admitting Diagnosis: Fever    Recommendations:                 General Recommendations:  Modified barium swallow study  Diet recommendations:  NPO, Liquid Diet Level: NPO   Aspiration Precautions: HOB to 90 degrees   General Precautions: Standard,    Communication strategies:  none    Subjective       Patient goals: Per wife, "To eat well."     Wife reports throat clearing after liquids this AM.    Pain/Comfort:  · Pain Rating 1: 0/10    Respiratory Status: Room air    Objective:     Has the patient been evaluated by SLP for swallowing?   Yes  Keep patient NPO? No   Current Respiratory Status:        Consistencies given:  Thin liquid via straw- immediate throat clear then cough after the swallow    Assessment:     Quan Contreras is a 71 y.o. male with an SLP diagnosis of possible oropharygneal dysphagia.  Rec: NPO, ok for meds crushed in pudding.  Will proceed with MBS to assess current swallow function.    Goals:   Multidisciplinary Problems     SLP Goals        Problem: SLP    Goal Priority Disciplines Outcome   SLP Goal     SLP Ongoing, Progressing   Description: LT)The pt will improve cognitive linguistic abilities to return to PLOF.  2) The pt will tolerate the least restrictive PO diet with no clinical signs/sx of aspiration.  STa) Orientation x4, independent  1b) Focused attention, min assist, 5 minutes without requiring redirection  1c) Information procession, min assist  1d) Problem solving, routine, 100% with min assist    2a) tolerate minced and moist solids with no signs/sx of aspiration.  2b) tolerate easy to chew solids with no signs/sx of aspiration.                     Plan:     · Patient to be seen:  5 x/week   · Plan of Care expires:  22  · Plan of Care reviewed with:  patient   · SLP Follow-Up:  Yes       Discharge recommendations:  rehabilitation facility   Barriers to Discharge:  severity " of impairment    Time Tracking:     SLP Treatment Date:   06/23/22  Speech Start Time:  1130  Speech Stop Time:  1140     Speech Total Time (min):  10 min    Billable Minutes: Treatment Swallowing Dysfunction 10 minutes    06/23/2022

## 2022-06-23 NOTE — PROCEDURES
Speech Language Pathology Department  Modified Barium Swallow Study    Patient Name:  Quan Contreras   MRN:  [id number]  Diagnosis: Fever     Recommendations:                 General Recommendations:  Dysphagia therapy and Cognitive-linguistic therapy   Repeat MBS study: 7-10 days or as clinically appropriate  Diet recommendations:  Puree, Liquid Diet Level: Moderately thick liquids - IDDSI Level 3   Swallow Strategies/Precautions: small bites/sips, alternate solids/liquids, NO straws and medications crushed in puree  General Precautions: Standard, aspiration    History:     A Modified Barium Swallow Study was completed to assess the efficiency of his/her swallow function, rule out aspiration and make recommendations regarding safe dietary consistencies, effective compensatory strategies, and safe eating environment.     Past Medical History:   Diagnosis Date    Acute left arterial ischemic stroke, KINGA (anterior cerebral artery) 5/3/2022    Acute osteomyelitis 5/11/2022    Atherosclerosis of coronary artery 5/11/2022    Atrial fibrillation 5/3/2022    Benign essential hypertension 5/3/2022    Cardiomyopathy     Coronary artery disease     Diverticulosis     Esophageal reflux 5/11/2022    Fatigue     Focal seizure 5/17/2022    Generalized anxiety disorder     GERD (gastroesophageal reflux disease)     Graves disease     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/2022    HTN (hypertension)     Hyperthyroidism     Idiopathic peripheral neuropathy 5/11/2022    Irritable bowel syndrome without diarrhea     Ischemic cardiomyopathy 5/3/2022    Mixed hyperlipidemia 5/11/2022    Other sequelae following unspecified cerebrovascular disease 5/9/2022    Paroxysmal atrial fibrillation     Rheumatoid arthritis 5/11/2022    Rheumatoid arthritis, unspecified     Shingles     Staph aureus infection     Stroke     Thyroiditis, unspecified     Thyrotoxicosis 5/3/2022       Home Diet:  "regular solids and thin liquids prior to admission  Current Method of Nutrition: NPO    Patient complaint: Per wife, "To eat enough."    Subjective:     Patient awake and alert.    Respiratory Status: room air        Fluoroscopic Results:     Oral Musculature Evaluation:   Oral Musculature: WFL   Dentition: present and adequate   Oral Labial Strength and Mobility: WFL   Voice Prior to PO Intake: adequate    Visualization  · Patient was seen in the lateral view    Oral Phase:    Disorganized lingual movement   Bolus holding   Loss of bolus control    Pharyngeal Phase:     Consistency Laryngeal Penetration Aspiration Residue Strategy   Mildly thick by cup After the swallow on base of tongue and vallecular residue none Base of tongue, vallecular, pyriform sinus residue, moderate Double swallow NOT effective, resulting in laryngeal penetration   Puree none none Base of tongue, vallecular, pyriform sinus residue, moderate Alternate solids/liquids, reduced residue   Thin by cup before During-SILENT Base of tongue, vallecular, pyriform sinus residue, moderate    Moderately thick by cup none none Base of tongue, vallecular, pyriform sinus residue, moderate Double swallow, reduced                    Swallow delay with spill to the pyriform sinuses   Reduced base of tongue retraction   Reduced hyolaryngeal excursion   Reduced airway protection   Laryngeal penetration with thin liquids and mildly thick liquids   Aspiration of thin liquids, SILENT aspiration   Base of tongue residue moderate   Vallecular residue moderate   Pyriform sinus residue moderate    Cervical Esophageal Phase:    retrograde movement of the bolus in the cervical esophagus            Assessment:     The pt presents with moderate-severe oropharyngeal dysphagia characterized by bolus holding, loss of bolus control, reduced base of tongue retraction, swallow delay with prespill to the pyriform sinuses resulting in reduced airway protection " with SILENT aspiration of thin liquids during the swallow and laryngeal penetration of mildly thick liquids when attempting to reduce residue with an additional swallow (laryngeal penetration resulted from base of tongue and vallecular residue although residue).  Moderate oropharyngeal residue was visualized however was successfully reduced by alternating solids/liquids.  Skilled SLP services warranted to tx dysphagia.    MBS results and diet recommendations reviewed at length with wife at bedside.  Demonstrated proper use of thickener agent and discussed safe swallow strategies such as upright for ALL PO intake, small bites/sips, alternating solids/liquids, and NO straws.  Wife verbalized understanding.    Goals:   Multidisciplinary Problems     SLP Goals        Problem: SLP    Goal Priority Disciplines Outcome   SLP Goal     SLP Ongoing, Progressing   Description: LT)The pt will improve cognitive linguistic abilities to return to PLOF.  2) The pt will tolerate the least restrictive PO diet with no clinical signs/sx of aspiration.  ST)  Orientation x4, independent  Focused attention, min assist, 5 minutes without requiring redirection  Information procession, min assist  Problem solving, routine, 100% with min assist    2)  Tolerate laryngeal elevation exercises and domingo, 100% with min cues  Tolerate thermal stimulation to the anterior faucial arches, 100% swallow response, with min cues  tolerate minced and moist solids with no signs/sx of aspiration.  tolerate easy to chew solids with no signs/sx of aspiration.                     Plan:     Patient to be seen:  5 x/week   Plan of Care expires:  22  Plan of Care reviewed with:  patient   SLP Follow-Up:  Yes      Time Tracking:     SLP Treatment Date:   22  Speech Start Time:  1330  Speech Stop Time:  1420     Speech Total Time (min):  50 min    Billable minutes: Motion Fluoroscopic Evaluation, Video Recording, 25 minutes  Self Care/Home  Management, 25 minutes       06/23/2022

## 2022-06-23 NOTE — PT/OT/SLP PROGRESS
Physical Therapy         Treatment        Quan Contreras   MRN: [id number]     PT Received On: 06/23/22  PT Start Time: 1109     PT Stop Time: 1202  PT Total Time (min): 52    Billable Minutes:  Gait [id number] and Therapeutic Activity 42  Total Minutes: 52    Treatment Type: Treatment  PT/PTA: PTA     PTA Visit Number: 1       General Precautions: Standard, aspiration  Orthopedic Precautions: Orthopedic Precautions : N/A   Braces:      Spiritual, Cultural Beliefs, Taoist Practices, Values that Affect Care: no    Subjective:  Communicated with NSG prior to session.    Pain/Comfort  Pain Rating 1: 0/10    Objective:  Patient found in bed with HOB elevated, with Patient found with: telemetry, peripheral IV    Functional Mobility:  Bed Mobility:   Supine to sit: Minimal Assistance   Sit to supine: Activity did not occur   Rolling: Activity did not occur   Scooting: Minimal Assistance    Balance:   Static Sit: GOOD: Takes MODERATE challenges from all directions  Dynamic Sit:  GOOD: Maintains balance through MODERATE excursions of active trunk movement. Pt sat EOB while reaching outside his YESSENIA to place/retrieve wash cloth and clean face.   Static Stand: FAIR: Maintains without assist but unable to take challenges. Pt stood at sink while therapist assisted him in shaving face. Pt able to remain standing for roughly 15min.   Dynamic stand: POOR+: Needs MIN (minimal ) assist during gait    Transfer Training:  Sit to stand:Minimal Assistance with Rolling Walker .  Toilet Transfer:  Pt Step Transfer with Minimal Assistance with Rolling Walker .pt T/F EOB>sink in bathroom>toilet.   ~pt T/F toilet> bedside chair with RW [place].     Gait Training:  Pt amb 10ft 2x T/Fing EOB<>bathroom. Pt unsteady but had no major LOB.       Additional Treatment:      Activity Tolerance:  Patient tolerated treatment well    Patient left up in chair with all lines intact, call button in reach and wife present.    Assessment:  Quan Contreras is a  71 y.o. male with a medical diagnosis of Fever. He presents with increased confusion and decreased safety awareness. Pt remains a high fall risk at this time.     Rehab potential is excellent.    Activity tolerance: Excellent    Discharge recommendations: Discharge Facility/Level of Care Needs: rehabilitation facility     Equipment recommendations: Equipment Needed After Discharge: walker, rolling     GOALS:   Multidisciplinary Problems     Physical Therapy Goals        Problem: Physical Therapy    Goal Priority Disciplines Outcome Goal Variances Interventions   Physical Therapy Goal     PT, PT/OT Ongoing, Progressing     Description: Goals to be met by: 22     Patient will increase functional independence with mobility by performin. Supine to sit with MInimal Assistance  2. Sit to supine with MInimal Assistance  3. Sit to stand transfer with Contact Guard Assistance  4. Gait  x 200 feet with Contact Guard Assistance using Rolling Walker.                      PLAN:    Patient to be seen 6 x/week  to address the above listed problems via gait training, therapeutic activities, therapeutic exercises  Plan of Care expires: 22  Plan of Care reviewed with: patient, spouse         2022

## 2022-06-23 NOTE — PLAN OF CARE
Problem: SLP  Goal: SLP Goal  Description: LT)The pt will improve cognitive linguistic abilities to return to PLOF.  2) The pt will tolerate the least restrictive PO diet with no clinical signs/sx of aspiration.  ST)  Orientation x4, independent  Focused attention, min assist, 5 minutes without requiring redirection  Information procession, min assist  Problem solving, routine, 100% with min assist    2)  Tolerate laryngeal elevation exercises and domingo, 100% with min cues  Tolerate thermal stimulation to the anterior faucial arches, 100% swallow response, with min cues  tolerate minced and moist solids with no signs/sx of aspiration.  tolerate easy to chew solids with no signs/sx of aspiration.    Outcome: Ongoing, Progressing       POC and goals updated.  Margareth

## 2022-06-23 NOTE — PROGRESS NOTES
Infectious Diseases Progress Note  71-year-old male with past medical history of paroxysmal AFib, HTN, HLD, hypothyroidism, seizure disorder, rheumatoid arthritis, CAD, with the recent hospitalizations for CVA in April, is admitted to [facility] on 06/13/2022, presenting to the ED with report of continued fevers of up to 102 at home.  Apparently had visited the ER a few times since his discharge from the rehab facility .  Including mid May with report of twitching on worsening altered mental status, and seen by Neurology, placed on care and then the week prior to this admission had developed fevers with worsening confusion and chest x-ray indicated for possible left lower lobe infiltrate, treated with oral Levaquin and discharge.  His wife tells been he had been on antibiotics continuously up until this admission including initially ampicillin and then most recently Levaquin.  He does take Cimzia and prednisone rheumatoid arthritis.  On presentation he was evaluated extensively and noted to have no fevers but by the next day had low-grade temperature of up to 99.5.  He did have leukocytosis of 12.8, anemic with low albumin.  He had a lumbar puncture with CSF analysis showing 20 WBC predodaughtminantly lymphocytes at 76%, elevated protein 91.4 and low glucose at 35. CSF cultures have remained negative with Gram stain showing no bacteria and no WBC.  CSF cryptococcal antigen and PCR panel negative.  Blood cultures have remained negative and review of records show 6/6 blood cultures which have been negative as well.  Chest x-ray on admission showed no acute cardiopulmonary process.  CT scan of the head with no acute intracranial findings, but had old infarcts, chronic micro angiopathic ischemia and atrophy.  MRI of the brain showed no acute infarcts, abnormal diffuse signal in the extra-axial space history with concern for meningitis/infection, needed to be correlated with CSF studies and  clinical findings. CT maxillofacial showed no drainable fluid collection.   He is currently on antibiotic coverage ceftriaxone.    Subjective:  No new complaints, no fevers, doing about the same.  Lying in bed in no acute distress.  Wife at the bedside      Past Medical History:   Diagnosis Date    Acute left arterial ischemic stroke, KINGA (anterior cerebral artery) 5/3/2022    Acute osteomyelitis 5/11/2022    Atherosclerosis of coronary artery 5/11/2022    Atrial fibrillation 5/3/2022    Benign essential hypertension 5/3/2022    Cardiomyopathy     Coronary artery disease     Diverticulosis     Esophageal reflux 5/11/2022    Fatigue     Focal seizure 5/17/2022    Generalized anxiety disorder     GERD (gastroesophageal reflux disease)     Graves disease     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/2022    HTN (hypertension)     Hyperthyroidism     Idiopathic peripheral neuropathy 5/11/2022    Irritable bowel syndrome without diarrhea     Ischemic cardiomyopathy 5/3/2022    Mixed hyperlipidemia 5/11/2022    Other sequelae following unspecified cerebrovascular disease 5/9/2022    Paroxysmal atrial fibrillation     Rheumatoid arthritis 5/11/2022    Rheumatoid arthritis, unspecified     Shingles     Staph aureus infection     Stroke     Thyroiditis, unspecified     Thyrotoxicosis 5/3/2022     Past Surgical History:   Procedure Laterality Date    CATARACT EXTRACTION      CYST REMOVAL Left     TONSILLECTOMY      TOTAL KNEE ARTHROPLASTY      VASECTOMY       Social History     Socioeconomic History    Marital status:    Tobacco Use    Smoking status: Never Smoker    Smokeless tobacco: Never Used   Substance and Sexual Activity    Alcohol use: Never    Drug use: Never    Sexual activity: Yes       ROS   Constitutional: Positive for malaise/fatigue.   HENT: Negative.    Respiratory: Negative.    Gastrointestinal: Negative.    Genitourinary: Negative.  "   Musculoskeletal: Negative.    Neurological: Positive for weakness.   Endo/Heme/Allergies: Negative.    All other Systems review done and negative.    Review of patient's allergies indicates:   Allergen Reactions    Ace inhibitors Swelling     Lip swelling    Morphine     Morphine sulfate     Nafcillin Itching    Pradaxa [dabigatran etexilate] Other (See Comments)     Abdominal cramping    Sulfamethoxazole Rash         Scheduled Meds:   (Magic mouthwash) 1:1:1 diphenhydramine(Benadryl) 12.5mg/5ml liq, aluminum & magnesium hydroxide-simethicone (Maalox), LIDOcaine viscous 2%  10 mL Swish & Spit Q6H    apixaban  5 mg Oral BID    aspirin  325 mg Oral Daily    atorvastatin  10 mg Oral QHS    cefTRIAXone (ROCEPHIN) IVPB  2 g Intravenous Q12H    digoxin  0.25 mg Oral Daily    ezetimibe  10 mg Oral Daily    levetiracetam IV  500 mg Intravenous Q12H    methIMAzole  20 mg Oral Daily    miconazole NITRATE 2 %   Topical (Top) BID    montelukast  10 mg Oral Daily    pantoprazole  40 mg Oral Daily    predniSONE  5 mg Oral Daily    propranoloL  60 mg Oral BID    sodium chloride 0.9%  10 mL Intravenous Q6H     Continuous Infusions:  PRN Meds:acetaminophen, acetaminophen, diphenhydrAMINE, haloperidol lactate, HYDROcodone-acetaminophen, HYDROmorphone, lorazepam, melatonin, metoprolol, ondansetron, sodium chloride 0.9%, Flushing PICC Protocol **AND** sodium chloride 0.9% **AND** sodium chloride 0.9%    Objective:  /77   Pulse 90   Temp 98.2 °F (36.8 °C) (Oral)   Resp 16   Ht 5' 8" (1.727 m)   Wt 64.2 kg (141 lb 8 oz)   SpO2 99%   BMI 21.51 kg/m²     Physical Exam:   Physical Exam  Vitals reviewed.   Constitutional:       General: He is not in acute distress.     Appearance: He is not toxic-appearing.   HENT:      Head: Normocephalic and atraumatic.   Cardiovascular:      Rate and Rhythm: Normal rate and regular rhythm.      Heart sounds: Normal heart sounds.   Pulmonary:      Effort: No respiratory " distress.      Breath sounds: Normal breath sounds.   Abdominal:      General: Bowel sounds are normal. There is no distension.      Palpations: Abdomen is soft.      Tenderness: There is no abdominal tenderness.   Musculoskeletal:      Cervical back: Neck supple.      Comments: Arthritic deformities of B/L hands   Skin:     Findings: No erythema or rash.   Neurological:      Mental Status: He is alert.      Comments: Follows commands   Psychiatric:      Comments: Calm and cooperative     Imaging  Imaging Results          CT Head Without Contrast (Final result)  Result time 06/13/22 17:27:43    Final result by Ethan Molina MD (06/13/22 17:27:43)                 Impression:      1.  No acute intracranial findings identified.    2.  Old infarcts, chronic microangiopathic ischemia and atrophy.      Electronically signed by: Ethan Molina  Date:    06/13/2022  Time:    17:27             Narrative:    EXAMINATION:  CT HEAD WITHOUT CONTRAST    CLINICAL HISTORY:  Mental status change, unknown cause;    TECHNIQUE:  Sequential axial images were performed of the brain without contrast.    Dose product length of 1167 mGycm. Automated exposure control was utilized to minimize radiation dose.    COMPARISON:  CT brain without contrast May 15, 2022 in MRI brain April 27, 2022.    FINDINGS:  There is no intracranial mass effect, midline shift, hydrocephalus or hemorrhage. There is no sulcal effacement or low attenuation changes to suggest recent large vessel territory infarction.  There are old infarcts which involve the right frontal lobe and the left cingulate cortical/subcortical location.  Chronic microvascular ischemic changes are mild.  The ventricular system and sulcal markings prominence is consistent with atrophy. There is no acute extra axial fluid collection. Visualized paranasal sinuses are clear without mucosal thickening, polypoidal abnormality or air-fluid levels. Mastoid air cells aeration is optimal.                                X-Ray Chest AP Portable (Final result)  Result time 06/13/22 17:09:45    Final result by Ethan Molina MD (06/13/22 17:09:45)                 Impression:      NO ACUTE CARDIOPULMONARY PROCESS IDENTIFIED.      Electronically signed by: Ethan Molina  Date:    06/13/2022  Time:    17:09             Narrative:    EXAMINATION:  XR CHEST AP PORTABLE    CLINICAL HISTORY:  Fever, unspecified    TECHNIQUE:  One view    COMPARISON:  June 6, 2022.    FINDINGS:  Cardiopericardial silhouette is within normal limits.  No acute dense focal or segmental consolidation, congestive process, pleural effusions or pneumothorax.                                 Lab Review   No results found for this or any previous visit (from the past 24 hour(s)).          Assessment/Plan:  1. Acute meningitis with encephalopathy  2. SIRS with fevers   3. Immunocompromised on immunosuppressive drugs  4. Rheumatoid arthritis  5. History of CVA  6. Seizure disorder  7. Anemia  8. Protein calorie malnutrition       -Continue Ceftriaxone #7/14 with option of transitioning to oral antibiotics with cefdinir if discharged  -No fevers and no leukocytosis  -CSF analysis consistent with meningitis with specific pathogen not known, predominantly CSF lymphocytosis with hypoglycorrhachia which could be indicative of even a fungal meningitis, however a pre-treated bacterial meningitis more likely since improving on antibacterial coverage   -CSF PCR panel with no organisms detected including negative for [place] HSV 1, 2 and enterovirus. CSF cryptococcal antigen negative, negative gram stains and cultures  -Immunocompromised and we could evaluate further with T-spot and CSF MTB PCR if cultures remain negative but again TB less likely since he is clinically improving   -Discussed with patient , wife and nursing staff

## 2022-06-24 PROCEDURE — 25000003 PHARM REV CODE 250: Performed by: INTERNAL MEDICINE

## 2022-06-24 PROCEDURE — 97110 THERAPEUTIC EXERCISES: CPT | Mod: CQ

## 2022-06-24 PROCEDURE — 63600175 PHARM REV CODE 636 W HCPCS: Performed by: INTERNAL MEDICINE

## 2022-06-24 PROCEDURE — A4216 STERILE WATER/SALINE, 10 ML: HCPCS | Performed by: INTERNAL MEDICINE

## 2022-06-24 PROCEDURE — C1751 CATH, INF, PER/CENT/MIDLINE: HCPCS

## 2022-06-24 PROCEDURE — 63600175 PHARM REV CODE 636 W HCPCS: Performed by: NURSE PRACTITIONER

## 2022-06-24 PROCEDURE — 36410 VNPNXR 3YR/> PHY/QHP DX/THER: CPT

## 2022-06-24 PROCEDURE — 97116 GAIT TRAINING THERAPY: CPT | Mod: CQ

## 2022-06-24 PROCEDURE — 25000003 PHARM REV CODE 250: Performed by: NURSE PRACTITIONER

## 2022-06-24 PROCEDURE — 97535 SELF CARE MNGMENT TRAINING: CPT | Mod: CO

## 2022-06-24 PROCEDURE — 11000001 HC ACUTE MED/SURG PRIVATE ROOM

## 2022-06-24 RX ORDER — QUETIAPINE FUMARATE 25 MG/1
25 TABLET, FILM COATED ORAL NIGHTLY
Status: DISCONTINUED | OUTPATIENT
Start: 2022-06-24 | End: 2022-06-27 | Stop reason: HOSPADM

## 2022-06-24 RX ADMIN — SODIUM CHLORIDE, PRESERVATIVE FREE 10 ML: 5 INJECTION INTRAVENOUS at 11:06

## 2022-06-24 RX ADMIN — DIPHENHYDRAMINE HYDROCHLORIDE 10 ML: 25 SOLUTION ORAL at 12:06

## 2022-06-24 RX ADMIN — PANTOPRAZOLE SODIUM 40 MG: 40 TABLET, DELAYED RELEASE ORAL at 09:06

## 2022-06-24 RX ADMIN — MICONAZOLE NITRATE 2 % TOPICAL POWDER: at 09:06

## 2022-06-24 RX ADMIN — SODIUM CHLORIDE, PRESERVATIVE FREE 10 ML: 5 INJECTION INTRAVENOUS at 12:06

## 2022-06-24 RX ADMIN — APIXABAN 5 MG: 5 TABLET, FILM COATED ORAL at 09:06

## 2022-06-24 RX ADMIN — ATORVASTATIN CALCIUM 10 MG: 10 TABLET, FILM COATED ORAL at 08:06

## 2022-06-24 RX ADMIN — LEVETIRACETAM 500 MG: 100 INJECTION, SOLUTION INTRAVENOUS at 09:06

## 2022-06-24 RX ADMIN — EZETIMIBE 10 MG: 10 TABLET ORAL at 06:06

## 2022-06-24 RX ADMIN — DIGOXIN 0.25 MG: 250 TABLET ORAL at 05:06

## 2022-06-24 RX ADMIN — MELATONIN TAB 3 MG 6 MG: 3 TAB at 08:06

## 2022-06-24 RX ADMIN — MONTELUKAST SODIUM 10 MG: 10 TABLET, COATED ORAL at 05:06

## 2022-06-24 RX ADMIN — SODIUM CHLORIDE, PRESERVATIVE FREE 10 ML: 5 INJECTION INTRAVENOUS at 06:06

## 2022-06-24 RX ADMIN — PROPRANOLOL HYDROCHLORIDE 60 MG: 20 TABLET ORAL at 08:06

## 2022-06-24 RX ADMIN — QUETIAPINE FUMARATE 25 MG: 25 TABLET ORAL at 08:06

## 2022-06-24 RX ADMIN — APIXABAN 5 MG: 5 TABLET, FILM COATED ORAL at 08:06

## 2022-06-24 RX ADMIN — CEFTRIAXONE 2 G: 2 INJECTION, SOLUTION INTRAVENOUS at 05:06

## 2022-06-24 RX ADMIN — DIPHENHYDRAMINE HYDROCHLORIDE 10 ML: 25 SOLUTION ORAL at 06:06

## 2022-06-24 RX ADMIN — PROPRANOLOL HYDROCHLORIDE 60 MG: 20 TABLET ORAL at 09:06

## 2022-06-24 RX ADMIN — PREDNISONE 5 MG: 5 TABLET ORAL at 09:06

## 2022-06-24 RX ADMIN — DIPHENHYDRAMINE HYDROCHLORIDE 10 ML: 25 SOLUTION ORAL at 11:06

## 2022-06-24 RX ADMIN — CEFTRIAXONE 2 G: 2 INJECTION, SOLUTION INTRAVENOUS at 07:06

## 2022-06-24 RX ADMIN — HYDROCODONE BITARTRATE AND ACETAMINOPHEN 1 TABLET: 10; 325 TABLET ORAL at 09:06

## 2022-06-24 RX ADMIN — MICONAZOLE NITRATE 2 % TOPICAL POWDER: at 08:06

## 2022-06-24 RX ADMIN — METHIMAZOLE 20 MG: 10 TABLET ORAL at 05:06

## 2022-06-24 RX ADMIN — ASPIRIN 325 MG ORAL TABLET 325 MG: 325 PILL ORAL at 09:06

## 2022-06-24 NOTE — PT/OT/SLP PROGRESS
Physical Therapy         Treatment        Quan Contreras   MRN: [id number]     PT Received On: 06/23/22  PT Start Time: 1109     PT Stop Time: 1202    PT Total Time (min): 53 min       Billable Minutes:  Gait [id number] and Therapeutic Exercise 9  Total Minutes: 23    Treatment Type: Treatment  PT/PTA: PTA     PTA Visit Number: 2       General Precautions: Standard, fall, aspiration, hearing impaired  Orthopedic Precautions: Orthopedic Precautions : N/A   Braces: Braces: N/A    Spiritual, Cultural Beliefs, Holiness Practices, Values that Affect Care: no    Subjective:  Communicated with NSG prior to session.         Objective:  Patient found Up in Bed HOB elevated , with Patient found with: telemetry, peripheral IV    Functional Mobility:  Bed Mobility:   Supine to sit: Minimal Assistance   Sit to supine: Minimal Assistance   Rolling: Minimal Assistance   Scooting: Minimal Assistance    Balance:   Static Sit: GOOD-: Takes MODERATE challenges from all directions but inconsistently  Dynamic Sit:  FAIR+: Maintains balance through MINIMAL excursions of active trunk motion  Static Stand: FAIR: Maintains without assist but unable to take challenges  Dynamic stand: POOR+: Needs MIN (minimal ) assist during gait    Transfer Training:  Sit to stand:Minimal Assistance with Rolling Walker . Moderate VC/TC for hand placement.        Gait Training:  Pt amb ~225ft Leo RW. Moderate VC/TC for steering of walker and orientation to task. No LOB noted during gait.          Additional Treatment:  BLE exercises performed. 1 set 15 reps. Moderate VC/TC for exercises and orientation to task. Exercises performed: LAQ, Hip Abd, Heel Slides, Hip Flx, Ankle Pumps.    Activity Tolerance:  Patient tolerated treatment well    Patient left HOB elevated with all lines intact, call button in reach and bed alarm on.    Assessment:  Quan Contreras is a 71 y.o. male with a medical diagnosis of Fever. He presents with increased ambulation endurance  compared to previous treatments..    Rehab potential is good.    Activity tolerance: Good    Discharge recommendations: Discharge Facility/Level of Care Needs: rehabilitation facility     Equipment recommendations: Equipment Needed After Discharge: walker, rolling     GOALS:   Multidisciplinary Problems     Physical Therapy Goals        Problem: Physical Therapy    Goal Priority Disciplines Outcome Goal Variances Interventions   Physical Therapy Goal     PT, PT/OT Ongoing, Progressing     Description: Goals to be met by: 22     Patient will increase functional independence with mobility by performin. Supine to sit with MInimal Assistance  2. Sit to supine with MInimal Assistance  3. Sit to stand transfer with Contact Guard Assistance  4. Gait  x 200 feet with Contact Guard Assistance using Rolling Walker.                      PLAN:    Patient to be seen 6 x/week  to address the above listed problems via gait training, therapeutic activities, therapeutic exercises  Plan of Care expires: 22  Plan of Care reviewed with: patient         2022

## 2022-06-24 NOTE — PT/OT/SLP PROGRESS
Attempted to see pt for PT at 1025 on 6/24/2022. Pt not seen 2/2 CNA giving him a bath.    Attempted to see pt at 1230, pt not seen 2/2 to eating lunch. Will f/u this afternoon.

## 2022-06-24 NOTE — PT/OT/SLP PROGRESS
Occupational Therapy  Treatment    Quan Contreras   MRN: [id number]   Admitting Diagnosis: Fever    OT Date of Treatment: 06/24/22   OT Start Time: 1458  OT Stop Time: 1515  OT Total Time (min): 17 min     Billable Minutes:  Self Care/Home Management 17  Total Minutes: 17     OT/VERONICA: VERONICA     VERONICA Visit Number: 2    General Precautions: Standard, fall  Orthopedic Precautions:    Braces:      Spiritual, Cultural Beliefs, Confucianism Practices, Values that Affect Care: no    Subjective:  Communicated with RN prior to session.         Objective:     Functional Mobility:  Bed Mobility:   Supine to sit: Standby Assistance   Sit to supine: Standby Assistance   Scooting: Standby Assistance    Transfer Training:   Sit to stand:Contact Guard Assistance with Rolling Walker from EOB  Toilet Transfer:  Pt Step Transfer with Contact Guard Assistance with Rolling Walker from EOB to BR commode    LE Dressing:  Patient don/doffed socks with Independent    Toilet Training:  Pt performed toileting with Contact Guard Assistance with Grab  bar at [employer].    Patient left HOB elevated with all lines intact, call button in reach and bed alarm on    ASSESSMENT:  Quan Contreras is a 71 y.o. male with a medical diagnosis of Fever.    Rehab potential is good    Activity tolerance: Good    Discharge recommendations: nursing facility, skilled, nursing facility, basic     Equipment recommendations:       GOALS:   Multidisciplinary Problems     Occupational Therapy Goals        Problem: Occupational Therapy    Goal Priority Disciplines Outcome Interventions   Occupational Therapy Goal     OT, PT/OT Ongoing, Progressing    Description: Goals to be met by: 7/5/22    Patient will increase functional independence with ADLs by performing:    UE Dressing with SBA  LE Dressing with Stand-by Assistance.  Grooming while standing at sink with Stand-by Assistance.  Toileting from toilet with Stand-by Assistance for hygiene and clothing management.   Toilet  transfer to toilet with Stand-by Assistance.                     Plan:  Patient to be seen 5 x/week, daily to address the above listed problems via self-care/home management, therapeutic activities, therapeutic exercises  Plan of Care expires: 07/05/22  Plan of Care reviewed with: patient, spouse         06/24/2022

## 2022-06-24 NOTE — PROGRESS NOTES
Infectious Diseases Progress Note  71-year-old male with past medical history of paroxysmal AFib, HTN, HLD, hypothyroidism, seizure disorder, rheumatoid arthritis, CAD, with the recent hospitalizations for CVA in April, is admitted to [facility] on 06/13/2022, presenting to the ED with report of continued fevers of up to 102 at home.  Apparently had visited the ER a few times since his discharge from the rehab facility .  Including mid May with report of twitching on worsening altered mental status, and seen by Neurology, placed on care and then the week prior to this admission had developed fevers with worsening confusion and chest x-ray indicated for possible left lower lobe infiltrate, treated with oral Levaquin and discharge.  His wife tells been he had been on antibiotics continuously up until this admission including initially ampicillin and then most recently Levaquin.  He does take Cimzia and prednisone rheumatoid arthritis.  On presentation he was evaluated extensively and noted to have no fevers but by the next day had low-grade temperature of up to 99.5.  He did have leukocytosis of 12.8, anemic with low albumin.  He had a lumbar puncture with CSF analysis showing 20 WBC predodaughtminantly lymphocytes at 76%, elevated protein 91.4 and low glucose at 35. CSF cultures have remained negative with Gram stain showing no bacteria and no WBC.  CSF cryptococcal antigen and PCR panel negative.  Blood cultures have remained negative and review of records show 6/6 blood cultures which have been negative as well.  Chest x-ray on admission showed no acute cardiopulmonary process.  CT scan of the head with no acute intracranial findings, but had old infarcts, chronic micro angiopathic ischemia and atrophy.  MRI of the brain showed no acute infarcts, abnormal diffuse signal in the extra-axial space history with concern for meningitis/infection, needed to be correlated with CSF studies and  clinical findings. CT maxillofacial showed no drainable fluid collection.   He is currently on antibiotic coverage ceftriaxone.     Subjective:  No new complaints, no fevers, doing about the same. Lying in bed in no acute distress.      ROS  Constitutional: Positive for malaise/fatigue.   HENT: Negative.    Respiratory: Negative.    Gastrointestinal: Negative.    Genitourinary: Negative.    Musculoskeletal: Negative.    Neurological: Positive for weakness.   Endo/Heme/Allergies: Negative.    All other Systems review done and negative.    Review of patient's allergies indicates:   Allergen Reactions    Ace inhibitors Swelling     Lip swelling    Morphine     Morphine sulfate     Nafcillin Itching    Pradaxa [dabigatran etexilate] Other (See Comments)     Abdominal cramping    Sulfamethoxazole Rash       Past Medical History:   Diagnosis Date    Acute left arterial ischemic stroke, KINGA (anterior cerebral artery) 5/3/2022    Acute osteomyelitis 5/11/2022    Atherosclerosis of coronary artery 5/11/2022    Atrial fibrillation 5/3/2022    Benign essential hypertension 5/3/2022    Cardiomyopathy     Coronary artery disease     Diverticulosis     Esophageal reflux 5/11/2022    Fatigue     Focal seizure 5/17/2022    Generalized anxiety disorder     GERD (gastroesophageal reflux disease)     Graves disease     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/2022    HTN (hypertension)     Hyperthyroidism     Idiopathic peripheral neuropathy 5/11/2022    Irritable bowel syndrome without diarrhea     Ischemic cardiomyopathy 5/3/2022    Mixed hyperlipidemia 5/11/2022    Other sequelae following unspecified cerebrovascular disease 5/9/2022    Paroxysmal atrial fibrillation     Rheumatoid arthritis 5/11/2022    Rheumatoid arthritis, unspecified     Shingles     Staph aureus infection     Stroke     Thyroiditis, unspecified     Thyrotoxicosis 5/3/2022       Past Surgical History:  "  Procedure Laterality Date    CATARACT EXTRACTION      CYST REMOVAL Left     TONSILLECTOMY      TOTAL KNEE ARTHROPLASTY      VASECTOMY         Social History     Socioeconomic History    Marital status:    Tobacco Use    Smoking status: Never Smoker    Smokeless tobacco: Never Used   Substance and Sexual Activity    Alcohol use: Never    Drug use: Never    Sexual activity: Yes         Scheduled Meds:   (Magic mouthwash) 1:1:1 diphenhydramine(Benadryl) 12.5mg/5ml liq, aluminum & magnesium hydroxide-simethicone (Maalox), LIDOcaine viscous 2%  10 mL Swish & Spit Q6H    apixaban  5 mg Oral BID    aspirin  325 mg Oral Daily    atorvastatin  10 mg Oral QHS    cefTRIAXone (ROCEPHIN) IVPB  2 g Intravenous Q12H    digoxin  0.25 mg Oral Daily    ezetimibe  10 mg Oral Daily    levetiracetam IV  500 mg Intravenous Q12H    methIMAzole  20 mg Oral Daily    miconazole NITRATE 2 %   Topical (Top) BID    montelukast  10 mg Oral Daily    pantoprazole  40 mg Oral Daily    predniSONE  5 mg Oral Daily    propranoloL  60 mg Oral BID    sodium chloride 0.9%  10 mL Intravenous Q6H     Continuous Infusions:  PRN Meds:acetaminophen, acetaminophen, diphenhydrAMINE, haloperidol lactate, HYDROcodone-acetaminophen, HYDROmorphone, lorazepam, melatonin, metoprolol, ondansetron, sodium chloride 0.9%, Flushing PICC Protocol **AND** sodium chloride 0.9% **AND** sodium chloride 0.9%    Objective:  /85   Pulse 97   Temp 97.9 °F (36.6 °C) (Oral)   Resp 17   Ht 5' 8" (1.727 m)   Wt 64.2 kg (141 lb 8 oz)   SpO2 97%   BMI 21.51 kg/m²     Physical Exam:   Physical Exam  Vitals reviewed.   Constitutional:       General: He is not in acute distress.     Appearance: He is not toxic-appearing.   HENT:      Head: Normocephalic and atraumatic.   Cardiovascular:      Rate and Rhythm: Normal rate and regular rhythm.      Heart sounds: Normal heart sounds.   Pulmonary:      Effort: No respiratory distress.      Breath " sounds: Normal breath sounds.   Abdominal:      General: Bowel sounds are normal. There is no distension.      Palpations: Abdomen is soft.      Tenderness: There is no abdominal tenderness.   Musculoskeletal:      Cervical back: Neck supple.      Comments: Arthritic deformities of B/L hands   Skin:     Findings: No erythema or rash.   Neurological:      Mental Status: He is alert.      Comments: Follows commands   Psychiatric:      Comments: Calm and cooperative     Imaging      Lab Review   No results found for this or any previous visit (from the past 24 hour(s)).      Assessment/Plan:  1. Acute meningitis with encephalopathy  2. SIRS with fevers   3. Immunocompromised on immunosuppressive drugs  4. Rheumatoid arthritis  5. History of CVA  6. Seizure disorder  7. Anemia  8. Protein calorie malnutrition    9. Dysphagia     -Continue Ceftriaxone #8/14 with option of transitioning to oral antibiotics with cefdinir if discharged  -Remains afebrile without leukocytosis  -CSF analysis consistent with meningitis with specific pathogen not known, predominantly CSF lymphocytosis with hypoglycorrhachia which could be indicative of even a fungal meningitis, however a pre-treated bacterial meningitis more likely since improving on antibacterial coverage   -CSF PCR panel with no organisms detected including negative for [place] HSV 1, 2 and enterovirus. CSF cryptococcal antigen negative, negative gram stains and cultures  -Immunocompromised and we could evaluate further with T-spot and CSF MTB PCR if cultures remain negative but again TB less likely since he is clinically improving   -ST on board, s/p MBS with diet adjusted accordingly  -Discussed with patient , daughter and nursing staff

## 2022-06-24 NOTE — PLAN OF CARE
06/24/22 1141   Discharge Assessment   Assessment Type Discharge Planning Assessment   Confirmed/corrected address, phone number and insurance Yes   Source of Information patient;family   When was your last doctors appointment?   (April 2022)   Communicated JORGE with patient/caregiver Date not available/Unable to determine   Reason For Admission AMS   Lives With spouse   Do you expect to return to your current living situation? Yes   Do you have help at home or someone to help you manage your care at home? Yes   Who are your caregiver(s) and their phone number(s)? Wife Elizabeth Contreras [phone]   Prior to hospitilization cognitive status: Unable to Assess   Current cognitive status: Not Oriented to Time   Walking or Climbing Stairs Difficulty ambulation difficulty, requires equipment   Mobility Management RW at times   Dressing/Bathing Difficulty bathing difficulty, assistance 1 person   Dressing/Bathing Management Stand by assist per wife   Equipment Currently Used at Home bedside commode;cane, straight;cane, quad;crutches;wheelchair;walker, rolling   Readmission within 30 days? Yes   Patient currently being followed by outpatient case management? No   Do you currently have service(s) that help you manage your care at home? Yes   Name and Contact number of agency [facility] [phone]   Is the pt/caregiver preference to resume services with current agency Yes   Do you take prescription medications? Yes   Do you have prescription coverage? Yes   Do you have any problems affording any of your prescribed medications? No   Is the patient taking medications as prescribed? yes   Who is going to help you get home at discharge? wife   How do you get to doctors appointments? family or friend will provide   Are you on dialysis? No   Do you take coumadin? No   Discharge Plan A Rehab;Home Health   DME Needed Upon Discharge  none   Discharge Plan discussed with: Spouse/sig other;Patient   Name(s) and Number(s) Wife  Elizabeth Diane [phone]   Discharge Barriers Identified None   Relationship/Environment   Name(s) of Who Lives With Patient Wife Elizabeth Diane [phone]   Discuss rehab options, FOC obtained for [facility], referral sent, Gris notified for review

## 2022-06-24 NOTE — PROGRESS NOTES
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  He was discharged to rehab facility and when he returned home started to have twitching and worsening altered mental status not behaving himself so was brought back to the ER in mid May where he was seen by Neurology and had a negative EEG but was started on Keppra out of concern for possible seizure-like activity in the setting of his recent stroke.  He then was brought back to the ER last week when he developed fevers and worsening of his baseline confusion and his chest x-ray was significant for a possible left lower lobe infiltrate and patient was started on oral Levaquin and discharged to follow-up with PCP.  Wife at bedside stated that since then he has been having episodic on and off fevers as high as 102. Home health nurse noted patient was extremely off from his baseline mental status and had a fever of 102 so was referred to the ER for further evaluation.    Afebrile, hemodynamically stable maintaining normal sats on room air.  His laboratory work was overall unremarkable, SARS-COVID-2 testing and influenza negative.  Blood cultures were obtained.  Due to him being on oral anticoagulation on LP was not performed in the ER.  CT of the head was unremarkable for acute process.  IR was consulted for LP and he was started on broad-spectrum empiric meningitis regimen with vancomycin, ceftriaxone, ampicillin, acyclovir.  Neurology was consulted, EEG and MRI was ordered.  MRI revealed significant  atrophy, encephalomalacia with DWI intensity in falcine frontal region.  Keppra was continued.  EEG revealed no epileptiform discharges or electrographic seizures.  He continued to have delirious episode while in the hospital.  CT maxillofacial was added to reassess recently diagnosed with dental abscess.  Haldol and Seroquel were added to help with mental status.  PICC line was inserted.  ID was consulted for further assistance. CSF analysis showing 20 WBC predodaughtminantly lymphocytes at 76%, elevated protein 91.4 and low glucose at 35. CSF cultures have remained negative with Gram stain showing no bacteria and no WBC.  CSF cryptococcal antigen and PCR panel negative.  Blood cultures have remained negative and review of records show 6/6 blood cultures which have been negative as well. maxillofacial showed no drainable fluid collection.  CSF analysis was consistent with meningitis with no specific pathogen.  He CSF lymphocytosis with hypoglycorrhachia could be indicative of fungal meningitis however he was not treated for fungal meningitis in the setting of improved mental status since initiation of antibiotics.  CSF PCR was negative for organisms.  During meningitis were under differential but was less likely given his improvement.  Ampicillin was discontinued.  Vancomycin and ceftriaxone were continued initially.  CSF PCR showed no organisms and it was negative for West Nile, HSV, enterovirus, cryptococcal antigen.  Later antibiotic regimen was switched to ceftriaxone only with plans to switch to p.o. cefdinir upon discharge.  ID recommended to get T spot and CSF MTB PCR if cultures remain negative but TB was less likely.    Interval Hx:     Hemodynamically stable.  He was alert, comfortable resting in the bed.  Family member was at bedside this morning.  No labs were obtained for today.    Objective/physical exam:  Vitals:    06/23/22 0956 06/23/22 1234 06/23/22 1926 06/24/22 0049   BP:  104/70 128/85 125/81    Pulse:  88 97 94   Resp: 14  17 18   Temp:  98.4 °F (36.9 °C) 97.9 °F (36.6 °C) 98.5 °F (36.9 °C)   TempSrc:  Oral Oral Oral   SpO2:  96% 97% 99%   Weight:       Height:         General: In no acute distress, afebrile  Respiratory: Clear to auscultation bilaterally  Cardiovascular: S1, S2, no appreciable murmur  Abdomen: Soft, nontender, BS +  MSK: Warm, no lower extremity edema, no clubbing or cyanosis  Neurologic: Alert and oriented x4, moving all extremities with good strength     Lab Results   Component Value Date     (L) 06/20/2022    K 4.9 06/20/2022    CO2 19 (L) 06/20/2022    BUN 6.0 (L) 06/20/2022    CREATININE 0.49 (L) 06/20/2022    CALCIUM 9.3 06/20/2022    EGFRNONAA >60 06/20/2022      Lab Results   Component Value Date    ALT 60 (H) 06/15/2022    AST 62 (H) 06/15/2022    ALKPHOS 91 06/15/2022    BILITOT 0.8 06/15/2022      Lab Results   Component Value Date    WBC 11.5 06/20/2022    HGB 14.5 06/20/2022    HCT 43.2 06/20/2022    MCV 85.7 06/20/2022     06/20/2022       Medications:   (Magic mouthwash) 1:1:1 diphenhydramine(Benadryl) 12.5mg/5ml liq, aluminum & magnesium hydroxide-simethicone (Maalox), LIDOcaine viscous 2%  10 mL Swish & Spit Q6H    apixaban  5 mg Oral BID    aspirin  325 mg Oral Daily    atorvastatin  10 mg Oral QHS    cefTRIAXone (ROCEPHIN) IVPB  2 g Intravenous Q12H    digoxin  0.25 mg Oral Daily    ezetimibe  10 mg Oral Daily    levetiracetam IV  500 mg Intravenous Q12H    methIMAzole  20 mg Oral Daily    miconazole NITRATE 2 %   Topical (Top) BID    montelukast  10 mg Oral Daily    pantoprazole  40 mg Oral Daily    predniSONE  5 mg Oral Daily    propranoloL  60 mg Oral BID    sodium chloride 0.9%  10 mL Intravenous Q6H      acetaminophen, acetaminophen, diphenhydrAMINE, haloperidol lactate, HYDROcodone-acetaminophen, HYDROmorphone, lorazepam, melatonin, metoprolol, ondansetron, sodium chloride 0.9%, Flushing PICC Protocol **AND** sodium chloride 0.9%  **AND** sodium chloride 0.9%     Assessment/Plan:    Bacterial meningitis  Acute encephalopathy likely infectious causes-improving  Immunosuppressed status  Chronic rheumatoid arthritis on immunosuppression  History of thyrotoxicosis    Hx: Vascular dementia, A. fib on Eliquis, HTN, CAD, HLD, ischemic cardiomyopathy with preserved ejection fraction, recent CVA, chronic seizure disorder    Plan:  -Stable from mental status standpoint.  We will continue Keppra.  IV Dilaudid for pain control  - ID following.  Continue ceftriaxone day 9/14.  Plan to switch to cefdinir upon discharge  - Continue other home medications including digoxin, propranolol, Eliquis, methimazole, prednisone.  Needs thyroid function test repeated as outpatient      Protonix  Needs placement  PT      Shmuel Crawford MD

## 2022-06-25 LAB
ANION GAP SERPL CALC-SCNC: 7 MEQ/L
BASOPHILS # BLD AUTO: 0.03 X10(3)/MCL (ref 0–0.2)
BASOPHILS NFR BLD AUTO: 0.4 %
BUN SERPL-MCNC: 8.8 MG/DL (ref 8.4–25.7)
CALCIUM SERPL-MCNC: 9.2 MG/DL (ref 8.8–10)
CHLORIDE SERPL-SCNC: 107 MMOL/L (ref 98–107)
CO2 SERPL-SCNC: 26 MMOL/L (ref 23–31)
CREAT SERPL-MCNC: 0.51 MG/DL (ref 0.73–1.18)
CREAT/UREA NIT SERPL: 17
EOSINOPHIL # BLD AUTO: 0.09 X10(3)/MCL (ref 0–0.9)
EOSINOPHIL NFR BLD AUTO: 1.1 %
ERYTHROCYTE [DISTWIDTH] IN BLOOD BY AUTOMATED COUNT: 15 % (ref 11.5–17)
GLUCOSE SERPL-MCNC: 100 MG/DL (ref 82–115)
HCT VFR BLD AUTO: 40.4 % (ref 42–52)
HGB BLD-MCNC: 13 GM/DL (ref 14–18)
IMM GRANULOCYTES # BLD AUTO: 0.04 X10(3)/MCL (ref 0–0.02)
IMM GRANULOCYTES NFR BLD AUTO: 0.5 % (ref 0–0.43)
LYMPHOCYTES # BLD AUTO: 3.06 X10(3)/MCL (ref 0.6–4.6)
LYMPHOCYTES NFR BLD AUTO: 35.7 %
MAGNESIUM SERPL-MCNC: 1.8 MG/DL (ref 1.6–2.6)
MCH RBC QN AUTO: 28.3 PG (ref 27–31)
MCHC RBC AUTO-ENTMCNC: 32.2 MG/DL (ref 33–36)
MCV RBC AUTO: 87.8 FL (ref 80–94)
MONOCYTES # BLD AUTO: 0.82 X10(3)/MCL (ref 0.1–1.3)
MONOCYTES NFR BLD AUTO: 9.6 %
NEUTROPHILS # BLD AUTO: 4.5 X10(3)/MCL (ref 2.1–9.2)
NEUTROPHILS NFR BLD AUTO: 52.7 %
NRBC BLD AUTO-RTO: 0 %
PLATELET # BLD AUTO: 313 X10(3)/MCL (ref 130–400)
PMV BLD AUTO: 9.3 FL (ref 9.4–12.4)
POTASSIUM SERPL-SCNC: 3.5 MMOL/L (ref 3.5–5.1)
RBC # BLD AUTO: 4.6 X10(6)/MCL (ref 4.7–6.1)
SODIUM SERPL-SCNC: 140 MMOL/L (ref 136–145)
WBC # SPEC AUTO: 8.6 X10(3)/MCL (ref 4.5–11.5)

## 2022-06-25 PROCEDURE — 25000003 PHARM REV CODE 250: Performed by: INTERNAL MEDICINE

## 2022-06-25 PROCEDURE — A4216 STERILE WATER/SALINE, 10 ML: HCPCS | Performed by: INTERNAL MEDICINE

## 2022-06-25 PROCEDURE — 85025 COMPLETE CBC W/AUTO DIFF WBC: CPT | Performed by: INTERNAL MEDICINE

## 2022-06-25 PROCEDURE — 80048 BASIC METABOLIC PNL TOTAL CA: CPT | Performed by: INTERNAL MEDICINE

## 2022-06-25 PROCEDURE — 25000003 PHARM REV CODE 250: Performed by: NURSE PRACTITIONER

## 2022-06-25 PROCEDURE — 63600175 PHARM REV CODE 636 W HCPCS: Performed by: NURSE PRACTITIONER

## 2022-06-25 PROCEDURE — 63600175 PHARM REV CODE 636 W HCPCS: Performed by: INTERNAL MEDICINE

## 2022-06-25 PROCEDURE — 63600175 PHARM REV CODE 636 W HCPCS: Performed by: HOSPITALIST

## 2022-06-25 PROCEDURE — 36415 COLL VENOUS BLD VENIPUNCTURE: CPT | Performed by: INTERNAL MEDICINE

## 2022-06-25 PROCEDURE — 11000001 HC ACUTE MED/SURG PRIVATE ROOM

## 2022-06-25 PROCEDURE — 83735 ASSAY OF MAGNESIUM: CPT | Performed by: INTERNAL MEDICINE

## 2022-06-25 RX ADMIN — LORAZEPAM 1 MG: 2 INJECTION, SOLUTION INTRAMUSCULAR; INTRAVENOUS at 05:06

## 2022-06-25 RX ADMIN — ASPIRIN 325 MG ORAL TABLET 325 MG: 325 PILL ORAL at 09:06

## 2022-06-25 RX ADMIN — CEFTRIAXONE 2 G: 2 INJECTION, SOLUTION INTRAVENOUS at 05:06

## 2022-06-25 RX ADMIN — DIPHENHYDRAMINE HYDROCHLORIDE 10 ML: 25 SOLUTION ORAL at 05:06

## 2022-06-25 RX ADMIN — MELATONIN TAB 3 MG 6 MG: 3 TAB at 09:06

## 2022-06-25 RX ADMIN — SODIUM CHLORIDE, PRESERVATIVE FREE 10 ML: 5 INJECTION INTRAVENOUS at 05:06

## 2022-06-25 RX ADMIN — MONTELUKAST SODIUM 10 MG: 10 TABLET, COATED ORAL at 05:06

## 2022-06-25 RX ADMIN — METHIMAZOLE 20 MG: 10 TABLET ORAL at 05:06

## 2022-06-25 RX ADMIN — QUETIAPINE FUMARATE 25 MG: 25 TABLET ORAL at 09:06

## 2022-06-25 RX ADMIN — MICONAZOLE NITRATE 2 % TOPICAL POWDER: at 09:06

## 2022-06-25 RX ADMIN — APIXABAN 5 MG: 5 TABLET, FILM COATED ORAL at 09:06

## 2022-06-25 RX ADMIN — SODIUM CHLORIDE, PRESERVATIVE FREE 10 ML: 5 INJECTION INTRAVENOUS at 06:06

## 2022-06-25 RX ADMIN — SODIUM CHLORIDE, PRESERVATIVE FREE 10 ML: 5 INJECTION INTRAVENOUS at 12:06

## 2022-06-25 RX ADMIN — PREDNISONE 5 MG: 5 TABLET ORAL at 09:06

## 2022-06-25 RX ADMIN — LEVETIRACETAM 500 MG: 100 INJECTION, SOLUTION INTRAVENOUS at 09:06

## 2022-06-25 RX ADMIN — DIPHENHYDRAMINE HYDROCHLORIDE 10 ML: 25 SOLUTION ORAL at 12:06

## 2022-06-25 RX ADMIN — EZETIMIBE 10 MG: 10 TABLET ORAL at 05:06

## 2022-06-25 RX ADMIN — PROPRANOLOL HYDROCHLORIDE 60 MG: 20 TABLET ORAL at 09:06

## 2022-06-25 RX ADMIN — PANTOPRAZOLE SODIUM 40 MG: 40 TABLET, DELAYED RELEASE ORAL at 09:06

## 2022-06-25 RX ADMIN — DIGOXIN 0.25 MG: 250 TABLET ORAL at 05:06

## 2022-06-25 RX ADMIN — HYDROCODONE BITARTRATE AND ACETAMINOPHEN 1 TABLET: 10; 325 TABLET ORAL at 06:06

## 2022-06-25 RX ADMIN — ATORVASTATIN CALCIUM 10 MG: 10 TABLET, FILM COATED ORAL at 09:06

## 2022-06-25 NOTE — PROGRESS NOTES
Infectious Diseases Progress Note  71-year-old male with past medical history of paroxysmal AFib, HTN, HLD, hypothyroidism, seizure disorder, rheumatoid arthritis, CAD, with the recent hospitalizations for CVA in April, is admitted to [facility] on 06/13/2022, presenting to the ED with report of continued fevers of up to 102 at home.  Apparently had visited the ER a few times since his discharge from the rehab facility .  Including mid May with report of twitching on worsening altered mental status, and seen by Neurology, placed on care and then the week prior to this admission had developed fevers with worsening confusion and chest x-ray indicated for possible left lower lobe infiltrate, treated with oral Levaquin and discharge.  His wife tells been he had been on antibiotics continuously up until this admission including initially ampicillin and then most recently Levaquin.  He does take Cimzia and prednisone rheumatoid arthritis.  On presentation he was evaluated extensively and noted to have no fevers but by the next day had low-grade temperature of up to 99.5.  He did have leukocytosis of 12.8, anemic with low albumin.  He had a lumbar puncture with CSF analysis showing 20 WBC predodaughtminantly lymphocytes at 76%, elevated protein 91.4 and low glucose at 35. CSF cultures have remained negative with Gram stain showing no bacteria and no WBC.  CSF cryptococcal antigen and PCR panel negative.  Blood cultures have remained negative and review of records show 6/6 blood cultures which have been negative as well.  Chest x-ray on admission showed no acute cardiopulmonary process.  CT scan of the head with no acute intracranial findings, but had old infarcts, chronic micro angiopathic ischemia and atrophy.  MRI of the brain showed no acute infarcts, abnormal diffuse signal in the extra-axial space history with concern for meningitis/infection, needed to be correlated with CSF studies and  clinical findings. CT maxillofacial showed no drainable fluid collection.   He is currently on antibiotic coverage ceftriaxone.    Subjective:  No fevers, doing about the same.  Lying in bed in no acute distress. Wife reports that he is more confused today, otherwise doing about the same.    Past Medical History:   Diagnosis Date    Acute left arterial ischemic stroke, KINGA (anterior cerebral artery) 5/3/2022    Acute osteomyelitis 5/11/2022    Atherosclerosis of coronary artery 5/11/2022    Atrial fibrillation 5/3/2022    Benign essential hypertension 5/3/2022    Cardiomyopathy     Coronary artery disease     Diverticulosis     Esophageal reflux 5/11/2022    Fatigue     Focal seizure 5/17/2022    Generalized anxiety disorder     GERD (gastroesophageal reflux disease)     Graves disease     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/2022    HTN (hypertension)     Hyperthyroidism     Idiopathic peripheral neuropathy 5/11/2022    Irritable bowel syndrome without diarrhea     Ischemic cardiomyopathy 5/3/2022    Mixed hyperlipidemia 5/11/2022    Other sequelae following unspecified cerebrovascular disease 5/9/2022    Paroxysmal atrial fibrillation     Rheumatoid arthritis 5/11/2022    Rheumatoid arthritis, unspecified     Shingles     Staph aureus infection     Stroke     Thyroiditis, unspecified     Thyrotoxicosis 5/3/2022     Past Surgical History:   Procedure Laterality Date    CATARACT EXTRACTION      CYST REMOVAL Left     TONSILLECTOMY      TOTAL KNEE ARTHROPLASTY      VASECTOMY       Social History     Socioeconomic History    Marital status:    Tobacco Use    Smoking status: Never Smoker    Smokeless tobacco: Never Used   Substance and Sexual Activity    Alcohol use: Never    Drug use: Never    Sexual activity: Yes       Review of Systems   Unable to perform ROS: Mental status change          Review of patient's allergies indicates:   Allergen  "Reactions    Ace inhibitors Swelling     Lip swelling    Morphine     Morphine sulfate     Nafcillin Itching    Pradaxa [dabigatran etexilate] Other (See Comments)     Abdominal cramping    Sulfamethoxazole Rash         Scheduled Meds:   (Magic mouthwash) 1:1:1 diphenhydramine(Benadryl) 12.5mg/5ml liq, aluminum & magnesium hydroxide-simethicone (Maalox), LIDOcaine viscous 2%  10 mL Swish & Spit Q6H    apixaban  5 mg Oral BID    aspirin  325 mg Oral Daily    atorvastatin  10 mg Oral QHS    cefTRIAXone (ROCEPHIN) IVPB  2 g Intravenous Q12H    digoxin  0.25 mg Oral Daily    ezetimibe  10 mg Oral Daily    levetiracetam IV  500 mg Intravenous Q12H    methIMAzole  20 mg Oral Daily    miconazole NITRATE 2 %   Topical (Top) BID    montelukast  10 mg Oral Daily    pantoprazole  40 mg Oral Daily    predniSONE  5 mg Oral Daily    propranoloL  60 mg Oral BID    QUEtiapine  25 mg Oral QHS    sodium chloride 0.9%  10 mL Intravenous Q6H     Continuous Infusions:  PRN Meds:acetaminophen, acetaminophen, diphenhydrAMINE, HYDROcodone-acetaminophen, HYDROmorphone, lorazepam, melatonin, metoprolol, ondansetron, sodium chloride 0.9%, Flushing PICC Protocol **AND** sodium chloride 0.9% **AND** sodium chloride 0.9%    Objective:  /78   Pulse 103   Temp 97.9 °F (36.6 °C) (Oral)   Resp 19   Ht 5' 8" (1.727 m)   Wt 64.2 kg (141 lb 8 oz)   SpO2 98%   BMI 21.51 kg/m²     Physical Exam:   Physical Exam  Vitals reviewed.   Constitutional:       General: He is not in acute distress.     Appearance: He is not toxic-appearing.   HENT:      Head: Normocephalic and atraumatic.   Cardiovascular:      Rate and Rhythm: Normal rate and regular rhythm.      Heart sounds: Normal heart sounds.   Pulmonary:      Effort: No respiratory distress.      Breath sounds: Normal breath sounds.   Abdominal:      General: Bowel sounds are normal. There is no distension.      Palpations: Abdomen is soft.      Tenderness: There is no " abdominal tenderness.   Musculoskeletal:      Cervical back: Neck supple.      Comments: Arthritic deformities of B/L hands   Skin:     Findings: No erythema or rash.   Neurological:      Mental Status: He is alert.      Comments:  Confused  Psychiatric:      Comments: Calm and cooperative     Imaging  Imaging Results          CT Head Without Contrast (Final result)  Result time 06/13/22 17:27:43    Final result by Ethan Molina MD (06/13/22 17:27:43)                 Impression:      1.  No acute intracranial findings identified.    2.  Old infarcts, chronic microangiopathic ischemia and atrophy.      Electronically signed by: Ethan Molina  Date:    06/13/2022  Time:    17:27             Narrative:    EXAMINATION:  CT HEAD WITHOUT CONTRAST    CLINICAL HISTORY:  Mental status change, unknown cause;    TECHNIQUE:  Sequential axial images were performed of the brain without contrast.    Dose product length of 1167 mGycm. Automated exposure control was utilized to minimize radiation dose.    COMPARISON:  CT brain without contrast May 15, 2022 in MRI brain April 27, 2022.    FINDINGS:  There is no intracranial mass effect, midline shift, hydrocephalus or hemorrhage. There is no sulcal effacement or low attenuation changes to suggest recent large vessel territory infarction.  There are old infarcts which involve the right frontal lobe and the left cingulate cortical/subcortical location.  Chronic microvascular ischemic changes are mild.  The ventricular system and sulcal markings prominence is consistent with atrophy. There is no acute extra axial fluid collection. Visualized paranasal sinuses are clear without mucosal thickening, polypoidal abnormality or air-fluid levels. Mastoid air cells aeration is optimal.                               X-Ray Chest AP Portable (Final result)  Result time 06/13/22 17:09:45    Final result by Ethan Molina MD (06/13/22 17:09:45)                 Impression:      NO ACUTE  CARDIOPULMONARY PROCESS IDENTIFIED.      Electronically signed by: Ethan Molina  Date:    06/13/2022  Time:    17:09             Narrative:    EXAMINATION:  XR CHEST AP PORTABLE    CLINICAL HISTORY:  Fever, unspecified    TECHNIQUE:  One view    COMPARISON:  June 6, 2022.    FINDINGS:  Cardiopericardial silhouette is within normal limits.  No acute dense focal or segmental consolidation, congestive process, pleural effusions or pneumothorax.                                 Lab Review   No results found for this or any previous visit (from the past 24 hour(s)).          Assessment/Plan:  1. Acute meningitis with encephalopathy  2. SIRS with fevers   3. Immunocompromised on immunosuppressive drugs  4. Rheumatoid arthritis  5. History of CVA  6. Seizure disorder  7. Anemia  8. Protein calorie malnutrition    9. Dysphagia     -Continue Ceftriaxone #9/14 with option of transitioning to oral antibiotics with cefdinir if discharged  -Remains afebrile without leukocytosis  -CSF analysis consistent with meningitis with specific pathogen not known, predominantly CSF lymphocytosis with hypoglycorrhachia which could be indicative of even a fungal meningitis, however a pre-treated bacterial meningitis more likely since improving on antibacterial coverage   -CSF PCR panel with no organisms detected including negative for West nile HSV 1, 2 and enterovirus. CSF cryptococcal antigen negative, negative gram stains and cultures  -Immunocompromised and we could evaluate further with T-spot and CSF MTB PCR if cultures remain negative but again TB less likely since he is clinically improving   -ST on board, s/p MBS with diet adjusted accordingly  -Discussed with patient and nursing staff

## 2022-06-25 NOTE — PLAN OF CARE
Problem: Adult Inpatient Plan of Care  Goal: Plan of Care Review  Outcome: Ongoing, Progressing  Goal: Patient-Specific Goal (Individualized)  Outcome: Ongoing, Progressing  Goal: Absence of Hospital-Acquired Illness or Injury  Outcome: Ongoing, Progressing  Goal: Optimal Comfort and Wellbeing  Outcome: Ongoing, Progressing  Goal: Readiness for Transition of Care  Outcome: Ongoing, Progressing     Problem: Skin Injury Risk Increased  Goal: Skin Health and Integrity  Outcome: Ongoing, Progressing     Problem: Infection  Goal: Absence of Infection Signs and Symptoms  Outcome: Ongoing, Progressing     Problem: Fall Injury Risk  Goal: Absence of Fall and Fall-Related Injury  Outcome: Ongoing, Progressing     Problem: Adjustment to Illness (Delirium)  Goal: Optimal Coping  Outcome: Ongoing, Progressing     Problem: Altered Behavior (Delirium)  Goal: Improved Behavioral Control  Outcome: Ongoing, Progressing     Problem: Attention and Thought Clarity Impairment (Delirium)  Goal: Improved Attention and Thought Clarity  Outcome: Ongoing, Progressing     Problem: Sleep Disturbance (Delirium)  Goal: Improved Sleep  Outcome: Ongoing, Progressing

## 2022-06-25 NOTE — PROGRESS NOTES
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  He was discharged to rehab facility and when he returned home started to have twitching and worsening altered mental status not behaving himself so was brought back to the ER in mid May where he was seen by Neurology and had a negative EEG but was started on Keppra out of concern for possible seizure-like activity in the setting of his recent stroke.  He then was brought back to the ER last week when he developed fevers and worsening of his baseline confusion and his chest x-ray was significant for a possible left lower lobe infiltrate and patient was started on oral Levaquin and discharged to follow-up with PCP.  Wife at bedside stated that since then he has been having episodic on and off fevers as high as 102. Home health nurse noted patient was extremely off from his baseline mental status and had a fever of 102 so was referred to the ER for further evaluation.    Afebrile, hemodynamically stable maintaining normal sats on room air.  His laboratory work was overall unremarkable, SARS-COVID-2 testing and influenza negative.  Blood cultures were obtained.  Due to him being on oral anticoagulation on LP was not performed in the ER.  CT of the head was unremarkable for acute process.  IR was consulted for LP and he was started on broad-spectrum empiric meningitis regimen with vancomycin, ceftriaxone, ampicillin, acyclovir.  Neurology was consulted, EEG and MRI was ordered.  MRI revealed significant  atrophy, encephalomalacia with DWI intensity in falcine frontal region.  Keppra was continued.  EEG revealed no epileptiform discharges or electrographic seizures.  He continued to have delirious episode while in the hospital.  CT maxillofacial was added to reassess recently diagnosed with dental abscess.  Haldol and Seroquel were added to help with mental status.  PICC line was inserted.  ID was consulted for further assistance. CSF analysis showing 20 WBC predodaughtminantly lymphocytes at 76%, elevated protein 91.4 and low glucose at 35. CSF cultures have remained negative with Gram stain showing no bacteria and no WBC.  CSF cryptococcal antigen and PCR panel negative.  Blood cultures have remained negative and review of records show 6/6 blood cultures which have been negative as well. maxillofacial showed no drainable fluid collection.  CSF analysis was consistent with meningitis with no specific pathogen.  He CSF lymphocytosis with hypoglycorrhachia could be indicative of fungal meningitis however he was not treated for fungal meningitis in the setting of improved mental status since initiation of antibiotics.  CSF PCR was negative for organisms.  During meningitis were under differential but was less likely given his improvement.  Ampicillin was discontinued.  Vancomycin and ceftriaxone were continued initially.  CSF PCR showed no organisms and it was negative for West Nile, HSV, enterovirus, cryptococcal antigen.  Later antibiotic regimen was switched to ceftriaxone only with plans to switch to p.o. cefdinir upon discharge.  ID recommended to get T spot and CSF MTB PCR if cultures remain negative but TB was less likely.    Interval Hx:     Afebrile.  Slept better after Seroquel.  When seen and examined at bedside he was comfortably resting in the bed sleeping.  Family member at bedside.  He is tolerating diet.  Morning labs revealed stable hemoglobin, electrolytes    Objective/physical exam:  Vitals:     06/24/22 2001 06/25/22 0010 06/25/22 0403 06/25/22 0800   BP: 119/78 118/79 113/74 121/80   Pulse: 103 85 99 101   Resp: 20 18 18    Temp: 97.9 °F (36.6 °C) 98.2 °F (36.8 °C) 98.3 °F (36.8 °C) 98.6 °F (37 °C)   TempSrc: Oral Oral Oral Axillary   SpO2: 98% 97% 98% (!) 94%   Weight:       Height:         General: In no acute distress, afebrile  Respiratory: Clear to auscultation bilaterally  Cardiovascular: S1, S2, no appreciable murmur  Abdomen: Soft, nontender, BS +  MSK: Warm, no lower extremity edema, no clubbing or cyanosis  Neurologic: Alert and oriented x4, moving all extremities with good strength     Lab Results   Component Value Date     (L) 06/20/2022    K 4.9 06/20/2022    CO2 19 (L) 06/20/2022    BUN 6.0 (L) 06/20/2022    CREATININE 0.49 (L) 06/20/2022    CALCIUM 9.3 06/20/2022    EGFRNONAA >60 06/20/2022      Lab Results   Component Value Date    ALT 60 (H) 06/15/2022    AST 62 (H) 06/15/2022    ALKPHOS 91 06/15/2022    BILITOT 0.8 06/15/2022      Lab Results   Component Value Date    WBC 11.5 06/20/2022    HGB 14.5 06/20/2022    HCT 43.2 06/20/2022    MCV 85.7 06/20/2022     06/20/2022       Medications:   (Magic mouthwash) 1:1:1 diphenhydramine(Benadryl) 12.5mg/5ml liq, aluminum & magnesium hydroxide-simethicone (Maalox), LIDOcaine viscous 2%  10 mL Swish & Spit Q6H    apixaban  5 mg Oral BID    aspirin  325 mg Oral Daily    atorvastatin  10 mg Oral QHS    cefTRIAXone (ROCEPHIN) IVPB  2 g Intravenous Q12H    digoxin  0.25 mg Oral Daily    ezetimibe  10 mg Oral Daily    levetiracetam IV  500 mg Intravenous Q12H    methIMAzole  20 mg Oral Daily    miconazole NITRATE 2 %   Topical (Top) BID    montelukast  10 mg Oral Daily    pantoprazole  40 mg Oral Daily    predniSONE  5 mg Oral Daily    propranoloL  60 mg Oral BID    QUEtiapine  25 mg Oral QHS    sodium chloride 0.9%  10 mL Intravenous Q6H      acetaminophen, acetaminophen, diphenhydrAMINE, HYDROcodone-acetaminophen,  HYDROmorphone, lorazepam, melatonin, metoprolol, ondansetron, sodium chloride 0.9%, Flushing PICC Protocol **AND** sodium chloride 0.9% **AND** sodium chloride 0.9%     Assessment/Plan:    Bacterial meningitis  Acute encephalopathy likely infectious causes vs Possible hospital acquired delirium on Dementia  Immunosuppressed status  Chronic rheumatoid arthritis on immunosuppression  History of thyrotoxicosis    Hx: Vascular dementia, A. fib on Eliquis, HTN, CAD, HLD, ischemic cardiomyopathy with preserved ejection fraction, recent CVA, chronic seizure disorder    Plan:  - Continue Seroquel for bedtime.  DC Haldol.  Continue Keppra  - ID following.  Continue ceftriaxone day 10/14.  Plan to switch to cefdinir if discharged  - Continue other home medications including digoxin, propranolol, Eliquis, methimazole, prednisone.  Needs thyroid function test repeated as outpatient        Protonix  Needs placement  PT    Shmuel Crawford MD

## 2022-06-25 NOTE — PLAN OF CARE
Problem: Adult Inpatient Plan of Care  Goal: Plan of Care Review  Outcome: Ongoing, Progressing  Goal: Patient-Specific Goal (Individualized)  Outcome: Ongoing, Progressing  Goal: Absence of Hospital-Acquired Illness or Injury  Outcome: Ongoing, Progressing  Intervention: Identify and Manage Fall Risk  Flowsheets (Taken 6/25/2022 1709)  Safety Promotion/Fall Prevention: assistive device/personal item within reach  Intervention: Prevent Skin Injury  Flowsheets (Taken 6/25/2022 1709)  Body Position: turned  Skin Protection:   adhesive use limited   drying agents applied  Intervention: Prevent and Manage VTE (Venous Thromboembolism) Risk  Flowsheets (Taken 6/25/2022 1709)  Activity Management: Sitting at edge of bed - L2  VTE Prevention/Management: remove, assess skin, and reapply sequential compression device  Range of Motion:   active ROM (range of motion) encouraged   ROM (range of motion) performed  Intervention: Prevent Infection  Flowsheets (Taken 6/25/2022 1709)  Infection Prevention: hand hygiene promoted  Goal: Optimal Comfort and Wellbeing  Outcome: Ongoing, Progressing  Intervention: Monitor Pain and Promote Comfort  Flowsheets (Taken 6/25/2022 1709)  Pain Management Interventions:   around-the-clock dosing utilized   medication offered  Intervention: Provide Person-Centered Care  Flowsheets (Taken 6/25/2022 1709)  Trust Relationship/Rapport:   care explained   questions answered   choices provided  Goal: Readiness for Transition of Care  Outcome: Ongoing, Progressing     Problem: Adult Inpatient Plan of Care  Goal: Optimal Comfort and Wellbeing  Outcome: Ongoing, Progressing  Intervention: Monitor Pain and Promote Comfort  Flowsheets (Taken 6/25/2022 1709)  Pain Management Interventions:   around-the-clock dosing utilized   medication offered  Intervention: Provide Person-Centered Care  Flowsheets (Taken 6/25/2022 1709)  Trust Relationship/Rapport:   care explained   questions answered   choices  provided     Problem: Adult Inpatient Plan of Care  Goal: Readiness for Transition of Care  Outcome: Ongoing, Progressing     Problem: Skin Injury Risk Increased  Goal: Skin Health and Integrity  Outcome: Ongoing, Progressing  Intervention: Optimize Skin Protection  Flowsheets (Taken 6/25/2022 1709)  Skin Protection:   adhesive use limited   drying agents applied  Head of Bed (HOB) Positioning: HOB elevated  Intervention: Promote and Optimize Oral Intake  Flowsheets (Taken 6/25/2022 1709)  Oral Nutrition Promotion: calorie-dense foods provided     Problem: Infection  Goal: Absence of Infection Signs and Symptoms  Outcome: Ongoing, Progressing     Problem: Fall Injury Risk  Goal: Absence of Fall and Fall-Related Injury  Outcome: Ongoing, Progressing  Intervention: Identify and Manage Contributors  Flowsheets (Taken 6/25/2022 1709)  Self-Care Promotion: independence encouraged  Medication Review/Management: medications reviewed  Intervention: Promote Injury-Free Environment  Flowsheets (Taken 6/25/2022 1709)  Safety Promotion/Fall Prevention: assistive device/personal item within reach     Problem: Adjustment to Illness (Delirium)  Goal: Optimal Coping  Outcome: Ongoing, Progressing  Intervention: Optimize Psychosocial Adjustment to Delirium  Flowsheets (Taken 6/25/2022 1709)  Supportive Measures:   active listening utilized   goal-setting facilitated     Problem: Altered Behavior (Delirium)  Goal: Improved Behavioral Control  Outcome: Ongoing, Progressing  Intervention: Prevent and Manage Agitation  Flowsheets (Taken 6/25/2022 1709)  Environment Familiarity/Consistency:   daily routine followed   familiar objects from home provided     Problem: Attention and Thought Clarity Impairment (Delirium)  Goal: Improved Attention and Thought Clarity  Outcome: Ongoing, Progressing  Intervention: Maximize Cognitive Function  Flowsheets (Taken 6/25/2022 1709)  Reorientation Measures: familiar social contact encouraged  Sensory  Stimulation Regulation: auditory stimulation minimized     Problem: Sleep Disturbance (Delirium)  Goal: Improved Sleep  Outcome: Ongoing, Progressing  Intervention: Promote Sleep  Flowsheets (Taken 6/25/2022 9209)  Sleep/Rest Enhancement:   room darkened   awakenings minimized

## 2022-06-25 NOTE — PROCEDURES
"Quan Contreras is a 71 y.o. male patient.    Temp: 97.9 °F (36.6 °C) (06/24/22 2001)  Pulse: 103 (06/24/22 2001)  Resp: 19 (06/24/22 1631)  BP: 119/78 (06/24/22 2001)  SpO2: 98 % (06/24/22 2001)  Weight: 64.2 kg (141 lb 8 oz) (06/14/22 2100)  Height: 5' 8" (172.7 cm) (06/14/22 0340)    PICC  Date/Time: 6/24/2022 10:27 PM  Performed by: Mariola Maldonado RN  Consent Done: Yes  Time out: Immediately prior to procedure a time out was called to verify the correct patient, procedure, equipment, support staff and site/side marked as required  Indications: med administration and vascular access  Local anesthetic: lidocaine 1% without epinephrine  Anesthetic Total (mL): 5  Preparation: skin prepped with ChloraPrep  Skin prep agent dried: skin prep agent completely dried prior to procedure  Sterile barriers: all five maximum sterile barriers used - cap, mask, sterile gown, sterile gloves, and large sterile sheet  Hand hygiene: hand hygiene performed prior to central venous catheter insertion  Location details: left basilic  Catheter type: single lumen  Catheter size: 4 Fr  Catheter Length: 13cm    Ultrasound guidance: yes  Vessel Caliber: medium and patent, compressibility normal  Needle advanced into vessel with real time Ultrasound guidance.  Guidewire confirmed in vessel.  Sterile sheath used.  Number of attempts: 1  Post-procedure: blood return through all ports, chlorhexidine patch and sterile dressing applied    Complications: none  Comments: Arm circumference 28cm          Mariola Maldonado Rn  6/24/2022    "

## 2022-06-26 LAB — MAGNESIUM SERPL-MCNC: 1.8 MG/DL (ref 1.6–2.6)

## 2022-06-26 PROCEDURE — 83735 ASSAY OF MAGNESIUM: CPT | Performed by: NURSE PRACTITIONER

## 2022-06-26 PROCEDURE — 25000003 PHARM REV CODE 250: Performed by: NURSE PRACTITIONER

## 2022-06-26 PROCEDURE — 63600175 PHARM REV CODE 636 W HCPCS: Performed by: INTERNAL MEDICINE

## 2022-06-26 PROCEDURE — 25000003 PHARM REV CODE 250: Performed by: INTERNAL MEDICINE

## 2022-06-26 PROCEDURE — 63600175 PHARM REV CODE 636 W HCPCS: Performed by: HOSPITALIST

## 2022-06-26 PROCEDURE — 36415 COLL VENOUS BLD VENIPUNCTURE: CPT | Performed by: NURSE PRACTITIONER

## 2022-06-26 PROCEDURE — 97116 GAIT TRAINING THERAPY: CPT | Mod: CQ

## 2022-06-26 PROCEDURE — 97110 THERAPEUTIC EXERCISES: CPT | Mod: CQ

## 2022-06-26 PROCEDURE — 63600175 PHARM REV CODE 636 W HCPCS: Performed by: NURSE PRACTITIONER

## 2022-06-26 PROCEDURE — A4216 STERILE WATER/SALINE, 10 ML: HCPCS | Performed by: INTERNAL MEDICINE

## 2022-06-26 PROCEDURE — 11000001 HC ACUTE MED/SURG PRIVATE ROOM

## 2022-06-26 RX ADMIN — QUETIAPINE FUMARATE 25 MG: 25 TABLET ORAL at 08:06

## 2022-06-26 RX ADMIN — LORAZEPAM 1 MG: 2 INJECTION, SOLUTION INTRAMUSCULAR; INTRAVENOUS at 06:06

## 2022-06-26 RX ADMIN — SODIUM CHLORIDE, PRESERVATIVE FREE 10 ML: 5 INJECTION INTRAVENOUS at 05:06

## 2022-06-26 RX ADMIN — PROPRANOLOL HYDROCHLORIDE 60 MG: 20 TABLET ORAL at 09:06

## 2022-06-26 RX ADMIN — PROPRANOLOL HYDROCHLORIDE 60 MG: 20 TABLET ORAL at 08:06

## 2022-06-26 RX ADMIN — ASPIRIN 325 MG ORAL TABLET 325 MG: 325 PILL ORAL at 09:06

## 2022-06-26 RX ADMIN — DIPHENHYDRAMINE HYDROCHLORIDE 10 ML: 25 SOLUTION ORAL at 05:06

## 2022-06-26 RX ADMIN — PREDNISONE 5 MG: 5 TABLET ORAL at 09:06

## 2022-06-26 RX ADMIN — LEVETIRACETAM 500 MG: 100 INJECTION, SOLUTION INTRAVENOUS at 09:06

## 2022-06-26 RX ADMIN — PANTOPRAZOLE SODIUM 40 MG: 40 TABLET, DELAYED RELEASE ORAL at 09:06

## 2022-06-26 RX ADMIN — CEFTRIAXONE 2 G: 2 INJECTION, SOLUTION INTRAVENOUS at 05:06

## 2022-06-26 RX ADMIN — HYDROCODONE BITARTRATE AND ACETAMINOPHEN 1 TABLET: 10; 325 TABLET ORAL at 08:06

## 2022-06-26 RX ADMIN — LEVETIRACETAM 500 MG: 100 INJECTION, SOLUTION INTRAVENOUS at 08:06

## 2022-06-26 RX ADMIN — MICONAZOLE NITRATE 2 % TOPICAL POWDER: at 08:06

## 2022-06-26 RX ADMIN — METHIMAZOLE 20 MG: 10 TABLET ORAL at 05:06

## 2022-06-26 RX ADMIN — ATORVASTATIN CALCIUM 10 MG: 10 TABLET, FILM COATED ORAL at 08:06

## 2022-06-26 RX ADMIN — DIPHENHYDRAMINE HYDROCHLORIDE 10 ML: 25 SOLUTION ORAL at 12:06

## 2022-06-26 RX ADMIN — SODIUM CHLORIDE, PRESERVATIVE FREE 10 ML: 5 INJECTION INTRAVENOUS at 12:06

## 2022-06-26 RX ADMIN — MONTELUKAST SODIUM 10 MG: 10 TABLET, COATED ORAL at 05:06

## 2022-06-26 RX ADMIN — APIXABAN 5 MG: 5 TABLET, FILM COATED ORAL at 09:06

## 2022-06-26 RX ADMIN — APIXABAN 5 MG: 5 TABLET, FILM COATED ORAL at 08:06

## 2022-06-26 RX ADMIN — DIGOXIN 0.25 MG: 250 TABLET ORAL at 05:06

## 2022-06-26 RX ADMIN — MICONAZOLE NITRATE 2 % TOPICAL POWDER: at 09:06

## 2022-06-26 RX ADMIN — EZETIMIBE 10 MG: 10 TABLET ORAL at 05:06

## 2022-06-26 NOTE — PT/OT/SLP PROGRESS
Physical Therapy Treatment    Patient Name:  Quan Contreras   MRN:  [id number]    Recommendations:     Discharge Recommendations:  rehabilitation facility, other (see comments) (swing)   Discharge Equipment Recommendations: walker, rolling     Subjective       Communicated with NSG prior to session to see if Pt is appropriate for therapy today. Pt greeted and agreed to session.    Spoke with wife who reports MD suggests possible snf when appropriate for d/c, however she expressed confusion on differences in settings. Wife expressed snf be last option, but is agreeable to what is recommended. Provided education on differences in each setting.    Objective:     General Precautions: Standard, hearing impaired, aspiration   Orthopedic Precautions:N/A   Braces: N/A  Respiratory Status:     Functional Mobility:    · Bed Mobility: Leo  · Sit to Stand: Min Assist  · Transfers: Min Assist  · Gait: 100ft with Leo for hand placement on RW, navigating RW, and following tasks.  Equipment Used: Gait belt, Rolling walker    Assessment:     Quan Contreras is a 71 y.o. male admitted with a medical diagnosis of Fever.     Treatment Encounter Note:  Patient actively participated with treatment today with no adverse effects. Pt ambulated 100ft in hallway with chair following. Pt demo'd very slow breann, decreased step length and height. Verbal and visual cues for upright posture and fwd gaze. Pt resting arms on RW and then requiring one seated rest break. Patient performed seated there ex all mm groups LAQ, hs curls, ankle pumps, hip flx, hip abd add. HR fluctuating during ambulation.     Patient left semi supine with HOB elevated with all lines in tact and all needs in reach. Wife present for duration of tx session.         Rehab Prognosis: Good; patient would benefit from acute skilled PT services to address these deficits and reach maximum level of function.    Recent Surgery: * No surgery found *    GOALS:   Multidisciplinary  Problems     Physical Therapy Goals        Problem: Physical Therapy    Goal Priority Disciplines Outcome Goal Variances Interventions   Physical Therapy Goal     PT, PT/OT Ongoing, Progressing     Description: Goals to be met by: 22     Patient will increase functional independence with mobility by performin. Supine to sit with MInimal Assistance  2. Sit to supine with MInimal Assistance  3. Sit to stand transfer with Contact Guard Assistance  4. Gait  x 200 feet with Contact Guard Assistance using Rolling Walker.                      Plan:     During this hospitalization, patient to be seen 6 x/week to address the identified rehab impairments via gait training, therapeutic activities, therapeutic exercises and progress toward the following goals:    · Plan of Care Expires:  22    Billable Minutes     Billable Minutes: 33    Treatment Type: Treatment  PT/PTA: PTA     PTA Visit Number: 3     2022

## 2022-06-26 NOTE — PROGRESS NOTES
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  He was discharged to rehab facility and when he returned home started to have twitching and worsening altered mental status not behaving himself so was brought back to the ER in mid May where he was seen by Neurology and had a negative EEG but was started on Keppra out of concern for possible seizure-like activity in the setting of his recent stroke.  He then was brought back to the ER last week when he developed fevers and worsening of his baseline confusion and his chest x-ray was significant for a possible left lower lobe infiltrate and patient was started on oral Levaquin and discharged to follow-up with PCP.  Wife at bedside stated that since then he has been having episodic on and off fevers as high as 102. Home health nurse noted patient was extremely off from his baseline mental status and had a fever of 102 so was referred to the ER for further evaluation.    Afebrile, hemodynamically stable maintaining normal sats on room air.  His laboratory work was overall unremarkable, SARS-COVID-2 testing and influenza negative.  Blood cultures were obtained.  Due to him being on oral anticoagulation on LP was not performed in the ER.  CT of the head was unremarkable for acute process.  IR was consulted for LP and he was started on broad-spectrum empiric meningitis regimen with vancomycin, ceftriaxone, ampicillin, acyclovir.  Neurology was consulted, EEG and MRI was ordered.  MRI revealed significant  atrophy, encephalomalacia with DWI intensity in falcine frontal region.  Keppra was continued.  EEG revealed no epileptiform discharges or electrographic seizures.  He continued to have delirious episode while in the hospital.  CT maxillofacial was added to reassess recently diagnosed with dental abscess.  Haldol and Seroquel were added to help with mental status.  PICC line was inserted.  ID was consulted for further assistance. CSF analysis showing 20 WBC predodaughtminantly lymphocytes at 76%, elevated protein 91.4 and low glucose at 35. CSF cultures have remained negative with Gram stain showing no bacteria and no WBC.  CSF cryptococcal antigen and PCR panel negative.  Blood cultures have remained negative and review of records show 6/6 blood cultures which have been negative as well. maxillofacial showed no drainable fluid collection.  CSF analysis was consistent with meningitis with no specific pathogen.  He CSF lymphocytosis with hypoglycorrhachia could be indicative of fungal meningitis however he was not treated for fungal meningitis in the setting of improved mental status since initiation of antibiotics.  CSF PCR was negative for organisms.  During meningitis were under differential but was less likely given his improvement.  Ampicillin was discontinued.  Vancomycin and ceftriaxone were continued initially.  CSF PCR showed no organisms and it was negative for West Nile, HSV, enterovirus, cryptococcal antigen.  Later antibiotic regimen was switched to ceftriaxone only with plans to switch to p.o. cefdinir upon discharge.  ID recommended to get T spot and CSF MTB PCR if cultures remain negative but TB was less likely.    Interval Hx:     No new issues.  He has been a hemodynamically stable.  When seen examined bedside he was alert, comfortable resting.  Family at bedside.  Patient has no new complaints or concerns.  P.o. intake is inadequate but tolerating diet.  Moving bowels    Objective/physical  exam:  Vitals:    06/25/22 1802 06/25/22 1849 06/25/22 2324 06/26/22 0236   BP:  121/74 122/76 113/77   Pulse:  95 89 82   Resp: 18 (!) 21 20 20   Temp:  97.6 °F (36.4 °C) 98 °F (36.7 °C) 98.4 °F (36.9 °C)   TempSrc:  Oral Oral Oral   SpO2:  98% 96% 97%   Weight:       Height:         General: In no acute distress, afebrile  Respiratory: Clear to auscultation bilaterally  Cardiovascular: S1, S2, no appreciable murmur  Abdomen: Soft, nontender, BS +  MSK: Warm, no lower extremity edema, no clubbing or cyanosis  Neurologic: Alert and oriented x4, moving all extremities with good strength     Lab Results   Component Value Date     06/25/2022    K 3.5 06/25/2022    CO2 26 06/25/2022    BUN 8.8 06/25/2022    CREATININE 0.51 (L) 06/25/2022    CALCIUM 9.2 06/25/2022    EGFRNONAA >60 06/25/2022      Lab Results   Component Value Date    ALT 60 (H) 06/15/2022    AST 62 (H) 06/15/2022    ALKPHOS 91 06/15/2022    BILITOT 0.8 06/15/2022      Lab Results   Component Value Date    WBC 8.6 06/25/2022    HGB 13.0 (L) 06/25/2022    HCT 40.4 (L) 06/25/2022    MCV 87.8 06/25/2022     06/25/2022       Medications:   (Magic mouthwash) 1:1:1 diphenhydramine(Benadryl) 12.5mg/5ml liq, aluminum & magnesium hydroxide-simethicone (Maalox), LIDOcaine viscous 2%  10 mL Swish & Spit Q6H    apixaban  5 mg Oral BID    aspirin  325 mg Oral Daily    atorvastatin  10 mg Oral QHS    cefTRIAXone (ROCEPHIN) IVPB  2 g Intravenous Q12H    digoxin  0.25 mg Oral Daily    ezetimibe  10 mg Oral Daily    levetiracetam IV  500 mg Intravenous Q12H    methIMAzole  20 mg Oral Daily    miconazole NITRATE 2 %   Topical (Top) BID    montelukast  10 mg Oral Daily    pantoprazole  40 mg Oral Daily    predniSONE  5 mg Oral Daily    propranoloL  60 mg Oral BID    QUEtiapine  25 mg Oral QHS    sodium chloride 0.9%  10 mL Intravenous Q6H      acetaminophen, acetaminophen, diphenhydrAMINE, HYDROcodone-acetaminophen, HYDROmorphone, lorazepam,  melatonin, metoprolol, ondansetron, sodium chloride 0.9%, Flushing PICC Protocol **AND** sodium chloride 0.9% **AND** sodium chloride 0.9%     Assessment/Plan:    Bacterial meningitis  Acute encephalopathy likely infectious causes vs Possible hospital acquired delirium on Dementia  Immunosuppressed status  Chronic rheumatoid arthritis on immunosuppression  History of thyrotoxicosis    Hx: Vascular dementia, A. fib on Eliquis, HTN, CAD, HLD, ischemic cardiomyopathy with preserved ejection fraction, recent CVA, chronic seizure disorder    Plan:  - We will continue Seroquel for bedtime.  DC/ed Haldol.  Continue Keppra  - ID following.  Continue ceftriaxone day 11/14.  Plan to switch to cefdinir if discharged  - Continue other home medications including digoxin, propranolol, Eliquis, methimazole, prednisone.  Needs thyroid function test repeated as outpatient.    Protonix  Needs placement  PT    Shmuel Crawford MD

## 2022-06-26 NOTE — PLAN OF CARE
Problem: Adult Inpatient Plan of Care  Goal: Plan of Care Review  Outcome: Ongoing, Progressing  Goal: Patient-Specific Goal (Individualized)  Outcome: Ongoing, Progressing  Goal: Absence of Hospital-Acquired Illness or Injury  Outcome: Ongoing, Progressing  Intervention: Identify and Manage Fall Risk  Flowsheets (Taken 6/26/2022 1555)  Safety Promotion/Fall Prevention: assistive device/personal item within reach  Intervention: Prevent Skin Injury  Flowsheets (Taken 6/26/2022 1555)  Skin Protection: skin sealant/moisture barrier applied  Intervention: Prevent and Manage VTE (Venous Thromboembolism) Risk  Flowsheets (Taken 6/26/2022 1555)  Activity Management: Sitting at edge of bed - L2  VTE Prevention/Management: bleeding precations maintained  Range of Motion: active ROM (range of motion) encouraged  Intervention: Prevent Infection  Flowsheets (Taken 6/26/2022 1555)  Infection Prevention: hand hygiene promoted  Goal: Optimal Comfort and Wellbeing  Outcome: Ongoing, Progressing  Intervention: Monitor Pain and Promote Comfort  Flowsheets (Taken 6/26/2022 1555)  Pain Management Interventions:   around-the-clock dosing utilized   quiet environment facilitated  Intervention: Provide Person-Centered Care  Flowsheets (Taken 6/26/2022 1555)  Trust Relationship/Rapport: care explained  Goal: Readiness for Transition of Care  Outcome: Ongoing, Progressing     Problem: Skin Injury Risk Increased  Goal: Skin Health and Integrity  Outcome: Ongoing, Progressing  Intervention: Optimize Skin Protection  Flowsheets (Taken 6/26/2022 1555)  Pressure Reduction Devices: positioning supports utilized  Skin Protection: skin sealant/moisture barrier applied  Head of Bed (HOB) Positioning: HOB at 30 degrees  Intervention: Promote and Optimize Oral Intake  Flowsheets (Taken 6/26/2022 1555)  Oral Nutrition Promotion:   calorie-dense foods provided   calorie-dense liquids provided     Problem: Infection  Goal: Absence of Infection Signs  and Symptoms  Outcome: Ongoing, Progressing     Problem: Fall Injury Risk  Goal: Absence of Fall and Fall-Related Injury  Outcome: Ongoing, Progressing  Intervention: Identify and Manage Contributors  Flowsheets (Taken 6/26/2022 1555)  Self-Care Promotion: independence encouraged  Medication Review/Management: medications reviewed  Intervention: Promote Injury-Free Environment  Flowsheets (Taken 6/26/2022 1555)  Safety Promotion/Fall Prevention: assistive device/personal item within reach     Problem: Adjustment to Illness (Delirium)  Goal: Optimal Coping  Outcome: Ongoing, Progressing  Intervention: Optimize Psychosocial Adjustment to Delirium  Flowsheets (Taken 6/26/2022 1555)  Supportive Measures: active listening utilized  Family/Support System Care: caregiver stress acknowledged     Problem: Altered Behavior (Delirium)  Goal: Improved Behavioral Control  Outcome: Ongoing, Progressing  Intervention: Prevent and Manage Agitation  Flowsheets (Taken 6/26/2022 1555)  Environment Familiarity/Consistency: familiar objects from home provided     Problem: Attention and Thought Clarity Impairment (Delirium)  Goal: Improved Attention and Thought Clarity  Outcome: Ongoing, Progressing  Intervention: Maximize Cognitive Function  Flowsheets (Taken 6/26/2022 1555)  Reorientation Measures:   familiar social contact encouraged   reorientation provided  Sensory Stimulation Regulation:   auditory stimulation minimized   quiet environment promoted     Problem: Sleep Disturbance (Delirium)  Goal: Improved Sleep  Outcome: Ongoing, Progressing  Intervention: Promote Sleep  Flowsheets (Taken 6/26/2022 1555)  Sleep/Rest Enhancement:   awakenings minimized   consistent schedule promoted

## 2022-06-27 VITALS
SYSTOLIC BLOOD PRESSURE: 116 MMHG | BODY MASS INDEX: 21.44 KG/M2 | TEMPERATURE: 98 F | OXYGEN SATURATION: 98 % | DIASTOLIC BLOOD PRESSURE: 78 MMHG | RESPIRATION RATE: 18 BRPM | WEIGHT: 141.5 LBS | HEIGHT: 68 IN | HEART RATE: 98 BPM

## 2022-06-27 PROBLEM — G03.9 MENINGITIS: Status: ACTIVE | Noted: 2022-06-27

## 2022-06-27 PROBLEM — R50.9 FEVER: Status: RESOLVED | Noted: 2022-06-14 | Resolved: 2022-06-27

## 2022-06-27 PROBLEM — G00.9 BACTERIAL MENINGITIS: Status: ACTIVE | Noted: 2022-06-27

## 2022-06-27 LAB — SARS-COV-2 RDRP RESP QL NAA+PROBE: NEGATIVE

## 2022-06-27 PROCEDURE — 25000003 PHARM REV CODE 250: Performed by: INTERNAL MEDICINE

## 2022-06-27 PROCEDURE — A4216 STERILE WATER/SALINE, 10 ML: HCPCS | Performed by: INTERNAL MEDICINE

## 2022-06-27 PROCEDURE — 25000003 PHARM REV CODE 250: Performed by: NURSE PRACTITIONER

## 2022-06-27 PROCEDURE — 87635 SARS-COV-2 COVID-19 AMP PRB: CPT | Performed by: INTERNAL MEDICINE

## 2022-06-27 PROCEDURE — 63600175 PHARM REV CODE 636 W HCPCS: Performed by: NURSE PRACTITIONER

## 2022-06-27 PROCEDURE — 63600175 PHARM REV CODE 636 W HCPCS: Performed by: INTERNAL MEDICINE

## 2022-06-27 PROCEDURE — 92526 ORAL FUNCTION THERAPY: CPT

## 2022-06-27 PROCEDURE — 63600175 PHARM REV CODE 636 W HCPCS: Performed by: HOSPITALIST

## 2022-06-27 RX ORDER — MICONAZOLE NITRATE 2 %
POWDER (GRAM) TOPICAL 2 TIMES DAILY
Refills: 0 | Status: ON HOLD
Start: 2022-06-27 | End: 2022-07-14

## 2022-06-27 RX ORDER — CEFDINIR 300 MG/1
300 CAPSULE ORAL 2 TIMES DAILY
Qty: 4 CAPSULE | Refills: 0
Start: 2022-06-27 | End: 2022-06-27 | Stop reason: HOSPADM

## 2022-06-27 RX ORDER — DIPHENHYDRAMINE HYDROCHLORIDE 50 MG/ML
12.5 INJECTION INTRAMUSCULAR; INTRAVENOUS EVERY 6 HOURS PRN
Start: 2022-06-27 | End: 2022-06-27

## 2022-06-27 RX ORDER — CEFTRIAXONE 2 G/50ML
2 INJECTION, SOLUTION INTRAVENOUS EVERY 12 HOURS
Qty: 200 ML | Refills: 0 | Status: ON HOLD
Start: 2022-06-27 | End: 2022-07-06

## 2022-06-27 RX ADMIN — EZETIMIBE 10 MG: 10 TABLET ORAL at 05:06

## 2022-06-27 RX ADMIN — MICONAZOLE NITRATE 2 % TOPICAL POWDER: at 10:06

## 2022-06-27 RX ADMIN — SODIUM CHLORIDE, PRESERVATIVE FREE 10 ML: 5 INJECTION INTRAVENOUS at 12:06

## 2022-06-27 RX ADMIN — CEFTRIAXONE 2 G: 2 INJECTION, SOLUTION INTRAVENOUS at 05:06

## 2022-06-27 RX ADMIN — LORAZEPAM 1 MG: 2 INJECTION, SOLUTION INTRAMUSCULAR; INTRAVENOUS at 05:06

## 2022-06-27 RX ADMIN — PROPRANOLOL HYDROCHLORIDE 60 MG: 20 TABLET ORAL at 10:06

## 2022-06-27 RX ADMIN — APIXABAN 5 MG: 5 TABLET, FILM COATED ORAL at 10:06

## 2022-06-27 RX ADMIN — SODIUM CHLORIDE, PRESERVATIVE FREE 10 ML: 5 INJECTION INTRAVENOUS at 05:06

## 2022-06-27 RX ADMIN — ASPIRIN 325 MG ORAL TABLET 325 MG: 325 PILL ORAL at 10:06

## 2022-06-27 RX ADMIN — PREDNISONE 5 MG: 5 TABLET ORAL at 10:06

## 2022-06-27 RX ADMIN — PANTOPRAZOLE SODIUM 40 MG: 40 TABLET, DELAYED RELEASE ORAL at 10:06

## 2022-06-27 RX ADMIN — LEVETIRACETAM 500 MG: 100 INJECTION, SOLUTION INTRAVENOUS at 10:06

## 2022-06-27 NOTE — PLAN OF CARE
Received communication from Gris with TCU, patient is accepted for admission today. Dr Crawford notified for orders. Nurse Ulysses notified to collect stat covid.

## 2022-06-27 NOTE — DISCHARGE SUMMARY
[facility] 9th [facility] Surg  [facility] Medicine  Discharge Summary      Patient Name: Quan Contreras  MRN: [id number]  Patient Class: IP- Inpatient  Admission Date: 6/13/2022  Hospital Length of Stay: 14 days  Discharge Date and Time:  06/27/2022 1:04 PM  Attending Physician: Jean Ferugson MD   Discharging Provider: Shmuel Crawford MD  Primary Care Provider: Bran Hansen MD      Refer to progress note for full details. Will be discharged to TCU. Complete abx there. Seroquel held at dc due to somnolence.      Bacterial meningitis  Acute encephalopathy likely infectious causes vs Possible hospital acquired delirium on Dementia  Immunosuppressed status  Chronic rheumatoid arthritis on immunosuppression  History of thyrotoxicosis     Hx: Vascular dementia, A. fib on Eliquis, HTN, CAD, HLD, ischemic cardiomyopathy with preserved ejection fraction, recent CVA, chronic seizure disorder    Goals of Care Treatment Preferences:  Code Status: Full Code      Consults:   Consults (From admission, onward)        Status Ordering Provider     Inpatient consult to Midline team  Once        Provider:  (Not yet assigned)    SHMUEL Zapien     Inpatient consult to Cardiology  Once        Provider:  Zulay Howard MD    Completed ZULAY HOWARD     Inpatient consult to Infectious Diseases  Once        Provider:  Alfreda Donnelly MD    Completed ZULAY HOWARD     Inpatient consult to Neurology  Once        Provider:  Crystal Sifuentes MD    Completed ROCIO CHAPARRO     Inpatient consult to Interventional Radiology  Once        Provider:  Kenrick Nguyen MD    Completed GABBI BROOKE          No new Assessment & Plan notes have been filed under this hospital service since the last note was generated.  Service: Hospital Medicine    Final Active Diagnoses:    Diagnosis Date Noted POA    PRINCIPAL PROBLEM:  Meningitis [G03.9] 06/27/2022 Yes    Encephalopathy [G93.40] 06/15/2022  Yes      Problems Resolved During this Admission:    Diagnosis Date Noted Date Resolved POA    Fever [R50.9] 06/14/2022 06/27/2022 Yes       Discharged Condition: good    Disposition: Another Health Care Inst*    Follow Up:   Follow-up Information     Karen Breen MD Follow up in 2 week(s).    Specialties: Cardiovascular Disease, Cardiology  Why: Hospital Follow Up  Contact information:  [address]na [address]ene PERSON [id number] [phone]                       Patient Instructions:   No discharge procedures on file.    Significant Diagnostic Studies: Labs: CMP No results for input(s): NA, K, CL, CO2, GLU, BUN, CREATININE, CALCIUM, PROT, ALBUMIN, BILITOT, ALKPHOS, AST, ALT, ANIONGAP, ESTGFRAFRICA, EGFRNONAA in the last 48 hours.    Pending Diagnostic Studies:     Procedure Component Value Units Date/Time    COVID-19 Rapid Screening [[id number]] Collected: 06/27/22 1240    Order Status: Sent Lab Status: In process Updated: 06/27/22 1241    Specimen: Nasal Swab     Miscellaneous Test, Sendout MTB PCR [[id number]]     Order Status: Sent Lab Status: No result     Specimen: CSF (Spinal Fluid) from Cerebrospinal fluid (CSF)     WNV Antibodies, CSF [[id number]]     Order Status: Sent Lab Status: No result     Specimen: CSF (Spinal Fluid) from Cerebrospinal Fluid     West Nile Virus by PCR, CSF [[id number]]     Order Status: Sent Lab Status: No result     Specimen: CSF (Spinal Fluid) from Cerebrospinal Fluid          Medications:  Reconciled Home Medications:      Medication List      START taking these medications    cefTRIAXone 2 gram/50 mL IVPB  Commonly known as: ROCEPHIN  Inject 50 mLs (2 g total) into the vein every 12 (twelve) hours. for 2 days     diphenhydrAMINE 50 mg/mL injection  Commonly known as: BENADRYL  Inject 0.25 mLs (12.5 mg total) into the vein every 6 (six) hours as needed for Itching.     miconazole NITRATE 2 % 2 % top powder  Commonly known as: MICOTIN  Apply topically 2 (two) times daily.         CONTINUE taking these medications    apixaban 5 mg Tab  Commonly known as: ELIQUIS  Take 1 tablet (5 mg total) by mouth 2 (two) times daily.     aspirin 325 MG tablet  Take 325 mg by mouth once daily.     DIGITEK 250 mcg tablet  Generic drug: digoxin  Take 1 tablet by mouth Daily.     ergocalciferol 50,000 unit Cap  Commonly known as: ERGOCALCIFEROL  Take 50,000 Units by mouth every 7 days.     ezetimibe 10 mg tablet  Commonly known as: ZETIA  Take 1 tablet by mouth once daily at 6am.     HYDROcodone-acetaminophen  mg per tablet  Commonly known as: NORCO  Take 1 tablet by mouth 3 (three) times daily. PRN     levETIRAcetam 500 MG Tab  Commonly known as: KEPPRA  Take 1 tablet (500 mg total) by mouth 2 (two) times daily.     methIMAzole 10 MG Tab  Commonly known as: TAPAZOLE  Take 4 tablets by mouth Daily.     montelukast 10 mg tablet  Commonly known as: SINGULAIR  Take 1 tablet by mouth Daily.     mv, min #36-iron,carbonyl-FA 16 mg iron- 0.38 mg Tab  Take 1 tablet by mouth once daily.     pantoprazole 40 MG tablet  Commonly known as: PROTONIX  Take 40 mg by mouth once daily.     predniSONE 5 MG tablet  Commonly known as: DELTASONE  Take 5 mg by mouth once daily. At bedtime     propranoloL 60 MG 24 hr capsule  Commonly known as: INDERAL LA  Take 1 capsule (60 mg total) by mouth 2 (two) times a day.     rosuvastatin 40 MG Tab  Commonly known as: CRESTOR  Take 40 mg by mouth every evening.        STOP taking these medications    baclofen 10 MG tablet  Commonly known as: LIORESAL     CIMZIA SUBQ     levoFLOXacin 500 MG tablet  Commonly known as: LEVAQUIN     metoprolol succinate 100 MG 24 hr tablet  Commonly known as: TOPROL-XL     traZODone 100 MG tablet  Commonly known as: DESYREL            Indwelling Lines/Drains at time of discharge:   Lines/Drains/Airways     None                 Time spent on the discharge of patient: 35 minutes         Shmuel Crawford MD  Department of Hospital Medicine  [facility] - 9th Floor Med Surg

## 2022-06-27 NOTE — PT/OT/SLP PROGRESS
"Speech Language Pathology Department  Dysphagia Therapy    Patient Name:  Quan Contreras   MRN:  [id number]  Admitting Diagnosis: Fever    Recommendations:                 General Recommendations:  Dysphagia therapy  Diet recommendations:  NPO, Liquid Diet Level: NPO , medications NPO  Aspiration Precautions: n/a    Discharge recommendations:  nursing facility, basic, LTACH (long-term acute [facility] hospital)   Barriers to Discharge:  severity of impairment    Subjective     Patient flat and lethargic.    Patient goals: Per wife, "To eat."    Pain/Comfort:  ·      Respiratory Status: room air    Wife reports coughing and throat clears with PO intake.  Nursing reports coughing with PO medications this AM.    Objective:     Oral Musculature Evaluation:   Oral Musculature: WFL   Dentition: present and adequate   Oral Labial Strength and Mobility: WFL   Voice Prior to PO Intake: adequate     Pt lethargic however he is arousable with verbal/tactile stimuli.  Presents with flat affect and minimally verbal.    Consistencies Tested:   Puree-bolus holding, multiple swallows, wet vocal quality, throat clear, and cough all demonstrated after one half teaspoon of pudding.      Assessment:     Pt presents with severe oropharyngeal dysphagia and is unsafe to PO intake at this time.  Swallow function is now negatively impacted by lethargy (possibly medicinally induced).  Dicussed POC with wife and nursing.  SLP to follow.    Goals:   Multidisciplinary Problems     SLP Goals        Problem: SLP    Goal Priority Disciplines Outcome   SLP Goal     SLP Ongoing, Progressing   Description: LT)The pt will improve cognitive linguistic abilities to return to PLOF.  2) The pt will tolerate the least restrictive PO diet with no clinical signs/sx of aspiration.  ST)  Orientation x4, independent  Focused attention, min assist, 5 minutes without requiring redirection  Information procession, min assist  Problem solving, routine, " 100% with min assist    2)  Tolerate laryngeal elevation exercises and domingo, 100% with min cues  Tolerate thermal stimulation to the anterior faucial arches, 100% swallow response, with min cues  tolerate minced and moist solids with no signs/sx of aspiration.  tolerate easy to chew solids with no signs/sx of aspiration.  tolerate pureed solids with no signs/sx of aspiration.  tolerate moderately thick liquids via cup with no signs/sx of aspiration.                     Plan:     Will continue to follow and tx as appropriate.    Patient to be seen:  5 x/week   Plan of Care expires:  07/14/22  Plan of Care reviewed with:  patient, spouse   SLP Follow-Up:  Yes       Time Tracking:     SLP Treatment Date:   06/27/22  Speech Start Time:  1100  Speech Stop Time:  1115     Speech Total Time (min):  15 min    Billable minutes:  Treatment of Swallow Dysfunction, 15 minutes       06/27/2022

## 2022-06-27 NOTE — PLAN OF CARE
Problem: SLP  Goal: SLP Goal  Description: LT)The pt will improve cognitive linguistic abilities to return to PLOF.  2) The pt will tolerate the least restrictive PO diet with no clinical signs/sx of aspiration.  ST)  Orientation x4, independent  Focused attention, min assist, 5 minutes without requiring redirection  Information procession, min assist  Problem solving, routine, 100% with min assist    2)  Tolerate laryngeal elevation exercises and domingo, 100% with min cues  Tolerate thermal stimulation to the anterior faucial arches, 100% swallow response, with min cues  tolerate minced and moist solids with no signs/sx of aspiration.  tolerate easy to chew solids with no signs/sx of aspiration.  tolerate pureed solids with no signs/sx of aspiration.  tolerate moderately thick liquids via cup with no signs/sx of aspiration.    Outcome: Ongoing, Progressing     POC and goals updated.  Margareth

## 2022-06-27 NOTE — PROGRESS NOTES
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  He was discharged to rehab facility and when he returned home started to have twitching and worsening altered mental status not behaving himself so was brought back to the ER in mid May where he was seen by Neurology and had a negative EEG but was started on Keppra out of concern for possible seizure-like activity in the setting of his recent stroke.  He then was brought back to the ER last week when he developed fevers and worsening of his baseline confusion and his chest x-ray was significant for a possible left lower lobe infiltrate and patient was started on oral Levaquin and discharged to follow-up with PCP.  Wife at bedside stated that since then he has been having episodic on and off fevers as high as 102. Home health nurse noted patient was extremely off from his baseline mental status and had a fever of 102 so was referred to the ER for further evaluation.    Afebrile, hemodynamically stable maintaining normal sats on room air.  His laboratory work was overall unremarkable, SARS-COVID-2 testing and influenza negative.  Blood cultures were obtained.  Due to him being on oral anticoagulation on LP was not performed in the ER.  CT of the head was unremarkable for acute process.  IR was consulted for LP and he was started on broad-spectrum empiric meningitis regimen with vancomycin, ceftriaxone, ampicillin, acyclovir.  Neurology was consulted, EEG and MRI was ordered.  MRI revealed significant  atrophy, encephalomalacia with DWI intensity in falcine frontal region.  Keppra was continued.  EEG revealed no epileptiform discharges or electrographic seizures.  He continued to have delirious episode while in the hospital.  CT maxillofacial was added to reassess recently diagnosed with dental abscess.  Haldol and Seroquel were added to help with mental status.  PICC line was inserted.  ID was consulted for further assistance. CSF analysis showing 20 WBC predodaughtminantly lymphocytes at 76%, elevated protein 91.4 and low glucose at 35. CSF cultures have remained negative with Gram stain showing no bacteria and no WBC.  CSF cryptococcal antigen and PCR panel negative.  Blood cultures have remained negative and review of records show 6/6 blood cultures which have been negative as well. maxillofacial showed no drainable fluid collection.  CSF analysis was consistent with meningitis with no specific pathogen.  He CSF lymphocytosis with hypoglycorrhachia could be indicative of fungal meningitis however he was not treated for fungal meningitis in the setting of improved mental status since initiation of antibiotics.  CSF PCR was negative for organisms.  During meningitis were under differential but was less likely given his improvement.  Ampicillin was discontinued.  Vancomycin and ceftriaxone were continued initially.  CSF PCR showed no organisms and it was negative for West Nile, HSV, enterovirus, cryptococcal antigen.  Later antibiotic regimen was switched to ceftriaxone only with plans to switch to p.o. cefdinir upon discharge.  ID recommended to get T spot and CSF MTB PCR if cultures remain negative but TB was less likely.    Interval Hx:     No new issues overnight. HDS.  Rested well.  Continues to have episodes of hallucinations.  Family at bedside.  P.o. intake remains inadequate      Objective/physical exam:  Vitals:    06/26/22 2014 06/26/22 2116 06/27/22 0332 06/27/22 0752   BP:  129/86 114/80 126/87    Pulse:  88 81 100   Resp: 16 20 20    Temp:  97.6 °F (36.4 °C) 98.9 °F (37.2 °C) 98.3 °F (36.8 °C)   TempSrc:  Oral Oral Oral   SpO2:  97% 97% 98%   Weight:       Height:         General: In no acute distress, afebrile  Respiratory: Clear to auscultation bilaterally  Cardiovascular: S1, S2, no appreciable murmur  Abdomen: Soft, nontender, BS +  MSK: Warm, no lower extremity edema, no clubbing or cyanosis  Neurologic: Alert and oriented x4, moving all extremities with good strength     Lab Results   Component Value Date     06/25/2022    K 3.5 06/25/2022    CO2 26 06/25/2022    BUN 8.8 06/25/2022    CREATININE 0.51 (L) 06/25/2022    CALCIUM 9.2 06/25/2022    EGFRNONAA >60 06/25/2022      Lab Results   Component Value Date    ALT 60 (H) 06/15/2022    AST 62 (H) 06/15/2022    ALKPHOS 91 06/15/2022    BILITOT 0.8 06/15/2022      Lab Results   Component Value Date    WBC 8.6 06/25/2022    HGB 13.0 (L) 06/25/2022    HCT 40.4 (L) 06/25/2022    MCV 87.8 06/25/2022     06/25/2022       Medications:   apixaban  5 mg Oral BID    aspirin  325 mg Oral Daily    atorvastatin  10 mg Oral QHS    cefTRIAXone (ROCEPHIN) IVPB  2 g Intravenous Q12H    digoxin  0.25 mg Oral Daily    ezetimibe  10 mg Oral Daily    levetiracetam IV  500 mg Intravenous Q12H    methIMAzole  20 mg Oral Daily    miconazole NITRATE 2 %   Topical (Top) BID    montelukast  10 mg Oral Daily    pantoprazole  40 mg Oral Daily    predniSONE  5 mg Oral Daily    propranoloL  60 mg Oral BID    QUEtiapine  25 mg Oral QHS    sodium chloride 0.9%  10 mL Intravenous Q6H      acetaminophen, acetaminophen, diphenhydrAMINE, HYDROcodone-acetaminophen, HYDROmorphone, lorazepam, melatonin, metoprolol, ondansetron, sodium chloride 0.9%, [place] PICC Protocol **AND** sodium chloride 0.9% **AND** sodium chloride 0.9%     Assessment/Plan:    Bacterial meningitis  Acute encephalopathy likely infectious causes vs Possible hospital acquired delirium on  Dementia  Immunosuppressed status  Chronic rheumatoid arthritis on immunosuppression  History of thyrotoxicosis    Hx: Vascular dementia, A. fib on Eliquis, HTN, CAD, HLD, ischemic cardiomyopathy with preserved ejection fraction, recent CVA, chronic seizure disorder    Plan:  - intermittent episodes of confusion continues. continue Seroquel for bedtime.  DC/ed Haldol.  Continue Keppra  - ID following.  Continue ceftriaxone day 12/14.  Plan to switch to cefdinir if discharged  - Continue other home medications including digoxin, propranolol, Eliquis, methimazole, prednisone.  Needs thyroid function test repeated as outpatient.    Protonix  Needs placement  PT    Shmuel Crawford MD

## 2022-06-27 NOTE — PLAN OF CARE
Problem: Adult Inpatient Plan of Care  Goal: Plan of Care Review  6/27/2022 1544 by Ulysses Peterson RN  Outcome: Met  6/27/2022 1544 by Ulysses Peterson RN  Outcome: Ongoing, Progressing  Goal: Patient-Specific Goal (Individualized)  6/27/2022 1544 by Ulysses Peterson RN  Outcome: Met  6/27/2022 1544 by Ulysses Peterson RN  Outcome: Ongoing, Progressing  Goal: Absence of Hospital-Acquired Illness or Injury  6/27/2022 1544 by Ulysses Peterson RN  Outcome: Met  6/27/2022 1544 by Ulysses Peterson RN  Outcome: Ongoing, Progressing  Goal: Optimal Comfort and Wellbeing  6/27/2022 1544 by Ulysses Peterson RN  Outcome: Met  6/27/2022 1544 by Ulysses Peterson RN  Outcome: Ongoing, Progressing  Goal: Readiness for Transition of Care  6/27/2022 1544 by Ulysses Peterson RN  Outcome: Met  6/27/2022 1544 by Ulysses Peterson RN  Outcome: Ongoing, Progressing     Problem: Skin Injury Risk Increased  Goal: Skin Health and Integrity  6/27/2022 1544 by Ulysses Peterson RN  Outcome: Met  6/27/2022 1544 by Ulysses Peterson RN  Outcome: Ongoing, Progressing     Problem: Infection  Goal: Absence of Infection Signs and Symptoms  6/27/2022 1544 by Ulysses Peterson RN  Outcome: Met  6/27/2022 1544 by Ulysses Peterson RN  Outcome: Ongoing, Progressing     Problem: Fall Injury Risk  Goal: Absence of Fall and Fall-Related Injury  6/27/2022 1544 by Ulysses Peterson RN  Outcome: Met  6/27/2022 1544 by Ulysses Peterson RN  Outcome: Ongoing, Progressing     Problem: Adjustment to Illness (Delirium)  Goal: Optimal Coping  6/27/2022 1544 by Ulysses Peterson RN  Outcome: Met  6/27/2022 1544 by Ulysses Peterson RN  Outcome: Ongoing, Progressing     Problem: Altered Behavior (Delirium)  Goal: Improved Behavioral Control  6/27/2022 1544 by Ulysses Peterson RN  Outcome: Met  6/27/2022 1544 by Alycea Peterson, RN  Outcome: Ongoing, Progressing     Problem: Attention and Thought Clarity Impairment (Delirium)  Goal: Improved Attention and Thought Clarity  6/27/2022 1544 by  Ulysses Peterson RN  Outcome: Met  6/27/2022 1544 by Ulysses Peterson RN  Outcome: Ongoing, Progressing     Problem: Sleep Disturbance (Delirium)  Goal: Improved Sleep  6/27/2022 1544 by Ulysses Peterson RN  Outcome: Met  6/27/2022 1544 by Ulysses Peterson RN  Outcome: Ongoing, Progressing

## 2022-07-06 ENCOUNTER — HOSPITAL ENCOUNTER (INPATIENT)
Facility: HOSPITAL | Age: 72
LOS: 8 days | Discharge: HOSPICE | DRG: 094 | End: 2022-07-14
Attending: STUDENT IN AN ORGANIZED HEALTH CARE EDUCATION/TRAINING PROGRAM | Admitting: INTERNAL MEDICINE
Payer: MEDICARE

## 2022-07-06 DIAGNOSIS — G93.40 ENCEPHALOPATHY: ICD-10-CM

## 2022-07-06 DIAGNOSIS — R50.9 FEVER: ICD-10-CM

## 2022-07-06 DIAGNOSIS — R41.82 AMS (ALTERED MENTAL STATUS): ICD-10-CM

## 2022-07-06 DIAGNOSIS — I48.91 ATRIAL FIBRILLATION WITH RVR: ICD-10-CM

## 2022-07-06 DIAGNOSIS — I49.9 CARDIAC RHYTHM DISORDER OR DISTURBANCE OR CHANGE: ICD-10-CM

## 2022-07-06 DIAGNOSIS — Z51.5 COMFORT MEASURES ONLY STATUS: Primary | ICD-10-CM

## 2022-07-06 LAB
APPEARANCE UR: CLEAR
APTT PPP: 30.9 SECONDS (ref 23.2–33.7)
BACTERIA #/AREA URNS AUTO: ABNORMAL /HPF
BILIRUB UR QL STRIP.AUTO: NEGATIVE MG/DL
BNP BLD-MCNC: 58 PG/ML
COLOR UR AUTO: ABNORMAL
FLUAV AG UPPER RESP QL IA.RAPID: NOT DETECTED
FLUBV AG UPPER RESP QL IA.RAPID: NOT DETECTED
GLUCOSE UR QL STRIP.AUTO: NEGATIVE MG/DL
INR BLD: 2.13 (ref 0–1.3)
KETONES UR QL STRIP.AUTO: ABNORMAL MG/DL
LACTATE SERPL-SCNC: 1.5 MMOL/L (ref 0.5–2.2)
LEUKOCYTE ESTERASE UR QL STRIP.AUTO: NEGATIVE UNIT/L
NITRITE UR QL STRIP.AUTO: NEGATIVE
PH UR STRIP.AUTO: 5.5 [PH]
PROT UR QL STRIP.AUTO: NEGATIVE MG/DL
PROTHROMBIN TIME: 23.4 SECONDS (ref 12.5–14.5)
RBC #/AREA URNS AUTO: 12 /HPF
RBC UR QL AUTO: ABNORMAL UNIT/L
SARS-COV-2 RNA RESP QL NAA+PROBE: NOT DETECTED
SP GR UR STRIP.AUTO: 1.02 (ref 1–1.03)
SQUAMOUS #/AREA URNS AUTO: <5 /HPF
T4 FREE SERPL-MCNC: 2.13 NG/DL (ref 0.7–1.48)
TROPONIN I SERPL-MCNC: 0.01 NG/ML (ref 0–0.04)
TSH SERPL-ACNC: <0.0083 UIU/ML (ref 0.35–4.94)
UROBILINOGEN UR STRIP-ACNC: 1 MG/DL
WBC #/AREA URNS AUTO: <5 /HPF

## 2022-07-06 PROCEDURE — 36415 COLL VENOUS BLD VENIPUNCTURE: CPT | Performed by: PHYSICIAN ASSISTANT

## 2022-07-06 PROCEDURE — 94640 AIRWAY INHALATION TREATMENT: CPT

## 2022-07-06 PROCEDURE — 87040 BLOOD CULTURE FOR BACTERIA: CPT | Performed by: STUDENT IN AN ORGANIZED HEALTH CARE EDUCATION/TRAINING PROGRAM

## 2022-07-06 PROCEDURE — 83605 ASSAY OF LACTIC ACID: CPT | Performed by: STUDENT IN AN ORGANIZED HEALTH CARE EDUCATION/TRAINING PROGRAM

## 2022-07-06 PROCEDURE — 93010 ELECTROCARDIOGRAM REPORT: CPT | Mod: ,,, | Performed by: INTERNAL MEDICINE

## 2022-07-06 PROCEDURE — 63600175 PHARM REV CODE 636 W HCPCS: Performed by: STUDENT IN AN ORGANIZED HEALTH CARE EDUCATION/TRAINING PROGRAM

## 2022-07-06 PROCEDURE — 87529 HSV DNA AMP PROBE: CPT | Performed by: PHYSICIAN ASSISTANT

## 2022-07-06 PROCEDURE — 84484 ASSAY OF TROPONIN QUANT: CPT | Performed by: STUDENT IN AN ORGANIZED HEALTH CARE EDUCATION/TRAINING PROGRAM

## 2022-07-06 PROCEDURE — 81001 URINALYSIS AUTO W/SCOPE: CPT | Performed by: STUDENT IN AN ORGANIZED HEALTH CARE EDUCATION/TRAINING PROGRAM

## 2022-07-06 PROCEDURE — 63600175 PHARM REV CODE 636 W HCPCS: Performed by: INTERNAL MEDICINE

## 2022-07-06 PROCEDURE — 25000242 PHARM REV CODE 250 ALT 637 W/ HCPCS: Performed by: INTERNAL MEDICINE

## 2022-07-06 PROCEDURE — 99285 EMERGENCY DEPT VISIT HI MDM: CPT | Mod: 25

## 2022-07-06 PROCEDURE — 87636 SARSCOV2 & INF A&B AMP PRB: CPT | Performed by: STUDENT IN AN ORGANIZED HEALTH CARE EDUCATION/TRAINING PROGRAM

## 2022-07-06 PROCEDURE — 63600175 PHARM REV CODE 636 W HCPCS: Performed by: PHYSICIAN ASSISTANT

## 2022-07-06 PROCEDURE — 84443 ASSAY THYROID STIM HORMONE: CPT | Performed by: STUDENT IN AN ORGANIZED HEALTH CARE EDUCATION/TRAINING PROGRAM

## 2022-07-06 PROCEDURE — 93005 ELECTROCARDIOGRAM TRACING: CPT

## 2022-07-06 PROCEDURE — 84439 ASSAY OF FREE THYROXINE: CPT | Performed by: STUDENT IN AN ORGANIZED HEALTH CARE EDUCATION/TRAINING PROGRAM

## 2022-07-06 PROCEDURE — 83880 ASSAY OF NATRIURETIC PEPTIDE: CPT | Performed by: STUDENT IN AN ORGANIZED HEALTH CARE EDUCATION/TRAINING PROGRAM

## 2022-07-06 PROCEDURE — 25000003 PHARM REV CODE 250: Performed by: INTERNAL MEDICINE

## 2022-07-06 PROCEDURE — 11000001 HC ACUTE MED/SURG PRIVATE ROOM

## 2022-07-06 PROCEDURE — 87077 CULTURE AEROBIC IDENTIFY: CPT | Performed by: STUDENT IN AN ORGANIZED HEALTH CARE EDUCATION/TRAINING PROGRAM

## 2022-07-06 PROCEDURE — 25000003 PHARM REV CODE 250: Performed by: PHYSICIAN ASSISTANT

## 2022-07-06 PROCEDURE — 93010 EKG 12-LEAD: ICD-10-PCS | Mod: ,,, | Performed by: INTERNAL MEDICINE

## 2022-07-06 PROCEDURE — 94761 N-INVAS EAR/PLS OXIMETRY MLT: CPT

## 2022-07-06 PROCEDURE — 36415 COLL VENOUS BLD VENIPUNCTURE: CPT | Performed by: STUDENT IN AN ORGANIZED HEALTH CARE EDUCATION/TRAINING PROGRAM

## 2022-07-06 PROCEDURE — 85610 PROTHROMBIN TIME: CPT | Performed by: STUDENT IN AN ORGANIZED HEALTH CARE EDUCATION/TRAINING PROGRAM

## 2022-07-06 PROCEDURE — 85730 THROMBOPLASTIN TIME PARTIAL: CPT | Performed by: STUDENT IN AN ORGANIZED HEALTH CARE EDUCATION/TRAINING PROGRAM

## 2022-07-06 RX ORDER — ACETYLCYSTEINE 200 MG/ML
2 SOLUTION ORAL; RESPIRATORY (INHALATION) 3 TIMES DAILY
Status: DISCONTINUED | OUTPATIENT
Start: 2022-07-06 | End: 2022-07-07

## 2022-07-06 RX ORDER — ACETAMINOPHEN 325 MG/1
650 TABLET ORAL EVERY 4 HOURS PRN
Status: DISCONTINUED | OUTPATIENT
Start: 2022-07-06 | End: 2022-07-14 | Stop reason: HOSPADM

## 2022-07-06 RX ORDER — SODIUM CHLORIDE 9 MG/ML
INJECTION, SOLUTION INTRAVENOUS CONTINUOUS
Status: ACTIVE | OUTPATIENT
Start: 2022-07-06 | End: 2022-07-07

## 2022-07-06 RX ORDER — DONEPEZIL HYDROCHLORIDE 5 MG/1
5 TABLET, FILM COATED ORAL NIGHTLY
Status: DISCONTINUED | OUTPATIENT
Start: 2022-07-06 | End: 2022-07-07

## 2022-07-06 RX ORDER — MIRTAZAPINE 15 MG/1
15 TABLET, FILM COATED ORAL NIGHTLY
Status: DISCONTINUED | OUTPATIENT
Start: 2022-07-06 | End: 2022-07-14 | Stop reason: HOSPADM

## 2022-07-06 RX ORDER — ALBUTEROL SULFATE 0.83 MG/ML
2.5 SOLUTION RESPIRATORY (INHALATION) EVERY 4 HOURS PRN
Status: DISCONTINUED | OUTPATIENT
Start: 2022-07-06 | End: 2022-07-14 | Stop reason: HOSPADM

## 2022-07-06 RX ORDER — IBUPROFEN 200 MG
24 TABLET ORAL
Status: DISCONTINUED | OUTPATIENT
Start: 2022-07-06 | End: 2022-07-14 | Stop reason: HOSPADM

## 2022-07-06 RX ORDER — CIPROFLOXACIN 2 MG/ML
400 INJECTION, SOLUTION INTRAVENOUS
Status: DISCONTINUED | OUTPATIENT
Start: 2022-07-06 | End: 2022-07-07

## 2022-07-06 RX ORDER — METHIMAZOLE 10 MG/1
20 TABLET ORAL 3 TIMES DAILY
Status: DISCONTINUED | OUTPATIENT
Start: 2022-07-06 | End: 2022-07-14 | Stop reason: HOSPADM

## 2022-07-06 RX ORDER — SODIUM CHLORIDE 0.9 % (FLUSH) 0.9 %
10 SYRINGE (ML) INJECTION EVERY 12 HOURS PRN
Status: DISCONTINUED | OUTPATIENT
Start: 2022-07-06 | End: 2022-07-14 | Stop reason: HOSPADM

## 2022-07-06 RX ORDER — GLUCAGON 1 MG
1 KIT INJECTION
Status: DISCONTINUED | OUTPATIENT
Start: 2022-07-06 | End: 2022-07-14 | Stop reason: HOSPADM

## 2022-07-06 RX ORDER — IBUPROFEN 200 MG
16 TABLET ORAL
Status: DISCONTINUED | OUTPATIENT
Start: 2022-07-06 | End: 2022-07-14 | Stop reason: HOSPADM

## 2022-07-06 RX ORDER — DIGOXIN 250 MCG
0.25 TABLET ORAL DAILY
Status: DISCONTINUED | OUTPATIENT
Start: 2022-07-07 | End: 2022-07-14 | Stop reason: HOSPADM

## 2022-07-06 RX ORDER — HYDRALAZINE HYDROCHLORIDE 20 MG/ML
10 INJECTION INTRAMUSCULAR; INTRAVENOUS
Status: DISCONTINUED | OUTPATIENT
Start: 2022-07-06 | End: 2022-07-14 | Stop reason: HOSPADM

## 2022-07-06 RX ORDER — VANCOMYCIN HCL IN 5 % DEXTROSE 1G/250ML
1000 PLASTIC BAG, INJECTION (ML) INTRAVENOUS
Status: DISCONTINUED | OUTPATIENT
Start: 2022-07-07 | End: 2022-07-07

## 2022-07-06 RX ORDER — DEXTROSE MONOHYDRATE 100 MG/ML
12.5 INJECTION, SOLUTION INTRAVENOUS
Status: DISCONTINUED | OUTPATIENT
Start: 2022-07-06 | End: 2022-07-14 | Stop reason: HOSPADM

## 2022-07-06 RX ORDER — DEXTROSE MONOHYDRATE 100 MG/ML
25 INJECTION, SOLUTION INTRAVENOUS
Status: DISCONTINUED | OUTPATIENT
Start: 2022-07-06 | End: 2022-07-14 | Stop reason: HOSPADM

## 2022-07-06 RX ORDER — METHIMAZOLE 10 MG/1
20 TABLET ORAL 3 TIMES DAILY
Status: DISCONTINUED | OUTPATIENT
Start: 2022-07-06 | End: 2022-07-06

## 2022-07-06 RX ADMIN — ACETYLCYSTEINE 2 ML: 200 SOLUTION ORAL; RESPIRATORY (INHALATION) at 08:07

## 2022-07-06 RX ADMIN — DONEPEZIL HYDROCHLORIDE 5 MG: 5 TABLET, FILM COATED ORAL at 09:07

## 2022-07-06 RX ADMIN — SODIUM CHLORIDE, POTASSIUM CHLORIDE, SODIUM LACTATE AND CALCIUM CHLORIDE 1000 ML: 600; 310; 30; 20 INJECTION, SOLUTION INTRAVENOUS at 03:07

## 2022-07-06 RX ADMIN — METHIMAZOLE 20 MG: 10 TABLET ORAL at 09:07

## 2022-07-06 RX ADMIN — ALBUTEROL SULFATE 2.5 MG: 2.5 SOLUTION RESPIRATORY (INHALATION) at 08:07

## 2022-07-06 RX ADMIN — SODIUM CHLORIDE: 9 INJECTION, SOLUTION INTRAVENOUS at 05:07

## 2022-07-06 RX ADMIN — CIPROFLOXACIN 400 MG: 2 INJECTION, SOLUTION INTRAVENOUS at 07:07

## 2022-07-06 RX ADMIN — VANCOMYCIN HYDROCHLORIDE 1500 MG: 1.5 INJECTION, POWDER, LYOPHILIZED, FOR SOLUTION INTRAVENOUS at 06:07

## 2022-07-06 RX ADMIN — ACYCLOVIR SODIUM 500 MG: 500 INJECTION, SOLUTION INTRAVENOUS at 07:07

## 2022-07-06 RX ADMIN — MIRTAZAPINE 15 MG: 15 TABLET, FILM COATED ORAL at 09:07

## 2022-07-06 NOTE — H&P
[facility] Medicine History & Physical Examination       Patient Name: Quan Contreras  MRN: [id number]  Patient Class: IP- Inpatient   Admission Date: 7/6/2022   Admitting Physician: Kraig Shen MD   Length of Stay: 0  Attending Physician: Kraig Shen MD   Primary Care Provider: Bran Hansen MD  Face-to-Face encounter date: 07/06/2022  Code Status: DNR witnessed by Dr. Perez Shen  Chief Complaint: Altered Mental Status (Sent from [facility] for altered mental status over past few days; hx of encephalopathy)        Patient information was obtained from patient, patient's family, past medical records and ER records.     HISTORY OF PRESENT ILLNESS:   Quan Contreras is a 71 y.o. White male with a past medical history of paroxysmal atrial fibrillation on Eliquis, hypertension, coronary artery disease, cardiomyopathy, hyperthyroidism on methimazole, prior CVA/TIA, seizure disorder and rheumatoid arthritis. In April 2022 patient suffered a stroke. He spent a week in rehab and was discharged home. He then developed seizures and was started on seizure medication. Patient progesively worsened and developed a fever and was diagnosed with pneumonia. Patient improved overall but confusion worsened. He was admitted and diagnosed with meningitis. CSF was negative. He was discharged to [facility] 06/28/2022 for acute encephalopathy secondary to meningitis. TSH was less than 0.0083 with a Free T3 11.61 and free T4 2.68 on 6/29/2022 and Methimazole was increased to 20 mg TID. Yesterday (07/05/2022) patient began running a fever of 101 F. He was started on Vancomycin and Merrem. In addition leukocytosis increased from 11.7 yesterday to 22.3 today (7/6/2022) with worsening mental status. Blood culture from 7/5/2022 returned positive for gram positive cocci in 1 of 2 bottles. He was transferred to [facility] for further neurological workup in ID consult.    Wife and daughter at bedside reports prior to the stroke in April patient was ambulating and completing activities of daily living without assistance and has not returned to baseline. In rehab he was responding to questions appropriately and recognizing family. NG tube was placed a few days ago.     Upon presentation to the ED, patient afebrile and hemodynamically stable.  Labs notable for WBC 22.3, H&H 13.5/39.6, MCV 85, creatinine 0.58, AST 46, ALT 65, TSH less than 0.0083, free T4 2.13.  Influenza A and B and SARS-CoV-2 PCR negative.  UA negative for infection.  Chest x-ray negative.  CT head negative for acute intracranial abnormalities.  Patient admitted to hospital medicine services for further medical management.      PAST MEDICAL HISTORY:     Past Medical History:   Diagnosis Date    Acute left arterial ischemic stroke, KINGA (anterior cerebral artery) 5/3/2022    Acute osteomyelitis 5/11/2022    Atherosclerosis of coronary artery 5/11/2022    Atrial fibrillation 5/3/2022    Benign essential hypertension 5/3/2022    Cardiomyopathy     Coronary artery disease     Diverticulosis     Esophageal reflux 5/11/2022    Fatigue     Focal seizure 5/17/2022    Generalized anxiety disorder     GERD (gastroesophageal reflux disease)     Graves disease     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/2022    HTN (hypertension)     Hyperthyroidism     Idiopathic peripheral neuropathy 5/11/2022    Irritable bowel syndrome without diarrhea     Ischemic cardiomyopathy 5/3/2022    Mixed hyperlipidemia 5/11/2022    Other sequelae following unspecified cerebrovascular disease 5/9/2022    Paroxysmal atrial fibrillation     Rheumatoid arthritis 5/11/2022    Rheumatoid arthritis, unspecified     Shingles     Staph aureus infection     Stroke     Thyroiditis, unspecified     Thyrotoxicosis 5/3/2022       PAST SURGICAL HISTORY:     Past Surgical History:   Procedure Laterality Date     CATARACT EXTRACTION      CYST REMOVAL Left     TONSILLECTOMY      TOTAL KNEE ARTHROPLASTY      VASECTOMY         ALLERGIES:   Ace inhibitors, Morphine, Morphine sulfate, Nafcillin, Pradaxa [dabigatran etexilate], and Sulfamethoxazole    FAMILY HISTORY:   Reviewed and negative    SOCIAL HISTORY:     Social History     Tobacco Use    Smoking status: Never Smoker    Smokeless tobacco: Never Used   Substance Use Topics    Alcohol use: Never        HOME MEDICATIONS:     Prior to Admission medications    Medication Sig Start Date End Date Taking? Authorizing Provider   acetylcysteine 200 mg/ml, 20%, (MUCOMYST) 200 mg/mL (20 %) nebulizer solution Take 2 mLs by nebulization 3 (three) times daily. 7/6/22 8/5/22  ADELE Lobato   apixaban (ELIQUIS) 5 mg Tab Take 1 tablet (5 mg total) by mouth 2 (two) times daily. 5/3/22   Evaristo Motta MD   aspirin 325 MG tablet Take 325 mg by mouth once daily.    Historical Provider   dextrose 5 % SolP 250 mL with vancomycin 750 mg SolR 750 mg Inject 750 mg into the vein every 8 (eight) hours. 7/6/22   ADELE Lobato   DIGITEK 250 mcg (0.25 mg) tablet Take 1 tablet by mouth Daily. 3/9/22   Historical Provider   donepeziL (ARICEPT) 5 MG tablet Take 1 tablet (5 mg total) by mouth every evening. 7/6/22 7/6/23  ADELE Lobato   ezetimibe (ZETIA) 10 mg tablet Take 1 tablet by mouth once daily at 6am. 3/9/22   Historical Provider   ferrous gluconate 324 mg (37.5 mg iron) Tab tablet Take 1 tablet (324 mg total) by mouth daily with breakfast. 7/7/22 7/7/23  ADELE Lobato   HYDROcodone-acetaminophen (NORCO)  mg per tablet Take 1 tablet by mouth 3 (three) times daily. PRN    Historical Provider   hydrOXYzine pamoate (VISTARIL) 50 MG Cap Take 1 capsule (50 mg total) by mouth every evening. 7/6/22   ADELE Lobato   methIMAzole (TAPAZOLE) 10 MG Tab Take 2 tablets (20 mg total) by mouth 3 (three) times daily. 7/6/22 7/6/23  ADELE Lobato    miconazole NITRATE 2 % (MICOTIN) 2 % top powder Apply topically 2 (two) times daily. 6/27/22   Shmuel Crawford MD   mirtazapine (REMERON) 15 MG tablet Take 1 tablet (15 mg total) by mouth every evening. 7/6/22 7/6/23  AEDLE Lobato   montelukast (SINGULAIR) 10 mg tablet Take 1 tablet by mouth Daily. 3/9/22   Historical Provider   pantoprazole (PROTONIX) 40 MG tablet Take 1 tablet (40 mg total) by mouth once daily. 7/7/22 7/7/23  ADELE Lobato   predniSONE (DELTASONE) 5 MG tablet Take 5 mg by mouth once daily. At bedtime    Historical Provider   propranoloL (INDERAL LA) 60 MG 24 hr capsule Take 1 capsule (60 mg total) by mouth 2 (two) times a day. 5/3/22 5/3/23  Evaristo Motta MD   rosuvastatin (CRESTOR) 40 MG Tab Take 40 mg by mouth every evening.    Historical Provider   sodium chloride 0.9 % PgBk 100 mL with levETIRAcetam 500 mg/5 mL Soln 500 mg Inject 500 mg into the vein every 12 (twelve) hours. 7/6/22   ADELE Lobato   sodium chloride 0.9 % PgBk 100 mL with meropenem 1 gram SolR 1 g Inject 1 g into the vein every 8 (eight) hours. 7/6/22   ADELE Lobato   traZODone (DESYREL) 150 MG tablet Take 1 tablet (150 mg total) by mouth every evening. 7/6/22 7/6/23  ADELE Lobato   baclofen (LIORESAL) 10 MG tablet Take 1 tablet by mouth nightly. 11/9/21 5/3/22  Historical Provider   cefTRIAXone (ROCEPHIN) 2 gram/50 mL IVPB Inject 50 mLs (2 g total) into the vein every 12 (twelve) hours. for 2 days 6/27/22 7/6/22  Shmuel Crawford MD   levETIRAcetam (KEPPRA) 500 MG Tab Take 1 tablet (500 mg total) by mouth 2 (two) times daily. 5/17/22 7/6/22  Franklin Dave MD   methIMAzole (TAPAZOLE) 10 MG Tab Take 4 tablets by mouth Daily. 12/10/21 7/6/22  Historical Provider   metoprolol succinate (TOPROL-XL) 100 MG 24 hr tablet Take 100 mg by mouth 2 (two) times daily.  5/3/22  Historical Provider   mv, min #36-iron,carbonyl-FA 16 mg iron- 0.38 mg Tab Take 1 tablet by mouth once daily.  7/6/22   Historical Provider   pantoprazole (PROTONIX) 40 MG tablet TAKE ONE TABLET BY MOUTH EVERY DAY 6/28/22 7/6/22  Bran Hansen MD       REVIEW OF SYSTEMS:   Except as documented, all other systems reviewed and negative     PHYSICAL EXAM:     VITAL SIGNS: 24 HRS MIN & MAX LAST   Temp  Min: 97.6 °F (36.4 °C)  Max: 101.7 °F (38.7 °C) 98.8 °F (37.1 °C)   BP  Min: 120/78  Max: 156/89 (!) 142/85   Pulse  Min: 74  Max: 136  98   Resp  Min: 18  Max: 24 18   SpO2  Min: 95 %  Max: 98 % 95 %       General appearance: Well-developed, well-nourished male in no apparent distress. Wife and daughter at bedside.  HEENT: Atraumatic head. Dry mucous membranes of oral cavity. Neck stiffness.  Lungs: Clear to auscultation bilaterally.   Heart: Regular rate and rhythm.   Abdomen: Soft, non-distended, non-tender. Bowel sounds are normal.   Extremities: No cyanosis, clubbing.   Skin: No Rash. Warm and dry.  Neuro: Able to say name. Does not follow commands.      LABS AND IMAGING:     Recent Labs   Lab 07/04/22 0522 07/05/22  0937 07/06/22  0451   WBC 9.9 11.7* 22.3*   RBC 4.11* 4.92 4.67*   HGB 11.7* 14.1 13.5*   HCT 35.3* 41.7* 39.6*   MCV 85.9 84.8 84.8   MCH 28.5 28.7 28.9   MCHC 33.1 33.8 34.1   RDW 15.4 15.4 15.7    297 311   MPV 10.6* 10.4 10.8*       Recent Labs   Lab 07/04/22 0522 07/05/22  0028 07/05/22  0458 07/06/22  0452     --  137 140   K 3.7  --  3.6 3.9   CO2 25  --  21* 27   BUN 7.4*  --  9.3 18.5   CREATININE 0.55*  --  0.59* 0.58*   CALCIUM 9.2  --  9.7 9.8   PH  --  7.493*  --   --    MG 1.60  --  1.60 1.80   ALBUMIN 2.4*  --  2.8* 2.6*   ALKPHOS 100  --  119 103   ALT 39  --  51 65*   AST 33  --  39* 46*   BILITOT 0.7  --  1.1 1.0       Microbiology Results (last 7 days)     Procedure Component Value Units Date/Time    Blood culture #1 **CANNOT BE ORDERED STAT** [[id number]] Collected: 07/06/22 1326    Order Status: Resulted Specimen: Blood from PICC Line Updated: 07/06/22 1356    Blood culture #2 **CANNOT  BE ORDERED STAT** [[id number]] Collected: 07/06/22 1322    Order Status: Resulted Specimen: Blood from Arm, Left Updated: 07/06/22 1356           CT Head Without Contrast  Narrative: EXAMINATION:  CT HEAD WITHOUT CONTRAST    CLINICAL HISTORY:  Mental status change, unknown cause;    TECHNIQUE:  Axial scans were obtained from skull base to the vertex.    Coronal and sagittal reconstructions obtained from the axial data.    Automatic exposure control was utilized to limit radiation dose.    Contrast: None    Radiation Dose:    Total DLP: 1097 mGy*cm    COMPARISON:  Brain MRI 06/15/2022    FINDINGS:  Scalp/Skull:    No abnormalities.    Brain sulci: Appropriate for patient's age.    Ventricles: Normal in size and configuration. No hydrocephalus.    Extra-axial spaces:    No masses or fluid collections.    Parenchyma:    Bifrontal foci of cortical based encephalomalacia compatible with remote insults.    Mild-moderate chronic microangiopathy in the supratentorial white matter.    No mass, hemorrhage or CT evidence of an acute vascular insult.    Dural sinuses: No abnormal densities.    Sellar/Suprasellar region: No abnormalities.    Skull base and Craniocervical junction: No abnormalities.    Incidental findings:    Carotid siphon and vertebrobasilar atherosclerotic vascular calcifications.    Status post bilateral cataract surgery.    Partially visualized nasoenteric catheter.  Impression: No acute intracranial abnormalities.    Electronically signed by: Michel Ge  Date:    07/06/2022  Time:    14:13  X-Ray Chest 1 View  Narrative: EXAMINATION:  XR CHEST 1 VIEW    CLINICAL HISTORY:  Fever, unspecified    TECHNIQUE:  One view    COMPARISON:  July 1, 2022.    FINDINGS:  Cardiopericardial silhouette is within normal limits.  No acute dense focal or segmental consolidation, congestive process, pleural effusions or pneumothorax.  Nasogastric tube extends into the gastric fundus.  Impression: NO ACUTE CARDIOPULMONARY  PROCESS IDENTIFIED.    Electronically signed by: Ethan Molina  Date:    07/06/2022  Time:    13:17        ASSESSMENT & PLAN:   Assessment:  Worsening encephalopathy in the setting of possible meningitis  Normocytic anemia  Acute kidney injury  Mild transaminitis  Acute thyrotoxicosis  Essential hypertension      Plan:  - MRI Brain ordered with and without contrast  - Repeat blood cultures ordered check CSF cell count, glucose, protein and HSV PCR  - Patient allergic to PCN  - Continue with Vancomycin 1 g every 12 hours, Ciprofloxacin 400 mg every 12 hours and Acyclovir 8 mg/kG every 8 hours  - Infectious disease consulted. Appreciate recommendations  - Neurology consulted. Appreciate recommendations  - Interventional radiology consulted for lumbar puncture  - Hold Eliquis  - Resume appropriate home medication  - Begin tube feeding via NG tube with free water flushes  - Labs in AM      VTE Prophylaxis: will be placed on SCDs for DVT prophylaxis    __________________________________________________________________________  INPATIENT LIST OF MEDICATIONS     Current Facility-Administered Medications:     lactated ringers bolus 1,000 mL, 1,000 mL, Intravenous, ED 1 Time, Sourav Goodwin MD, Last Rate: 999 mL/hr at 07/06/22 1548, 1,000 mL at 07/06/22 1548    Current Outpatient Medications:     acetylcysteine 200 mg/ml, 20%, (MUCOMYST) 200 mg/mL (20 %) nebulizer solution, Take 2 mLs by nebulization 3 (three) times daily., Disp: , Rfl: 12    apixaban (ELIQUIS) 5 mg Tab, Take 1 tablet (5 mg total) by mouth 2 (two) times daily., Disp: , Rfl:     aspirin 325 MG tablet, Take 325 mg by mouth once daily., Disp: , Rfl:     dextrose 5 % SolP 250 mL with vancomycin 750 mg SolR 750 mg, Inject 750 mg into the vein every 8 (eight) hours., Disp: , Rfl:     DIGITEK 250 mcg (0.25 mg) tablet, Take 1 tablet by mouth Daily., Disp: , Rfl:     donepeziL (ARICEPT) 5 MG tablet, Take 1 tablet (5 mg total) by mouth every evening., Disp: 30  tablet, Rfl: 11    ezetimibe (ZETIA) 10 mg tablet, Take 1 tablet by mouth once daily at 6am., Disp: , Rfl:     [START ON 7/7/2022] ferrous gluconate 324 mg (37.5 mg iron) Tab tablet, Take 1 tablet (324 mg total) by mouth daily with breakfast., Disp: 30 tablet, Rfl: 11    HYDROcodone-acetaminophen (NORCO)  mg per tablet, Take 1 tablet by mouth 3 (three) times daily. PRN, Disp: , Rfl:     hydrOXYzine pamoate (VISTARIL) 50 MG Cap, Take 1 capsule (50 mg total) by mouth every evening., Disp: , Rfl:     methIMAzole (TAPAZOLE) 10 MG Tab, Take 2 tablets (20 mg total) by mouth 3 (three) times daily., Disp: 180 tablet, Rfl: 11    miconazole NITRATE 2 % (MICOTIN) 2 % top powder, Apply topically 2 (two) times daily., Disp: , Rfl: 0    mirtazapine (REMERON) 15 MG tablet, Take 1 tablet (15 mg total) by mouth every evening., Disp: 30 tablet, Rfl: 11    montelukast (SINGULAIR) 10 mg tablet, Take 1 tablet by mouth Daily., Disp: , Rfl:     [START ON 7/7/2022] pantoprazole (PROTONIX) 40 MG tablet, Take 1 tablet (40 mg total) by mouth once daily., Disp: 30 tablet, Rfl: 11    predniSONE (DELTASONE) 5 MG tablet, Take 5 mg by mouth once daily. At bedtime, Disp: , Rfl:     propranoloL (INDERAL LA) 60 MG 24 hr capsule, Take 1 capsule (60 mg total) by mouth 2 (two) times a day., Disp: 60 capsule, Rfl: 11    rosuvastatin (CRESTOR) 40 MG Tab, Take 40 mg by mouth every evening., Disp: , Rfl:     sodium chloride 0.9 % PgBk 100 mL with levETIRAcetam 500 mg/5 mL Soln 500 mg, Inject 500 mg into the vein every 12 (twelve) hours., Disp: , Rfl:     sodium chloride 0.9 % PgBk 100 mL with meropenem 1 gram SolR 1 g, Inject 1 g into the vein every 8 (eight) hours., Disp: , Rfl:     traZODone (DESYREL) 150 MG tablet, Take 1 tablet (150 mg total) by mouth every evening., Disp: 30 tablet, Rfl: 11    Facility-Administered Medications Ordered in Other Encounters:     [MAR Hold - Suspended Admission] acetaminophen suppository 650 mg, 650  mg, Rectal, Q6H PRN, Bran Hansen MD, 650 mg at 07/05/22 2026    [MAR Hold - Suspended Admission] acetylcysteine 200 mg/ml (20%) solution 2 mL, 2 mL, Nebulization, TID, ADELE Lobato, 2 mL at 07/06/22 0830    [MAR Hold - Suspended Admission] albuterol-ipratropium 2.5 mg-0.5 mg/3 mL nebulizer solution 3 mL, 3 mL, Nebulization, Q4H, Bran Hansen MD, 3 mL at 07/06/22 0829    [MAR Hold - Suspended Admission] apixaban tablet 5 mg, 5 mg, Oral, BID, AMBER MorenoP, 5 mg at 07/06/22 0954    [MAR Hold - Suspended Admission] aspirin tablet 325 mg, 325 mg, Oral, Daily, AMBER MorenoP, 325 mg at 07/06/22 0954    [MAR Hold - Suspended Admission] atorvastatin tablet 10 mg, 10 mg, Oral, Daily, Adair Gabriel FNP, 10 mg at 07/06/22 0954    [MAR Hold - Suspended Admission] benzonatate capsule 100 mg, 100 mg, Oral, TID PRN, AMBER MorenoP    [MAR Hold - Suspended Admission] digoxin tablet 0.25 mg, 0.25 mg, Oral, Daily, Adair Gabriel, FNP, 0.25 mg at 07/05/22 1800    [MAR Hold - Suspended Admission] donepeziL tablet 5 mg, 5 mg, Oral, QHS, ADELE Lobato, 5 mg at 07/05/22 2156    [MAR Hold - Suspended Admission] ezetimibe tablet 10 mg, 10 mg, Oral, QHS, Adair Gabriel FNP, 10 mg at 07/05/22 2156    [MAR Hold - Suspended Admission] ferrous gluconate tablet 324 mg, 324 mg, Oral, Daily with breakfast, ADELE Lobato, 324 mg at 07/06/22 0954    [MAR Hold - Suspended Admission] hydrALAZINE injection 10 mg, 10 mg, Intravenous, Q4H PRN, AMBER MorenoP    [MAR Hold - Suspended Admission] HYDROcodone-acetaminophen  mg per tablet 1 tablet, 1 tablet, Oral, Q6H PRN, Adair Gabriel, ADELE, 1 tablet at 07/05/22 2210    [MAR Hold - Suspended Admission] hydrOXYzine pamoate capsule 50 mg, 50 mg, Oral, QHS, AMBER LobatoP, 50 mg at 07/05/22 2211    [MAR Hold - Suspended Admission] ipratropium 0.02 % nebulizer solution 0.5 mg, 0.5 mg,  Nebulization, Q4H, ADELE Lobato, 0.5 mg at 07/06/22 0830    [MAR Hold - Suspended Admission] labetaloL injection 10 mg, 10 mg, Intravenous, Q4H PRN, ADELE Moreno    [MAR Hold - Suspended Admission] levETIRAcetam (KEPPRA) 500 mg in sodium chloride 0.9 % 100 mL IVPB, 500 mg, Intravenous, Q12H, Bran Hansen MD, Stopped at 07/06/22 0930    [MAR Hold - Suspended Admission] meropenem (MERREM) 1 g in sodium chloride 0.9 % 100 mL IVPB (MB+), 1 g, Intravenous, Q8H, Bran Hansen MD, Stopped at 07/06/22 0900    [MAR Hold - Suspended Admission] methIMAzole tablet 20 mg, 20 mg, Oral, TID, Bran Hasnen MD, 20 mg at 07/06/22 0954    [MAR Hold - Suspended Admission] metoprolol injection 10 mg, 10 mg, Intravenous, Q2H PRN, ADELE Moreno    [MAR Hold - Suspended Admission] miconazole NITRATE 2 % top powder, , Topical (Top), BID, ADELE Moreno, Given at 07/06/22 0900    [MAR Hold - Suspended Admission] mirtazapine tablet 15 mg, 15 mg, Oral, QHS, ADELE Lobato, 15 mg at 07/05/22 2156    [MAR Hold - Suspended Admission] montelukast tablet 10 mg, 10 mg, Oral, Daily, ADELE Moreno, 10 mg at 07/05/22 1800    [MAR Hold - Suspended Admission] nitroGLYCERIN SL tablet 0.4 mg, 0.4 mg, Sublingual, Q5 Min PRN, ADELE Moreno    [MAR Hold - Suspended Admission] ondansetron disintegrating tablet 4 mg, 4 mg, Oral, Q6H PRN, ADELE Moreno    [MAR Hold - [facility] Admission] ondansetron injection 4 mg, 4 mg, Intravenous, Q8H PRN, ADELE Moreno    [MAR Hold - Suspended Admission] pantoprazole EC tablet 40 mg, 40 mg, Oral, Daily, AMBER MorenoP, 40 mg at 07/06/22 0954    [MAR Hold - Suspended Admission] predniSONE tablet 5 mg, 5 mg, Oral, Daily, Adair Gabriel FNP, 5 mg at 07/06/22 0954    [MAR Hold - Suspended Admission] propranoloL 24 hr capsule 60 mg, 60 mg, Oral, BID, Adair Gabriel, FNP, 60 mg at 07/06/22 0954    [MAR  Hold - Suspended Admission] traZODone tablet 150 mg, 150 mg, Oral, QHS, Janel Barber, FNP, 150 mg at 07/05/22 2155    Pharmacy to dose Vancomycin consult, , , Once **AND** [MAR Hold - Suspended Admission] vancomycin - pharmacy to dose, , Intravenous, pharmacy to manage frequency, Bran Hansen MD    [MAR Hold - Suspended Admission] vancomycin 750 mg in dextrose 5 % 250 mL IVPB, 750 mg, Intravenous, Q8H, Bran Hansen MD, Stopped at 07/06/22 0529    [MAR Hold - Suspended Admission] zinc oxide 20 % ointment, , Topical (Top), PRN, Adair Gabriel, FNP, Given at 07/04/22 2143      Scheduled Meds:   lactated ringers  1,000 mL Intravenous ED 1 Time     Continuous Infusions:  PRN Meds:.      Discharge Planning and Disposition: Anticipated discharge to be determined.    I, LORETTA Grijalva, have reviewed and discussed the case with Dr. Kraig Shen MD    Please see the following addendum for further assessment and plan from there attending MD.    LORETTA Jo-C  07/06/2022    ________________________________________________________________________________    MD Addendum:  I, Dr. Kraig Shen MD assumed care of this patient today at 5pm  For the patient encounter, I performed the substantive portion of the visit, I reviewed the NP/PA documentation, treatment plan, and medical decision making.  I had face to face time with this patient     A. History:  Patient sent from [facility] for fever and sepsis. Was diagnosed recently with possible meningitis  Was on iv antibiotics. There was no major changes in mental status  Per family, patient was fully functional April 2022    B. Physical exam:  Patient opens eyes to tactile stimulus   Can whisper his name   Somnolent   Oral mucosa dry   Heart RRR  Lungs clear   Abdomen soft and non tender   + Neck stiffness    C. Medical decision making:  Patients labs and vitals reviewed  Old records reviewed  Last CSF did not shows any bacterial infection  Suspect  CNS infection - prob Viral   Will get MRI brain with contrast   Needs rpt CSF analysis and check HSV pcr   Consult IR, Neuro and ID team   Has an NGT will cont feedings and free water   Cont iv gently hydration  Home meds reviewed and started     Blood cultures x 1 positive or staph, cont iv vanc and added cipro for now   Patient allergic to PCN    Labs in am    VTE prophylaxis: SCDs    HOLD Home eliquis     Per family patient is DNR     Critical care diagnosis: COVID-19 pneumonitis with acute hypoxic respiratory failure on BiPAP, unable to tolerate vapothem  Critical care time was giving 50 minutes      All diagnosis and differential diagnosis have been reviewed; assessment and plan has been documented; I have personally reviewed the labs and test results that are presently available; I have reviewed the patients medication list; I have reviewed the consulting providers response and recommendations. I have reviewed or attempted to review medical records based upon their availability.    All of the patient and family questions have been addressed and answered. Patient's is agreeable to the above stated plan. I will continue to monitor closely and make adjustments to medical management as needed.      Kraig Shen MD  07/06/2022

## 2022-07-06 NOTE — ED PROVIDER NOTES
Encounter Date: 7/6/2022    SCRIBE #1 NOTE: I, Kenzie King, am scribing for, and in the presence of,  Sourav Goodwin. I have scribed the following portions of the note - Other sections scribed: HPI,ROS,PE.       History     Chief Complaint   Patient presents with    Altered Mental Status     Sent from LTAC for altered mental status over past few days; hx of encephalopathy     Full HPI liminted secondary to encephalopathy.  72 y/o male with a history of delirium, seizure disoder, CVA, afib, Grave's disease, Depression, presents to the ED from LTAC for altered mental status over the past few days. Patient recently admitted for altered mental status, diagnosed with meningitis, started on vanc/merrem.  ROS limited due to mental status of patient.     The history is provided by the patient, the EMS personnel and the nursing home. No  was used.   Altered Mental Status  This is a new problem. Episode onset: past few days. The problem has not changed since onset.Nothing aggravates the symptoms. Nothing relieves the symptoms.     Review of patient's allergies indicates:   Allergen Reactions    Ace inhibitors Swelling     Lip swelling    Morphine     Morphine sulfate     Nafcillin Itching    Pradaxa [dabigatran etexilate] Other (See Comments)     Abdominal cramping    Sulfamethoxazole Rash     Past Medical History:   Diagnosis Date    Acute left arterial ischemic stroke, KINGA (anterior cerebral artery) 5/3/2022    Acute osteomyelitis 5/11/2022    Atherosclerosis of coronary artery 5/11/2022    Atrial fibrillation 5/3/2022    Benign essential hypertension 5/3/2022    Cardiomyopathy     Coronary artery disease     Diverticulosis     Esophageal reflux 5/11/2022    Fatigue     Focal seizure 5/17/2022    Generalized anxiety disorder     GERD (gastroesophageal reflux disease)     Graves disease     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/2022    HTN (hypertension)      Hyperthyroidism     Idiopathic peripheral neuropathy 5/11/2022    Irritable bowel syndrome without diarrhea     Ischemic cardiomyopathy 5/3/2022    Mixed hyperlipidemia 5/11/2022    Other sequelae following unspecified cerebrovascular disease 5/9/2022    Paroxysmal atrial fibrillation     Rheumatoid arthritis 5/11/2022    Rheumatoid arthritis, unspecified     Shingles     Staph aureus infection     Stroke     Thyroiditis, unspecified     Thyrotoxicosis 5/3/2022     Past Surgical History:   Procedure Laterality Date    CATARACT EXTRACTION      CYST REMOVAL Left     TONSILLECTOMY      TOTAL KNEE ARTHROPLASTY      VASECTOMY       Family History   Family history unknown: Yes     Social History     Tobacco Use    Smoking status: Never Smoker    Smokeless tobacco: Never Used   Substance Use Topics    Alcohol use: Never    Drug use: Never     Review of Systems   Unable to perform ROS: Mental status change       Physical Exam     Initial Vitals [07/06/22 1207]   BP Pulse Resp Temp SpO2   120/78 98 20 98.8 °F (37.1 °C) 98 %      MAP       --         Physical Exam    Nursing note and vitals reviewed.  Constitutional: He appears well-nourished. He is not diaphoretic. No distress.   HENT:   Head: Normocephalic and atraumatic.   Nose: Nose normal.   Mouth/Throat: Oropharynx is clear and moist.   NG tube to the right nares at about 60 cm.    Eyes: Conjunctivae are normal. Pupils are equal, round, and reactive to light.   Neck:   Normal range of motion.  Cardiovascular: Normal rate, normal heart sounds and intact distal pulses.   No murmur heard.  Irregular rhythm, tachycardiac.    Pulmonary/Chest: Breath sounds normal. No respiratory distress. He has no wheezes. He has no rales.   Abdominal: Abdomen is soft. He exhibits no distension. There is no abdominal tenderness.   Musculoskeletal:         General: No tenderness or edema. Normal range of motion.      Cervical back: Normal range of motion.      Neurological: GCS eye subscore is 4. GCS verbal subscore is 1. GCS motor subscore is 5.   Opens eyes spontaneously.   GCS:10.  Localizes to pain.  Nonverbal.    Skin: Skin is warm and dry. Capillary refill takes less than 2 seconds. No rash noted. No erythema.   Stage 1 ulcer to the lower back 3 by 2 cm         ED Course   Procedures  Labs Reviewed   URINALYSIS, REFLEX TO URINE CULTURE - Abnormal; Notable for the following components:       Result Value    Color, UA Dark Yellow (*)     Ketones, UA Trace (*)     Blood, UA Trace (*)     All other components within normal limits   PROTIME-INR - Abnormal; Notable for the following components:    PT 23.4 (*)     INR 2.13 (*)     All other components within normal limits   TSH - Abnormal; Notable for the following components:    Thyroid Stimulating Hormone <0.0083 (*)     All other components within normal limits   T4, FREE - Abnormal; Notable for the following components:    Thyroxine Free 2.13 (*)     All other components within normal limits   URINALYSIS, MICROSCOPIC - Abnormal; Notable for the following components:    RBC, UA 12 (*)     All other components within normal limits   B-TYPE NATRIURETIC PEPTIDE - Normal   APTT - Normal   TROPONIN I - Normal   LACTIC ACID, PLASMA - Normal   COVID/FLU A&B PCR - Normal   BLOOD CULTURE [facility]   BLOOD CULTURE [facility]     EKG Readings: (Independently Interpreted)   Atrial fib with RVR.  Rate of 110.  No STEMI.  12:45.      ECG Results          EKG 12-lead (Final result)  Result time 07/06/22 15:32:13    Final result by Interface, Lab [facility] (07/06/22 15:32:13)                 Narrative:    Test Reason : R41.82,    Vent. Rate : 110 BPM     Atrial Rate : 000 BPM     P-R Int : 000 ms          QRS Dur : 082 ms      QT Int : 314 ms       P-R-T Axes : 000 008 266 degrees     QTc Int : 424 ms    Atrial fibrillation with rapid ventricular response with premature  ventricular or aberrantly conducted complexes  Cannot rule out Inferior  infarct ,age undetermined  Cannot rule out Anterior infarct ,age undetermined  Abnormal ECG  Confirmed by Vani Gibson MD ([id number]) on 7/6/2022 3:32:04 PM    Referred By:             Confirmed By:Vani Gibson MD                            Imaging Results          CT Head Without Contrast (Final result)  Result time 07/06/22 14:13:35    Final result by Michel Ge MD (07/06/22 14:13:35)                 Impression:      No acute intracranial abnormalities.      Electronically signed by: Michel Ge  Date:    07/06/2022  Time:    14:13             Narrative:    EXAMINATION:  CT HEAD WITHOUT CONTRAST    CLINICAL HISTORY:  Mental status change, unknown cause;    TECHNIQUE:  Axial scans were obtained from skull base to the vertex.    Coronal and sagittal reconstructions obtained from the axial data.    Automatic exposure control was utilized to limit radiation dose.    Contrast: None    Radiation Dose:    Total DLP: 1097 mGy*cm    COMPARISON:  Brain MRI 06/15/2022    FINDINGS:  Scalp/Skull:    No abnormalities.    Brain sulci: Appropriate for patient's age.    Ventricles: Normal in size and configuration. No hydrocephalus.    Extra-axial spaces:    No masses or fluid collections.    Parenchyma:    Bifrontal foci of cortical based encephalomalacia compatible with remote insults.    Mild-moderate chronic microangiopathy in the supratentorial white matter.    No mass, hemorrhage or CT evidence of an acute vascular insult.    Dural sinuses: No abnormal densities.    Sellar/Suprasellar region: No abnormalities.    Skull base and Craniocervical junction: No abnormalities.    Incidental findings:    Carotid siphon and vertebrobasilar atherosclerotic vascular calcifications.    Status post bilateral cataract surgery.    Partially visualized nasoenteric catheter.                               X-Ray Chest 1 View (Final result)  Result time 07/06/22 13:17:46    Final result by Ethan Molina MD (07/06/22 13:17:46)                  Impression:      NO ACUTE CARDIOPULMONARY PROCESS IDENTIFIED.      Electronically signed by: Ethan Molina  Date:    07/06/2022  Time:    13:17             Narrative:    EXAMINATION:  XR CHEST 1 VIEW    CLINICAL HISTORY:  Fever, unspecified    TECHNIQUE:  One view    COMPARISON:  July 1, 2022.    FINDINGS:  Cardiopericardial silhouette is within normal limits.  No acute dense focal or segmental consolidation, congestive process, pleural effusions or pneumothorax.  Nasogastric tube extends into the gastric fundus.                                 Medications   lactated ringers bolus 1,000 mL (1,000 mLs Intravenous New Bag 7/6/22 1548)     Medical Decision Making:   History:   Old Medical Records: I decided to obtain old medical records.  Clinical Tests:   Lab Tests: Ordered and Reviewed  Radiological Study: Ordered and Reviewed  ED Management:  Patient is a 71-year-old male with past medical history of paroxysmal AFib on Eliquis, coronary disease, hypertension, hyperthyroidism on methimazole, seizure disorder with recent admission for fever, cephalopathy, diagnosed with meningitis started on vanc and Merrem.  CSF studies did not grow out any specific organism discharge to LTAC for continued IV antibiotics presents again for worsening encephalopathy x3 days.  See HPI.  On examination GCS of 10. Repeat cultures obtained.  CT of head without any acute abnormality.  Patient given IV fluids.  Unclear what patient's baseline neuro status is given history of CVAs.  Discussed with medicine for further admission/evaluation.           Scribe Attestation:   Scribe #1: I performed the above scribed service and the documentation accurately describes the services I performed. I attest to the accuracy of the note.    Attending Attestation:           Physician Attestation for Scribe:  Physician Attestation Statement for Scribe #1: I, Sourav Goodwin, reviewed documentation, as scribed by Kenzie King in my presence, and it is  both accurate and complete.                     Clinical Impression:   Final diagnoses:  [R41.82] AMS (altered mental status)  [R50.9] Fever  [G93.40] Encephalopathy (Primary)  [I48.91] Atrial fibrillation with RVR          ED Disposition Condition    Admit               Sourav Goodwin MD  07/06/22 1972

## 2022-07-06 NOTE — PROGRESS NOTES
Pharmacokinetic Initial Assessment: IV Vancomycin    Assessment/Plan:    Initiate intravenous vancomycin with loading dose of 1500 mg once followed by a maintenance dose of vancomycin 1000mg IV every 12 hours  Desired empiric serum trough concentration is 15 to 20 mcg/mL  Draw vancomycin trough level 60 min prior to fourth dose on 7/8 at approximately 0500  Pharmacy will continue to follow and monitor vancomycin.      Please contact pharmacy at extension [phone] with any questions regarding this assessment.     Thank you for the consult,   Dayanna Reji       Patient brief summary:  Quan Contreras is a 71 y.o. male initiated on antimicrobial therapy with IV Vancomycin for treatment of suspected bacteremia    Drug Allergies:   Review of patient's allergies indicates:   Allergen Reactions    Ace inhibitors Swelling     Lip swelling    Morphine     Morphine sulfate     Nafcillin Itching    Pradaxa [dabigatran etexilate] Other (See Comments)     Abdominal cramping    Sulfamethoxazole Rash       Actual Body Weight:   62.1 kg    Renal Function:   Estimated Creatinine Clearance: 104.1 mL/min (A) (based on SCr of 0.58 mg/dL (L)).,     Dialysis Method (if applicable):  N/A    CBC (last 72 hours):  Recent Labs   Lab Result Units 07/04/22 0522 07/05/22  0937 07/06/22  0451   WBC x10(3)/mcL 9.9 11.7* 22.3*   Hgb gm/dL 11.7* 14.1 13.5*   Hct % 35.3* 41.7* 39.6*   Platelet x10(3)/mcL 239 297 311   Mono % % 10.0 3.0 9.8   Eos % % 0.9 0.0 0.0   Basophil % % 0.2 0.1 0.1       Metabolic Panel (last 72 hours):  Recent Labs   Lab Result Units 07/04/22  0522 07/05/22  0040 07/05/22  0458 07/06/22  0452 07/06/22  1246   Sodium Level mmol/L 138  --  137 140  --    Potassium Level mmol/L 3.7  --  3.6 3.9  --    Chloride mmol/L 104  --  101 103  --    Carbon Dioxide mmol/L 25  --  21* 27  --    Glucose Level mg/dL 82  --  131* 149*  --    Glucose, UA mg/dL  --  Negative  --   --  Negative   Blood Urea Nitrogen mg/dL 7.4*  --  9.3 18.5  --     Creatinine mg/dL 0.55*  --  0.59* 0.58*  --    Albumin Level gm/dL 2.4*  --  2.8* 2.6*  --    Bilirubin Total mg/dL 0.7  --  1.1 1.0  --    Alkaline Phosphatase unit/L 100  --  119 103  --    Aspartate Aminotransferase unit/L 33  --  39* 46*  --    Alanine Aminotransferase unit/L 39  --  51 65*  --    Magnesium Level mg/dL 1.60  --  1.60 1.80  --    Phosphorus Level mg/dL 3.3  --  3.7  --   --        Drug levels (last 3 results):  No results for input(s): VANCOMYCINRA, VANCORANDOM, VANCOMYCINPE, VANCOPEAK, VANCOMYCINTR, VANCOTROUGH in the last 72 hours.    Microbiologic Results:  Microbiology Results (last 7 days)       Procedure Component Value Units Date/Time    Cerebrospinal Fluid Culture [[id number]]     Order Status: Sent Specimen: CSF (Spinal Fluid) from Cerebrospinal Fluid     Blood Culture [[id number]]     Order Status: Canceled Specimen: Blood     Blood Culture [[id number]]     Order Status: Canceled Specimen: Blood     Blood culture #1 **CANNOT BE ORDERED STAT** [[id number]] Collected: 07/06/22 1326    Order Status: Resulted Specimen: Blood from PICC Line Updated: 07/06/22 1356    Blood culture #2 **CANNOT BE ORDERED STAT** [[id number]] Collected: 07/06/22 1322    Order Status: Resulted Specimen: Blood from Arm, Left Updated: 07/06/22 1356

## 2022-07-07 PROBLEM — I63.511 ACUTE ISCHEMIC RIGHT MCA STROKE: Status: ACTIVE | Noted: 2022-07-07

## 2022-07-07 LAB
ALBUMIN SERPL-MCNC: 2.4 GM/DL (ref 3.4–4.8)
ALBUMIN/GLOB SERPL: 0.7 RATIO (ref 1.1–2)
ALP SERPL-CCNC: 102 UNIT/L (ref 40–150)
ALT SERPL-CCNC: 58 UNIT/L (ref 0–55)
APPEARANCE CSF: CLEAR
AST SERPL-CCNC: 44 UNIT/L (ref 5–34)
BASOPHILS # BLD AUTO: 0.04 X10(3)/MCL (ref 0–0.2)
BASOPHILS NFR BLD AUTO: 0.3 %
BILIRUBIN DIRECT+TOT PNL SERPL-MCNC: 1.2 MG/DL
BUN SERPL-MCNC: 14.4 MG/DL (ref 8.4–25.7)
C3 SERPL-MCNC: 138 MG/DL (ref 80–173)
C4 SERPL-MCNC: 26.7 MG/DL (ref 13–46)
CALCIUM SERPL-MCNC: 9.1 MG/DL (ref 8.8–10)
CHLORIDE SERPL-SCNC: 105 MMOL/L (ref 98–107)
CO2 SERPL-SCNC: 26 MMOL/L (ref 23–31)
COLOR CSF: COLORLESS
CREAT SERPL-MCNC: 0.56 MG/DL (ref 0.73–1.18)
EOSINOPHIL # BLD AUTO: 0.01 X10(3)/MCL (ref 0–0.9)
EOSINOPHIL NFR BLD AUTO: 0.1 %
ERYTHROCYTE [DISTWIDTH] IN BLOOD BY AUTOMATED COUNT: 16 % (ref 11.5–17)
GLOBULIN SER-MCNC: 3.6 GM/DL (ref 2.4–3.5)
GLUCOSE CSF-MCNC: 36 MG/DL (ref 40–70)
GLUCOSE SERPL-MCNC: 101 MG/DL (ref 82–115)
HCT VFR BLD AUTO: 39.3 % (ref 42–52)
HGB BLD-MCNC: 12.7 GM/DL (ref 14–18)
IMM GRANULOCYTES # BLD AUTO: 0.09 X10(3)/MCL (ref 0–0.04)
IMM GRANULOCYTES NFR BLD AUTO: 0.6 %
LYMPHOCYTE MANUAL CSF (OHS): 33 %
LYMPHOCYTES # BLD AUTO: 3.35 X10(3)/MCL (ref 0.6–4.6)
LYMPHOCYTES NFR BLD AUTO: 21.2 %
MAGNESIUM SERPL-MCNC: 1.7 MG/DL (ref 1.6–2.6)
MCH RBC QN AUTO: 28.8 PG (ref 27–31)
MCHC RBC AUTO-ENTMCNC: 32.3 MG/DL (ref 33–36)
MCV RBC AUTO: 89.1 FL (ref 80–94)
MONOCYTE MANUAL CSF (OHS): 56 %
MONOCYTES # BLD AUTO: 1.46 X10(3)/MCL (ref 0.1–1.3)
MONOCYTES NFR BLD AUTO: 9.3 %
NEUTROPHILS # BLD AUTO: 10.8 X10(3)/MCL (ref 2.1–9.2)
NEUTROPHILS MAN CSF (OHS): 11 %
NEUTROPHILS NFR BLD AUTO: 68.5 %
NRBC BLD AUTO-RTO: 0 %
PHOSPHATE SERPL-MCNC: 2.4 MG/DL (ref 2.3–4.7)
PLATELET # BLD AUTO: 291 X10(3)/MCL (ref 130–400)
PMV BLD AUTO: 10.5 FL (ref 7.4–10.4)
POTASSIUM SERPL-SCNC: 3.9 MMOL/L (ref 3.5–5.1)
PROT CSF-MCNC: 82.6 MG/DL (ref 15–45)
PROT SERPL-MCNC: 6 GM/DL (ref 5.8–7.6)
RBC # BLD AUTO: 4.41 X10(6)/MCL (ref 4.7–6.1)
RBC # CSF MANUAL: 5 /UL
SODIUM SERPL-SCNC: 139 MMOL/L (ref 136–145)
WBC # CSF MANUAL: 50 /UL (ref 0–5)
WBC # SPEC AUTO: 15.8 X10(3)/MCL (ref 4.5–11.5)

## 2022-07-07 PROCEDURE — 86160 COMPLEMENT ANTIGEN: CPT | Performed by: INTERNAL MEDICINE

## 2022-07-07 PROCEDURE — 25000003 PHARM REV CODE 250: Performed by: INTERNAL MEDICINE

## 2022-07-07 PROCEDURE — 80053 COMPREHEN METABOLIC PANEL: CPT | Performed by: PHYSICIAN ASSISTANT

## 2022-07-07 PROCEDURE — 27000221 HC OXYGEN, UP TO 24 HOURS

## 2022-07-07 PROCEDURE — 63600175 PHARM REV CODE 636 W HCPCS: Performed by: PHYSICIAN ASSISTANT

## 2022-07-07 PROCEDURE — 25000003 PHARM REV CODE 250: Performed by: PHYSICIAN ASSISTANT

## 2022-07-07 PROCEDURE — 11000001 HC ACUTE MED/SURG PRIVATE ROOM

## 2022-07-07 PROCEDURE — 82945 GLUCOSE OTHER FLUID: CPT | Performed by: PHYSICIAN ASSISTANT

## 2022-07-07 PROCEDURE — 83605 ASSAY OF LACTIC ACID: CPT | Performed by: INTERNAL MEDICINE

## 2022-07-07 PROCEDURE — 99223 PR INITIAL HOSPITAL CARE,LEVL III: ICD-10-PCS | Mod: ,,, | Performed by: PSYCHIATRY & NEUROLOGY

## 2022-07-07 PROCEDURE — 86039 ANTINUCLEAR ANTIBODIES (ANA): CPT | Performed by: INTERNAL MEDICINE

## 2022-07-07 PROCEDURE — 83735 ASSAY OF MAGNESIUM: CPT | Performed by: INTERNAL MEDICINE

## 2022-07-07 PROCEDURE — 99223 1ST HOSP IP/OBS HIGH 75: CPT | Mod: ,,, | Performed by: PSYCHIATRY & NEUROLOGY

## 2022-07-07 PROCEDURE — 63600175 PHARM REV CODE 636 W HCPCS: Performed by: INTERNAL MEDICINE

## 2022-07-07 PROCEDURE — 36415 COLL VENOUS BLD VENIPUNCTURE: CPT | Performed by: INTERNAL MEDICINE

## 2022-07-07 PROCEDURE — 25000242 PHARM REV CODE 250 ALT 637 W/ HCPCS: Performed by: INTERNAL MEDICINE

## 2022-07-07 PROCEDURE — 99900035 HC TECH TIME PER 15 MIN (STAT)

## 2022-07-07 PROCEDURE — 94640 AIRWAY INHALATION TREATMENT: CPT

## 2022-07-07 PROCEDURE — 94761 N-INVAS EAR/PLS OXIMETRY MLT: CPT

## 2022-07-07 PROCEDURE — 86255 FLUORESCENT ANTIBODY SCREEN: CPT | Performed by: INTERNAL MEDICINE

## 2022-07-07 PROCEDURE — 86038 ANTINUCLEAR ANTIBODIES: CPT | Performed by: INTERNAL MEDICINE

## 2022-07-07 PROCEDURE — 86235 NUCLEAR ANTIGEN ANTIBODY: CPT | Performed by: INTERNAL MEDICINE

## 2022-07-07 PROCEDURE — 84182 PROTEIN WESTERN BLOT TEST: CPT

## 2022-07-07 PROCEDURE — 86317 IMMUNOASSAY INFECTIOUS AGENT: CPT | Performed by: INTERNAL MEDICINE

## 2022-07-07 PROCEDURE — 87070 CULTURE OTHR SPECIMN AEROBIC: CPT | Performed by: PHYSICIAN ASSISTANT

## 2022-07-07 PROCEDURE — 84157 ASSAY OF PROTEIN OTHER: CPT | Performed by: PHYSICIAN ASSISTANT

## 2022-07-07 PROCEDURE — 89051 BODY FLUID CELL COUNT: CPT | Performed by: PHYSICIAN ASSISTANT

## 2022-07-07 PROCEDURE — 84100 ASSAY OF PHOSPHORUS: CPT | Performed by: INTERNAL MEDICINE

## 2022-07-07 PROCEDURE — 87102 FUNGUS ISOLATION CULTURE: CPT | Performed by: INTERNAL MEDICINE

## 2022-07-07 PROCEDURE — 85025 COMPLETE CBC W/AUTO DIFF WBC: CPT | Performed by: PHYSICIAN ASSISTANT

## 2022-07-07 RX ORDER — CIPROFLOXACIN 2 MG/ML
400 INJECTION, SOLUTION INTRAVENOUS
Status: DISCONTINUED | OUTPATIENT
Start: 2022-07-07 | End: 2022-07-12

## 2022-07-07 RX ORDER — IPRATROPIUM BROMIDE AND ALBUTEROL SULFATE 2.5; .5 MG/3ML; MG/3ML
3 SOLUTION RESPIRATORY (INHALATION) EVERY 4 HOURS
Status: ON HOLD | COMMUNITY
End: 2022-07-14

## 2022-07-07 RX ORDER — PREDNISONE 10 MG/1
10 TABLET ORAL DAILY
Status: DISCONTINUED | OUTPATIENT
Start: 2022-07-07 | End: 2022-07-08

## 2022-07-07 RX ORDER — LEVETIRACETAM 100 MG/ML
500 SOLUTION ORAL 2 TIMES DAILY
Status: DISCONTINUED | OUTPATIENT
Start: 2022-07-07 | End: 2022-07-12

## 2022-07-07 RX ORDER — ACETAMINOPHEN 650 MG/1
650 SUPPOSITORY RECTAL EVERY 6 HOURS PRN
Status: ON HOLD | COMMUNITY
End: 2022-07-14

## 2022-07-07 RX ORDER — BENZONATATE 100 MG/1
100 CAPSULE ORAL 3 TIMES DAILY PRN
Status: ON HOLD | COMMUNITY
End: 2022-07-14

## 2022-07-07 RX ORDER — IPRATROPIUM BROMIDE 0.5 MG/2.5ML
500 SOLUTION RESPIRATORY (INHALATION) EVERY 4 HOURS
Status: ON HOLD | COMMUNITY
End: 2022-07-14

## 2022-07-07 RX ORDER — METOPROLOL TARTRATE 25 MG/1
25 TABLET, FILM COATED ORAL 3 TIMES DAILY
Status: DISCONTINUED | OUTPATIENT
Start: 2022-07-07 | End: 2022-07-08

## 2022-07-07 RX ORDER — PROPRANOLOL HYDROCHLORIDE 20 MG/1
60 TABLET ORAL 2 TIMES DAILY
Status: DISCONTINUED | OUTPATIENT
Start: 2022-07-07 | End: 2022-07-07

## 2022-07-07 RX ORDER — ATORVASTATIN CALCIUM 10 MG/1
10 TABLET, FILM COATED ORAL DAILY
Status: ON HOLD | COMMUNITY
End: 2022-07-14

## 2022-07-07 RX ADMIN — PREDNISONE 10 MG: 10 TABLET ORAL at 03:07

## 2022-07-07 RX ADMIN — ACYCLOVIR SODIUM 500 MG: 500 INJECTION, SOLUTION INTRAVENOUS at 08:07

## 2022-07-07 RX ADMIN — METHIMAZOLE 20 MG: 10 TABLET ORAL at 03:07

## 2022-07-07 RX ADMIN — VANCOMYCIN HYDROCHLORIDE 1000 MG: 1 INJECTION, POWDER, LYOPHILIZED, FOR SOLUTION INTRAVENOUS at 09:07

## 2022-07-07 RX ADMIN — ACYCLOVIR SODIUM 500 MG: 500 INJECTION, SOLUTION INTRAVENOUS at 03:07

## 2022-07-07 RX ADMIN — ALBUTEROL SULFATE 2.5 MG: 2.5 SOLUTION RESPIRATORY (INHALATION) at 11:07

## 2022-07-07 RX ADMIN — METHIMAZOLE 20 MG: 10 TABLET ORAL at 09:07

## 2022-07-07 RX ADMIN — ALBUTEROL SULFATE 2.5 MG: 2.5 SOLUTION RESPIRATORY (INHALATION) at 08:07

## 2022-07-07 RX ADMIN — METOPROLOL TARTRATE 25 MG: 25 TABLET, FILM COATED ORAL at 09:07

## 2022-07-07 RX ADMIN — ACYCLOVIR SODIUM 500 MG: 500 INJECTION, SOLUTION INTRAVENOUS at 12:07

## 2022-07-07 RX ADMIN — CIPROFLOXACIN 400 MG: 2 INJECTION, SOLUTION INTRAVENOUS at 06:07

## 2022-07-07 RX ADMIN — LEVETIRACETAM 500 MG: 100 SOLUTION ORAL at 09:07

## 2022-07-07 RX ADMIN — METOPROLOL TARTRATE 25 MG: 25 TABLET, FILM COATED ORAL at 02:07

## 2022-07-07 RX ADMIN — CIPROFLOXACIN 400 MG: 2 INJECTION, SOLUTION INTRAVENOUS at 07:07

## 2022-07-07 RX ADMIN — MIRTAZAPINE 15 MG: 15 TABLET, FILM COATED ORAL at 09:07

## 2022-07-07 RX ADMIN — DIGOXIN 0.25 MG: 250 TABLET ORAL at 05:07

## 2022-07-07 RX ADMIN — ACETYLCYSTEINE 2 ML: 200 SOLUTION ORAL; RESPIRATORY (INHALATION) at 08:07

## 2022-07-07 NOTE — PROGRESS NOTES
[facility] MEDICINE   PROGRESS NOTE      CHIEF COMPLAINT  Hospital follow up    HOSPITAL COURSE  Quan Contreras is a 71 y.o. White male with a past medical history of paroxysmal atrial fibrillation on Eliquis, hypertension, coronary artery disease, cardiomyopathy, hyperthyroidism on methimazole, prior CVA/TIA, seizure disorder and rheumatoid arthritis. In April 2022 patient suffered a stroke. He spent a week in rehab and was discharged home. He then developed seizures and was started on seizure medication. Patient progesively worsened and developed a fever and was diagnosed with pneumonia. Patient improved overall but confusion worsened. He was admitted and diagnosed with meningitis. CSF was negative. He was discharged to [facility] 06/28/2022 for acute encephalopathy secondary to meningitis. TSH was less than 0.0083 with a Free T3 11.61 and free T4 2.68 on 6/29/2022 and Methimazole was increased to 20 mg TID. Yesterday (07/05/2022) patient began running a fever of 101 F. He was started on Vancomycin and Merrem. In addition leukocytosis increased from 11.7 yesterday to 22.3 today (7/6/2022) with worsening mental status. Blood culture from 7/5/2022 returned positive for gram positive cocci in 1 of 2 bottles. He was transferred to [facility] for further neurological workup in ID consult.   Wife and daughter at bedside reports prior to the stroke in April patient was ambulating and completing activities of daily living without assistance and has not returned to baseline. In rehab he was responding to questions appropriately and recognizing family. NG tube was placed a few days ago.      Upon presentation to the ED, patient afebrile and hemodynamically stable.  Labs notable for WBC 22.3, H&H 13.5/39.6, MCV 85, creatinine 0.58, AST 46, ALT 65, TSH less than 0.0083, free T4 2.13.  Influenza A and B and SARS-CoV-2 PCR negative.  UA negative for infection.  Chest  "x-ray negative.  CT head negative for acute intracranial abnormalities.  Patient admitted to hospital medicine services for further medical management.    Today  Seen examined this morning.  Seems to be following some commands but intermittent.  Was able to raise the right arm otherwise all 3 extremities was not able to move.  He was able to tell me his name and hospital but otherwise not much else.  He had quite significant posterior pharynx gurgling this morning and improved with suctioning.  He is also on him 8 to 10 L of oxygen this morning as well.        OBJECTIVE/PHYSICAL EXAM  /79 (Patient Position: Lying)   Pulse (!) 115   Temp 99.5 °F (37.5 °C) (Rectal)   Resp 18   Ht 6' 0.99" (1.854 m)   Wt 62.1 kg (137 lb)   SpO2 (!) 93%   BMI 18.08 kg/m²   General: In no acute distress, afebrile, significant posterior pharynx gurgling  Chest: Clear to auscultation bilaterally  Heart: S1, S2, no appreciable murmur, tachycardia  Abdomen: Soft, nontender, BS +  MSK: Warm, no lower extremity edema, no clubbing or cyanosis  Neurologic:  Awake and alert oriented to self and location, right upper extremity raise to gravity and resistance, left upper and bilateral lower extremity unable to lift against gravity      ASSESSMENT/PLAN  Acute encephalopathy-infectious versus metabolic  Encephalitis/meningitis-infectious versus others  Acute ischemic CVA-right MCA vascular territory  Acute hypoxemic respiratory failure  Oropharyngeal dysphagia  Leukocytosis-sepsis vs steroid induced (received solumedrol 80mg early morning July 5)  Thyrotoxicosis  Atrial fibrillation with RVR  Severe protein caloric malnutrition    History of:  Seizures, thyrotoxicosis diffuse goiter, stroke April 2022, HTN, CAD, ischemic cardiomyopathy, rheumatoid arthritis, HLD, atrial fibrillation      Neurology consulted.  New stroke noted on MRI.  cardioembolic vs vasculitis vs autoimmune vs lymphoma? Was getting Eliquis 5 bid and  at LTAC for " the last 7 days.   ID consulted.  Continue vancomycin, Cipro, acyclovir.  Follow up on LP results.  Added on NMDA receptor on the blood and CSF, 14-3-3 protein.  ALFONZO panel.  Atypical workup now as infectious workup previously was negative.  Metoprolol 25 t.i.d., methimazole 20 t.i.d. May need to change to coumadin since recurring strokes on NOAC.   Increase chronic steroids to 10mg prednisone for stress dose and should also help with thyrotoxocosis.  Start tube feeds and discontinue IV fluids once started.    Critical care diagnosis:  Acute encephalopathy on chronic with acute stroke and concern for possible encephalitis  Critical care time spent:  35 minutes      UPDATE: reviewed csf not consistent with bacterial meningitis.  Stop vanc.  I can continue with cipro and acyclovir but less likley as previous infectious workup was negative.  Pend ID eval.  Maybe autoimmune in nature as above.  Consulted rheumatology as well. Ordered ALFONZO and ANCA.       Anticipated discharge and disposition:   __________________________________________________________________________    LABS/MICROBIOLOGY/MEDICATIONS/DIAGNOSTICS    LABS  Recent Labs     07/07/22  0409      K 3.9   CHLORIDE 105   CO2 26   BUN 14.4   CREATININE 0.56*   GLUCOSE 101   CALCIUM 9.1   ALKPHOS 102   AST 44*   ALT 58*   ALBUMIN 2.4*     Recent Labs     07/07/22  0409   WBC 15.8*   RBC 4.41*   HCT 39.3*   MCV 89.1          MICROBIOLOGY  Microbiology Results (last 7 days)     Procedure Component Value Units Date/Time    Cerebrospinal Fluid Culture [[id number]] Collected: 07/07/22 1050    Order Status: Sent Specimen: CSF (Spinal Fluid) from Cerebrospinal Fluid Updated: 07/07/22 1111    Blood Culture [[id number]]     Order Status: Canceled Specimen: Blood     Blood Culture [[id number]]     Order Status: Canceled Specimen: Blood     Blood culture #1 **CANNOT BE ORDERED STAT** [[id number]] Collected: 07/06/22 1326    Order Status: Resulted Specimen: Blood from  PICC Line Updated: 07/06/22 1356    Blood culture #2 **CANNOT BE ORDERED STAT** [[id number]] Collected: 07/06/22 1322    Order Status: Resulted Specimen: Blood from Arm, Left Updated: 07/06/22 1356          MEDICATIONS   acetylcysteine 200 mg/ml (20%)  2 mL Nebulization TID    acyclovir  8 mg/kg Intravenous Q8H    ciprofloxacin  400 mg Intravenous Q12H    digoxin  0.25 mg Oral Daily    donepeziL  5 mg Oral QHS    methIMAzole  20 mg Oral TID    metoprolol tartrate  25 mg Oral TID    mirtazapine  15 mg Oral QHS    vancomycin (VANCOCIN) IVPB  1,000 mg Intravenous Q12H      INFUSIONS   sodium chloride 0.9% 50 mL/hr at 07/06/22 1745       DIAGNOSTIC TESTS  MRI Brain Limited Without Contrast   Final Result      Motion degraded exam.      In spite of this limitation:      1. Acute cortical based ischemia in the right MCA vascular territory as exemplified by a right middle frontal 2.9 x 1.9 cm focus.   2. Extra-axial diffusion restriction about the falx persists but has decreased, with similar findings about the cerebral convexities, again suspicious for meningitis/infection.         Electronically signed by: Michel Ge   Date:    07/06/2022   Time:    18:31      CT Head Without Contrast   Final Result      No acute intracranial abnormalities.         Electronically signed by: Michel Ge   Date:    07/06/2022   Time:    14:13      X-Ray Chest 1 View   Final Result      NO ACUTE CARDIOPULMONARY PROCESS IDENTIFIED.         Electronically signed by: Ethan Molina   Date:    07/06/2022   Time:    13:17      FL LUMBAR PUNCTURE DIAGNOSTIC WITH IMAGING    (Results Pending)            All diagnosis and differential diagnosis have been reviewed; assessment and plan has been documented. I have personally reviewed the labs and test results that are presently available; I have reviewed the patients medication list. I have reviewed the consulting providers response and recommendations. I have reviewed or attempted to  review medical records based upon their availability.  All of the patient's questions have been addressed and answered. Patient's is agreeable to the above stated plan. I will continue to monitor closely and make adjustments to medical management as needed.  This document was created using M*Modal Fluency Direct.  Transcription errors may have been made.  Please contact me if any questions may rise regarding documentation to clarify verbiage.        Cyril Goodwin MD   07/07/2022   Internal Medicine

## 2022-07-07 NOTE — CONSULTS
[facility] - Emergency Dept  Adult Nutrition  Consult Note    SUMMARY     Recommendations    Recommendation/Intervention:   1. Continue NPO per SLP/MD   2. Once medically feasible, resume tube feeding via NG tube. Rec Isosource 1.5 begin at 30mL/hr and advance as tolerated to goal rate 60mL/hr.   Will provide:  1800 kcal/day (96% est needs)  82 g/day (120% est needs)  917 mL/day   3. Rec free water flushes 160mL q4hrs    (calculations based on tube feeding running over 20hrs/daily)       Goals:   1. Tolerate enteral feeding at goal rate.   2. Meet greater than 75% of nutritional needs.  Nutrition Goal Status: new  Communication of RD Recs: reviewed with RN    Assessment and Plan  Nutrition Problem  Inadequate oral intake    Related to (etiology):   NPO with nutrition support held     Signs and Symptoms (as evidenced by):   Intake meeting <25% est needs    Interventions/Recommendations (treatment strategy):  Collaboration with other providers, modified rate of enteral nutrition    Nutrition Diagnosis Status:   New      Malnutrition Assessment  Malnutrition Type: acute illness or injury          Weight Loss (Malnutrition): greater than 5% in 1 month   Orbital Region (Subcutaneous Fat Loss): moderate depletion   [place] Region (Muscle Loss): moderate depletion  Clavicle Bone Region (Muscle Loss): moderate depletion        Reason for Assessment    Reason For Assessment: consult    Diagnosis:   Worsening encephalopathy in the setting of possible meningitis, Normocytic anemia, Acute kidney injury, Mild transaminitis, Acute thyrotoxicosis, Essential hypertension    Relevant Medical History:  paroxysmal atrial fibrillation, hypertension, coronary artery disease, cardiomyopathy, hyperthyroidism, prior CVA/TIA, seizure disorder, Graves disease, IBS and rheumatoid arthritis    General Information Comments:   7/7: Pt poor historian per RN, wife not present at this time. NG tube in place, however feedings are not  "running at this time. Pt transfer from [facility] and was on Isosource 1.5 via NG tube.      Nutrition/Diet History    Food Allergies: NKFA  Nutrition Support Formula Prior to Admit: Isosource 1.5  Nutrtion Support Frequency Prior to Admit: 65mL/hr    Anthropometrics    Temp: 99.5 °F (37.5 °C)  Height Method: Estimated  Height: 6' 0.99" (185.4 cm)  Height (inches): 72.99 in  Weight Method: Estimated  Weight: 62.1 kg (137 lb)  Weight (lb): 137 lb  Ideal Body Weight (IBW), Male: 183.94 lb  % Ideal Body Weight, Male (lb): 74.48 %  % Ideal Body Weight Malnutrition: 70-79%: moderate deficit  BMI (Calculated): 18.1  BMI Grade: 17 - 18.4 protein-energy malnutrition grade I  Usual Body Weight (UBW), k.4 kg (UBW per previous RD note.)  % Usual Body Weight: 83.7  % Weight Change From Usual Weight: -16.48 %       Lab/Procedures/Meds    Pertinent Labs Reviewed: reviewed  Pertinent Labs Comments: : Gluc 101, AST/ALT 44/58  Pertinent Medications Reviewed: reviewed  Pertinent Medications Comments: Metoprolol, NaCl IVF's      Estimated/Assessed Needs    Weight Used For Calorie Calculations: 62.1 kg (136 lb 14.5 oz)  Energy Calorie Requirements (kcal): 1865 kcal/day (MSJ x 1.3SF)  Energy Need Method: [place]  Protein Requirements: 68 g/day (1.1g/kg/d)  Weight Used For Protein Calculations: 62.1 kg (136 lb 14.5 oz)  Fluid Requirements (mL): 1865 mL/day  Estimated Fluid Requirement Method: RDA Method  RDA Method (mL): 1865         Nutrition Prescription Ordered    Current Diet Order: NPO    Evaluation of Received Nutrient/Fluid Intake       % Intake of Estimated Energy Needs: 0 - 25 %  % Meal Intake: 0 - 25 %    Nutrition Risk    Level of Risk/Frequency of Follow-up: high       Monitor and Evaluation    Food and Nutrient Intake: enteral nutrition intake  Anthropometric Measurements: weight  Biochemical Data, Medical Tests and Procedures: electrolyte and renal panel       Nutrition Follow-Up    RD Follow-up?: Yes  "

## 2022-07-07 NOTE — CONSULTS
[facility] - [facility] Medical Telemetry  Neurology  Consult Note    Patient Name: Quan Contreras  MRN: [id number]  Admission Date: 7/6/2022  Hospital Length of Stay: 1 days  Code Status: DNR   Attending Provider: Kraig Shen MD   Consulting Provider: Damian Montgomery MD  Primary Care Physician: Bran Hansen MD  Principal Problem:Encephalopathy    Inpatient consult to Neurology  Consult performed by: ADELE Carcamo  Consult ordered by: Dominique Ferguson PA-C         Subjective:     Chief Complaint:    Chief Complaint   Patient presents with    Altered Mental Status     Sent from LT for altered mental status over past few days; hx of encephalopathy         HPI:   Mr Contreras is a 71-year-old male with past medical history of PAF (on Eliquis), HTN, CAD, cardiomyopathy, hyperthyroidism, stroke, seizure, rheumatoid arthritis, presented to ED on 7/6 as a transfer from LT for evaluation of altered mental status.    In April 2022 patient suffered a stroke. He spent a week in rehab and was discharged home. He then developed seizures and was started on seizure medication. Patient progesively worsened and developed a fever and was diagnosed with pneumonia. Patient improved overall but confusion worsened. He was admitted and diagnosed with meningitis. CSF was negative. He was discharged to [facility] 06/28/2022 for acute encephalopathy secondary to meningitis. TSH was less than 0.0083 with a Free T3 11.61 and free T4 2.68 on 6/29/2022 and Methimazole was increased to 20 mg TID. Yesterday (07/05/2022) patient began running a fever of 101 F. He was started on Vancomycin and Merrem. In addition leukocytosis increased from 11.7 yesterday to 22.3 today (7/6/2022) with worsening mental status. Blood culture from 7/5/2022 returned positive for gram positive cocci in 1 of 2 bottles. He was transferred to [facility] for further neurological workup in ID consult.        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/2022    HTN (hypertension)     Hyperthyroidism     Idiopathic peripheral neuropathy 5/11/2022    Irritable bowel syndrome without diarrhea     Ischemic cardiomyopathy 5/3/2022    Mixed hyperlipidemia 5/11/2022    Other sequelae following unspecified cerebrovascular disease 5/9/2022    Paroxysmal atrial fibrillation     Rheumatoid arthritis 5/11/2022    Rheumatoid arthritis, unspecified     Shingles     Staph aureus infection     Stroke     Thyroiditis, unspecified     Thyrotoxicosis 5/3/2022       Past Surgical History:   Procedure Laterality Date    CATARACT EXTRACTION      CYST REMOVAL Left     TONSILLECTOMY      TOTAL KNEE ARTHROPLASTY      VASECTOMY         Review of patient's allergies indicates:   Allergen Reactions    Ace inhibitors Swelling     Lip swelling    Morphine     Morphine sulfate     Nafcillin Itching    Pradaxa [dabigatran etexilate] Other (See Comments)     Abdominal cramping    Sulfamethoxazole Rash       Current Facility-Administered Medications on File Prior to Encounter   Medication    [DISCONTINUED] acetaminophen suppository 650 mg    [DISCONTINUED] acetylcysteine 200 mg/ml (20%) solution 2 mL    [DISCONTINUED] albuterol-ipratropium 2.5 mg-0.5 mg/3 mL nebulizer solution 3 mL    [DISCONTINUED] apixaban tablet 5 mg    [DISCONTINUED] aspirin tablet 325 mg    [DISCONTINUED] atorvastatin  tablet 10 mg    [DISCONTINUED] benzonatate capsule 100 mg    [DISCONTINUED] digoxin tablet 0.25 mg    [DISCONTINUED] donepeziL tablet 5 mg    [DISCONTINUED] ezetimibe tablet 10 mg    [DISCONTINUED] ferrous gluconate tablet 324 mg    [DISCONTINUED] hydrALAZINE injection 10 mg    [DISCONTINUED] HYDROcodone-acetaminophen  mg per tablet 1 tablet    [DISCONTINUED] hydrOXYzine pamoate capsule 50 mg    [DISCONTINUED] ipratropium 0.02 % nebulizer solution 0.5 mg    [DISCONTINUED] labetaloL injection 10 mg    [DISCONTINUED] levETIRAcetam (KEPPRA) 500 mg in sodium chloride 0.9 % 100 mL IVPB    [DISCONTINUED] meropenem (MERREM) 1 g in sodium chloride 0.9 % 100 mL IVPB (MB+)    [DISCONTINUED] methIMAzole tablet 20 mg    [DISCONTINUED] metoprolol injection 10 mg    [DISCONTINUED] miconazole NITRATE 2 % top powder    [DISCONTINUED] mirtazapine tablet 15 mg    [DISCONTINUED] montelukast tablet 10 mg    [DISCONTINUED] nitroGLYCERIN SL tablet 0.4 mg    [DISCONTINUED] ondansetron disintegrating tablet 4 mg    [DISCONTINUED] ondansetron injection 4 mg    [DISCONTINUED] pantoprazole EC tablet 40 mg    [DISCONTINUED] predniSONE tablet 5 mg    [DISCONTINUED] propranoloL 24 hr capsule 60 mg    [DISCONTINUED] traZODone tablet 150 mg    [DISCONTINUED] vancomycin - pharmacy to dose    [DISCONTINUED] vancomycin 750 mg in dextrose 5 % 250 mL IVPB    [DISCONTINUED] zinc oxide 20 % ointment     Current Outpatient Medications on File Prior to Encounter   Medication Sig    acetaminophen (TYLENOL) 650 MG Supp Place 650 mg rectally every 6 (six) hours as needed. Fever >101 F    acetylcysteine 200 mg/ml, 20%, (MUCOMYST) 200 mg/mL (20 %) nebulizer solution Take 2 mLs by nebulization 3 (three) times daily.    albuterol-ipratropium (DUO-NEB) 2.5 mg-0.5 mg/3 mL nebulizer solution Take 3 mLs by nebulization every 4 (four) hours.    apixaban (ELIQUIS) 5 mg Tab Take 1 tablet (5 mg total) by mouth 2 (two) times daily.     aspirin 325 MG tablet Take 325 mg by mouth once daily.    atorvastatin (LIPITOR) 10 MG tablet Take 10 mg by mouth once daily.    benzonatate (TESSALON) 100 MG capsule Take 100 mg by mouth 3 (three) times daily as needed for Cough.    DIGITEK 250 mcg (0.25 mg) tablet Take 1 tablet by mouth Daily.    donepeziL (ARICEPT) 5 MG tablet Take 1 tablet (5 mg total) by mouth every evening.    ezetimibe (ZETIA) 10 mg tablet Take 1 tablet by mouth once daily at 6am.    ferrous gluconate 324 mg (37.5 mg iron) Tab tablet Take 1 tablet (324 mg total) by mouth daily with breakfast.    HYDROcodone-acetaminophen (NORCO)  mg per tablet Take 1 tablet by mouth 3 (three) times daily. PRN    hydrOXYzine pamoate (VISTARIL) 50 MG Cap Take 1 capsule (50 mg total) by mouth every evening.    ipratropium (ATROVENT) 0.02 % nebulizer solution Take 500 mcg by nebulization every 4 (four) hours.    methIMAzole (TAPAZOLE) 10 MG Tab Take 2 tablets (20 mg total) by mouth 3 (three) times daily.    miconazole NITRATE 2 % (MICOTIN) 2 % top powder Apply topically 2 (two) times daily.    mirtazapine (REMERON) 15 MG tablet Take 1 tablet (15 mg total) by mouth every evening.    montelukast (SINGULAIR) 10 mg tablet Take 1 tablet by mouth Daily.    pantoprazole (PROTONIX) 40 MG tablet Take 1 tablet (40 mg total) by mouth once daily.    predniSONE (DELTASONE) 5 MG tablet Take 5 mg by mouth once daily. At bedtime    propranoloL (INDERAL LA) 60 MG 24 hr capsule Take 1 capsule (60 mg total) by mouth 2 (two) times a day.    rosuvastatin (CRESTOR) 40 MG Tab Take 40 mg by mouth every evening.    sodium chloride 0.9 % PgBk 100 mL with levETIRAcetam 500 mg/5 mL Soln 500 mg Inject 500 mg into the vein every 12 (twelve) hours.    sodium chloride 0.9 % PgBk 100 mL with meropenem 1 gram SolR 1 g Inject 1 g into the vein every 8 (eight) hours.    traZODone (DESYREL) 150 MG tablet Take 1 tablet (150 mg total) by mouth every evening.    vancomycin  HCl (VANCOMYCIN 750 MG/250 ML D5W, READY TO MIX SYSTEM,) Inject 750 mg into the vein every 8 (eight) hours.    dextrose 5 % SolP 250 mL with vancomycin 750 mg SolR 750 mg Inject 750 mg into the vein every 8 (eight) hours.    [DISCONTINUED] baclofen (LIORESAL) 10 MG tablet Take 1 tablet by mouth nightly.    [DISCONTINUED] levETIRAcetam (KEPPRA) 500 MG Tab Take 1 tablet (500 mg total) by mouth 2 (two) times daily.    [DISCONTINUED] metoprolol succinate (TOPROL-XL) 100 MG 24 hr tablet Take 100 mg by mouth 2 (two) times daily.    [DISCONTINUED] mv, min #36-iron,carbonyl-FA 16 mg iron- 0.38 mg Tab Take 1 tablet by mouth once daily.     Family History       Family history is unknown by patient.          Tobacco Use    Smoking status: Never Smoker    Smokeless tobacco: Never Used   Substance and Sexual Activity    Alcohol use: Never    Drug use: Never    Sexual activity: Yes     Review of Systems   Unable to perform ROS: Acuity of condition     Objective:     Vital Signs (Most Recent):  Temp: 99.5 °F (37.5 °C) (07/07/22 0000)  Pulse: (!) 120 (07/07/22 1155)  Resp: 18 (07/07/22 1155)  BP: 130/76 (07/07/22 1117)  SpO2: 98 % (07/07/22 1155)   Vital Signs (24h Range):  Temp:  [99.5 °F (37.5 °C)] 99.5 °F (37.5 °C)  Pulse:  [] 120  Resp:  [14-22] 18  SpO2:  [87 %-98 %] 98 %  BP: (115-147)/() 130/76     Weight: 62.1 kg (137 lb)  Body mass index is 18.08 kg/m².    Physical Exam  Constitutional:       Appearance: He is cachectic. He is ill-appearing.      Comments:   No family at bedside.  Full exam to follow by MD.         Significant Labs: BMP:   Recent Labs   Lab 07/06/22  0452 07/07/22  0409    139   K 3.9 3.9   CO2 27 26   BUN 18.5 14.4   CREATININE 0.58* 0.56*   CALCIUM 9.8 9.1   MG 1.80 1.70     CBC:   Recent Labs   Lab 07/06/22  0451 07/07/22  0409   WBC 22.3* 15.8*   HGB 13.5* 12.7*   HCT 39.6* 39.3*    291       Significant Imaging: I have reviewed all pertinent imaging results/findings  within the past 24 hours.    MRI brain: acute cortical based ischemia in the right MCA vascular territory    Assessment and Plan:     Acute ischemic right MCA stroke  Continue Eliquis for stroke prevention due to AFib      Encephalopathy       Continue antibiotics per primary team      Thank you for your consult. Further recommendations to follow by MD.    Helena Briggs, AMBERP  Neurology  [facility] - [facility] Medical Telemetry

## 2022-07-07 NOTE — SUBJECTIVE & OBJECTIVE
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/2022    HTN (hypertension)     Hyperthyroidism     Idiopathic peripheral neuropathy 5/11/2022    Irritable bowel syndrome without diarrhea     Ischemic cardiomyopathy 5/3/2022    Mixed hyperlipidemia 5/11/2022    Other sequelae following unspecified cerebrovascular disease 5/9/2022    Paroxysmal atrial fibrillation     Rheumatoid arthritis 5/11/2022    Rheumatoid arthritis, unspecified     Shingles     Staph aureus infection     Stroke     Thyroiditis, unspecified     Thyrotoxicosis 5/3/2022       Past Surgical History:   Procedure Laterality Date    CATARACT EXTRACTION      CYST REMOVAL Left     TONSILLECTOMY      TOTAL KNEE ARTHROPLASTY      VASECTOMY         Review of patient's allergies indicates:   Allergen Reactions    Ace inhibitors Swelling     Lip swelling    Morphine     Morphine sulfate     Nafcillin Itching    Pradaxa [dabigatran etexilate] Other (See Comments)     Abdominal cramping    Sulfamethoxazole Rash       Current Facility-Administered Medications on File Prior to Encounter   Medication    [DISCONTINUED] acetaminophen suppository 650 mg    [DISCONTINUED] acetylcysteine 200 mg/ml (20%) solution 2 mL    [DISCONTINUED] albuterol-ipratropium 2.5 mg-0.5 mg/3 mL nebulizer solution 3 mL    [DISCONTINUED] apixaban tablet 5 mg    [DISCONTINUED] aspirin tablet 325 mg    [DISCONTINUED] atorvastatin tablet 10 mg    [DISCONTINUED] benzonatate  capsule 100 mg    [DISCONTINUED] digoxin tablet 0.25 mg    [DISCONTINUED] donepeziL tablet 5 mg    [DISCONTINUED] ezetimibe tablet 10 mg    [DISCONTINUED] ferrous gluconate tablet 324 mg    [DISCONTINUED] hydrALAZINE injection 10 mg    [DISCONTINUED] HYDROcodone-acetaminophen  mg per tablet 1 tablet    [DISCONTINUED] hydrOXYzine pamoate capsule 50 mg    [DISCONTINUED] ipratropium 0.02 % nebulizer solution 0.5 mg    [DISCONTINUED] labetaloL injection 10 mg    [DISCONTINUED] levETIRAcetam (KEPPRA) 500 mg in sodium chloride 0.9 % 100 mL IVPB    [DISCONTINUED] meropenem (MERREM) 1 g in sodium chloride 0.9 % 100 mL IVPB (MB+)    [DISCONTINUED] methIMAzole tablet 20 mg    [DISCONTINUED] metoprolol injection 10 mg    [DISCONTINUED] miconazole NITRATE 2 % top powder    [DISCONTINUED] mirtazapine tablet 15 mg    [DISCONTINUED] montelukast tablet 10 mg    [DISCONTINUED] nitroGLYCERIN SL tablet 0.4 mg    [DISCONTINUED] ondansetron disintegrating tablet 4 mg    [DISCONTINUED] ondansetron injection 4 mg    [DISCONTINUED] pantoprazole EC tablet 40 mg    [DISCONTINUED] predniSONE tablet 5 mg    [DISCONTINUED] propranoloL 24 hr capsule 60 mg    [DISCONTINUED] traZODone tablet 150 mg    [DISCONTINUED] vancomycin - pharmacy to dose    [DISCONTINUED] vancomycin 750 mg in dextrose 5 % 250 mL IVPB    [DISCONTINUED] zinc oxide 20 % ointment     Current Outpatient Medications on File Prior to Encounter   Medication Sig    acetaminophen (TYLENOL) 650 MG Supp Place 650 mg rectally every 6 (six) hours as needed. Fever >101 F    acetylcysteine 200 mg/ml, 20%, (MUCOMYST) 200 mg/mL (20 %) nebulizer solution Take 2 mLs by nebulization 3 (three) times daily.    albuterol-ipratropium (DUO-NEB) 2.5 mg-0.5 mg/3 mL nebulizer solution Take 3 mLs by nebulization every 4 (four) hours.    apixaban (ELIQUIS) 5 mg Tab Take 1 tablet (5 mg total) by mouth 2 (two) times daily.    aspirin 325 MG tablet Take 325 mg by mouth once daily.    atorvastatin  (LIPITOR) 10 MG tablet Take 10 mg by mouth once daily.    benzonatate (TESSALON) 100 MG capsule Take 100 mg by mouth 3 (three) times daily as needed for Cough.    DIGITEK 250 mcg (0.25 mg) tablet Take 1 tablet by mouth Daily.    donepeziL (ARICEPT) 5 MG tablet Take 1 tablet (5 mg total) by mouth every evening.    ezetimibe (ZETIA) 10 mg tablet Take 1 tablet by mouth once daily at 6am.    ferrous gluconate 324 mg (37.5 mg iron) Tab tablet Take 1 tablet (324 mg total) by mouth daily with breakfast.    HYDROcodone-acetaminophen (NORCO)  mg per tablet Take 1 tablet by mouth 3 (three) times daily. PRN    hydrOXYzine pamoate (VISTARIL) 50 MG Cap Take 1 capsule (50 mg total) by mouth every evening.    ipratropium (ATROVENT) 0.02 % nebulizer solution Take 500 mcg by nebulization every 4 (four) hours.    methIMAzole (TAPAZOLE) 10 MG Tab Take 2 tablets (20 mg total) by mouth 3 (three) times daily.    miconazole NITRATE 2 % (MICOTIN) 2 % top powder Apply topically 2 (two) times daily.    mirtazapine (REMERON) 15 MG tablet Take 1 tablet (15 mg total) by mouth every evening.    montelukast (SINGULAIR) 10 mg tablet Take 1 tablet by mouth Daily.    pantoprazole (PROTONIX) 40 MG tablet Take 1 tablet (40 mg total) by mouth once daily.    predniSONE (DELTASONE) 5 MG tablet Take 5 mg by mouth once daily. At bedtime    propranoloL (INDERAL LA) 60 MG 24 hr capsule Take 1 capsule (60 mg total) by mouth 2 (two) times a day.    rosuvastatin (CRESTOR) 40 MG Tab Take 40 mg by mouth every evening.    sodium chloride 0.9 % PgBk 100 mL with levETIRAcetam 500 mg/5 mL Soln 500 mg Inject 500 mg into the vein every 12 (twelve) hours.    sodium chloride 0.9 % PgBk 100 mL with meropenem 1 gram SolR 1 g Inject 1 g into the vein every 8 (eight) hours.    traZODone (DESYREL) 150 MG tablet Take 1 tablet (150 mg total) by mouth every evening.    vancomycin HCl (VANCOMYCIN 750 MG/250 ML D5W, READY TO MIX SYSTEM,) Inject 750 mg into the vein every 8  (eight) hours.    dextrose 5 % SolP 250 mL with vancomycin 750 mg SolR 750 mg Inject 750 mg into the vein every 8 (eight) hours.    [DISCONTINUED] baclofen (LIORESAL) 10 MG tablet Take 1 tablet by mouth nightly.    [DISCONTINUED] levETIRAcetam (KEPPRA) 500 MG Tab Take 1 tablet (500 mg total) by mouth 2 (two) times daily.    [DISCONTINUED] metoprolol succinate (TOPROL-XL) 100 MG 24 hr tablet Take 100 mg by mouth 2 (two) times daily.    [DISCONTINUED] mv, min #36-iron,carbonyl-FA 16 mg iron- 0.38 mg Tab Take 1 tablet by mouth once daily.     Family History       Family history is unknown by patient.          Tobacco Use    Smoking status: Never Smoker    Smokeless tobacco: Never Used   Substance and Sexual Activity    Alcohol use: Never    Drug use: Never    Sexual activity: Yes     Review of Systems   Unable to perform ROS: Acuity of condition     Objective:     Vital Signs (Most Recent):  Temp: 99.5 °F (37.5 °C) (07/07/22 0000)  Pulse: (!) 120 (07/07/22 1155)  Resp: 18 (07/07/22 1155)  BP: 130/76 (07/07/22 1117)  SpO2: 98 % (07/07/22 1155)   Vital Signs (24h Range):  Temp:  [99.5 °F (37.5 °C)] 99.5 °F (37.5 °C)  Pulse:  [] 120  Resp:  [14-22] 18  SpO2:  [87 %-98 %] 98 %  BP: (115-147)/() 130/76     Weight: 62.1 kg (137 lb)  Body mass index is 18.08 kg/m².    Physical Exam  Constitutional:       Appearance: He is cachectic. He is ill-appearing.      Comments:   No family at bedside.  Full exam to follow by MD.         Significant Labs: BMP:   Recent Labs   Lab 07/06/22 0452 07/07/22  0409    139   K 3.9 3.9   CO2 27 26   BUN 18.5 14.4   CREATININE 0.58* 0.56*   CALCIUM 9.8 9.1   MG 1.80 1.70     CBC:   Recent Labs   Lab 07/06/22 0451 07/07/22  0409   WBC 22.3* 15.8*   HGB 13.5* 12.7*   HCT 39.6* 39.3*    291       Significant Imaging: I have reviewed all pertinent imaging results/findings within the past 24 hours.    MRI brain: acute cortical based ischemia in the right MCA vascular  territory

## 2022-07-07 NOTE — HPI
Mr Contreras is a 71-year-old male with past medical history of PAF (on Eliquis), HTN, CAD, cardiomyopathy, hyperthyroidism, stroke, seizure, rheumatoid arthritis, presented to ED on 7/6 as a transfer from [facility] for evaluation of altered mental status.    In April 2022 patient suffered a stroke. He spent a week in rehab and was discharged home. He then developed seizures and was started on seizure medication. Patient progesively worsened and developed a fever and was diagnosed with pneumonia. Patient improved overall but confusion worsened. He was admitted and diagnosed with meningitis. CSF was negative. He was discharged to [facility] 06/28/2022 for acute encephalopathy secondary to meningitis. TSH was less than 0.0083 with a Free T3 11.61 and free T4 2.68 on 6/29/2022 and Methimazole was increased to 20 mg TID. Yesterday (07/05/2022) patient began running a fever of 101 F. He was started on Vancomycin and Merrem. In addition leukocytosis increased from 11.7 yesterday to 22.3 today (7/6/2022) with worsening mental status. Blood culture from 7/5/2022 returned positive for gram positive cocci in 1 of 2 bottles. He was transferred to [facility] for further neurological workup in ID consult.

## 2022-07-08 PROBLEM — M05.79 RHEUMATOID ARTHRITIS INVOLVING MULTIPLE SITES WITH POSITIVE RHEUMATOID FACTOR: Status: ACTIVE | Noted: 2022-05-11

## 2022-07-08 LAB
ALBUMIN SERPL-MCNC: 2.1 GM/DL (ref 3.4–4.8)
ALBUMIN/GLOB SERPL: 0.6 RATIO (ref 1.1–2)
ALP SERPL-CCNC: 111 UNIT/L (ref 40–150)
ALT SERPL-CCNC: 54 UNIT/L (ref 0–55)
ANTINUCLEAR ANTIBODY SCREEN (OHS): NEGATIVE
AST SERPL-CCNC: 48 UNIT/L (ref 5–34)
BASOPHILS # BLD AUTO: 0.01 X10(3)/MCL (ref 0–0.2)
BASOPHILS NFR BLD AUTO: 0.1 %
BILIRUBIN DIRECT+TOT PNL SERPL-MCNC: 0.9 MG/DL
BUN SERPL-MCNC: 10.6 MG/DL (ref 8.4–25.7)
C GATTII+NEOFOR DNA CSF QL NAA+NON-PROBE: NEGATIVE
CALCIUM SERPL-MCNC: 8.8 MG/DL (ref 8.8–10)
CENTROMERE PROTEIN ANTIBODY (OHS): NEGATIVE
CHLORIDE SERPL-SCNC: 102 MMOL/L (ref 98–107)
CMV DNA CSF QL NAA+NON-PROBE: NEGATIVE
CO2 SERPL-SCNC: 22 MMOL/L (ref 23–31)
CREAT SERPL-MCNC: 0.45 MG/DL (ref 0.73–1.18)
DSDNA ANTIBODY (OHS): NEGATIVE
E COLI K1 DNA CSF QL NAA+NON-PROBE: NEGATIVE
EOSINOPHIL # BLD AUTO: 0.09 X10(3)/MCL (ref 0–0.9)
EOSINOPHIL NFR BLD AUTO: 0.8 %
ERYTHROCYTE [DISTWIDTH] IN BLOOD BY AUTOMATED COUNT: 15.8 % (ref 11.5–17)
EV RNA CSF QL NAA+NON-PROBE: NEGATIVE
GLOBULIN SER-MCNC: 3.8 GM/DL (ref 2.4–3.5)
GLUCOSE SERPL-MCNC: 109 MG/DL (ref 82–115)
GP B STREP DNA CSF QL NAA+NON-PROBE: NEGATIVE
HAEM INFLU DNA CSF QL NAA+NON-PROBE: NEGATIVE
HCT VFR BLD AUTO: 36.1 % (ref 42–52)
HGB BLD-MCNC: 11.7 GM/DL (ref 14–18)
HHV6 DNA CSF QL NAA+NON-PROBE: NEGATIVE
HSV1 DNA CSF QL NAA+NON-PROBE: NEGATIVE
HSV1 DNA CSF QL NAA+PROBE: NEGATIVE
HSV2 DNA CSF QL NAA+NON-PROBE: NEGATIVE
HSV2 DNA CSF QL NAA+PROBE: NEGATIVE
IMM GRANULOCYTES # BLD AUTO: 0.03 X10(3)/MCL (ref 0–0.04)
IMM GRANULOCYTES NFR BLD AUTO: 0.3 %
JO-1 ANTIBODY (OHS): NEGATIVE
L MONOCYTOG DNA CSF QL NAA+NON-PROBE: NEGATIVE
LYMPHOCYTES # BLD AUTO: 2.78 X10(3)/MCL (ref 0.6–4.6)
LYMPHOCYTES NFR BLD AUTO: 23.9 %
MAYO GENERIC ORDERABLE RESULT: ABNORMAL
MCH RBC QN AUTO: 28.3 PG (ref 27–31)
MCHC RBC AUTO-ENTMCNC: 32.4 MG/DL (ref 33–36)
MCV RBC AUTO: 87.2 FL (ref 80–94)
MONOCYTES # BLD AUTO: 1.05 X10(3)/MCL (ref 0.1–1.3)
MONOCYTES NFR BLD AUTO: 9 %
N MEN DNA CSF QL NAA+NON-PROBE: NEGATIVE
NEUTROPHILS # BLD AUTO: 7.7 X10(3)/MCL (ref 2.1–9.2)
NEUTROPHILS NFR BLD AUTO: 65.9 %
NRBC BLD AUTO-RTO: 0 %
PARECHOVIRUS A RNA CSF QL NAA+NON-PROBE: NEGATIVE
PLATELET # BLD AUTO: 260 X10(3)/MCL (ref 130–400)
PMV BLD AUTO: 10.8 FL (ref 7.4–10.4)
POCT GLUCOSE: 114 MG/DL (ref 70–110)
POCT GLUCOSE: 147 MG/DL (ref 70–110)
POTASSIUM SERPL-SCNC: 3 MMOL/L (ref 3.5–5.1)
PROCALCITONIN SERPL-MCNC: <0.1 NG/ML
PROT SERPL-MCNC: 5.9 GM/DL (ref 5.8–7.6)
RBC # BLD AUTO: 4.14 X10(6)/MCL (ref 4.7–6.1)
RNP70 ANTIBODY (OHS): NEGATIVE
S PNEUM DNA CSF QL NAA+NON-PROBE: NEGATIVE
SCLERODERMA (SCL-70S) ANTIBODY (OHS): NEGATIVE
SMITH DP IGG (OHS): NEGATIVE
SODIUM SERPL-SCNC: 135 MMOL/L (ref 136–145)
SPECIMEN SOURCE: NORMAL
SSA(RO) ANTIBODY (OHS): NEGATIVE
SSB(LA) ANTIBODY (OHS): NEGATIVE
T PALLIDUM AB SER QL: NONREACTIVE
U1RNP ANTIBODY (OHS): NEGATIVE
VZV DNA CSF QL NAA+NON-PROBE: NEGATIVE
WBC # SPEC AUTO: 11.6 X10(3)/MCL (ref 4.5–11.5)

## 2022-07-08 PROCEDURE — 36415 COLL VENOUS BLD VENIPUNCTURE: CPT | Performed by: INTERNAL MEDICINE

## 2022-07-08 PROCEDURE — 80053 COMPREHEN METABOLIC PANEL: CPT | Performed by: INTERNAL MEDICINE

## 2022-07-08 PROCEDURE — 94761 N-INVAS EAR/PLS OXIMETRY MLT: CPT

## 2022-07-08 PROCEDURE — 25000003 PHARM REV CODE 250: Performed by: INTERNAL MEDICINE

## 2022-07-08 PROCEDURE — 83516 IMMUNOASSAY NONANTIBODY: CPT | Performed by: INTERNAL MEDICINE

## 2022-07-08 PROCEDURE — 86376 MICROSOMAL ANTIBODY EACH: CPT | Performed by: INTERNAL MEDICINE

## 2022-07-08 PROCEDURE — 99223 1ST HOSP IP/OBS HIGH 75: CPT | Mod: ,,, | Performed by: INTERNAL MEDICINE

## 2022-07-08 PROCEDURE — 25000003 PHARM REV CODE 250: Performed by: PHYSICIAN ASSISTANT

## 2022-07-08 PROCEDURE — 83516 IMMUNOASSAY NONANTIBODY: CPT

## 2022-07-08 PROCEDURE — 11000001 HC ACUTE MED/SURG PRIVATE ROOM

## 2022-07-08 PROCEDURE — 63600175 PHARM REV CODE 636 W HCPCS: Performed by: INTERNAL MEDICINE

## 2022-07-08 PROCEDURE — 86780 TREPONEMA PALLIDUM: CPT | Performed by: INTERNAL MEDICINE

## 2022-07-08 PROCEDURE — 85025 COMPLETE CBC W/AUTO DIFF WBC: CPT | Performed by: INTERNAL MEDICINE

## 2022-07-08 PROCEDURE — 99223 PR INITIAL HOSPITAL CARE,LEVL III: ICD-10-PCS | Mod: ,,, | Performed by: INTERNAL MEDICINE

## 2022-07-08 PROCEDURE — 63600175 PHARM REV CODE 636 W HCPCS: Performed by: PHYSICIAN ASSISTANT

## 2022-07-08 RX ORDER — ENOXAPARIN SODIUM 100 MG/ML
60 INJECTION SUBCUTANEOUS 2 TIMES DAILY
Status: DISCONTINUED | OUTPATIENT
Start: 2022-07-08 | End: 2022-07-14 | Stop reason: HOSPADM

## 2022-07-08 RX ORDER — METOPROLOL TARTRATE 50 MG/1
50 TABLET ORAL 2 TIMES DAILY
Status: DISCONTINUED | OUTPATIENT
Start: 2022-07-08 | End: 2022-07-09

## 2022-07-08 RX ORDER — PREDNISONE 20 MG/1
20 TABLET ORAL DAILY
Status: DISCONTINUED | OUTPATIENT
Start: 2022-07-08 | End: 2022-07-12

## 2022-07-08 RX ORDER — MICONAZOLE NITRATE 2 %
POWDER (GRAM) TOPICAL 2 TIMES DAILY
Status: DISCONTINUED | OUTPATIENT
Start: 2022-07-08 | End: 2022-07-14 | Stop reason: HOSPADM

## 2022-07-08 RX ADMIN — ENOXAPARIN SODIUM 60 MG: 100 INJECTION SUBCUTANEOUS at 09:07

## 2022-07-08 RX ADMIN — MICONAZOLE NITRATE 2 % TOPICAL POWDER: at 08:07

## 2022-07-08 RX ADMIN — Medication: at 08:07

## 2022-07-08 RX ADMIN — METHIMAZOLE 20 MG: 10 TABLET ORAL at 08:07

## 2022-07-08 RX ADMIN — METOPROLOL TARTRATE 50 MG: 50 TABLET, FILM COATED ORAL at 09:07

## 2022-07-08 RX ADMIN — CIPROFLOXACIN 400 MG: 2 INJECTION, SOLUTION INTRAVENOUS at 08:07

## 2022-07-08 RX ADMIN — DIGOXIN 0.25 MG: 250 TABLET ORAL at 04:07

## 2022-07-08 RX ADMIN — ACYCLOVIR SODIUM 500 MG: 500 INJECTION, SOLUTION INTRAVENOUS at 08:07

## 2022-07-08 RX ADMIN — POTASSIUM BICARBONATE 25 MEQ: 978 TABLET, EFFERVESCENT ORAL at 08:07

## 2022-07-08 RX ADMIN — MIRTAZAPINE 15 MG: 15 TABLET, FILM COATED ORAL at 08:07

## 2022-07-08 RX ADMIN — METOPROLOL TARTRATE 50 MG: 50 TABLET, FILM COATED ORAL at 08:07

## 2022-07-08 RX ADMIN — MICONAZOLE NITRATE 2 % TOPICAL POWDER: at 01:07

## 2022-07-08 RX ADMIN — POTASSIUM BICARBONATE 25 MEQ: 978 TABLET, EFFERVESCENT ORAL at 03:07

## 2022-07-08 RX ADMIN — METHIMAZOLE 20 MG: 10 TABLET ORAL at 09:07

## 2022-07-08 RX ADMIN — ENOXAPARIN SODIUM 60 MG: 100 INJECTION SUBCUTANEOUS at 08:07

## 2022-07-08 RX ADMIN — Medication: at 01:07

## 2022-07-08 RX ADMIN — LEVETIRACETAM 500 MG: 100 SOLUTION ORAL at 08:07

## 2022-07-08 RX ADMIN — PREDNISONE 20 MG: 20 TABLET ORAL at 09:07

## 2022-07-08 RX ADMIN — ACYCLOVIR SODIUM 500 MG: 500 INJECTION, SOLUTION INTRAVENOUS at 11:07

## 2022-07-08 RX ADMIN — CIPROFLOXACIN 400 MG: 2 INJECTION, SOLUTION INTRAVENOUS at 09:07

## 2022-07-08 RX ADMIN — ACYCLOVIR SODIUM 500 MG: 500 INJECTION, SOLUTION INTRAVENOUS at 03:07

## 2022-07-08 RX ADMIN — METHIMAZOLE 20 MG: 10 TABLET ORAL at 03:07

## 2022-07-08 RX ADMIN — LEVETIRACETAM 500 MG: 100 SOLUTION ORAL at 09:07

## 2022-07-08 NOTE — ASSESSMENT & PLAN NOTE
For in my opinion this patient does not need to be on a metal suppression at the moment.  I have high suspicion that his  CNS issue could be infection was.  I will not get him on steroids or immunosuppression because in my opinion they can deteriorate his case.  I agree with waiting for the C ANCA p-ANCA but I highly doubt there will come back abnormal.

## 2022-07-08 NOTE — HPI
This is a 71-year-old gentleman seen and evaluated today for possible CNS rheumatological illnesses.  The patient is known to have rheumatoid arthritis and was treated by Dr. Higgins previously.  His ALFONZO, ALFONZO panel are negative.  His C ANCA p-ANCA are pending.  His CSF fluid show elevated white blood cell count and elevated proteins.  This is not the typical for autoimmune encephalitis.  The patient is not verbal but when I said his name he lifted his left arm.  Later on when I examined his joints he did not respond nor lift his arm to say whether he is in pain or not.

## 2022-07-08 NOTE — CONSULTS
Infectious Diseases Consultation       Inpatient consult to Infectious Diseases  Consult performed by: Alfreda Donnelly MD  Consult ordered by: Dominique Ferguson PA-C        HPI:   71-year-old male with past medical history of paroxysmal AFib on Eliquis, CAD, HTN, hyperthyroidism, cardiomyopathy, CVA/TIA, seizure disorder, rheumatoid arthritis with a recent overall health decline, with CVA in April this year, 2022, then seizures, fevers with diagnosis of pneumonia and progressively worsening confusion, recently diagnosed with meningitis with negative CSF cultures and discharged to [facility] in extended care on 06/28/2022 and is readmitted to [facility] on 07/06/2022.  He has started to have fevers with a documented fever of 102.4 on 07/04 I will 1.7 on 07/05, and associated leukocytosis which on admission 7/6 up to 22.3, worsening mental status.  He has been extensively evaluated with 1 of 2 blood culture sets on 07/05 showing Gram-positive cocci probable Staphylococcus and a follow-up blood cultures on 07/06 negative so far.  Urinalysis is not impressive.  He is anemic with low albumin and prealbumin.  Had a lumbar puncture today 07/07 with CSF WBC 50, 11% neutrophils, 56% mono sites, 33% lymphs, glucose at 36 and protein 82.6.  CSF Gram stain with no bacteria and no WBC and culture pending.  Cryptococcal antigen of the CSF has been noted to be negative and AFB negative on CSF of 6/15.te cardiopulmonary disease.  CT scan of the head without no acute intracranial process.  MRI of the brain shows acute ischemia with involvement of the right MCA territory.  He is currently on antibiotic coverage with Cipro and acyclovir in was and vancomycin which has been discontinued.      Past Medical and Surgical History  Allergies :   Ace inhibitors, Morphine, Morphine sulfate, Nafcillin, Pradaxa [dabigatran etexilate], and Sulfamethoxazole    Medical :   He has a past medical history of Acute left  arterial ischemic stroke, KINGA (anterior cerebral artery) (5/3/2022), Acute osteomyelitis (5/11/2022), Atherosclerosis of coronary artery (5/11/2022), Atrial fibrillation (5/3/2022), Benign essential hypertension (5/3/2022), Cardiomyopathy, Coronary artery disease, Diverticulosis, Esophageal reflux (5/11/2022), Fatigue, Focal seizure (5/17/2022), Generalized anxiety disorder, GERD (gastroesophageal reflux disease), Graves disease, Hemiplga following cerebral infrc affecting left nondom side (5/8/2022), HTN (hypertension), Hyperthyroidism, Idiopathic peripheral neuropathy (5/11/2022), Irritable bowel syndrome without diarrhea, Ischemic cardiomyopathy (5/3/2022), Mixed hyperlipidemia (5/11/2022), Other sequelae following unspecified cerebrovascular disease (5/9/2022), Paroxysmal atrial fibrillation, Rheumatoid arthritis (5/11/2022), Rheumatoid arthritis, unspecified, Shingles, Staph aureus infection, Stroke, Thyroiditis, unspecified, and Thyrotoxicosis (5/3/2022).    Surgical :   He has a past surgical history that includes Cataract extraction; Total knee arthroplasty; Tonsillectomy; Vasectomy; and Cyst Removal (Left).     Family History  His Family history is unknown by patient.    Social History  He reports that he has never smoked. He has never used smokeless tobacco. He reports that he does not drink alcohol and does not use drugs.     Review of Systems   Unable to perform ROS: Mental status change     Objective   Physical Exam  Vitals reviewed.   Constitutional:       General: He is not in acute distress.     Appearance: He is ill-appearing.      Comments: Confused   HENT:      Head: Normocephalic and atraumatic.      Nose:      Comments: NG tube in place  Eyes:      Pupils: Pupils are equal, round, and reactive to light.   Cardiovascular:      Rate and Rhythm: Normal rate and regular rhythm.      Heart sounds: Normal heart sounds.   Pulmonary:      Effort: Pulmonary effort is normal. No respiratory distress.       "Breath sounds: Normal breath sounds.   Abdominal:      General: Bowel sounds are normal. There is no distension.      Palpations: Abdomen is soft.      Tenderness: There is no abdominal tenderness.   Genitourinary:     Comments: No suprapubic tenderness  Musculoskeletal:         General: No deformity.      Cervical back: Neck supple.      Comments: Has mittens on his bilateral upper extremities   Skin:     Findings: No erythema or rash.   Neurological:      Comments: Confused and does not adequately follow commands   Psychiatric:      Comments: Noncommunicative       VITAL SIGNS: 24 HR MIN & MAX LAST    Temp  Min: 97.5 °F (36.4 °C)  Max: 99.5 °F (37.5 °C)  99.2 °F (37.3 °C)        BP  Min: 113/56  Max: 141/91  (!) 113/56     Pulse  Min: 103  Max: 128  (!) 120     Resp  Min: 14  Max: 22  20    SpO2  Min: 87 %  Max: 100 %  96 %      HT: 6' 0.99" (185.4 cm)  WT: 62.1 kg (137 lb)  BMI: 18.1     Recent Results (from the past 24 hour(s))   Comprehensive Metabolic Panel (CMP)    Collection Time: 07/07/22  4:09 AM   Result Value Ref Range    Sodium Level 139 136 - 145 mmol/L    Potassium Level 3.9 3.5 - 5.1 mmol/L    Chloride 105 98 - 107 mmol/L    Carbon Dioxide 26 23 - 31 mmol/L    Glucose Level 101 82 - 115 mg/dL    Blood Urea Nitrogen 14.4 8.4 - 25.7 mg/dL    Creatinine 0.56 (L) 0.73 - 1.18 mg/dL    Calcium Level Total 9.1 8.8 - 10.0 mg/dL    Protein Total 6.0 5.8 - 7.6 gm/dL    Albumin Level 2.4 (L) 3.4 - 4.8 gm/dL    Globulin 3.6 (H) 2.4 - 3.5 gm/dL    Albumin/Globulin Ratio 0.7 (L) 1.1 - 2.0 ratio    Bilirubin Total 1.2 <=1.5 mg/dL    Alkaline Phosphatase 102 40 - 150 unit/L    Alanine Aminotransferase 58 (H) 0 - 55 unit/L    Aspartate Aminotransferase 44 (H) 5 - 34 unit/L    Estimated GFR-Non  >60 mls/min/1.73/m2   CBC with Differential    Collection Time: 07/07/22  4:09 AM   Result Value Ref Range    WBC 15.8 (H) 4.5 - 11.5 x10(3)/mcL    RBC 4.41 (L) 4.70 - 6.10 x10(6)/mcL    Hgb 12.7 (L) 14.0 - " 18.0 gm/dL    Hct 39.3 (L) 42.0 - 52.0 %    MCV 89.1 80.0 - 94.0 fL    MCH 28.8 27.0 - 31.0 pg    MCHC 32.3 (L) 33.0 - 36.0 mg/dL    RDW 16.0 11.5 - 17.0 %    Platelet 291 130 - 400 x10(3)/mcL    MPV 10.5 (H) 7.4 - 10.4 fL    Neut % 68.5 %    Lymph % 21.2 %    Mono % 9.3 %    Eos % 0.1 %    Basophil % 0.3 %    Lymph # 3.35 0.6 - 4.6 x10(3)/mcL    Neut # 10.8 (H) 2.1 - 9.2 x10(3)/mcL    Mono # 1.46 (H) 0.1 - 1.3 x10(3)/mcL    Eos # 0.01 0 - 0.9 x10(3)/mcL    Baso # 0.04 0 - 0.2 x10(3)/mcL    IG# 0.09 (H) 0 - 0.04 x10(3)/mcL    IG% 0.6 %    NRBC% 0.0 %   C3 Complement    Collection Time: 07/07/22  4:09 AM   Result Value Ref Range    C3 Complement 138 80 - 173 mg/dL   C4 Complement    Collection Time: 07/07/22  4:09 AM   Result Value Ref Range    C4 Complement 26.7 13.0 - 46.0 mg/dL   Phosphorus    Collection Time: 07/07/22  4:09 AM   Result Value Ref Range    Phosphorus Level 2.4 2.3 - 4.7 mg/dL   Magnesium    Collection Time: 07/07/22  4:09 AM   Result Value Ref Range    Magnesium Level 1.70 1.60 - 2.60 mg/dL   Cell Count, CSF    Collection Time: 07/07/22 10:50 AM   Result Value Ref Range    Appear CSF Clear Clear    Color CSF Colorless Colorless    WBC CSF 50 (H) 0 - 5 /uL    RBC CSF 5 /uL   Cerebrospinal Fluid Culture    Collection Time: 07/07/22 10:50 AM    Specimen: Cerebrospinal Fluid; CSF (Spinal Fluid)   Result Value Ref Range    GRAM STAIN No WBCs, No bacteria seen     Glucose, CSF    Collection Time: 07/07/22 10:50 AM   Result Value Ref Range    Glucose CSF  36 (L) 40 - 70 mg/dL   Protein, CSF    Collection Time: 07/07/22 10:50 AM   Result Value Ref Range    Protein CSF  82.6 (H) 15.0 - 45.0 mg/dL   Differential, CSF    Collection Time: 07/07/22 10:50 AM    Specimen: Cerebrospinal Fluid; CSF (Spinal Fluid)   Result Value Ref Range    Neutrophils % 11 %    Lymphocyte % 33 %    Monocyte % 56 %       Imaging  Imaging Results          X-Ray Chest 1 View (Final result)  Result time 07/07/22 12:02:15    Final  "result by Lisa Soto MD (07/07/22 12:02:15)                 Impression:      No acute cardiopulmonary abnormality.      Electronically signed by: Lisa Soto  Date:    07/07/2022  Time:    12:02             Narrative:    EXAMINATION:  XR CHEST 1 VIEW    CLINICAL HISTORY:  aspiration;    TECHNIQUE:  Single frontal view of the chest was performed.    COMPARISON:  07/06/2022    FINDINGS:  LINES AND TUBES: Enteric tube courses below the diaphragm.    MEDIASTINUM AND JOJO: The cardiac silhouette is normal. EKG/telemetry leads overlie the chest.    LUNGS: No lobar consolidation. No edema.    PLEURA:No pleural effusion. No pneumothorax.    BONES: No acute osseous abnormality.                               FL LUMBAR PUNCTURE DIAGNOSTIC WITH IMAGING (Final result)  Result time 07/07/22 13:37:48    Final result by Michel Ge MD (07/07/22 13:37:48)                 Impression:      Technically successful fluoroscopic guided lumbar puncture.    Opening pressure = less than 10 cm H2O    CSF drained = 6.5 cc    Closing pressure = less than 10 cm H2O      Electronically signed by: Michel Ge  Date:    07/07/2022  Time:    13:37             Narrative:    EXAMINATION:  FL LUMBAR PUNCTURE DIAGNOSTIC WITH IMAGING    CLINICAL HISTORY:  history of meningitis;    TECHNIQUE:  A preprocedure "Time-out" was performed by the radiologist and radiology technologist confirming patient identification and procedure location. The patient was brought into the interventional radiology suite and placed in the prone position on the fluoroscopy table. A nikki was placed one the patient's back overlying the right L3-L4 intralaminar space. The patient's back was then prepped and draped in the usual aseptic fashion. 1% plain lidocaine was used to anesthetize the skin and subcutaneous tissues to deep to the previously made nikki. Under fluoroscopic guidance, a 22 gauge spinal needle was used to access the thecal sac on the first pass " with immediate return of clear CSF.  Opening pressure was less than 10 cm H2O.  Approximately 6.5 ml of CSF was drained and submitted to the laboratory for analysis.  Closing pressure was less than 10 cm H2O.  The needle was removed, and the puncture site was aseptically bandage. There were no immediate complaints or complications.    Total fluoroscopic time: 6 seconds    Total # images: 1                               MRI Brain Limited Without Contrast (Final result)  Result time 07/06/22 18:31:13   Procedure changed from MRI Brain W WO Contrast     Final result by Michel Ge MD (07/06/22 18:31:13)                 Impression:      Motion degraded exam.    In spite of this limitation:    1. Acute cortical based ischemia in the right MCA vascular territory as exemplified by a right middle frontal 2.9 x 1.9 cm focus.  2. Extra-axial diffusion restriction about the falx persists but has decreased, with similar findings about the cerebral convexities, again suspicious for meningitis/infection.      Electronically signed by: Michel Ge  Date:    07/06/2022  Time:    18:31             Narrative:    EXAMINATION:  MRI BRAIN LIMITED WITHOUT CONTRAST    CLINICAL HISTORY:  Mental status change, unknown cause;Mental status change;;    TECHNIQUE:  Limited multisequence MR images of the brain were obtained WITHOUT the administration of intravenous contrast.    Axial DWI    Axial T2 FLAIR    COMPARISON:  Head CT earlier the same day.    Brain MRI 06/15/2022.                               CT Head Without Contrast (Final result)  Result time 07/06/22 14:13:35    Final result by Michel Ge MD (07/06/22 14:13:35)                 Impression:      No acute intracranial abnormalities.      Electronically signed by: Michel Ge  Date:    07/06/2022  Time:    14:13             Narrative:    EXAMINATION:  CT HEAD WITHOUT CONTRAST    CLINICAL HISTORY:  Mental status change, unknown cause;    TECHNIQUE:  Axial scans were  obtained from skull base to the vertex.    Coronal and sagittal reconstructions obtained from the axial data.    Automatic exposure control was utilized to limit radiation dose.    Contrast: None    Radiation Dose:    Total DLP: 1097 mGy*cm    COMPARISON:  Brain MRI 06/15/2022    FINDINGS:  Scalp/Skull:    No abnormalities.    Brain sulci: Appropriate for patient's age.    Ventricles: Normal in size and configuration. No hydrocephalus.    Extra-axial spaces:    No masses or fluid collections.    Parenchyma:    Bifrontal foci of cortical based encephalomalacia compatible with remote insults.    Mild-moderate chronic microangiopathy in the supratentorial white matter.    No mass, hemorrhage or CT evidence of an acute vascular insult.    Dural sinuses: No abnormal densities.    Sellar/Suprasellar region: No abnormalities.    Skull base and Craniocervical junction: No abnormalities.    Incidental findings:    Carotid siphon and vertebrobasilar atherosclerotic vascular calcifications.    Status post bilateral cataract surgery.    Partially visualized nasoenteric catheter.                               X-Ray Chest 1 View (Final result)  Result time 07/06/22 13:17:46    Final result by Ethan Molina MD (07/06/22 13:17:46)                 Impression:      NO ACUTE CARDIOPULMONARY PROCESS IDENTIFIED.      Electronically signed by: Ethan Molina  Date:    07/06/2022  Time:    13:17             Narrative:    EXAMINATION:  XR CHEST 1 VIEW    CLINICAL HISTORY:  Fever, unspecified    TECHNIQUE:  One view    COMPARISON:  July 1, 2022.    FINDINGS:  Cardiopericardial silhouette is within normal limits.  No acute dense focal or segmental consolidation, congestive process, pleural effusions or pneumothorax.  Nasogastric tube extends into the gastric fundus.                                 Impression  1. Meningitis with encephalitis/encephalopathy  2. Acute ischemic CVA  3. Leukocytosis with recent high-dose steroids  4.  Staphylococcus positive blood culture  5. Recent thyrotoxicosis  6. Anemia  7. Protein calorie malnutrition    Recommendations  I agree with the management of this patient.  He is noted to have fevers with encephalopathy and notable abnormal CSF analysis consistent meningitis and possible encephalitis.  A CSF analysis with low glucose predominantly monocytes with negative Gram stain for bacteria could be indicated above if fungal or mycobacterial infection as well as pretreated bacterial meningitis on non infectious etiology.  The presentation is further complicated by the findings of acute ischemic CVA in the right MCA territory.  His multiple allergies including antibiotics a comorbid disease limit shortness of antibiotics.  We will follow CSF meningitis/encephalitis panel as well as AFB and fungal cultures and ensure CSF evaluation includes MTB PCR, HSV, VZV, CMV, West Nile and VDRL .   The staphylococcus isolated in 1 of 2 blood culture sets is likely a contaminant.  We will however follow final cultures including follow up repeat blood cultures.  We will continue current antimicrobials with acyclovir and ciprofloxacin with plan to deescalate if no findings-cultures of PCR to justify continued use.  Neurology is on board with inputs noted.   Guarded prognosis.  Case is discussed with nursing staff.  I would like to thank the team very much for the opportunity to assist in the care of this patient.

## 2022-07-08 NOTE — ASSESSMENT & PLAN NOTE
This encephalopathy does not seem to be autoimmune since the white count and the proteins are elevated together.  In the autoimmune encephalopathy the proteins go up and the white cells remain normal.  The ALFONZO and ALFONZO panel are normal.   It is very unusual to see encephalopathy with rheumatoid unless it is associated with CNS vasculitis which is not the case on the MRI.  C Anca p-ANCA pending as blood tests.

## 2022-07-08 NOTE — PLAN OF CARE
07/08/22 1535   Discharge Assessment   Assessment Type Discharge Planning Assessment   Confirmed/corrected address, phone number and insurance Yes   Confirmed Demographics Correct on Facesheet   Source of Information patient   Communicated JORGE with patient/caregiver Date not available/Unable to determine   Facility Arrived From: [facility]   Do you expect to return to your current living situation? Yes   Readmission within 30 days? Yes   Patient currently being followed by outpatient case management? No   Do you currently have service(s) that help you manage your care at home? No   Discharge Plan A Skilled Nursing Facility   Discharge Plan B Long-term acute care facility (LTAC)   Discharge Barriers Identified None

## 2022-07-08 NOTE — NURSING
WOCN consult-70 y/o male in with encephalopathy.   Non verbal non communicative total care.   Assisted by CNA to turn and assess backside.  Pt is emaciated.  Hip vulnerable and care put in place.  Buttock noted with maceration.    Care put in place  Low airloss support ordered.  Call to [facility] in PT to order bed.    Instructions to nurse.

## 2022-07-08 NOTE — PROGRESS NOTES
[facility] MEDICINE   PROGRESS NOTE      CHIEF COMPLAINT  Hospital follow up    HOSPITAL COURSE  Quan Contreras is a 71 y.o. White male with a past medical history of paroxysmal atrial fibrillation on Eliquis, hypertension, coronary artery disease, cardiomyopathy, hyperthyroidism on methimazole, prior CVA/TIA, seizure disorder and rheumatoid arthritis. In April 2022 patient suffered a stroke. He spent a week in rehab and was discharged home. He then developed seizures and was started on seizure medication. Patient progesively worsened and developed a fever and was diagnosed with pneumonia. Patient improved overall but confusion worsened. He was admitted and diagnosed with meningitis. CSF was negative. He was discharged to [facility] 06/28/2022 for acute encephalopathy secondary to meningitis. TSH was less than 0.0083 with a Free T3 11.61 and free T4 2.68 on 6/29/2022 and Methimazole was increased to 20 mg TID. Yesterday (07/05/2022) patient began running a fever of 101 F. He was started on Vancomycin and Merrem. In addition leukocytosis increased from 11.7 yesterday to 22.3 today (7/6/2022) with worsening mental status. Blood culture from 7/5/2022 returned positive for gram positive cocci in 1 of 2 bottles. He was transferred to [facility] for further neurological workup in ID consult.   Wife and daughter at bedside reports prior to the stroke in April patient was ambulating and completing activities of daily living without assistance and has not returned to baseline. In rehab he was responding to questions appropriately and recognizing family. NG tube was placed a few days ago.      Upon presentation to the ED, patient afebrile and hemodynamically stable.  Labs notable for WBC 22.3, H&H 13.5/39.6, MCV 85, creatinine 0.58, AST 46, ALT 65, TSH less than 0.0083, free T4 2.13.  Influenza A and B and SARS-CoV-2 PCR negative.  UA negative for infection.  Chest  "x-ray negative.  CT head negative for acute intracranial abnormalities.  Patient admitted to hospital medicine services for further medical management.    Today  Reviewed the chart yesterday night and looks like leukocytosis started after steroid.  Blood culture 1 of the 2 is positive for Staphylococcus epidermidis and looks like it was drawn from PICC line so could be colonization.  Procalcitonin is negative.  Will continue with Cipro and acyclovir for now.  Infectious Disease has been consulted as well.  His previous infection workup was negative, we will repeat encephalitis panel as well as add on NMDA receptor antibody.  Otherwise no neurologic changes just stares and moves the arm.        OBJECTIVE/PHYSICAL EXAM  BP (!) 143/83   Pulse (!) 116   Temp 98.9 °F (37.2 °C) (Axillary)   Resp 18   Ht 6' 0.99" (1.854 m)   Wt 62.1 kg (137 lb)   SpO2 98%   BMI 18.08 kg/m²   General: In no acute distress, afebrile, significant posterior pharynx gurgling  Chest: Clear to auscultation bilaterally  Heart: S1, S2, no appreciable murmur, tachycardia  Abdomen: Soft, nontender, BS +  MSK: Warm, no lower extremity edema, no clubbing or cyanosis  Neurologic:  Awake and alert oriented to self did not move the lower extremity      ASSESSMENT/PLAN  Acute encephalopathy-infectious versus metabolic  Encephalitis/meningitis-infectious versus others  Acute ischemic CVA-right MCA vascular territory  Acute hypoxemic respiratory failure  Oropharyngeal dysphagia  Leukocytosis-sepsis vs steroid induced (received solumedrol 80mg early morning July 5)  Thyrotoxicosis  Atrial fibrillation with RVR  Severe protein caloric malnutrition    History of:  Seizures, thyrotoxicosis diffuse goiter, stroke April 2022, HTN, CAD, ischemic cardiomyopathy, rheumatoid arthritis, HLD, atrial fibrillation      Neurology following.  New stroke noted on MRI.  cardioembolic vs vasculitis vs autoimmune vs lymphoma? Was getting Eliquis 5 bid and  at LTAC " for the last 7 days.   ID consulted.  Continue Cipro, acyclovir.  Follow up on LP results.  Negative procalcitonin.  Added on NMDA receptor on the blood and CSF, 14-3-3 protein.  ALFONZO panel.  Atypical workup now as infectious workup previously was negative.  Metoprolol 50 b.i.d.., methimazole 20 t.i.d. May need to change to coumadin since recurring strokes on NOAC.   Increase chronic steroids to 20mg prednisone for stress dose and should also help with thyrotoxocosis.  Continue NG tube feeds    Critical care diagnosis:  Acute encephalopathy on chronic with acute stroke and concern for possible encephalitis  Critical care time spent:  35 minutes    Anticipated discharge and disposition:   __________________________________________________________________________    LABS/MICROBIOLOGY/MEDICATIONS/DIAGNOSTICS    LABS  Recent Labs     07/08/22  0534   *   K 3.0*   CHLORIDE 102   CO2 22*   BUN 10.6   CREATININE 0.45*   GLUCOSE 109   CALCIUM 8.8   ALKPHOS 111   AST 48*   ALT 54   ALBUMIN 2.1*     Recent Labs     07/08/22  0533   WBC 11.6*   RBC 4.14*   HCT 36.1*   MCV 87.2          MICROBIOLOGY  Microbiology Results (last 7 days)     Procedure Component Value Units Date/Time    Blood culture #1 **CANNOT BE ORDERED STAT** [[id number]]  (Abnormal) Collected: 07/06/22 1326    Order Status: Completed Specimen: Blood from PICC Line Updated: 07/08/22 1014     GRAM STAIN Gram Positive Cocci, probable Staphylococcus      Seen in gram stain of broth only      1 of 2 Aerobic bottles positive     Cerebrospinal Fluid Culture [[id number]] Collected: 07/07/22 1050    Order Status: Completed Specimen: CSF (Spinal Fluid) from Cerebrospinal Fluid Updated: 07/08/22 0710     CULTURE, CSF ([facility]) No Growth At 24 Hours     GRAM STAIN No WBCs, No bacteria seen     Blood culture #2 **CANNOT BE ORDERED STAT** [[id number]]  (Normal) Collected: 07/06/22 1322    Order Status: Completed Specimen: Blood from Arm, Left Updated: 07/07/22 1503      CULTURE, BLOOD ([facility]) No Growth At 24 Hours    Fungal Culture [[id number]]     Order Status: Sent Specimen: CSF (Spinal Fluid) from Cerebrospinal Fluid     Blood Culture [[id number]]     Order Status: Canceled Specimen: Blood     Blood Culture [[id number]]     Order Status: Canceled Specimen: Blood           MEDICATIONS   acyclovir  8 mg/kg Intravenous Q8H    ciprofloxacin  400 mg Intravenous Q12H    digoxin  0.25 mg Oral Daily    enoxaparin  60 mg Subcutaneous BID    levetiracetam  500 mg Per NG tube BID    methIMAzole  20 mg Oral TID    metoprolol tartrate  50 mg Oral BID    miconazole NITRATE 2 %   Topical (Top) BID    mirtazapine  15 mg Oral QHS    potassium bicarbonate  25 mEq Oral Q4H    predniSONE  20 mg Oral Daily    zinc oxide-cod liver oil   Topical (Top) BID      INFUSIONS      DIAGNOSTIC TESTS  XR Non-Rad Performed NG/Gastric Tube Check   Final Result      Nasogastric tube as above         Electronically signed by: De Conte   Date:    07/08/2022   Time:    12:31      US Thyroid   Final Result      Thyromegaly with heterogeneous gland without discrete thyroid nodule..      Reference: ACR Thyroid Imaging, Reporting and Data System (TI-RADS): White Paper of the ACR TI-RADS Committee.  2017.         Electronically signed by: Lisa Soto   Date:    07/08/2022   Time:    11:55      XR Non-Rad Performed NG/Gastric Tube Check   Final Result      XR Non-Rad Performed NG/Gastric Tube Check   Final Result      Enteric tube in the airway.      Finding relayed to patient's nurse Alice Dickey at the time of this dictation.         Electronically signed by: Lisa Soto   Date:    07/07/2022   Time:    16:28      X-Ray Chest 1 View   Final Result      No acute cardiopulmonary abnormality.         Electronically signed by: Lisa Soto   Date:    07/07/2022   Time:    12:02      FL LUMBAR PUNCTURE DIAGNOSTIC WITH IMAGING   Final Result      Technically successful fluoroscopic guided  lumbar puncture.      Opening pressure = less than 10 cm H2O      CSF drained = 6.5 cc      Closing pressure = less than 10 cm H2O         Electronically signed by: Michel Ge   Date:    07/07/2022   Time:    13:37      MRI Brain Limited Without Contrast   Final Result      Motion degraded exam.      In spite of this limitation:      1. Acute cortical based ischemia in the right MCA vascular territory as exemplified by a right middle frontal 2.9 x 1.9 cm focus.   2. Extra-axial diffusion restriction about the falx persists but has decreased, with similar findings about the cerebral convexities, again suspicious for meningitis/infection.         Electronically signed by: Michel Ge   Date:    07/06/2022   Time:    18:31      CT Head Without Contrast   Final Result      No acute intracranial abnormalities.         Electronically signed by: Michel Ge   Date:    07/06/2022   Time:    14:13      X-Ray Chest 1 View   Final Result      NO ACUTE CARDIOPULMONARY PROCESS IDENTIFIED.         Electronically signed by: Ethan Molina   Date:    07/06/2022   Time:    13:17               All diagnosis and differential diagnosis have been reviewed; assessment and plan has been documented. I have personally reviewed the labs and test results that are presently available; I have reviewed the patients medication list. I have reviewed the consulting providers response and recommendations. I have reviewed or attempted to review medical records based upon their availability.  All of the patient's questions have been addressed and answered. Patient's is agreeable to the above stated plan. I will continue to monitor closely and make adjustments to medical management as needed.  This document was created using M*Modal Fluency Direct.  Transcription errors may have been made.  Please contact me if any questions may rise regarding documentation to clarify verbiage.        Cyril Goodwin MD   07/08/2022   Internal Medicine

## 2022-07-08 NOTE — CONSULTS
[facility]  Rheumatology  Consult Note    Patient Name: Quan Contreras  MRN: [id number]  Admission Date: 7/6/2022  Hospital Length of Stay: 2 days  Code Status: DNR   Attending Provider: Kraig Shen MD  Primary Care Physician: Bran Hansen MD  Principal Problem:Encephalopathy    Consults  Subjective:     HPI: This is a 71-year-old gentleman seen and evaluated today for possible CNS rheumatological illnesses.  The patient is known to have rheumatoid arthritis and was treated by Dr. Higgins previously.  His ALFONZO, ALFONZO panel are negative.  His C ANCA p-ANCA are pending.  His CSF fluid show elevated white blood cell count and elevated proteins.  This is not the typical for autoimmune encephalitis.  The patient is not verbal but when I said his name he lifted his left arm.  Later on when I examined his joints he did not respond nor lift his arm to say whether he is in pain or not.      Past Medical History:   Diagnosis Date    Acute left arterial ischemic stroke, KINGA (anterior cerebral artery) 5/3/2022    Acute osteomyelitis 5/11/2022    Atherosclerosis of coronary artery 5/11/2022    Atrial fibrillation 5/3/2022    Benign essential hypertension 5/3/2022    Cardiomyopathy     Coronary artery disease     Diverticulosis     Esophageal reflux 5/11/2022    Fatigue     Focal seizure 5/17/2022    Generalized anxiety disorder     GERD (gastroesophageal reflux disease)     Graves disease     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/2022    HTN (hypertension)     Hyperthyroidism     Idiopathic peripheral neuropathy 5/11/2022    Irritable bowel syndrome without diarrhea     Ischemic cardiomyopathy 5/3/2022    Mixed hyperlipidemia 5/11/2022    Other sequelae following unspecified cerebrovascular disease 5/9/2022    Paroxysmal atrial fibrillation     Rheumatoid arthritis 5/11/2022    Rheumatoid arthritis, unspecified     Shingles      Staph aureus infection     Stroke     Thyroiditis, unspecified     Thyrotoxicosis 5/3/2022       Past Surgical History:   Procedure Laterality Date    CATARACT EXTRACTION      CYST REMOVAL Left     TONSILLECTOMY      TOTAL KNEE ARTHROPLASTY      VASECTOMY         Immunization History   Administered Date(s) Administered    COVID-19 Vaccine 03/08/2021, 04/19/2021    Influenza - Quadrivalent - PF *Preferred* (6 months and older) 09/19/2017, 09/19/2018, 10/04/2019, 09/30/2020, 11/02/2021    Influenza - Trivalent (ADULT) 01/07/2016    PPD Test 06/28/2022    Pneumococcal Conjugate - 13 Valent 12/22/2015    Pneumococcal Polysaccharide - 23 Valent 01/17/2017       Review of patient's allergies indicates:   Allergen Reactions    Ace inhibitors Swelling     Lip swelling    Morphine     Morphine sulfate     Nafcillin Itching    Pradaxa [dabigatran etexilate] Other (See Comments)     Abdominal cramping    Sulfamethoxazole Rash     Current Facility-Administered Medications   Medication Frequency    acetaminophen tablet 650 mg Q4H PRN    acyclovir (ZOVIRAX) 500 mg in dextrose 5 % in water (D5W) 5 % 100 mL IVPB Q8H    albuterol nebulizer solution 2.5 mg Q4H PRN    ciprofloxacin (CIPRO)400mg/200ml D5W IVPB 400 mg Q12H    dextrose 10 % infusion PRN    dextrose 10 % infusion PRN    digoxin tablet 0.25 mg Daily    enoxaparin injection 60 mg BID    glucagon (human recombinant) injection 1 mg PRN    glucose chewable tablet 16 g PRN    glucose chewable tablet 24 g PRN    hydrALAZINE injection 10 mg Q2H PRN    levetiracetam 500 mg/5 mL (5 mL) liquid Soln 500 mg BID    methIMAzole tablet 20 mg TID    metoprolol tartrate (LOPRESSOR) tablet 50 mg BID    miconazole NITRATE 2 % top powder BID    mirtazapine tablet 15 mg QHS    predniSONE tablet 20 mg Daily    sodium chloride 0.9% flush 10 mL Q12H PRN    zinc oxide-cod liver oil 40 % paste BID     Family History       Family history is unknown by  patient.          Tobacco Use    Smoking status: Never Smoker    Smokeless tobacco: Never Used   Substance and Sexual Activity    Alcohol use: Never    Drug use: Never    Sexual activity: Yes     Review of Systems   Constitutional:         Patient laying in bed .  Responds only by lifting his left arm when he hears his name.     HENT: Negative.     Eyes: Negative.    Cardiovascular: Negative.    Gastrointestinal: Negative.    Endocrine: Negative.    Musculoskeletal:         The patient is known to have rheumatoid arthritis.  He was treated by Dr. Higgins   Allergic/Immunologic: Negative.    Neurological:         The patient is laying in bed.  He only responds to his name being called by deflating his left arm.   Objective:     Vital Signs (Most Recent):  Temp: 98.9 °F (37.2 °C) (07/08/22 1139)  Pulse: (!) 116 (07/08/22 1139)  Resp: 18 (07/08/22 0300)  BP: (!) 143/83 (07/08/22 1139)  SpO2: 98 % (07/08/22 0300)  O2 Device (Oxygen Therapy): room air (07/08/22 0844)   Vital Signs (24h Range):  Temp:  [97.5 °F (36.4 °C)-99.2 °F (37.3 °C)] 98.9 °F (37.2 °C)  Pulse:  [103-120] 116  Resp:  [18-20] 18  SpO2:  [96 %-100 %] 98 %  BP: (109-143)/(56-92) 143/83     Weight: 62.1 kg (137 lb) (07/07/22 1044)  Body mass index is 18.08 kg/m².  Body surface area is 1.79 meters squared.    No intake or output data in the 24 hours ending 07/08/22 1231    Physical Exam   Constitutional: He appears ill.   Lying in bed, only lift his left arm when I call his name.  Otherwise unresponsive.   HENT:   Head: Normocephalic and atraumatic.   Nose: Nose normal.   Mouth/Throat: Mucous membranes are moist.   Eyes: Pupils are equal, round, and reactive to light.   Cardiovascular: Normal pulses. Tachycardia present.   Pulmonary/Chest: Breath sounds normal.   Abdominal: Soft.   Musculoskeletal:      Cervical back: Neck supple.      Comments: Concerning the joints I did realize mild thickening of the synovial met the MCP PIP and MTP areas.  The  patient did not respond by lifting his left arm nor by making any facial sign upon pressure of the mentioned joints.   Neurological:   The patient is only responsive to hearing his name.  He lifts his left arm upon hearing his name.  No other responses were detected during my exam.   Skin: Skin is warm.     Significant Labs:  All pertinent lab results from the last 24 hours have been reviewed.    Significant Imaging:  Imaging results within the past 24 hours have been reviewed.    Assessment/Plan:     * Encephalopathy  This encephalopathy does not seem to be autoimmune since the white count and the proteins are elevated together.  In the autoimmune encephalopathy the proteins go up and the white cells remain normal.  The ALFONZO and ALFONZO panel are normal.   It is very unusual to see encephalopathy with rheumatoid unless it is associated with CNS vasculitis which is not the case on the MRI.  C Anca p-ANCA pending as blood tests.    Rheumatoid arthritis involving multiple sites with positive rheumatoid factor  For in my opinion this patient does not need to be on a metal suppression at the moment.  I have high suspicion that his  CNS issue could be infection was.  I will not get him on steroids or immunosuppression because in my opinion they can deteriorate his case.  I agree with waiting for the C ANCA p-ANCA but I highly doubt there will come back abnormal.      Thank you for your consult. I will follow-up with patient. Please contact us if you have any additional questions.    Sonido Walters MD  Rheumatology  [facility]

## 2022-07-08 NOTE — SUBJECTIVE & OBJECTIVE
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/2022    HTN (hypertension)     Hyperthyroidism     Idiopathic peripheral neuropathy 5/11/2022    Irritable bowel syndrome without diarrhea     Ischemic cardiomyopathy 5/3/2022    Mixed hyperlipidemia 5/11/2022    Other sequelae following unspecified cerebrovascular disease 5/9/2022    Paroxysmal atrial fibrillation     Rheumatoid arthritis 5/11/2022    Rheumatoid arthritis, unspecified     Shingles     Staph aureus infection     Stroke     Thyroiditis, unspecified     Thyrotoxicosis 5/3/2022       Past Surgical History:   Procedure Laterality Date    CATARACT EXTRACTION      CYST REMOVAL Left     TONSILLECTOMY      TOTAL KNEE ARTHROPLASTY      VASECTOMY         Immunization History   Administered Date(s) Administered    COVID-19 Vaccine 03/08/2021, 04/19/2021    Influenza - Quadrivalent - PF *Preferred* (6 months and older) 09/19/2017, 09/19/2018, 10/04/2019, 09/30/2020, 11/02/2021    Influenza - Trivalent (ADULT) 01/07/2016    PPD Test 06/28/2022    Pneumococcal Conjugate - 13 Valent 12/22/2015    Pneumococcal Polysaccharide - 23 Valent 01/17/2017       Review of patient's allergies indicates:   Allergen Reactions    Ace inhibitors Swelling     Lip swelling    Morphine     Morphine sulfate     Nafcillin Itching    Pradaxa [dabigatran etexilate] Other (See Comments)     Abdominal cramping    Sulfamethoxazole Rash     Current Facility-Administered  Medications   Medication Frequency    acetaminophen tablet 650 mg Q4H PRN    acyclovir (ZOVIRAX) 500 mg in dextrose 5 % in water (D5W) 5 % 100 mL IVPB Q8H    albuterol nebulizer solution 2.5 mg Q4H PRN    ciprofloxacin (CIPRO)400mg/200ml D5W IVPB 400 mg Q12H    dextrose 10 % infusion PRN    dextrose 10 % infusion PRN    digoxin tablet 0.25 mg Daily    enoxaparin injection 60 mg BID    glucagon (human recombinant) injection 1 mg PRN    glucose chewable tablet 16 g PRN    glucose chewable tablet 24 g PRN    hydrALAZINE injection 10 mg Q2H PRN    levetiracetam 500 mg/5 mL (5 mL) liquid Soln 500 mg BID    methIMAzole tablet 20 mg TID    metoprolol tartrate (LOPRESSOR) tablet 50 mg BID    miconazole NITRATE 2 % top powder BID    mirtazapine tablet 15 mg QHS    predniSONE tablet 20 mg Daily    sodium chloride 0.9% flush 10 mL Q12H PRN    zinc oxide-cod liver oil 40 % paste BID     Family History       Family history is unknown by patient.          Tobacco Use    Smoking status: Never Smoker    Smokeless tobacco: Never Used   Substance and Sexual Activity    Alcohol use: Never    Drug use: Never    Sexual activity: Yes     Review of Systems   Constitutional:         Patient laying in bed .  Responds only by lifting his left arm when he hears his name.     HENT: Negative.     Eyes: Negative.    Cardiovascular: Negative.    Gastrointestinal: Negative.    Endocrine: Negative.    Musculoskeletal:         The patient is known to have rheumatoid arthritis.  He was treated by Dr. Higgins   Allergic/Immunologic: Negative.    Neurological:         The patient is laying in bed.  He only responds to his name being called by deflating his left arm.   Objective:     Vital Signs (Most Recent):  Temp: 98.9 °F (37.2 °C) (07/08/22 1139)  Pulse: (!) 116 (07/08/22 1139)  Resp: 18 (07/08/22 0300)  BP: (!) 143/83 (07/08/22 1139)  SpO2: 98 % (07/08/22 0300)  O2 Device (Oxygen Therapy): room air (07/08/22 0844)   Vital Signs (24h Range):  Temp:   [97.5 °F (36.4 °C)-99.2 °F (37.3 °C)] 98.9 °F (37.2 °C)  Pulse:  [103-120] 116  Resp:  [18-20] 18  SpO2:  [96 %-100 %] 98 %  BP: (109-143)/(56-92) 143/83     Weight: 62.1 kg (137 lb) (07/07/22 1044)  Body mass index is 18.08 kg/m².  Body surface area is 1.79 meters squared.    No intake or output data in the 24 hours ending 07/08/22 1231    Physical Exam   Constitutional: He appears ill.   Lying in bed, only lift his left arm when I call his name.  Otherwise unresponsive.   HENT:   Head: Normocephalic and atraumatic.   Nose: Nose normal.   Mouth/Throat: Mucous membranes are moist.   Eyes: Pupils are equal, round, and reactive to light.   Cardiovascular: Normal pulses. Tachycardia present.   Pulmonary/Chest: Breath sounds normal.   Abdominal: Soft.   Musculoskeletal:      Cervical back: Neck supple.      Comments: Concerning the joints I did realize mild thickening of the synovial met the MCP PIP and MTP areas.  The patient did not respond by lifting his left arm nor by making any facial sign upon pressure of the mentioned joints.   Neurological:   The patient is only responsive to hearing his name.  He lifts his left arm upon hearing his name.  No other responses were detected during my exam.   Skin: Skin is warm.     Significant Labs:  All pertinent lab results from the last 24 hours have been reviewed.    Significant Imaging:  Imaging results within the past 24 hours have been reviewed.

## 2022-07-09 LAB — POCT GLUCOSE: 116 MG/DL (ref 70–110)

## 2022-07-09 PROCEDURE — 25000003 PHARM REV CODE 250: Performed by: INTERNAL MEDICINE

## 2022-07-09 PROCEDURE — 63600175 PHARM REV CODE 636 W HCPCS: Performed by: INTERNAL MEDICINE

## 2022-07-09 PROCEDURE — 63600175 PHARM REV CODE 636 W HCPCS: Performed by: PHYSICIAN ASSISTANT

## 2022-07-09 PROCEDURE — 11000001 HC ACUTE MED/SURG PRIVATE ROOM

## 2022-07-09 PROCEDURE — 25000003 PHARM REV CODE 250: Performed by: PHYSICIAN ASSISTANT

## 2022-07-09 RX ORDER — METOPROLOL TARTRATE 1 MG/ML
5 INJECTION, SOLUTION INTRAVENOUS EVERY 5 MIN PRN
Status: COMPLETED | OUTPATIENT
Start: 2022-07-09 | End: 2022-07-14

## 2022-07-09 RX ORDER — METOPROLOL TARTRATE 50 MG/1
100 TABLET ORAL 2 TIMES DAILY
Status: DISCONTINUED | OUTPATIENT
Start: 2022-07-09 | End: 2022-07-14 | Stop reason: HOSPADM

## 2022-07-09 RX ORDER — SCOLOPAMINE TRANSDERMAL SYSTEM 1 MG/1
1 PATCH, EXTENDED RELEASE TRANSDERMAL
Status: DISCONTINUED | OUTPATIENT
Start: 2022-07-09 | End: 2022-07-14

## 2022-07-09 RX ORDER — METOPROLOL TARTRATE 25 MG/1
25 TABLET, FILM COATED ORAL ONCE
Status: COMPLETED | OUTPATIENT
Start: 2022-07-09 | End: 2022-07-09

## 2022-07-09 RX ORDER — ACETYLCYSTEINE 200 MG/ML
4 SOLUTION ORAL; RESPIRATORY (INHALATION) 3 TIMES DAILY
Status: DISCONTINUED | OUTPATIENT
Start: 2022-07-09 | End: 2022-07-14 | Stop reason: HOSPADM

## 2022-07-09 RX ADMIN — METOPROLOL TARTRATE 5 MG: 5 INJECTION, SOLUTION INTRAVENOUS at 02:07

## 2022-07-09 RX ADMIN — Medication: at 08:07

## 2022-07-09 RX ADMIN — MICONAZOLE NITRATE 2 % TOPICAL POWDER: at 08:07

## 2022-07-09 RX ADMIN — CIPROFLOXACIN 400 MG: 2 INJECTION, SOLUTION INTRAVENOUS at 06:07

## 2022-07-09 RX ADMIN — METOPROLOL TARTRATE 100 MG: 50 TABLET, FILM COATED ORAL at 08:07

## 2022-07-09 RX ADMIN — ENOXAPARIN SODIUM 60 MG: 100 INJECTION SUBCUTANEOUS at 08:07

## 2022-07-09 RX ADMIN — PREDNISONE 20 MG: 20 TABLET ORAL at 08:07

## 2022-07-09 RX ADMIN — CIPROFLOXACIN 400 MG: 2 INJECTION, SOLUTION INTRAVENOUS at 08:07

## 2022-07-09 RX ADMIN — METOPROLOL TARTRATE 25 MG: 25 TABLET, FILM COATED ORAL at 12:07

## 2022-07-09 RX ADMIN — MIRTAZAPINE 15 MG: 15 TABLET, FILM COATED ORAL at 08:07

## 2022-07-09 RX ADMIN — LEVETIRACETAM 500 MG: 100 SOLUTION ORAL at 08:07

## 2022-07-09 RX ADMIN — ACYCLOVIR SODIUM 500 MG: 500 INJECTION, SOLUTION INTRAVENOUS at 05:07

## 2022-07-09 RX ADMIN — ACYCLOVIR SODIUM 500 MG: 500 INJECTION, SOLUTION INTRAVENOUS at 12:07

## 2022-07-09 RX ADMIN — METHIMAZOLE 20 MG: 10 TABLET ORAL at 08:07

## 2022-07-09 RX ADMIN — SCOPOLAMINE 1 PATCH: 1 PATCH TRANSDERMAL at 03:07

## 2022-07-09 RX ADMIN — METHIMAZOLE 20 MG: 10 TABLET ORAL at 03:07

## 2022-07-09 RX ADMIN — DIGOXIN 0.25 MG: 250 TABLET ORAL at 06:07

## 2022-07-09 RX ADMIN — METOPROLOL TARTRATE 50 MG: 50 TABLET, FILM COATED ORAL at 08:07

## 2022-07-09 NOTE — PROGRESS NOTES
Infectious Diseases Progress Note  71-year-old male with past medical history of paroxysmal AFib on Eliquis, CAD, HTN, hyperthyroidism, cardiomyopathy, CVA/TIA, seizure disorder, rheumatoid arthritis with a recent overall health decline, with CVA in April this year, 2022, then seizures, fevers with diagnosis of pneumonia and progressively worsening confusion, recently diagnosed with meningitis with negative CSF cultures and discharged to [facility] in extended care on 06/28/2022 and is readmitted to [facility] on 07/06/2022.  He has started to have fevers with a documented fever of 102.4 on 07/04 I will 1.7 on 07/05, and associated leukocytosis which on admission 7/6 up to 22.3, worsening mental status.  He has been extensively evaluated with 1 of 2 blood culture sets on 07/05 showing Gram-positive cocci probable Staphylococcus and a follow-up blood cultures on 07/06 negative so far.  Urinalysis is not impressive.  He is anemic with low albumin and prealbumin.  Had a lumbar puncture today 07/07 with CSF WBC 50, 11% neutrophils, 56% mono sites, 33% lymphs, glucose at 36 and protein 82.6.  CSF Gram stain with no bacteria and no WBC and culture pending.  Cryptococcal antigen of the CSF has been noted to be negative and AFB negative on CSF of 6/15.te cardiopulmonary disease.  CT scan of the head without no acute intracranial process.  MRI of the brain shows acute ischemia with involvement of the right MCA territory.   He is currently on Cipro and acyclovir     Subjective:  No new complaints, no fevers, doing about the same.  In no acute distress    ROS  Unable to perform ROS: Mental status change     Review of patient's allergies indicates:   Allergen Reactions    Ace inhibitors Swelling     Lip swelling    Morphine     Morphine sulfate     Nafcillin Itching    Pradaxa [dabigatran etexilate] Other (See Comments)     Abdominal cramping    Sulfamethoxazole Rash       Past Medical History:    Diagnosis Date    Acute left arterial ischemic stroke, KINGA (anterior cerebral artery) 5/3/2022    Acute osteomyelitis 5/11/2022    Atherosclerosis of coronary artery 5/11/2022    Atrial fibrillation 5/3/2022    Benign essential hypertension 5/3/2022    Cardiomyopathy     Coronary artery disease     Diverticulosis     Esophageal reflux 5/11/2022    Fatigue     Focal seizure 5/17/2022    Generalized anxiety disorder     GERD (gastroesophageal reflux disease)     Graves disease     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/2022    HTN (hypertension)     Hyperthyroidism     Idiopathic peripheral neuropathy 5/11/2022    Irritable bowel syndrome without diarrhea     Ischemic cardiomyopathy 5/3/2022    Mixed hyperlipidemia 5/11/2022    Other sequelae following unspecified cerebrovascular disease 5/9/2022    Paroxysmal atrial fibrillation     Rheumatoid arthritis 5/11/2022    Rheumatoid arthritis, unspecified     Shingles     Staph aureus infection     Stroke     Thyroiditis, unspecified     Thyrotoxicosis 5/3/2022       Past Surgical History:   Procedure Laterality Date    CATARACT EXTRACTION      CYST REMOVAL Left     TONSILLECTOMY      TOTAL KNEE ARTHROPLASTY      VASECTOMY         Social History     Socioeconomic History    Marital status:    Tobacco Use    Smoking status: Never Smoker    Smokeless tobacco: Never Used   Substance and Sexual Activity    Alcohol use: Never    Drug use: Never    Sexual activity: Yes         Scheduled Meds:   acyclovir  8 mg/kg Intravenous Q8H    ciprofloxacin  400 mg Intravenous Q12H    digoxin  0.25 mg Oral Daily    enoxaparin  60 mg Subcutaneous BID    levetiracetam  500 mg Per NG tube BID    methIMAzole  20 mg Oral TID    metoprolol tartrate  75 mg Oral BID    miconazole NITRATE 2 %   Topical (Top) BID    mirtazapine  15 mg Oral QHS    predniSONE  20 mg Oral Daily    zinc oxide-cod liver oil   Topical  "(Top) BID     Continuous Infusions:  PRN Meds:acetaminophen, albuterol sulfate, dextrose 10 % in water (D10W), dextrose 10 % in water (D10W), glucagon (human recombinant), glucose, glucose, hydrALAZINE, sodium chloride 0.9%    Objective:  /81   Pulse 72   Temp 98.6 °F (37 °C) (Axillary)   Resp 19   Ht 6' 0.99" (1.854 m)   Wt 62.1 kg (137 lb)   SpO2 (!) 92%   BMI 18.08 kg/m²     Physical Exam:   Physical Exam  Vitals reviewed.   Constitutional:       General: He is not in acute distress.     Appearance: He is ill-appearing.      Comments: Confused   HENT:      Head: Normocephalic and atraumatic.      Nose:      Comments: NG tube in place  Cardiovascular:      Rate and Rhythm: Normal rate and regular rhythm.      Heart sounds: Normal heart sounds.   Pulmonary:      Effort: Pulmonary effort is normal. No respiratory distress.      Breath sounds: Normal breath sounds.   Abdominal:      General: Bowel sounds are normal. There is no distension.      Palpations: Abdomen is soft.      Tenderness: There is no abdominal tenderness.   Musculoskeletal:         General: No deformity.      Cervical back: Neck supple.  For  Skin:     Findings: No erythema or rash.   Neurological:      Comments: Confused and does not adequately follow commands   Psychiatric:      Comments: Non communicative    Imaging      Lab Review   Recent Results (from the past 24 hour(s))   POCT glucose    Collection Time: 07/08/22  4:08 PM   Result Value Ref Range    POCT Glucose 147 (H) 70 - 110 mg/dL   POCT glucose    Collection Time: 07/09/22 11:25 AM   Result Value Ref Range    POCT Glucose 116 (H) 70 - 110 mg/dL     Assessment/Plan:  1. Meningitis with encephalitis/encephalopathy  2. Acute ischemic CVA  3. Leukocytosis with recent high-dose steroids  4. Staphylococcus positive blood culture  5. Recent thyrotoxicosis  6. Anemia  7. Protein calorie malnutrition     -Continue Cipro and D/C Acyclovir  -Low-grade fevers noted and leukocytosis " resolving  -Abnormal CSF analysis consistent meningitis with low glucose predominantly monocytes with negative Gram stain for bacteria could be indicative of a fungal or mycobacterial infection as well as pretreated bacterial meningitis or of non infectious etiology  -The presentation is further complicated by the findings of acute ischemic CVA in the right MCA territory  -CSF meningitis/encephalitis panel (-), CSF HSV and VZV (-) and CMV (-) as well. Follow CSF AFB, fungal cultures, MTB PCR, West Nile and VDRL  -7/5 and 7/6 blood cultures 1 of 2 sets with Staph Epi 1/2 sets  -Neurology is on board with inputs noted.   -Discussed with nursing staff.

## 2022-07-09 NOTE — PLAN OF CARE
Problem: Adult Inpatient Plan of Care  Goal: Absence of Hospital-Acquired Illness or Injury  Intervention: Identify and Manage Fall Risk  Flowsheets (Taken 7/8/2022 2321)  Safety Promotion/Fall Prevention: assistive device/personal item within reach  Intervention: Prevent Skin Injury  Flowsheets (Taken 7/8/2022 2321)  Body Position:   turned   30 degrees  Intervention: Prevent and Manage VTE (Venous Thromboembolism) Risk  Flowsheets (Taken 7/8/2022 2321)  Activity Management:   Arm raise - L1   Rolling - L1  Intervention: Prevent Infection  Flowsheets (Taken 7/8/2022 2321)  Infection Prevention: rest/sleep promoted  Goal: Optimal Comfort and Wellbeing  Intervention: Monitor Pain and Promote Comfort  Flowsheets (Taken 7/8/2022 2321)  Pain Management Interventions: pain management plan reviewed with patient/caregiver  Intervention: Provide Person-Centered Care  Flowsheets (Taken 7/8/2022 2321)  Trust Relationship/Rapport: thoughts/feelings acknowledged  Goal: Readiness for Transition of Care  Intervention: Mutually Develop Transition Plan  Flowsheets (Taken 7/8/2022 2321)  Equipment Currently Used at Home: walker, rolling     Problem: Impaired Wound Healing  Goal: Optimal Wound Healing  Intervention: Promote Wound Healing  Flowsheets (Taken 7/8/2022 2321)  Oral Nutrition Promotion: safe use of adaptive equipment encouraged  Activity Management:   Arm raise - L1   Rolling - L1  Pain Management Interventions: pain management plan reviewed with patient/caregiver     Problem: Skin Injury Risk Increased  Goal: Skin Health and Integrity  Intervention: Optimize Skin Protection  Flowsheets (Taken 7/8/2022 2321)  Head of Bed (HOB) Positioning: HOB at 30-45 degrees  Intervention: Promote and Optimize Oral Intake  Flowsheets (Taken 7/8/2022 2321)  Oral Nutrition Promotion: safe use of adaptive equipment encouraged

## 2022-07-09 NOTE — PROCEDURES
EEG REPORT         DATE OF THE STUDY:    07/8/2022      REFERRING PROVIDER:   Dr Goodwin      REASON FOR THE STUDY:    Confusion      CONDITION OF THE RECORDING:    This is an 19 channel EEG utilizing the 10-20 International System for electrode placement including (18) cephalic and (1) EKG and standard montages.  All impedance were verified and noted to be within standards.  The recording was done for a period of 24 minutes.          DESCRIPTION:  The EEG is characterized by medium amplitude 6 to 7 Hz background activity with excessive diffuse irregular theta and occasional delta range slowing.  Photic stimulation produced no abnormalities.  Definite lateralizing or epileptiform activity did not occur.        IMPRESSION:  Abnormal EEG due to findings consistent with a nonspecific bilateral brain dysfunction as seen with toxic, metabolic or infectious encephalopathy.          Jun Lucio MD

## 2022-07-09 NOTE — PROGRESS NOTES
[facility]ICINE   PROGRESS NOTE      CHIEF COMPLAINT  Hospital follow up    HOSPITAL COURSE  Quan Contreras is a 71 y.o. White male with a past medical history of paroxysmal atrial fibrillation on Eliquis, hypertension, coronary artery disease, cardiomyopathy, hyperthyroidism on methimazole, prior CVA/TIA, seizure disorder and rheumatoid arthritis. In April 2022 patient suffered a stroke. He spent a week in rehab and was discharged home. He then developed seizures and was started on seizure medication. Patient progesively worsened and developed a fever and was diagnosed with pneumonia. Patient improved overall but confusion worsened. He was admitted and diagnosed with meningitis. CSF was negative. He was discharged to [facility] 06/28/2022 for acute encephalopathy secondary to meningitis. TSH was less than 0.0083 with a Free T3 11.61 and free T4 2.68 on 6/29/2022 and Methimazole was increased to 20 mg TID. Yesterday (07/05/2022) patient began running a fever of 101 F. He was started on Vancomycin and Merrem. In addition leukocytosis increased from 11.7 yesterday to 22.3 today (7/6/2022) with worsening mental status. Blood culture from 7/5/2022 returned positive for gram positive cocci in 1 of 2 bottles. He was transferred to [facility] for further neurological workup in ID consult.   Wife and daughter at bedside reports prior to the stroke in April patient was ambulating and completing activities of daily living without assistance and has not returned to baseline. In rehab he was responding to questions appropriately and recognizing family. NG tube was placed a few days ago.      Upon presentation to the ED, patient afebrile and hemodynamically stable.  Labs notable for WBC 22.3, H&H 13.5/39.6, MCV 85, creatinine 0.58, AST 46, ALT 65, TSH less than 0.0083, free T4 2.13.  Influenza A and B and SARS-CoV-2 PCR negative.  UA negative for infection.  Chest  "x-ray negative.  CT head negative for acute intracranial abnormalities.  Patient admitted to hospital medicine services for further medical management.    Today  Seen examined this morning.  He apparently pulled out his NG tube today and may have aspirated.  We will get a chest x-ray for him today.  Otherwise no new neurologic changes.  He just stares and does seem to understand some stuff.         OBJECTIVE/PHYSICAL EXAM  /84   Pulse (!) 114   Temp 97.4 °F (36.3 °C) (Axillary)   Resp 19   Ht 6' 0.99" (1.854 m)   Wt 62.1 kg (137 lb)   SpO2 96%   BMI 18.08 kg/m²   General: In no acute distress, afebrile, significant posterior pharynx gurgling  Chest: Clear to auscultation bilaterally  Heart: S1, S2, no appreciable murmur, tachycardia  Abdomen: Soft, nontender, BS +  MSK: Warm, no lower extremity edema, no clubbing or cyanosis  Neurologic:  Awake and doesn't seem to follow commands      ASSESSMENT/PLAN  Acute encephalopathy-infectious versus metabolic  Encephalitis/meningitis-infectious versus others  Acute ischemic CVA-right MCA vascular territory  Acute hypoxemic respiratory failure  Oropharyngeal dysphagia  Leukocytosis-sepsis vs steroid induced (received solumedrol 80mg early morning July 5)  Thyrotoxicosis  Atrial fibrillation with RVR  Severe protein caloric malnutrition    History of:  Seizures, thyrotoxicosis diffuse goiter, stroke April 2022, HTN, CAD, ischemic cardiomyopathy, rheumatoid arthritis, HLD, atrial fibrillation      Neurology following.  New stroke noted on MRI.  cardioembolic vs vasculitis vs autoimmune vs lymphoma? Was getting Eliquis 5 bid and  at LTAC for the last 7 days so wouldn't think AC failure.    ID following.  Continue Cipro, acyclovir.  Follow up on LP results, CSF lactate elevated.  Negative procalcitonin.  Added on NMDA receptor on the blood and CSF, 14-3-3 protein.  ALFONZO panel.  Atypical workup now as infectious workup previously was negative.  Metoprolol 75 " b.i.d.., methimazole 20 t.i.d. May need to change to coumadin since recurring strokes on NOAC.  FD Lovenox for now.  Increase chronic steroids to 20mg prednisone for stress dose and should also help with thyrotoxocosis.  Hold NG tube feed get CXR today.     Critical care diagnosis:  Acute encephalopathy on chronic with acute stroke and concern for possible encephalitis  Critical care time spent:  35 minutes    Anticipated discharge and disposition:   __________________________________________________________________________    LABS/MICROBIOLOGY/MEDICATIONS/DIAGNOSTICS    LABS  Recent Labs     07/08/22  0534   *   K 3.0*   CHLORIDE 102   CO2 22*   BUN 10.6   CREATININE 0.45*   GLUCOSE 109   CALCIUM 8.8   ALKPHOS 111   AST 48*   ALT 54   ALBUMIN 2.1*     Recent Labs     07/08/22  0533   WBC 11.6*   RBC 4.14*   HCT 36.1*   MCV 87.2          MICROBIOLOGY  Microbiology Results (last 7 days)     Procedure Component Value Units Date/Time    Cerebrospinal Fluid Culture [[id number]] Collected: 07/07/22 1050    Order Status: Completed Specimen: CSF (Spinal Fluid) from Cerebrospinal Fluid Updated: 07/09/22 0837     CULTURE, CSF ([facility]) No Growth At 48 Hours     GRAM STAIN No WBCs, No bacteria seen     Blood culture #2 **CANNOT BE ORDERED STAT** [[id number]]  (Normal) Collected: 07/06/22 1322    Order Status: Completed Specimen: Blood from Arm, Left Updated: 07/08/22 1501     CULTURE, BLOOD ([facility]) No Growth At 48 Hours    Blood culture #1 **CANNOT BE ORDERED STAT** [[id number]]  (Abnormal) Collected: 07/06/22 1326    Order Status: Completed Specimen: Blood from PICC Line Updated: 07/08/22 1014     GRAM STAIN Gram Positive Cocci, probable Staphylococcus      Seen in gram stain of broth only      1 of 2 Aerobic bottles positive     Fungal Culture [[id number]]     Order Status: Sent Specimen: CSF (Spinal Fluid) from Cerebrospinal Fluid     Blood Culture [[id number]]     Order Status: Canceled Specimen: Blood     Blood Culture  [[id number]]     Order Status: Canceled Specimen: Blood           MEDICATIONS   acyclovir  8 mg/kg Intravenous Q8H    ciprofloxacin  400 mg Intravenous Q12H    digoxin  0.25 mg Oral Daily    enoxaparin  60 mg Subcutaneous BID    levetiracetam  500 mg Per NG tube BID    methIMAzole  20 mg Oral TID    metoprolol tartrate  50 mg Oral BID    miconazole NITRATE 2 %   Topical (Top) BID    mirtazapine  15 mg Oral QHS    predniSONE  20 mg Oral Daily    zinc oxide-cod liver oil   Topical (Top) BID      INFUSIONS      DIAGNOSTIC TESTS  XR Non-Rad Performed NG/Gastric Tube Check   Final Result      Nasogastric tube as above         Electronically signed by: De Conte   Date:    07/08/2022   Time:    12:31      US Thyroid   Final Result      Thyromegaly with heterogeneous gland without discrete thyroid nodule..      Reference: ACR Thyroid Imaging, Reporting and Data System (TI-RADS): White Paper of the ACR TI-RADS Committee.  2017.         Electronically signed by: Lisa Soto   Date:    07/08/2022   Time:    11:55      XR Non-Rad Performed NG/Gastric Tube Check   Final Result      XR Non-Rad Performed NG/Gastric Tube Check   Final Result      Enteric tube in the airway.      Finding relayed to patient's nurse Alice Dickey at the time of this dictation.         Electronically signed by: Lisa Soto   Date:    07/07/2022   Time:    16:28      X-Ray Chest 1 View   Final Result      No acute cardiopulmonary abnormality.         Electronically signed by: Lisa Soto   Date:    07/07/2022   Time:    12:02      FL LUMBAR PUNCTURE DIAGNOSTIC WITH IMAGING   Final Result      Technically successful fluoroscopic guided lumbar puncture.      Opening pressure = less than 10 cm H2O      CSF drained = 6.5 cc      Closing pressure = less than 10 cm H2O         Electronically signed by: Michel Ge   Date:    07/07/2022   Time:    13:37      MRI Brain Limited Without Contrast   Final Result      Motion  degraded exam.      In spite of this limitation:      1. Acute cortical based ischemia in the right MCA vascular territory as exemplified by a right middle frontal 2.9 x 1.9 cm focus.   2. Extra-axial diffusion restriction about the falx persists but has decreased, with similar findings about the cerebral convexities, again suspicious for meningitis/infection.         Electronically signed by: Michel Ge   Date:    07/06/2022   Time:    18:31      CT Head Without Contrast   Final Result      No acute intracranial abnormalities.         Electronically signed by: Michel Ge   Date:    07/06/2022   Time:    14:13      X-Ray Chest 1 View   Final Result      NO ACUTE CARDIOPULMONARY PROCESS IDENTIFIED.         Electronically signed by: Ethan Molina   Date:    07/06/2022   Time:    13:17               All diagnosis and differential diagnosis have been reviewed; assessment and plan has been documented. I have personally reviewed the labs and test results that are presently available; I have reviewed the patients medication list. I have reviewed the consulting providers response and recommendations. I have reviewed or attempted to review medical records based upon their availability.  All of the patient's questions have been addressed and answered. Patient's is agreeable to the above stated plan. I will continue to monitor closely and make adjustments to medical management as needed.  This document was created using M*Modal Fluency Direct.  Transcription errors may have been made.  Please contact me if any questions may rise regarding documentation to clarify verbiage.        Cyril Goodwin MD   07/09/2022   Internal Medicine

## 2022-07-09 NOTE — PROGRESS NOTES
Infectious Diseases Progress Note  71-year-old male with past medical history of paroxysmal AFib on Eliquis, CAD, HTN, hyperthyroidism, cardiomyopathy, CVA/TIA, seizure disorder, rheumatoid arthritis with a recent overall health decline, with CVA in April this year, 2022, then seizures, fevers with diagnosis of pneumonia and progressively worsening confusion, recently diagnosed with meningitis with negative CSF cultures and discharged to [facility] in extended care on 06/28/2022 and is readmitted to [facility] on 07/06/2022.  He has started to have fevers with a documented fever of 102.4 on 07/04 I will 1.7 on 07/05, and associated leukocytosis which on admission 7/6 up to 22.3, worsening mental status.  He has been extensively evaluated with 1 of 2 blood culture sets on 07/05 showing Gram-positive cocci probable Staphylococcus and a follow-up blood cultures on 07/06 negative so far.  Urinalysis is not impressive.  He is anemic with low albumin and prealbumin.  Had a lumbar puncture today 07/07 with CSF WBC 50, 11% neutrophils, 56% mono sites, 33% lymphs, glucose at 36 and protein 82.6.  CSF Gram stain with no bacteria and no WBC and culture pending.  Cryptococcal antigen of the CSF has been noted to be negative and AFB negative on CSF of 6/15.te cardiopulmonary disease.  CT scan of the head without no acute intracranial process.  MRI of the brain shows acute ischemia with involvement of the right MCA territory.   He is currently on Cipro and acyclovir    Subjective:  No new complaints, no fevers, doing about the same.  In no acute distress      Past Medical History:   Diagnosis Date    Acute left arterial ischemic stroke, KINGA (anterior cerebral artery) 5/3/2022    Acute osteomyelitis 5/11/2022    Atherosclerosis of coronary artery 5/11/2022    Atrial fibrillation 5/3/2022    Benign essential hypertension 5/3/2022    Cardiomyopathy     Coronary artery disease     Diverticulosis      Esophageal reflux 5/11/2022    Fatigue     Focal seizure 5/17/2022    Generalized anxiety disorder     GERD (gastroesophageal reflux disease)     Graves disease     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/2022    HTN (hypertension)     Hyperthyroidism     Idiopathic peripheral neuropathy 5/11/2022    Irritable bowel syndrome without diarrhea     Ischemic cardiomyopathy 5/3/2022    Mixed hyperlipidemia 5/11/2022    Other sequelae following unspecified cerebrovascular disease 5/9/2022    Paroxysmal atrial fibrillation     Rheumatoid arthritis 5/11/2022    Rheumatoid arthritis, unspecified     Shingles     Staph aureus infection     Stroke     Thyroiditis, unspecified     Thyrotoxicosis 5/3/2022     Past Surgical History:   Procedure Laterality Date    CATARACT EXTRACTION      CYST REMOVAL Left     TONSILLECTOMY      TOTAL KNEE ARTHROPLASTY      VASECTOMY       Social History     Socioeconomic History    Marital status:    Tobacco Use    Smoking status: Never Smoker    Smokeless tobacco: Never Used   Substance and Sexual Activity    Alcohol use: Never    Drug use: Never    Sexual activity: Yes       Review of Systems   Unable to perform ROS: Mental status change       Review of patient's allergies indicates:   Allergen Reactions    Ace inhibitors Swelling     Lip swelling    Morphine     Morphine sulfate     Nafcillin Itching    Pradaxa [dabigatran etexilate] Other (See Comments)     Abdominal cramping    Sulfamethoxazole Rash         Scheduled Meds:   acyclovir  8 mg/kg Intravenous Q8H    ciprofloxacin  400 mg Intravenous Q12H    digoxin  0.25 mg Oral Daily    enoxaparin  60 mg Subcutaneous BID    levetiracetam  500 mg Per NG tube BID    methIMAzole  20 mg Oral TID    metoprolol tartrate  50 mg Oral BID    miconazole NITRATE 2 %   Topical (Top) BID    mirtazapine  15 mg Oral QHS    potassium bicarbonate  25 mEq Oral Q4H    predniSONE  20  "mg Oral Daily    zinc oxide-cod liver oil   Topical (Top) BID     Continuous Infusions:  PRN Meds:acetaminophen, albuterol sulfate, dextrose 10 % in water (D10W), dextrose 10 % in water (D10W), glucagon (human recombinant), glucose, glucose, hydrALAZINE, sodium chloride 0.9%    Objective:  /79   Pulse 99   Temp 98.3 °F (36.8 °C) (Axillary)   Resp 18   Ht 6' 0.99" (1.854 m)   Wt 62.1 kg (137 lb)   SpO2 95%   BMI 18.08 kg/m²     Physical Exam:   Physical Exam  Vitals reviewed.   Constitutional:       General: He is not in acute distress.     Appearance: He is ill-appearing.      Comments: Confused   HENT:      Head: Normocephalic and atraumatic.      Nose:      Comments: NG tube in place  Cardiovascular:      Rate and Rhythm: Normal rate and regular rhythm.      Heart sounds: Normal heart sounds.   Pulmonary:      Effort: Pulmonary effort is normal. No respiratory distress.      Breath sounds: Normal breath sounds.   Abdominal:      General: Bowel sounds are normal. There is no distension.      Palpations: Abdomen is soft.      Tenderness: There is no abdominal tenderness.   Musculoskeletal:         General: No deformity.      Cervical back: Neck supple.  For  Skin:     Findings: No erythema or rash.   Neurological:      Comments: Confused and does not adequately follow commands   Psychiatric:      Comments: Noncommunicative  a    Imaging  Imaging Results          X-Ray Chest 1 View (Final result)  Result time 07/07/22 12:02:15    Final result by Lisa Soto MD (07/07/22 12:02:15)                 Impression:      No acute cardiopulmonary abnormality.      Electronically signed by: Lisa Soto  Date:    07/07/2022  Time:    12:02             Narrative:    EXAMINATION:  XR CHEST 1 VIEW    CLINICAL HISTORY:  aspiration;    TECHNIQUE:  Single frontal view of the chest was performed.    COMPARISON:  07/06/2022    FINDINGS:  LINES AND TUBES: Enteric tube courses below the " "diaphragm.    MEDIASTINUM AND JOJO: The cardiac silhouette is normal. EKG/telemetry leads overlie the chest.    LUNGS: No lobar consolidation. No edema.    PLEURA:No pleural effusion. No pneumothorax.    BONES: No acute osseous abnormality.                               FL LUMBAR PUNCTURE DIAGNOSTIC WITH IMAGING (Final result)  Result time 07/07/22 13:37:48    Final result by Michel Ge MD (07/07/22 13:37:48)                 Impression:      Technically successful fluoroscopic guided lumbar puncture.    Opening pressure = less than 10 cm H2O    CSF drained = 6.5 cc    Closing pressure = less than 10 cm H2O      Electronically signed by: Michel Ge  Date:    07/07/2022  Time:    13:37             Narrative:    EXAMINATION:  FL LUMBAR PUNCTURE DIAGNOSTIC WITH IMAGING    CLINICAL HISTORY:  history of meningitis;    TECHNIQUE:  A preprocedure "Time-out" was performed by the radiologist and radiology technologist confirming patient identification and procedure location. The patient was brought into the interventional radiology suite and placed in the prone position on the fluoroscopy table. A nikki was placed one the patient's back overlying the right L3-L4 intralaminar space. The patient's back was then prepped and draped in the usual aseptic fashion. 1% plain lidocaine was used to anesthetize the skin and subcutaneous tissues to deep to the previously made nikki. Under fluoroscopic guidance, a 22 gauge spinal needle was used to access the thecal sac on the first pass with immediate return of clear CSF.  Opening pressure was less than 10 cm H2O.  Approximately 6.5 ml of CSF was drained and submitted to the laboratory for analysis.  Closing pressure was less than 10 cm H2O.  The needle was removed, and the puncture site was aseptically bandage. There were no immediate complaints or complications.    Total fluoroscopic time: 6 seconds    Total # images: 1                               MRI Brain Limited Without " Contrast (Final result)  Result time 07/06/22 18:31:13   Procedure changed from MRI Brain W WO Contrast     Final result by Michel Ge MD (07/06/22 18:31:13)                 Impression:      Motion degraded exam.    In spite of this limitation:    1. Acute cortical based ischemia in the right MCA vascular territory as exemplified by a right middle frontal 2.9 x 1.9 cm focus.  2. Extra-axial diffusion restriction about the falx persists but has decreased, with similar findings about the cerebral convexities, again suspicious for meningitis/infection.      Electronically signed by: Michel Ge  Date:    07/06/2022  Time:    18:31             Narrative:    EXAMINATION:  MRI BRAIN LIMITED WITHOUT CONTRAST    CLINICAL HISTORY:  Mental status change, unknown cause;Mental status change;;    TECHNIQUE:  Limited multisequence MR images of the brain were obtained WITHOUT the administration of intravenous contrast.    Axial DWI    Axial T2 FLAIR    COMPARISON:  Head CT earlier the same day.    Brain MRI 06/15/2022.                               CT Head Without Contrast (Final result)  Result time 07/06/22 14:13:35    Final result by Mcihel Ge MD (07/06/22 14:13:35)                 Impression:      No acute intracranial abnormalities.      Electronically signed by: Michel Ge  Date:    07/06/2022  Time:    14:13             Narrative:    EXAMINATION:  CT HEAD WITHOUT CONTRAST    CLINICAL HISTORY:  Mental status change, unknown cause;    TECHNIQUE:  Axial scans were obtained from skull base to the vertex.    Coronal and sagittal reconstructions obtained from the axial data.    Automatic exposure control was utilized to limit radiation dose.    Contrast: None    Radiation Dose:    Total DLP: 1097 mGy*cm    COMPARISON:  Brain MRI 06/15/2022    FINDINGS:  Scalp/Skull:    No abnormalities.    Brain sulci: Appropriate for patient's age.    Ventricles: Normal in size and configuration. No  hydrocephalus.    Extra-axial spaces:    No masses or fluid collections.    Parenchyma:    Bifrontal foci of cortical based encephalomalacia compatible with remote insults.    Mild-moderate chronic microangiopathy in the supratentorial white matter.    No mass, hemorrhage or CT evidence of an acute vascular insult.    Dural sinuses: No abnormal densities.    Sellar/Suprasellar region: No abnormalities.    Skull base and Craniocervical junction: No abnormalities.    Incidental findings:    Carotid siphon and vertebrobasilar atherosclerotic vascular calcifications.    Status post bilateral cataract surgery.    Partially visualized nasoenteric catheter.                               X-Ray Chest 1 View (Final result)  Result time 07/06/22 13:17:46    Final result by Ethan Molina MD (07/06/22 13:17:46)                 Impression:      NO ACUTE CARDIOPULMONARY PROCESS IDENTIFIED.      Electronically signed by: Ethan Molina  Date:    07/06/2022  Time:    13:17             Narrative:    EXAMINATION:  XR CHEST 1 VIEW    CLINICAL HISTORY:  Fever, unspecified    TECHNIQUE:  One view    COMPARISON:  July 1, 2022.    FINDINGS:  Cardiopericardial silhouette is within normal limits.  No acute dense focal or segmental consolidation, congestive process, pleural effusions or pneumothorax.  Nasogastric tube extends into the gastric fundus.                                 Lab Review   Recent Results (from the past 24 hour(s))   SYPHILIS ANTIBODY (WITH REFLEX RPR)    Collection Time: 07/08/22  5:33 AM   Result Value Ref Range    Syphilis Antibody Nonreactive Nonreactive, Equivocal   CBC with Differential    Collection Time: 07/08/22  5:33 AM   Result Value Ref Range    WBC 11.6 (H) 4.5 - 11.5 x10(3)/mcL    RBC 4.14 (L) 4.70 - 6.10 x10(6)/mcL    Hgb 11.7 (L) 14.0 - 18.0 gm/dL    Hct 36.1 (L) 42.0 - 52.0 %    MCV 87.2 80.0 - 94.0 fL    MCH 28.3 27.0 - 31.0 pg    MCHC 32.4 (L) 33.0 - 36.0 mg/dL    RDW 15.8 11.5 - 17.0 %    Platelet 260  130 - 400 x10(3)/mcL    MPV 10.8 (H) 7.4 - 10.4 fL    Neut % 65.9 %    Lymph % 23.9 %    Mono % 9.0 %    Eos % 0.8 %    Basophil % 0.1 %    Lymph # 2.78 0.6 - 4.6 x10(3)/mcL    Neut # 7.7 2.1 - 9.2 x10(3)/mcL    Mono # 1.05 0.1 - 1.3 x10(3)/mcL    Eos # 0.09 0 - 0.9 x10(3)/mcL    Baso # 0.01 0 - 0.2 x10(3)/mcL    IG# 0.03 0 - 0.04 x10(3)/mcL    IG% 0.3 %    NRBC% 0.0 %   Comprehensive Metabolic Panel    Collection Time: 07/08/22  5:34 AM   Result Value Ref Range    Sodium Level 135 (L) 136 - 145 mmol/L    Potassium Level 3.0 (L) 3.5 - 5.1 mmol/L    Chloride 102 98 - 107 mmol/L    Carbon Dioxide 22 (L) 23 - 31 mmol/L    Glucose Level 109 82 - 115 mg/dL    Blood Urea Nitrogen 10.6 8.4 - 25.7 mg/dL    Creatinine 0.45 (L) 0.73 - 1.18 mg/dL    Calcium Level Total 8.8 8.8 - 10.0 mg/dL    Protein Total 5.9 5.8 - 7.6 gm/dL    Albumin Level 2.1 (L) 3.4 - 4.8 gm/dL    Globulin 3.8 (H) 2.4 - 3.5 gm/dL    Albumin/Globulin Ratio 0.6 (L) 1.1 - 2.0 ratio    Bilirubin Total 0.9 <=1.5 mg/dL    Alkaline Phosphatase 111 40 - 150 unit/L    Alanine Aminotransferase 54 0 - 55 unit/L    Aspartate Aminotransferase 48 (H) 5 - 34 unit/L    Estimated GFR-Non  >60 mls/min/1.73/m2   POCT glucose    Collection Time: 07/08/22 10:58 AM   Result Value Ref Range    POCT Glucose 114 (H) 70 - 110 mg/dL   POCT glucose    Collection Time: 07/08/22  4:08 PM   Result Value Ref Range    POCT Glucose 147 (H) 70 - 110 mg/dL         Assessment/Plan:  1. Meningitis with encephalitis/encephalopathy  2. Acute ischemic CVA  3. Leukocytosis with recent high-dose steroids  4. Staphylococcus positive blood culture  5. Recent thyrotoxicosis  6. Anemia  7. Protein calorie malnutrition     -Continue acyclovir and Cipro  -Low-grade fevers noted and leukocytosis trending down  - Abnormal CSF analysis consistent meningitis with low glucose predominantly monocytes with negative Gram stain for bacteria could be indicative of a fungal or mycobacterial  infection as well as pretreated bacterial meningitis or of non infectious etiology  - The presentation is further complicated by the findings of acute ischemic CVA in the right MCA territory  -Follow CSF meningitis/encephalitis panel as well as AFB, fungal cultures, MTB PCR, HSV, VZV, CMV, West Nile and VDRL  -7/5 and 7/6 blood cultures 1 of 2 sets with GPC probable staphylococcus, seems to be drawn from PICC line with questionable clinical significance. Follow final cultures  -Neurology is on board with inputs noted.   -Discussed with nursing staff.

## 2022-07-10 LAB
ALBUMIN SERPL-MCNC: 2.3 GM/DL (ref 3.4–4.8)
ALBUMIN/GLOB SERPL: 0.6 RATIO (ref 1.1–2)
ALP SERPL-CCNC: 111 UNIT/L (ref 40–150)
ALT SERPL-CCNC: 72 UNIT/L (ref 0–55)
AR ANA INTERPRETIVE COMMENT: ABNORMAL
AR ANA PATTERN: ABNORMAL
AR ANA TITER: ABNORMAL
AR ANTINUCLEAR ANTIBODY (ANA), HEP-2, IGG: DETECTED
AST SERPL-CCNC: 52 UNIT/L (ref 5–34)
BASOPHILS # BLD AUTO: 0.03 X10(3)/MCL (ref 0–0.2)
BASOPHILS NFR BLD AUTO: 0.2 %
BILIRUBIN DIRECT+TOT PNL SERPL-MCNC: 1 MG/DL
BUN SERPL-MCNC: 9.5 MG/DL (ref 8.4–25.7)
CALCIUM SERPL-MCNC: 9 MG/DL (ref 8.8–10)
CHLORIDE SERPL-SCNC: 104 MMOL/L (ref 98–107)
CO2 SERPL-SCNC: 20 MMOL/L (ref 23–31)
CREAT SERPL-MCNC: 0.45 MG/DL (ref 0.73–1.18)
EOSINOPHIL # BLD AUTO: 0.06 X10(3)/MCL (ref 0–0.9)
EOSINOPHIL NFR BLD AUTO: 0.5 %
ERYTHROCYTE [DISTWIDTH] IN BLOOD BY AUTOMATED COUNT: 15.7 % (ref 11.5–17)
GLOBULIN SER-MCNC: 3.7 GM/DL (ref 2.4–3.5)
GLUCOSE SERPL-MCNC: 107 MG/DL (ref 82–115)
HCT VFR BLD AUTO: 41.5 % (ref 42–52)
HGB BLD-MCNC: 13.5 GM/DL (ref 14–18)
IMM GRANULOCYTES # BLD AUTO: 0.07 X10(3)/MCL (ref 0–0.04)
IMM GRANULOCYTES NFR BLD AUTO: 0.6 %
LYMPHOCYTES # BLD AUTO: 3.02 X10(3)/MCL (ref 0.6–4.6)
LYMPHOCYTES NFR BLD AUTO: 24.4 %
MAGNESIUM SERPL-MCNC: 1.6 MG/DL (ref 1.6–2.6)
MCH RBC QN AUTO: 28.6 PG (ref 27–31)
MCHC RBC AUTO-ENTMCNC: 32.5 MG/DL (ref 33–36)
MCV RBC AUTO: 87.9 FL (ref 80–94)
MONOCYTES # BLD AUTO: 1.12 X10(3)/MCL (ref 0.1–1.3)
MONOCYTES NFR BLD AUTO: 9.1 %
NEUTROPHILS # BLD AUTO: 8.1 X10(3)/MCL (ref 2.1–9.2)
NEUTROPHILS NFR BLD AUTO: 65.2 %
NMDAR IGG TITR CSF IF: NORMAL {TITER}
NRBC BLD AUTO-RTO: 0 %
PHOSPHATE SERPL-MCNC: 3.2 MG/DL (ref 2.3–4.7)
PLATELET # BLD AUTO: 310 X10(3)/MCL (ref 130–400)
PMV BLD AUTO: 10.2 FL (ref 7.4–10.4)
POTASSIUM SERPL-SCNC: 4.1 MMOL/L (ref 3.5–5.1)
PROT SERPL-MCNC: 6 GM/DL (ref 5.8–7.6)
RBC # BLD AUTO: 4.72 X10(6)/MCL (ref 4.7–6.1)
SODIUM SERPL-SCNC: 135 MMOL/L (ref 136–145)
T4 FREE SERPL-MCNC: 2.01 NG/DL (ref 0.7–1.48)
WBC # SPEC AUTO: 12.4 X10(3)/MCL (ref 4.5–11.5)

## 2022-07-10 PROCEDURE — 94640 AIRWAY INHALATION TREATMENT: CPT

## 2022-07-10 PROCEDURE — 25000242 PHARM REV CODE 250 ALT 637 W/ HCPCS: Performed by: INTERNAL MEDICINE

## 2022-07-10 PROCEDURE — 11000001 HC ACUTE MED/SURG PRIVATE ROOM

## 2022-07-10 PROCEDURE — 83735 ASSAY OF MAGNESIUM: CPT | Performed by: INTERNAL MEDICINE

## 2022-07-10 PROCEDURE — 36415 COLL VENOUS BLD VENIPUNCTURE: CPT | Performed by: INTERNAL MEDICINE

## 2022-07-10 PROCEDURE — 25000003 PHARM REV CODE 250: Performed by: INTERNAL MEDICINE

## 2022-07-10 PROCEDURE — 84100 ASSAY OF PHOSPHORUS: CPT | Performed by: INTERNAL MEDICINE

## 2022-07-10 PROCEDURE — 84439 ASSAY OF FREE THYROXINE: CPT | Performed by: INTERNAL MEDICINE

## 2022-07-10 PROCEDURE — 94761 N-INVAS EAR/PLS OXIMETRY MLT: CPT

## 2022-07-10 PROCEDURE — 63600175 PHARM REV CODE 636 W HCPCS: Performed by: INTERNAL MEDICINE

## 2022-07-10 PROCEDURE — 85025 COMPLETE CBC W/AUTO DIFF WBC: CPT | Performed by: INTERNAL MEDICINE

## 2022-07-10 PROCEDURE — 80053 COMPREHEN METABOLIC PANEL: CPT | Performed by: INTERNAL MEDICINE

## 2022-07-10 RX ORDER — METRONIDAZOLE 500 MG/1
500 TABLET ORAL 3 TIMES DAILY
Status: DISCONTINUED | OUTPATIENT
Start: 2022-07-10 | End: 2022-07-12

## 2022-07-10 RX ADMIN — Medication: at 08:07

## 2022-07-10 RX ADMIN — PREDNISONE 20 MG: 20 TABLET ORAL at 08:07

## 2022-07-10 RX ADMIN — Medication: at 09:07

## 2022-07-10 RX ADMIN — METRONIDAZOLE 500 MG: 500 TABLET ORAL at 09:07

## 2022-07-10 RX ADMIN — DIGOXIN 0.25 MG: 250 TABLET ORAL at 05:07

## 2022-07-10 RX ADMIN — METRONIDAZOLE 500 MG: 500 TABLET ORAL at 03:07

## 2022-07-10 RX ADMIN — ENOXAPARIN SODIUM 60 MG: 100 INJECTION SUBCUTANEOUS at 08:07

## 2022-07-10 RX ADMIN — LEVETIRACETAM 500 MG: 100 SOLUTION ORAL at 08:07

## 2022-07-10 RX ADMIN — MICONAZOLE NITRATE 2 % TOPICAL POWDER: at 09:07

## 2022-07-10 RX ADMIN — CIPROFLOXACIN 400 MG: 2 INJECTION, SOLUTION INTRAVENOUS at 07:07

## 2022-07-10 RX ADMIN — METOPROLOL TARTRATE 100 MG: 50 TABLET, FILM COATED ORAL at 08:07

## 2022-07-10 RX ADMIN — CIPROFLOXACIN 400 MG: 2 INJECTION, SOLUTION INTRAVENOUS at 06:07

## 2022-07-10 RX ADMIN — ACETYLCYSTEINE 4 ML: 200 SOLUTION ORAL; RESPIRATORY (INHALATION) at 08:07

## 2022-07-10 RX ADMIN — METHIMAZOLE 20 MG: 10 TABLET ORAL at 03:07

## 2022-07-10 RX ADMIN — METHIMAZOLE 20 MG: 10 TABLET ORAL at 08:07

## 2022-07-10 RX ADMIN — MICONAZOLE NITRATE 2 % TOPICAL POWDER: at 08:07

## 2022-07-10 RX ADMIN — ALBUTEROL SULFATE 2.5 MG: 2.5 SOLUTION RESPIRATORY (INHALATION) at 08:07

## 2022-07-10 RX ADMIN — MIRTAZAPINE 15 MG: 15 TABLET, FILM COATED ORAL at 08:07

## 2022-07-10 RX ADMIN — METRONIDAZOLE 500 MG: 500 TABLET ORAL at 08:07

## 2022-07-10 NOTE — PLAN OF CARE
Problem: Adult Inpatient Plan of Care  Goal: Plan of Care Review  Outcome: Ongoing, Progressing  Goal: Patient-Specific Goal (Individualized)  Outcome: Ongoing, Progressing  Goal: Absence of Hospital-Acquired Illness or Injury  Outcome: Ongoing, Progressing  Goal: Optimal Comfort and Wellbeing  Outcome: Ongoing, Progressing  Goal: Readiness for Transition of Care  Outcome: Ongoing, Progressing     Problem: Infection  Goal: Absence of Infection Signs and Symptoms  Outcome: Ongoing, Progressing     Problem: Impaired Wound Healing  Goal: Optimal Wound Healing  Outcome: Ongoing, Progressing     Problem: Skin Injury Risk Increased  Goal: Skin Health and Integrity  Outcome: Ongoing, Progressing

## 2022-07-10 NOTE — PLAN OF CARE
Problem: Adult Inpatient Plan of Care  Goal: Plan of Care Review  Outcome: Ongoing, Progressing  Goal: Patient-Specific Goal (Individualized)  Outcome: Ongoing, Progressing  Goal: Absence of Hospital-Acquired Illness or Injury  Outcome: Ongoing, Progressing  Intervention: Identify and Manage Fall Risk  Flowsheets (Taken 7/9/2022 2156)  Safety Promotion/Fall Prevention:   assistive device/personal item within reach   side rails raised x 2  Intervention: Prevent Skin Injury  Flowsheets (Taken 7/9/2022 2156)  Body Position:   turned   head facing, left  Skin Protection:   mittens applied to hands   hydrocolloids used  Intervention: Prevent and Manage VTE (Venous Thromboembolism) Risk  Flowsheets (Taken 7/9/2022 2156)  Activity Management: Arm raise - L1  VTE Prevention/Management: remove, assess skin, and reapply foot pump  Intervention: Prevent Infection  Flowsheets (Taken 7/9/2022 2156)  Infection Prevention: rest/sleep promoted  Goal: Optimal Comfort and Wellbeing  Outcome: Ongoing, Progressing  Intervention: Monitor Pain and Promote Comfort  Flowsheets (Taken 7/9/2022 2156)  Pain Management Interventions:   pillow support provided   position adjusted   care clustered  Intervention: Provide Person-Centered Care  Flowsheets (Taken 7/9/2022 2156)  Trust Relationship/Rapport: thoughts/feelings acknowledged  Goal: Readiness for Transition of Care  Outcome: Ongoing, Progressing  Intervention: Mutually Develop Transition Plan  Flowsheets (Taken 7/9/2022 2156)  Equipment Currently Used at Home: none     Problem: Infection  Goal: Absence of Infection Signs and Symptoms  Outcome: Ongoing, Progressing  Intervention: Prevent or Manage Infection  Flowsheets (Taken 7/9/2022 2156)  Isolation Precautions: droplet     Problem: Impaired Wound Healing  Goal: Optimal Wound Healing  Outcome: Ongoing, Progressing  Intervention: Promote Wound Healing  Flowsheets (Taken 7/9/2022 2156)  Oral Nutrition Promotion: safe use of adaptive  equipment encouraged  Activity Management: Arm raise - L1  Pain Management Interventions:   pillow support provided   position adjusted   care clustered     Problem: Skin Injury Risk Increased  Goal: Skin Health and Integrity  Outcome: Ongoing, Progressing  Intervention: Promote and Optimize Oral Intake  Flowsheets (Taken 7/9/2022 2156)  Oral Nutrition Promotion: safe use of adaptive equipment encouraged

## 2022-07-10 NOTE — PROGRESS NOTES
[facility] MEDICINE   PROGRESS NOTE      CHIEF COMPLAINT  Hospital follow up    HOSPITAL COURSE  Quan Contreras is a 71 y.o. White male with a past medical history of paroxysmal atrial fibrillation on Eliquis, hypertension, coronary artery disease, cardiomyopathy, hyperthyroidism on methimazole, prior CVA/TIA, seizure disorder and rheumatoid arthritis. In April 2022 patient suffered a stroke. He spent a week in rehab and was discharged home. He then developed seizures and was started on seizure medication. Patient progesively worsened and developed a fever and was diagnosed with pneumonia. Patient improved overall but confusion worsened. He was admitted and diagnosed with meningitis. CSF was negative. He was discharged to [facility] 06/28/2022 for acute encephalopathy secondary to meningitis. TSH was less than 0.0083 with a Free T3 11.61 and free T4 2.68 on 6/29/2022 and Methimazole was increased to 20 mg TID. Yesterday (07/05/2022) patient began running a fever of 101 F. He was started on Vancomycin and Merrem. In addition leukocytosis increased from 11.7 yesterday to 22.3 today (7/6/2022) with worsening mental status. Blood culture from 7/5/2022 returned positive for gram positive cocci in 1 of 2 bottles. He was transferred to [facility] for further neurological workup in ID consult.   Wife and daughter at bedside reports prior to the stroke in April patient was ambulating and completing activities of daily living without assistance and has not returned to baseline. In rehab he was responding to questions appropriately and recognizing family. NG tube was placed a few days ago.      Upon presentation to the ED, patient afebrile and hemodynamically stable.  Labs notable for WBC 22.3, H&H 13.5/39.6, MCV 85, creatinine 0.58, AST 46, ALT 65, TSH less than 0.0083, free T4 2.13.  Influenza A and B and SARS-CoV-2 PCR negative.  UA negative for infection.  Chest  "x-ray negative.  CT head negative for acute intracranial abnormalities.  Patient admitted to hospital medicine services for further medical management.    Today  Seen examined this morning.  Apparently last night he was following commands and asked to have his mouth suctioned per nurse report to me.  This morning he would follow some commands but not consistently.  I did order some fungal cultures on the blood as well this morning.  So far cultures are negative.  CT done yesterday of chest abdomen and pelvis noting tracheal secretion an aspiration pneumonitis bilateral lower lobes        OBJECTIVE/PHYSICAL EXAM  /69   Pulse 73   Temp 98.6 °F (37 °C) (Axillary)   Resp 18   Ht 6' 0.99" (1.854 m)   Wt 62.1 kg (137 lb)   SpO2 98%   BMI 18.08 kg/m²   General: In no acute distress, afebrile  Chest:  Bibasilar coarse crackle  Heart: S1, S2, no appreciable murmur  Abdomen: Soft, nontender, BS +  MSK: Warm, no lower extremity edema, no clubbing or cyanosis  Neurologic:  Awake follows commands intermittently      ASSESSMENT/PLAN  Acute encephalopathy-infectious versus metabolic  Encephalitis/meningitis-infectious versus others  Acute ischemic CVA-right MCA vascular territory  Acute hypoxemic respiratory failure  Bibasilar aspiration pneumonia  Oropharyngeal dysphagia  Steroid induced leukocytosis  Thyrotoxicosis  Atrial fibrillation with RVR  Severe protein caloric malnutrition    History of:  Seizures, thyrotoxicosis diffuse goiter, stroke April 2022, HTN, CAD, ischemic cardiomyopathy, rheumatoid arthritis, HLD, atrial fibrillation      Neurology following.  Pending evaluation, has not been seen since the 7th.  Was getting Eliquis 5 bid and  at LTAC for the last 7 days so wouldn't think AC failure stroke.    ID following.  Continue Cipro, acyclovir.  Follow up on LP results, CSF lactate elevated could be from stroke.  Negative procalcitonin.  Added Flagyl for aspiration pneumonia.  Follow-up fungal CSF " culture and fungal blood culture.  Pending NMDA receptor on the blood and CSF, 14-3-3 protein.  ALFONZO panel negative, pending Anca.  Metoprolol 100 b.i.d.., methimazole 20 t.i.d. May need to change to coumadin since recurring strokes on NOAC.  FD Lovenox for now.  Continue prednisone 20 mg daily for now for stress dose steroids and should help with thyrotoxicosis as well.  Start tube feeds back at 30 mL/hour, keep upright.    Critical care diagnosis:  Acute encephalopathy on chronic with acute stroke and concern for possible encephalitis  Critical care time spent:  35 minutes    Anticipated discharge and disposition:   __________________________________________________________________________    LABS/MICROBIOLOGY/MEDICATIONS/DIAGNOSTICS    LABS  Recent Labs     07/10/22  0853   *   K 4.1   CHLORIDE 104   CO2 20*   BUN 9.5   CREATININE 0.45*   GLUCOSE 107   CALCIUM 9.0   ALKPHOS 111   AST 52*   ALT 72*   ALBUMIN 2.3*     Recent Labs     07/10/22  0853   WBC 12.4*   RBC 4.72   HCT 41.5*   MCV 87.9          MICROBIOLOGY  Microbiology Results (last 7 days)     Procedure Component Value Units Date/Time    Cerebrospinal Fluid Culture [[id number]] Collected: 07/07/22 1050    Order Status: Completed Specimen: CSF (Spinal Fluid) from Cerebrospinal Fluid Updated: 07/10/22 1028     CULTURE, CSF ([facility]) No Growth At 72 Hours     GRAM STAIN No WBCs, No bacteria seen     Blood culture #1 **CANNOT BE ORDERED STAT** [[id number]]  (Abnormal) Collected: 07/06/22 1326    Order Status: Completed Specimen: Blood from PICC Line Updated: 07/10/22 0759     CULTURE, BLOOD ([facility]) Gram-positive coccus probable staph     Comment: Identification and Susceptibility to Follow        GRAM STAIN Gram Positive Cocci, probable Staphylococcus      Seen in gram stain of broth only      1 of 2 Aerobic bottles positive     Fungal Culture Blood [[id number]]     Order Status: Sent Specimen: Blood from Forearm     Fungal Culture Blood [[id number]]      Order Status: Sent Specimen: Blood from Forearm     Blood culture #2 **CANNOT BE ORDERED STAT** [[id number]]  (Normal) Collected: 07/06/22 1322    Order Status: Completed Specimen: Blood from Arm, Left Updated: 07/09/22 1501     CULTURE, BLOOD ([facility]) No Growth At 72 Hours    Fungal Culture [[id number]] Collected: 07/07/22 1050    Order Status: Sent Specimen: CSF (Spinal Fluid) from Cerebrospinal Fluid Updated: 07/09/22 1457    Blood Culture [[id number]]     Order Status: Canceled Specimen: Blood     Blood Culture [[id number]]     Order Status: Canceled Specimen: Blood           MEDICATIONS   acetylcysteine 200 mg/ml (20%)  4 mL Nebulization TID    ciprofloxacin  400 mg Intravenous Q12H    digoxin  0.25 mg Oral Daily    enoxaparin  60 mg Subcutaneous BID    levetiracetam  500 mg Per NG tube BID    methIMAzole  20 mg Oral TID    metoprolol tartrate  100 mg Oral BID    metroNIDAZOLE  500 mg Oral TID    miconazole NITRATE 2 %   Topical (Top) BID    mirtazapine  15 mg Oral QHS    predniSONE  20 mg Oral Daily    scopolamine  1 patch Transdermal Q3 Days    zinc oxide-cod liver oil   Topical (Top) BID      INFUSIONS      DIAGNOSTIC TESTS  CT Chest Abdomen Pelvis Without Contrast (XPD)   Final Result      1. Findings are consistent with aspiration pneumonitis greater on the left than right with diffuse mucoid impaction of the trachea, and airways.   2. Other secondary findings of the abdomen and pelvis as above.         Electronically signed by: Ha Fleming MD   Date:    07/09/2022   Time:    18:04      XR Non-Rad Performed NG/Gastric Tube Check   Final Result      Standard NG tube placement         Electronically signed by: Nehemias Chirinos MD   Date:    07/09/2022   Time:    14:40      X-Ray Chest 1 View   Final Result      No acute abnormality of the chest.         Electronically signed by: Victoria Corley   Date:    07/09/2022   Time:    11:55      XR Non-Rad Performed NG/Gastric Tube Check   Final Result       Nasogastric tube as above         Electronically signed by: De Conte   Date:    07/08/2022   Time:    12:31      US Thyroid   Final Result      Thyromegaly with heterogeneous gland without discrete thyroid nodule..      Reference: ACR Thyroid Imaging, Reporting and Data System (TI-RADS): White Paper of the ACR TI-RADS Committee.  2017.         Electronically signed by: Lisa Soto   Date:    07/08/2022   Time:    11:55      XR Non-Rad Performed NG/Gastric Tube Check   Final Result      XR Non-Rad Performed NG/Gastric Tube Check   Final Result      Enteric tube in the airway.      Finding relayed to patient's nurse Alice Dickey at the time of this dictation.         Electronically signed by: Lisa Soto   Date:    07/07/2022   Time:    16:28      X-Ray Chest 1 View   Final Result      No acute cardiopulmonary abnormality.         Electronically signed by: Lisa Soto   Date:    07/07/2022   Time:    12:02      FL LUMBAR PUNCTURE DIAGNOSTIC WITH IMAGING   Final Result      Technically successful fluoroscopic guided lumbar puncture.      Opening pressure = less than 10 cm H2O      CSF drained = 6.5 cc      Closing pressure = less than 10 cm H2O         Electronically signed by: Michel Ge   Date:    07/07/2022   Time:    13:37      MRI Brain Limited Without Contrast   Final Result      Motion degraded exam.      In spite of this limitation:      1. Acute cortical based ischemia in the right MCA vascular territory as exemplified by a right middle frontal 2.9 x 1.9 cm focus.   2. Extra-axial diffusion restriction about the falx persists but has decreased, with similar findings about the cerebral convexities, again suspicious for meningitis/infection.         Electronically signed by: Michel Ge   Date:    07/06/2022   Time:    18:31      CT Head Without Contrast   Final Result      No acute intracranial abnormalities.         Electronically signed by: Michel Ge   Date:    07/06/2022    Time:    14:13      X-Ray Chest 1 View   Final Result      NO ACUTE CARDIOPULMONARY PROCESS IDENTIFIED.         Electronically signed by: Ethan Molina   Date:    07/06/2022   Time:    13:17               All diagnosis and differential diagnosis have been reviewed; assessment and plan has been documented. I have personally reviewed the labs and test results that are presently available; I have reviewed the patients medication list. I have reviewed the consulting providers response and recommendations. I have reviewed or attempted to review medical records based upon their availability.  All of the patient's questions have been addressed and answered. Patient's is agreeable to the above stated plan. I will continue to monitor closely and make adjustments to medical management as needed.  This document was created using M*Modal Fluency Direct.  Transcription errors may have been made.  Please contact me if any questions may rise regarding documentation to clarify verbiage.        Cyril Goodwin MD   07/10/2022   Internal Medicine

## 2022-07-11 LAB
BACTERIA BLD CULT: ABNORMAL
BACTERIA BLD CULT: NORMAL
BEAKER SEE SCANNED REPORT: NORMAL
GRAM STN SPEC: ABNORMAL
HSV1 DNA CSF QL NAA+NON-PROBE: NOT DETECTED
SPECIMEN SOURCE: NORMAL

## 2022-07-11 PROCEDURE — 63600175 PHARM REV CODE 636 W HCPCS: Performed by: NURSE PRACTITIONER

## 2022-07-11 PROCEDURE — 87103 BLOOD FUNGUS CULTURE: CPT | Performed by: INTERNAL MEDICINE

## 2022-07-11 PROCEDURE — 25000003 PHARM REV CODE 250: Performed by: INTERNAL MEDICINE

## 2022-07-11 PROCEDURE — 99497 PR ADVNCD CARE PLAN 30 MIN: ICD-10-PCS | Mod: 25,,, | Performed by: INTERNAL MEDICINE

## 2022-07-11 PROCEDURE — 94640 AIRWAY INHALATION TREATMENT: CPT

## 2022-07-11 PROCEDURE — 25000242 PHARM REV CODE 250 ALT 637 W/ HCPCS: Performed by: INTERNAL MEDICINE

## 2022-07-11 PROCEDURE — 11000001 HC ACUTE MED/SURG PRIVATE ROOM

## 2022-07-11 PROCEDURE — 99223 1ST HOSP IP/OBS HIGH 75: CPT | Mod: ,,, | Performed by: INTERNAL MEDICINE

## 2022-07-11 PROCEDURE — 36415 COLL VENOUS BLD VENIPUNCTURE: CPT | Performed by: INTERNAL MEDICINE

## 2022-07-11 PROCEDURE — 99223 PR INITIAL HOSPITAL CARE,LEVL III: ICD-10-PCS | Mod: ,,, | Performed by: INTERNAL MEDICINE

## 2022-07-11 PROCEDURE — 94761 N-INVAS EAR/PLS OXIMETRY MLT: CPT

## 2022-07-11 PROCEDURE — 63600175 PHARM REV CODE 636 W HCPCS: Performed by: INTERNAL MEDICINE

## 2022-07-11 PROCEDURE — 99497 ADVNCD CARE PLAN 30 MIN: CPT | Mod: 25,,, | Performed by: INTERNAL MEDICINE

## 2022-07-11 RX ADMIN — CIPROFLOXACIN 400 MG: 2 INJECTION, SOLUTION INTRAVENOUS at 08:07

## 2022-07-11 RX ADMIN — Medication: at 09:07

## 2022-07-11 RX ADMIN — METHIMAZOLE 20 MG: 10 TABLET ORAL at 04:07

## 2022-07-11 RX ADMIN — MICONAZOLE NITRATE 2 % TOPICAL POWDER: at 08:07

## 2022-07-11 RX ADMIN — METHIMAZOLE 20 MG: 10 TABLET ORAL at 08:07

## 2022-07-11 RX ADMIN — METOPROLOL TARTRATE 100 MG: 50 TABLET, FILM COATED ORAL at 09:07

## 2022-07-11 RX ADMIN — ACETYLCYSTEINE 4 ML: 200 SOLUTION ORAL; RESPIRATORY (INHALATION) at 08:07

## 2022-07-11 RX ADMIN — ENOXAPARIN SODIUM 60 MG: 100 INJECTION SUBCUTANEOUS at 08:07

## 2022-07-11 RX ADMIN — ACETYLCYSTEINE 4 ML: 200 SOLUTION ORAL; RESPIRATORY (INHALATION) at 01:07

## 2022-07-11 RX ADMIN — MICONAZOLE NITRATE 2 % TOPICAL POWDER: at 09:07

## 2022-07-11 RX ADMIN — METOPROLOL TARTRATE 100 MG: 50 TABLET, FILM COATED ORAL at 08:07

## 2022-07-11 RX ADMIN — Medication: at 08:07

## 2022-07-11 RX ADMIN — LEVETIRACETAM 500 MG: 100 SOLUTION ORAL at 09:07

## 2022-07-11 RX ADMIN — METHIMAZOLE 20 MG: 10 TABLET ORAL at 09:07

## 2022-07-11 RX ADMIN — LEVETIRACETAM 500 MG: 100 SOLUTION ORAL at 08:07

## 2022-07-11 RX ADMIN — METRONIDAZOLE 500 MG: 500 TABLET ORAL at 04:07

## 2022-07-11 RX ADMIN — MIRTAZAPINE 15 MG: 15 TABLET, FILM COATED ORAL at 09:07

## 2022-07-11 RX ADMIN — DIGOXIN 0.25 MG: 250 TABLET ORAL at 04:07

## 2022-07-11 RX ADMIN — LORAZEPAM 0.25 MG: 2 INJECTION INTRAMUSCULAR; INTRAVENOUS at 09:07

## 2022-07-11 RX ADMIN — ENOXAPARIN SODIUM 60 MG: 100 INJECTION SUBCUTANEOUS at 09:07

## 2022-07-11 RX ADMIN — PREDNISONE 20 MG: 20 TABLET ORAL at 08:07

## 2022-07-11 RX ADMIN — METRONIDAZOLE 500 MG: 500 TABLET ORAL at 08:07

## 2022-07-11 RX ADMIN — METRONIDAZOLE 500 MG: 500 TABLET ORAL at 09:07

## 2022-07-11 RX ADMIN — ACETYLCYSTEINE 4 ML: 200 SOLUTION ORAL; RESPIRATORY (INHALATION) at 06:07

## 2022-07-11 RX ADMIN — ALBUTEROL SULFATE 2.5 MG: 2.5 SOLUTION RESPIRATORY (INHALATION) at 01:07

## 2022-07-11 RX ADMIN — ALBUTEROL SULFATE 2.5 MG: 2.5 SOLUTION RESPIRATORY (INHALATION) at 08:07

## 2022-07-11 NOTE — CONSULTS
[facility] - Emergency Dept  Adult Nutrition  Consult Note    SUMMARY     Recommendations    Recommendation/Intervention:   1. Tube Feeding recs: Isosource 1.5 begin at 30mL/hr and advance as tolerated to goal rate 60mL/hr.   Will provide:  1800 kcal/day (96% est needs)  82 g/day (120% est needs)  917 mL/day     2. Rec free water flushes 160mL q4hrs    (calculations based on tube feeding running over 20hrs/daily)       Goals:   1. Tolerate enteral feeding at goal rate.   2. Meet greater than 75% of nutritional needs.  Nutrition Goal Status: continues  Communication of RD Recs: reviewed with RN    Assessment and Plan  Nutrition Problem  Inadequate oral intake    Related to (etiology):   Dysphagia, current condition    Signs and Symptoms (as evidenced by):   NPO    Interventions/Recommendations (treatment strategy):  Collaboration with other providers, modified rate of enteral nutrition    Nutrition Diagnosis Status:   Continues      Malnutrition Assessment  Malnutrition Type: acute illness or injury          Weight Loss (Malnutrition): greater than 5% in 1 month   Orbital Region (Subcutaneous Fat Loss): moderate depletion   Denominational Region (Muscle Loss): moderate depletion  Clavicle Bone Region (Muscle Loss): moderate depletion        Reason for Assessment    Reason For Assessment: consult    Diagnosis:   Worsening encephalopathy in the setting of possible meningitis, Normocytic anemia, Acute kidney injury, Mild transaminitis, Acute thyrotoxicosis, Essential hypertension    Relevant Medical History:  paroxysmal atrial fibrillation, hypertension, coronary artery disease, cardiomyopathy, hyperthyroidism, prior CVA/TIA, seizure disorder, Graves disease, IBS and rheumatoid arthritis    General Information Comments:   7/7: Pt poor historian per RN, wife not present at this time. NG tube in place, however feedings are not running at this time. Pt transfer from LTAC and was on Isosource 1.5 via NG  "tube.    : NG tube feeding continues; tolerating per nurse; noted plans for possible PEG placement      Nutrition/Diet History    Food Allergies: NKFA  Nutrition Support Formula Prior to Admit: Isosource 1.5  Nutrtion Support Frequency Prior to Admit: 65mL/hr    Anthropometrics    Temp: (!) 100.5 °F (38.1 °C)  Height Method: Estimated  Height: 6' 0.99" (185.4 cm)  Height (inches): 72.99 in  Weight Method: Estimated  Weight: 62.1 kg (137 lb)  Weight (lb): 137 lb  Ideal Body Weight (IBW), Male: 183.94 lb  % Ideal Body Weight, Male (lb): 74.48 %  % Ideal Body Weight Malnutrition: 70-79%: moderate deficit  BMI (Calculated): 18.1  BMI Grade: 17 - 18.4 protein-energy malnutrition grade I  Usual Body Weight (UBW), k.4 kg (UBW per previous RD note.)  % Usual Body Weight: 83.7  % Weight Change From Usual Weight: -16.48 %       Lab/Procedures/Meds    Pertinent Labs Reviewed: reviewed  Pertinent Labs Comments: 7/10: Na 135, Crea 0.45  Pertinent Medications Reviewed: reviewed  Pertinent Medications Comments: Metoprolol, NaCl IVF's      Estimated/Assessed Needs    Weight Used For Calorie Calculations: 62.1 kg (136 lb 14.5 oz)  Energy Calorie Requirements (kcal): 1865 kcal/day (MSJ x 1.3SF)  Energy Need Method: [place]  Protein Requirements: 68 g/day (1.1g/kg/d)  Weight Used For Protein Calculations: 62.1 kg (136 lb 14.5 oz)  Fluid Requirements (mL): 1865 mL/day  Estimated Fluid Requirement Method: RDA Method  RDA Method (mL): 1865         Nutrition Prescription Ordered    Current Diet Order: NPO    Evaluation of Received Nutrient/Fluid Intake       % Intake of Estimated Energy Needs: 75 - 100 %  % Meal Intake: NPO    Nutrition Risk    Level of Risk/Frequency of Follow-up: high       Monitor and Evaluation    Food and Nutrient Intake: enteral nutrition intake  Anthropometric Measurements: weight  Biochemical Data, Medical Tests and Procedures: electrolyte and renal panel       Nutrition Follow-Up    RD " Follow-up?: Yes

## 2022-07-11 NOTE — CONSULTS
Inpatient consult to Palliative Care  Consult performed by: Javi Corley MD  Consult ordered by: Cyril Goodwin MD        Patient Name: uQan Contreras   MRN: [id number]   Admission Date: 7/6/2022   Hospital Length of Stay: 5   Attending Provider: Kraig Shen MD   Consulting Provider: Javi Corley M.D.  Reason for Consult: Goals of Care  Primary Care Physician: Bran Hansen MD     Principal Problem: Encephalopathy     Patient information was obtained from spouse/SO and ER records.      Final diagnoses:  [R41.82] AMS (altered mental status)  [R50.9] Fever  [G93.40] Encephalopathy (Primary)  [I48.91] Atrial fibrillation with RVR     Assessment/Plan:     I reviewed the family's understanding of the seriousness of the illness and its expected prognosis. We discussed the patient's goals of care and treatment preferences. We discussed the difference between palliative and curative medicine. I explained the option of palliative/ comfort care through hospice care. I clarified current code status. I identified the surrogate decision maker or health care POA. (Wife is Surrogate)theophylline patient has no advanced directive nor has he instructed family in specific medical decision-making.  I answered all questions and we formulated a plan including recommendations for symptom management and how to best achieve goals of care.    I evaluated the patient. He appears lethargic. He opens his eyes and says yes to some simple questions but is unable to answer any others. He is unable to follow commands. He does not appear oriented to situation, time or place. He has a tight and frequent cough and significant oral secretions.    The patient has 3 children. 2 daughters and 1 son. His wife says that his son is very optimistic that the patient will make a recovery. She states that she is more realistic and is worried about his quality of life after his strok and the possibility that he may linger rather than  improve if more procedures are done to him. She says that he has suffered for a long time with rheumatoid arthritis. Post stroke he suffered with expressive aphasia and dysphagia, requiring a modified diet. She knows that he has declined quite a bit since arrival here. I noted that he appears more alert today but remains very sick and is not able to communicate effectively.       I reviewed the pulmonologist recommendations for placement of tracheostomy to protect his airway, improve access for secretion management and reduce aspiration on secretions. I explained that this would also provide better access for ventilator management if he should have need in the future. I reviewed his severe oropharyngeal dysphagia and the recommendation for PEG with or without trach to reduce the risk of aspiration. I pointed out some of the risks of PEG which include a lack of evidence to suggest any improved quality of life, increased risk of accidental or purposeful self-removal, infection at site, still about a 40% risk of aspiration especially in a patient who is mostly bed-bound.    I explained that without these procedure, the patient would likely not be able to protect his airway and will have a higher risk of aspiration and death. I believe that if she elected this on his behalf, she should also elect to focus treatment on comfort-based care with hospice assistance.      I asked if she felt that the patient would ever elect for these procedures for himself. She says that she doesn't believe that the patient would elect either procedure as he wouldn't want to live this way. She said that she would probably elect them, as would his son, because they would want to fight for his life. I offered to meet with her and his 3 children to answer all questions before decisions are made. I will meet with them tomorrow.    Code status: I confirmed that if the patient's heart stopped, his wife wishes on his behalf a DNR/ Allow Natural  Death code status. She also would not want intubation/ mechanical ventilation. (This can be further clarified if she elects tracheostomy placement).      History of Present Illness:     71-year-old male with history cardiomyopathy TIA, CVA, seizure disorder, rheumatoid arthritis, atrial fibrillation, coronary artery disease and recent acute CVA April 2022 and subsequent rehab and return home.  He was apparently hospitalized with pneumonia and associated acute bacterial meningitis with encephalopathy subsequently sent to TCU on 06/28/2022 but transfer to acute care on 07/06/2022 with acute metabolic encephalopathy with fever.  He has had workup including lumbar puncture with possible fungal or mycobacterial infection pending.  Is also a seizure episodes and has developed bilateral left greater than right aspiration superior mucous plugging left.  He remains severely weak and lethargic.  I was consulted to review goals of care with patient and family.      Active Ambulatory Problems     Diagnosis Date Noted    Acute left arterial ischemic stroke, KINGA (anterior cerebral artery) 05/03/2022    Thyrotoxicosis 05/03/2022    Benign essential hypertension 05/03/2022    Atrial fibrillation 05/03/2022    Ischemic cardiomyopathy 05/03/2022    Atherosclerosis of coronary artery 05/11/2022    Esophageal reflux 05/11/2022    Idiopathic peripheral neuropathy 05/11/2022    Rheumatoid arthritis involving multiple sites with positive rheumatoid factor 05/11/2022    Mixed hyperlipidemia 05/11/2022    Other cardiomyopathy 05/11/2022    Focal seizure 05/17/2022    Other sequelae following unspecified cerebrovascular disease 05/09/2022    Hemiplga following cerebral infrc affecting left nondom side 05/08/2022    Encephalopathy 06/15/2022    Meningitis 06/27/2022    Bacterial meningitis 06/27/2022     Resolved Ambulatory Problems     Diagnosis Date Noted    Acute osteomyelitis 05/11/2022    Fever 06/14/2022     Past  Medical History:   Diagnosis Date    Cardiomyopathy     Coronary artery disease     Diverticulosis     Fatigue     Generalized anxiety disorder     GERD (gastroesophageal reflux disease)     Graves disease     HTN (hypertension)     Hyperthyroidism     Irritable bowel syndrome without diarrhea     Paroxysmal atrial fibrillation     Rheumatoid arthritis 5/11/2022    Rheumatoid arthritis, unspecified     Shingles     Staph aureus infection     Stroke     Thyroiditis, unspecified         Past Surgical History:   Procedure Laterality Date    CATARACT EXTRACTION      CYST REMOVAL Left     TONSILLECTOMY      TOTAL KNEE ARTHROPLASTY      VASECTOMY          Review of patient's allergies indicates:   Allergen Reactions    Ace inhibitors Swelling     Lip swelling    Morphine     Morphine sulfate     Nafcillin Itching    Pradaxa [dabigatran etexilate] Other (See Comments)     Abdominal cramping    Sulfamethoxazole Rash          Current Facility-Administered Medications:     acetaminophen tablet 650 mg, 650 mg, Oral, Q4H PRN, Dominique Ferguson PA-C    acetylcysteine 200 mg/ml (20%) solution 4 mL, 4 mL, Nebulization, TID, Cyril Goodwin MD, 4 mL at 07/11/22 1333    albuterol nebulizer solution 2.5 mg, 2.5 mg, Nebulization, Q4H PRN, Kraig Shen MD, 2.5 mg at 07/11/22 1334    ciprofloxacin (CIPRO)400mg/200ml D5W IVPB 400 mg, 400 mg, Intravenous, Q12H, Kraig Shen MD, Stopped at 07/11/22 0952    dextrose 10 % infusion, 12.5 g, Intravenous, PRN, Dominique Ferguson PA-C    dextrose 10 % infusion, 25 g, Intravenous, PRN, Dominique Ferguson PA-C    digoxin tablet 0.25 mg, 0.25 mg, Oral, Daily, Kraig Shen MD, 0.25 mg at 07/10/22 1741    enoxaparin injection 60 mg, 60 mg, Subcutaneous, BID, Cyril Goodwin MD, 60 mg at 07/11/22 0852    glucagon (human recombinant) injection 1 mg, 1 mg, Intramuscular, PRN, Dominique Ferguson PA-C    glucose chewable tablet 16 g, 16 g, Oral, PRN,  "Dominique Ferguson PA-C    glucose chewable tablet 24 g, 24 g, Oral, PRN, Dominique Ferguson PA-C    hydrALAZINE injection 10 mg, 10 mg, Intravenous, Q2H PRN, Dominique Ferguson PA-C    levetiracetam 500 mg/5 mL (5 mL) liquid Soln 500 mg, 500 mg, Per NG tube, BID, Cyril Goodwin MD, 500 mg at 07/11/22 0852    methIMAzole tablet 20 mg, 20 mg, Oral, TID, Kraig Shen MD, 20 mg at 07/11/22 0852    metoprolol injection 5 mg, 5 mg, Intravenous, Q5 Min PRN, Cyril Goodwin MD, 5 mg at 07/09/22 1438    metoprolol tartrate (LOPRESSOR) tablet 100 mg, 100 mg, Oral, BID, Cyril Goodwin MD, 100 mg at 07/11/22 0852    metroNIDAZOLE tablet 500 mg, 500 mg, Oral, TID, Cyril Goodwin MD, 500 mg at 07/11/22 0852    miconazole NITRATE 2 % top powder, , Topical (Top), BID, Kraig Shen MD, Given at 07/11/22 0854    mirtazapine tablet 15 mg, 15 mg, Oral, QHS, Kraig Shen MD, 15 mg at 07/10/22 2053    predniSONE tablet 20 mg, 20 mg, Oral, Daily, Cyril Goodwin MD, 20 mg at 07/11/22 0852    scopolamine 1.3-1.5 mg (1 mg over 3 days) 1 patch, 1 patch, Transdermal, Q3 Days, Cyril Goodwin MD, 1 patch at 07/09/22 1545    sodium chloride 0.9% flush 10 mL, 10 mL, Intravenous, Q12H PRN, Dominique Ferguson PA-C    zinc oxide-cod liver oil 40 % paste, , Topical (Top), BID, Kraig Shen MD, Given at 07/11/22 0858     acetaminophen, albuterol sulfate, dextrose 10 % in water (D10W), dextrose 10 % in water (D10W), glucagon (human recombinant), glucose, glucose, hydrALAZINE, metoprolol, sodium chloride 0.9%     Family History   Family history unknown: Yes        Review of Systems   Unable to perform ROS: Mental status change            Objective:   /80   Pulse (!) 115   Temp (!) 102.3 °F (39.1 °C) (Axillary)   Resp 18   Ht 6' 0.99" (1.854 m)   Wt 62.1 kg (137 lb)   SpO2 95%   BMI 18.08 kg/m²      Physical Exam  Constitutional:       Appearance: He is ill-appearing.   HENT:      Head:      Comments: " cachectic     Nose: Nose normal.      Mouth/Throat:      Mouth: Mucous membranes are dry.      Pharynx: Oropharynx is clear.   Eyes:      Pupils: Pupils are equal, round, and reactive to light.   Cardiovascular:      Rate and Rhythm: Normal rate and regular rhythm.      Pulses: Normal pulses.      Heart sounds: Normal heart sounds.   Pulmonary:      Breath sounds: Rhonchi and rales present.      Comments: Wet crackles L>R, severely diminished on Left  Abdominal:      General: Abdomen is flat. Bowel sounds are normal. There is no distension.      Palpations: Abdomen is soft.      Tenderness: There is no abdominal tenderness.   Musculoskeletal:         General: Normal range of motion.      Cervical back: Normal range of motion.   Skin:     General: Skin is warm.      Findings: Lesion present.   Neurological:      Mental Status: He is alert. He is disoriented.      Motor: Weakness present.      Comments: Rt sided hemiparesis, raises left arm without intent, no follow commands, expressive aphasia            Review of Symptoms  Review of Symptoms    Symptom Assessment (ESAS 0-10 Scale)  Pain:  0  Dyspnea:  0  Anxiety:  0  Nausea:  0  Depression:  0  Anorexia:  0  Fatigue:  0  Insomnia:  0  Restlessness:  0  Agitation:  0     CAM / Delirium:  Positive  Constipation:  Negative  Diarrhea:  Negative    Bowel Management Plan (BMP):  Yes      Modified Nima Scale:  0    Performance Status:  20    Living Arrangements:  Lives with spouse    Spiritual:  F - Sharri and Belief:  Jewish      Advance Care Planning   Advance Directives:   Living Will: No    LaPOST: No    Do Not Resuscitate Status: Yes    Medical Power of : No    Agent's Name:  WifeJean-Pierre Contreras   Agent's Contact Number:  [phone]    Decision Making:  Family answered questions and Patient unable to communicate due to disease severity/cognitive impairment            PAINAD: 0     Caregiver burden formerly assessed: yes        > 50% of 87 min of encounter was  spent in chart review, face to face discussion of goals of care, symptom assessment, coordination of care and emotional support. This includes >16 min of time spent in Advanced Care Planning discussion    Javi Corley M.D.  Palliative Medicine  [facility]

## 2022-07-11 NOTE — PROGRESS NOTES
[facility] MEDICINE   PROGRESS NOTE      CHIEF COMPLAINT  Hospital follow up    HOSPITAL COURSE  Quan Contreras is a 71 y.o. White male with a past medical history of paroxysmal atrial fibrillation on Eliquis, hypertension, coronary artery disease, cardiomyopathy, hyperthyroidism on methimazole, prior CVA/TIA, seizure disorder and rheumatoid arthritis. In April 2022 patient suffered a stroke. He spent a week in rehab and was discharged home. He then developed seizures and was started on seizure medication. Patient progesively worsened and developed a fever and was diagnosed with pneumonia. Patient improved overall but confusion worsened. He was admitted and diagnosed with meningitis. CSF was negative. He was discharged [place] [facility] 06/28/2022 for acute encephalopathy secondary to meningitis. TSH was less than 0.0083 with a Free T3 11.61 and free T4 2.68 on 6/29/2022 and Methimazole was increased to 20 mg TID. Yesterday (07/05/2022) patient began running a fever of 101 F. He was started on Vancomycin and Merrem. In addition leukocytosis increased from 11.7 yesterday to 22.3 today (7/6/2022) with worsening mental status. Blood culture from 7/5/2022 returned positive for gram positive cocci in 1 of 2 bottles. [facility] in [facility] consult.   Wife and daughter at bedside reports prior to the stroke in April patient was ambulating and completing activities of daily living without assistance and has not returned to baseline. In rehab he was responding to questions appropriately and recognizing family. NG tube was placed a few days ago.      Upon presentation to the ED, patient afebrile and hemodynamically stable.  Labs notable for WBC 22.3, H&H 13.5/39.6, MCV 85, creatinine 0.58, AST 46, ALT 65, TSH less than 0.0083, free T4 2.13.  Influenza A and B and SARS-CoV-2 PCR negative.  UA negative for infection.  Chest  "x-ray negative.  CT head negative for acute intracranial abnormalities.  Patient admitted to hospital medicine services for further medical management.    Today  Seen examined this morning.  Seems to be doing much better mental status wise today he is talking and following commands appropriately.  Responding appropriately.  Moving all extremities except the left lower extremity which is weaker.  He has significant posterior pharynx good goal and aspirating.  We will consult palliative Care to see about goals of care and trach/PEG placement.        OBJECTIVE/PHYSICAL EXAM  /85   Pulse (!) 120   Temp 99.9 °F (37.7 °C) (Axillary)   Resp 20   Ht 6' 0.99" (1.854 m)   Wt 62.1 kg (137 lb)   SpO2 96%   BMI 18.08 kg/m²   General: In no acute distress, afebrile  Chest:  Bibasilar coarse crackle  Heart: S1, S2, no appreciable murmur  Abdomen: Soft, nontender, BS +  MSK: Warm, no lower extremity edema, no clubbing or cyanosis  Neurologic:  Awake and oriented and following commands appropriately, left lower extremity 3/5, bilateral upper extremity and right lower extremity moving up against resistance      ASSESSMENT/PLAN  Acute encephalopathy-infectious versus metabolic  Encephalitis/meningitis-infectious versus others  Acute ischemic CVA-right MCA vascular territory  Acute hypoxemic respiratory failure  Bibasilar aspiration pneumonia  Severe oropharyngeal dysphagia  Steroid induced leukocytosis  Thyrotoxicosis  Atrial fibrillation with RVR  Severe protein caloric malnutrition    History of:  Seizures, thyrotoxicosis diffuse goiter, stroke April 2022, HTN, CAD, ischemic cardiomyopathy, rheumatoid arthritis, HLD, atrial fibrillation      Neurology following.  Pending evaluation, has not been seen since the 7th.  Will have nurse reach back out to them today.  Encephalopathy 2/to seizures versus encephalitis?  Pending repeat prolonged EEG.  Was getting Eliquis 5 bid and  at LTAC for the last 7 days so wouldn't " think AC failure stroke.    ID following.  Continue Cipro and Flagyl.  Follow up on LP results, CSF lactate elevated could be from stroke.  Negative procalcitonin.  Added Flagyl for aspiration pneumonia.  Follow-up fungal CSF culture and fungal blood culture.  NMDA receptor on CSF is negative, 14-3-3 protein pending.  ALFONZO positive, pending Anca.  Metoprolol 100 b.i.d.., methimazole 20 t.i.d. May need to change to coumadin since recurring strokes on NOAC.  FD Lovenox for now.  Continue prednisone 20 mg daily for now for stress dose steroids and should help with thyrotoxicosis as well.  Start tube feeds back at 30 mL/hour, keep upright.  Consult palliative to discuss goals of care as well as trach/PEG placement due to recurrent aspiration event and severe oropharyngeal dysphagia.    Anticipated discharge and disposition:   __________________________________________________________________________    LABS/MICROBIOLOGY/MEDICATIONS/DIAGNOSTICS    LABS  Recent Labs     07/10/22  0853   *   K 4.1   CHLORIDE 104   CO2 20*   BUN 9.5   CREATININE 0.45*   GLUCOSE 107   CALCIUM 9.0   ALKPHOS 111   AST 52*   ALT 72*   ALBUMIN 2.3*     Recent Labs     07/10/22  0853   WBC 12.4*   RBC 4.72   HCT 41.5*   MCV 87.9          MICROBIOLOGY  Microbiology Results (last 7 days)     Procedure Component Value Units Date/Time    Fungal Culture Blood [[id number]] Collected: 07/11/22 0543    Order Status: Sent Specimen: Blood from Hand, Left Updated: 07/11/22 0746    Blood culture #1 **CANNOT BE ORDERED STAT** [[id number]]  (Abnormal)  (Susceptibility) Collected: 07/06/22 1326    Order Status: Completed Specimen: Blood from PICC Line Updated: 07/11/22 0740     CULTURE, BLOOD ([facility]) Staphylococcus epidermidis     GRAM STAIN Gram Positive Cocci, probable Staphylococcus      Seen in gram stain of broth only      1 of 2 Aerobic bottles positive     Fungal Culture Blood [[id number]] Collected: 07/11/22 0544    Order Status: Sent Specimen:  Blood from Arm, Left Updated: 07/11/22 0610    Blood culture #2 **CANNOT BE ORDERED STAT** [[id number]]  (Normal) Collected: 07/06/22 1322    Order Status: Completed Specimen: Blood from Arm, Left Updated: 07/10/22 1501     CULTURE, BLOOD ([facility]) No Growth At 96 Hours    Cerebrospinal Fluid Culture [[id number]] Collected: 07/07/22 1050    Order Status: Completed Specimen: CSF (Spinal Fluid) from Cerebrospinal Fluid Updated: 07/10/22 1028     CULTURE, CSF ([facility]) No Growth At 72 Hours     GRAM STAIN No WBCs, No bacteria seen     Fungal Culture [[id number]] Collected: 07/07/22 1050    Order Status: Sent Specimen: CSF (Spinal Fluid) from Cerebrospinal Fluid Updated: 07/09/22 1457    Blood Culture [[id number]]     Order Status: Canceled Specimen: Blood     Blood Culture [[id number]]     Order Status: Canceled Specimen: Blood           MEDICATIONS   acetylcysteine 200 mg/ml (20%)  4 mL Nebulization TID    ciprofloxacin  400 mg Intravenous Q12H    digoxin  0.25 mg Oral Daily    enoxaparin  60 mg Subcutaneous BID    levetiracetam  500 mg Per NG tube BID    methIMAzole  20 mg Oral TID    metoprolol tartrate  100 mg Oral BID    metroNIDAZOLE  500 mg Oral TID    miconazole NITRATE 2 %   Topical (Top) BID    mirtazapine  15 mg Oral QHS    predniSONE  20 mg Oral Daily    scopolamine  1 patch Transdermal Q3 Days    zinc oxide-cod liver oil   Topical (Top) BID      INFUSIONS      DIAGNOSTIC TESTS  CT Chest Abdomen Pelvis Without Contrast (XPD)   Final Result      1. Findings are consistent with aspiration pneumonitis greater on the left than right with diffuse mucoid impaction of the trachea, and airways.   2. Other secondary findings of the abdomen and pelvis as above.         Electronically signed by: Ha Fleming MD   Date:    07/09/2022   Time:    18:04      XR Non-Rad Performed NG/Gastric Tube Check   Final Result      Standard NG tube placement         Electronically signed by: Nehemias Chirinos MD   Date:    07/09/2022    Time:    14:40      X-Ray Chest 1 View   Final Result      No acute abnormality of the chest.         Electronically signed by: Victoria Corley   Date:    07/09/2022   Time:    11:55      XR Non-Rad Performed NG/Gastric Tube Check   Final Result      Nasogastric tube as above         Electronically signed by: De Conte   Date:    07/08/2022   Time:    12:31      US Thyroid   Final Result      Thyromegaly with heterogeneous gland without discrete thyroid nodule..      Reference: ACR Thyroid Imaging, Reporting and Data System (TI-RADS): White Paper of the ACR TI-RADS Committee.  2017.         Electronically signed by: Lisa Soto   Date:    07/08/2022   Time:    11:55      XR Non-Rad Performed NG/Gastric Tube Check   Final Result      XR Non-Rad Performed NG/Gastric Tube Check   Final Result      Enteric tube in the airway.      Finding relayed to patient's nurse Alice Dickey at the time of this dictation.         Electronically signed by: Lisa Soto   Date:    07/07/2022   Time:    16:28      X-Ray Chest 1 View   Final Result      No acute cardiopulmonary abnormality.         Electronically signed by: Lisa Soto   Date:    07/07/2022   Time:    12:02      FL LUMBAR PUNCTURE DIAGNOSTIC WITH IMAGING   Final Result      Technically successful fluoroscopic guided lumbar puncture.      Opening pressure = less than 10 cm H2O      CSF drained = 6.5 cc      Closing pressure = less than 10 cm H2O         Electronically signed by: Michel Ge   Date:    07/07/2022   Time:    13:37      MRI Brain Limited Without Contrast   Final Result      Motion degraded exam.      In spite of this limitation:      1. Acute cortical based ischemia in the right MCA vascular territory as exemplified by a right middle frontal 2.9 x 1.9 cm focus.   2. Extra-axial diffusion restriction about the falx persists but has decreased, with similar findings about the cerebral convexities, again suspicious for  meningitis/infection.         Electronically signed by: Michel Ge   Date:    07/06/2022   Time:    18:31      CT Head Without Contrast   Final Result      No acute intracranial abnormalities.         Electronically signed by: Michel Ge   Date:    07/06/2022   Time:    14:13      X-Ray Chest 1 View   Final Result      NO ACUTE CARDIOPULMONARY PROCESS IDENTIFIED.         Electronically signed by: Ethan Molina   Date:    07/06/2022   Time:    13:17               All diagnosis and differential diagnosis have been reviewed; assessment and plan has been documented. I have personally reviewed the labs and test results that are presently available; I have reviewed the patients medication list. I have reviewed the consulting providers response and recommendations. I have reviewed or attempted to review medical records based upon their availability.  All of the patient's questions have been addressed and answered. Patient's is agreeable to the above stated plan. I will continue to monitor closely and make adjustments to medical management as needed.  This document was created using M*Modal Fluency Direct.  Transcription errors may have been made.  Please contact me if any questions may rise regarding documentation to clarify verbiage.        Cyril Goodwin MD   07/11/2022   Internal Medicine

## 2022-07-11 NOTE — CONSULTS
[facility] [address] [place], [place] [address]  Pulmonology  Consult Note    Patient Name: Quan Contreras  MRN: [id number]  Admission Date: 7/6/2022  Hospital Length of Stay: 5 days  Code Status: DNR  Attending Physician: Kraig Shen MD  Primary Care Provider: Bran Hansen MD   Principal Problem: Encephalopathy    Consults  Subjective:     HPI:  Dr. Goodwin asked for Pulmonary consult on this patient earlier today.  I reviewed the electronic medical records in detail.  It appears in April of this year patient suffered a stroke and was sent to rehab and ultimately discharged home.  He was readmitted to the hospital after developing seizures.  He was treated for suspected meningitis and pneumonia.  Ultimately discharged to [facility] on 06/28/2022 where he was diagnosed with hyperthyroidism.  He was transferred back to [facility] on 07/06/2022 due to recurring fever and worsening mental status.  Pulmonary consult was placed for evaluation of airway control and possible need for tracheostomy.  Patient awakens and answers questions but in a very wet moist voice and quickly goes back to sleep.  Unable to give a reliable history and no family at the bedside this morning.  As mentioned above I did review his EMR in detail.    Past Medical History:   Diagnosis Date    Acute left arterial ischemic stroke, KINGA (anterior cerebral artery) 5/3/2022    Acute osteomyelitis 5/11/2022    Atherosclerosis of coronary artery 5/11/2022    Atrial fibrillation 5/3/2022    Benign essential hypertension 5/3/2022    Cardiomyopathy     Coronary artery disease     Diverticulosis     Esophageal reflux 5/11/2022    Fatigue     Focal seizure 5/17/2022    Generalized anxiety disorder     GERD (gastroesophageal reflux disease)     Graves disease     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/2022    HTN (hypertension)     Hyperthyroidism     Idiopathic peripheral  neuropathy 5/11/2022    Irritable bowel syndrome without diarrhea     Ischemic cardiomyopathy 5/3/2022    Mixed hyperlipidemia 5/11/2022    Other sequelae following unspecified cerebrovascular disease 5/9/2022    Paroxysmal atrial fibrillation     Rheumatoid arthritis 5/11/2022    Rheumatoid arthritis, unspecified     Shingles     Staph aureus infection     Stroke     Thyroiditis, unspecified     Thyrotoxicosis 5/3/2022       Past Surgical History:   Procedure Laterality Date    CATARACT EXTRACTION      CYST REMOVAL Left     TONSILLECTOMY      TOTAL KNEE ARTHROPLASTY      VASECTOMY         Review of patient's allergies indicates:   Allergen Reactions    Ace inhibitors Swelling     Lip swelling    Morphine     Morphine sulfate     Nafcillin Itching    Pradaxa [dabigatran etexilate] Other (See Comments)     Abdominal cramping    Sulfamethoxazole Rash       Family History     Family history is unknown by patient.        Tobacco Use    Smoking status: Never Smoker    Smokeless tobacco: Never Used   Substance and Sexual Activity    Alcohol use: Never    Drug use: Never    Sexual activity: Yes        Review of Systems   Reason unable to perform ROS: Patient lethargic and has a hard time staying awake during the interview.     Objective:     Vital Signs (Most Recent):  Temp: 99.9 °F (37.7 °C) (07/11/22 0801)  Pulse: (!) 120 (07/11/22 0857)  Resp: 20 (07/11/22 0857)  BP: 127/85 (07/11/22 0852)  SpO2: 96 % (07/10/22 2300) Vital Signs (24h Range):  Temp:  [97.7 °F (36.5 °C)-99.9 °F (37.7 °C)] 99.9 °F (37.7 °C)  Pulse:  [] 120  Resp:  [17-20] 20  SpO2:  [95 %-98 %] 96 %  BP: (104-127)/(66-85) 127/85     Body mass index is 18.08 kg/m².      Intake/Output Summary (Last 24 hours) at 7/11/2022 1053  Last data filed at 7/10/2022 1418  Gross per 24 hour   Intake --   Output 550 ml   Net -550 ml       Physical Exam  Constitutional:       General: He is not in acute distress.     Appearance: He is  ill-appearing.   HENT:      Head: Normocephalic and atraumatic.      Nose:      Comments: NG tube in the right nares  Eyes:      General: No scleral icterus.     Conjunctiva/sclera: Conjunctivae normal.   Cardiovascular:      Rate and Rhythm: Normal rate. Rhythm irregular.      Heart sounds: No murmur heard.    No gallop.   Pulmonary:      Effort: No respiratory distress.      Breath sounds: No stridor. Rhonchi and rales present.   Abdominal:      General: Abdomen is flat. Bowel sounds are normal.      Palpations: Abdomen is soft.      Tenderness: There is no abdominal tenderness. There is no rebound.   Musculoskeletal:      Right lower leg: Edema present.      Left lower leg: Edema present.   Skin:     General: Skin is warm and dry.   Neurological:      Sensory: Sensory deficit present.      Motor: Weakness present.            Lines/Drains/Airways     Drain  Duration                NG/OG Tube 07/07/22 1700 nasogastric 16 Fr. Right nostril 3 days          Peripheral Intravenous Line  Duration                Midline Catheter Insertion/Assessment  - Single Lumen 07/05/22 1516 Right cephalic vein (lateral side of arm) other (see comments) 5 days                Significant Labs:    Lab Results   Component Value Date    WBC 12.4 (H) 07/10/2022    HGB 13.5 (L) 07/10/2022    HCT 41.5 (L) 07/10/2022    MCV 87.9 07/10/2022     07/10/2022         BMP  Lab Results   Component Value Date     (L) 07/10/2022    K 4.1 07/10/2022    CO2 20 (L) 07/10/2022    BUN 9.5 07/10/2022    CREATININE 0.45 (L) 07/10/2022    CALCIUM 9.0 07/10/2022    EGFRNONAA >60 07/10/2022       ABG  Recent Labs   Lab 07/05/22  0028   PH 7.493*   PO2 71*   PCO2 29.5*   HCO3 22.6*   BE -1       Significant Imaging:     I also reviewed a CT chest and abdomen from 07/09.  There is mucus within the trachea and partially occluding the left mainstem bronchus.    Assessment/Plan:       Encephalopathy with history of stroke in April and then suspected  meningitis treated with antibiotic therapy.  Likely recurring aspiration with pneumonia.  Patient has significant mucus within the trachea and left mainstem bronchus.  Plan bronchoscopy later this week.  Would benefit from a PEG tube.  Also recommend ENT evaluation and possible trach.  Will follow with you.      Thank you for your consult.      JOHN Klein MD  Pulmonology  [facility] [address] [facility]

## 2022-07-12 LAB
ALBUMIN SERPL-MCNC: 2 GM/DL (ref 3.4–4.8)
ALBUMIN/GLOB SERPL: 0.5 RATIO (ref 1.1–2)
ALP SERPL-CCNC: 108 UNIT/L (ref 40–150)
ALT SERPL-CCNC: 54 UNIT/L (ref 0–55)
ANCA AB SER QL IF: NEGATIVE
AST SERPL-CCNC: 32 UNIT/L (ref 5–34)
BACTERIA CSF CULT: NORMAL
BASOPHILS # BLD AUTO: 0.03 X10(3)/MCL (ref 0–0.2)
BASOPHILS NFR BLD AUTO: 0.2 %
BILIRUBIN DIRECT+TOT PNL SERPL-MCNC: 0.6 MG/DL
BUN SERPL-MCNC: 12.5 MG/DL (ref 8.4–25.7)
CALCIUM SERPL-MCNC: 8.5 MG/DL (ref 8.8–10)
CHLORIDE SERPL-SCNC: 104 MMOL/L (ref 98–107)
CO2 SERPL-SCNC: 20 MMOL/L (ref 23–31)
CREAT SERPL-MCNC: 0.48 MG/DL (ref 0.73–1.18)
EOSINOPHIL # BLD AUTO: 0.01 X10(3)/MCL (ref 0–0.9)
EOSINOPHIL NFR BLD AUTO: 0.1 %
ERYTHROCYTE [DISTWIDTH] IN BLOOD BY AUTOMATED COUNT: 16.6 % (ref 11.5–17)
GLOBULIN SER-MCNC: 3.7 GM/DL (ref 2.4–3.5)
GLUCOSE SERPL-MCNC: 115 MG/DL (ref 82–115)
GRAM STN SPEC: NORMAL
HCT VFR BLD AUTO: 38.1 % (ref 42–52)
HGB BLD-MCNC: 12.2 GM/DL (ref 14–18)
IMM GRANULOCYTES # BLD AUTO: 0.05 X10(3)/MCL (ref 0–0.04)
IMM GRANULOCYTES NFR BLD AUTO: 0.4 %
LYMPHOCYTES # BLD AUTO: 3.47 X10(3)/MCL (ref 0.6–4.6)
LYMPHOCYTES NFR BLD AUTO: 28.7 %
MAGNESIUM SERPL-MCNC: 1.7 MG/DL (ref 1.6–2.6)
MCH RBC QN AUTO: 28.6 PG (ref 27–31)
MCHC RBC AUTO-ENTMCNC: 32 MG/DL (ref 33–36)
MCV RBC AUTO: 89.4 FL (ref 80–94)
MONOCYTES # BLD AUTO: 1.14 X10(3)/MCL (ref 0.1–1.3)
MONOCYTES NFR BLD AUTO: 9.4 %
NEUTROPHILS # BLD AUTO: 7.4 X10(3)/MCL (ref 2.1–9.2)
NEUTROPHILS NFR BLD AUTO: 61.2 %
NRBC BLD AUTO-RTO: 0 %
P-ANCA SER QL IF: POSITIVE
PHOSPHATE SERPL-MCNC: 2.9 MG/DL (ref 2.3–4.7)
PLATELET # BLD AUTO: 355 X10(3)/MCL (ref 130–400)
PMV BLD AUTO: 10.6 FL (ref 7.4–10.4)
POTASSIUM SERPL-SCNC: 4.2 MMOL/L (ref 3.5–5.1)
PROT SERPL-MCNC: 5.7 GM/DL (ref 5.8–7.6)
RBC # BLD AUTO: 4.26 X10(6)/MCL (ref 4.7–6.1)
SODIUM SERPL-SCNC: 135 MMOL/L (ref 136–145)
WBC # SPEC AUTO: 12.1 X10(3)/MCL (ref 4.5–11.5)

## 2022-07-12 PROCEDURE — 25000003 PHARM REV CODE 250: Performed by: INTERNAL MEDICINE

## 2022-07-12 PROCEDURE — 25000003 PHARM REV CODE 250: Performed by: PHYSICIAN ASSISTANT

## 2022-07-12 PROCEDURE — 99233 SBSQ HOSP IP/OBS HIGH 50: CPT | Mod: ,,, | Performed by: PSYCHIATRY & NEUROLOGY

## 2022-07-12 PROCEDURE — 99233 SBSQ HOSP IP/OBS HIGH 50: CPT | Mod: ,,, | Performed by: INTERNAL MEDICINE

## 2022-07-12 PROCEDURE — 99233 PR SUBSEQUENT HOSPITAL CARE,LEVL III: ICD-10-PCS | Mod: ,,, | Performed by: INTERNAL MEDICINE

## 2022-07-12 PROCEDURE — 83735 ASSAY OF MAGNESIUM: CPT | Performed by: INTERNAL MEDICINE

## 2022-07-12 PROCEDURE — 94640 AIRWAY INHALATION TREATMENT: CPT

## 2022-07-12 PROCEDURE — 36415 COLL VENOUS BLD VENIPUNCTURE: CPT | Performed by: INTERNAL MEDICINE

## 2022-07-12 PROCEDURE — 99497 PR ADVNCD CARE PLAN 30 MIN: ICD-10-PCS | Mod: ,,, | Performed by: INTERNAL MEDICINE

## 2022-07-12 PROCEDURE — 84100 ASSAY OF PHOSPHORUS: CPT | Performed by: INTERNAL MEDICINE

## 2022-07-12 PROCEDURE — S0030 INJECTION, METRONIDAZOLE: HCPCS | Performed by: INTERNAL MEDICINE

## 2022-07-12 PROCEDURE — 85025 COMPLETE CBC W/AUTO DIFF WBC: CPT | Performed by: INTERNAL MEDICINE

## 2022-07-12 PROCEDURE — 80053 COMPREHEN METABOLIC PANEL: CPT | Performed by: INTERNAL MEDICINE

## 2022-07-12 PROCEDURE — 11000001 HC ACUTE MED/SURG PRIVATE ROOM

## 2022-07-12 PROCEDURE — 25000242 PHARM REV CODE 250 ALT 637 W/ HCPCS: Performed by: INTERNAL MEDICINE

## 2022-07-12 PROCEDURE — 63600175 PHARM REV CODE 636 W HCPCS: Performed by: INTERNAL MEDICINE

## 2022-07-12 PROCEDURE — 99233 PR SUBSEQUENT HOSPITAL CARE,LEVL III: ICD-10-PCS | Mod: ,,, | Performed by: PSYCHIATRY & NEUROLOGY

## 2022-07-12 PROCEDURE — 99497 ADVNCD CARE PLAN 30 MIN: CPT | Mod: ,,, | Performed by: INTERNAL MEDICINE

## 2022-07-12 RX ORDER — HYDROCODONE BITARTRATE AND ACETAMINOPHEN 5; 325 MG/1; MG/1
1 TABLET ORAL EVERY 6 HOURS PRN
Status: DISCONTINUED | OUTPATIENT
Start: 2022-07-12 | End: 2022-07-14 | Stop reason: HOSPADM

## 2022-07-12 RX ORDER — LEVETIRACETAM 100 MG/ML
1000 SOLUTION ORAL 2 TIMES DAILY
Status: DISCONTINUED | OUTPATIENT
Start: 2022-07-12 | End: 2022-07-14 | Stop reason: HOSPADM

## 2022-07-12 RX ORDER — METRONIDAZOLE 500 MG/100ML
500 INJECTION, SOLUTION INTRAVENOUS
Status: DISCONTINUED | OUTPATIENT
Start: 2022-07-12 | End: 2022-07-14 | Stop reason: HOSPADM

## 2022-07-12 RX ORDER — PREDNISONE 10 MG/1
10 TABLET ORAL DAILY
Status: DISCONTINUED | OUTPATIENT
Start: 2022-07-13 | End: 2022-07-14

## 2022-07-12 RX ORDER — HALOPERIDOL 5 MG/ML
5 INJECTION INTRAMUSCULAR EVERY 6 HOURS PRN
Status: DISCONTINUED | OUTPATIENT
Start: 2022-07-12 | End: 2022-07-14 | Stop reason: HOSPADM

## 2022-07-12 RX ADMIN — Medication: at 09:07

## 2022-07-12 RX ADMIN — CEFEPIME 2 G: 2 INJECTION, POWDER, FOR SOLUTION INTRAVENOUS at 11:07

## 2022-07-12 RX ADMIN — CEFEPIME 2 G: 2 INJECTION, POWDER, FOR SOLUTION INTRAVENOUS at 05:07

## 2022-07-12 RX ADMIN — MICONAZOLE NITRATE 2 % TOPICAL POWDER: at 09:07

## 2022-07-12 RX ADMIN — METHIMAZOLE 20 MG: 10 TABLET ORAL at 05:07

## 2022-07-12 RX ADMIN — DIGOXIN 0.25 MG: 250 TABLET ORAL at 05:07

## 2022-07-12 RX ADMIN — METRONIDAZOLE 500 MG: 500 TABLET ORAL at 08:07

## 2022-07-12 RX ADMIN — LEVETIRACETAM 1000 MG: 500 SOLUTION ORAL at 09:07

## 2022-07-12 RX ADMIN — METHIMAZOLE 20 MG: 10 TABLET ORAL at 09:07

## 2022-07-12 RX ADMIN — ENOXAPARIN SODIUM 60 MG: 100 INJECTION SUBCUTANEOUS at 08:07

## 2022-07-12 RX ADMIN — LEVETIRACETAM 1000 MG: 500 SOLUTION ORAL at 08:07

## 2022-07-12 RX ADMIN — PREDNISONE 20 MG: 20 TABLET ORAL at 08:07

## 2022-07-12 RX ADMIN — HYDROCODONE BITARTRATE AND ACETAMINOPHEN 1 TABLET: 5; 325 TABLET ORAL at 06:07

## 2022-07-12 RX ADMIN — METOPROLOL TARTRATE 100 MG: 50 TABLET, FILM COATED ORAL at 09:07

## 2022-07-12 RX ADMIN — METOPROLOL TARTRATE 100 MG: 50 TABLET, FILM COATED ORAL at 08:07

## 2022-07-12 RX ADMIN — ACETYLCYSTEINE 4 ML: 200 SOLUTION ORAL; RESPIRATORY (INHALATION) at 08:07

## 2022-07-12 RX ADMIN — MIRTAZAPINE 15 MG: 15 TABLET, FILM COATED ORAL at 09:07

## 2022-07-12 RX ADMIN — HYDROCODONE BITARTRATE AND ACETAMINOPHEN 1 TABLET: 5; 325 TABLET ORAL at 09:07

## 2022-07-12 RX ADMIN — SCOPOLAMINE 1 PATCH: 1 PATCH TRANSDERMAL at 05:07

## 2022-07-12 RX ADMIN — ENOXAPARIN SODIUM 60 MG: 100 INJECTION SUBCUTANEOUS at 09:07

## 2022-07-12 RX ADMIN — METHIMAZOLE 20 MG: 10 TABLET ORAL at 08:07

## 2022-07-12 RX ADMIN — METRONIDAZOLE 500 MG: 500 INJECTION, SOLUTION INTRAVENOUS at 05:07

## 2022-07-12 RX ADMIN — ACETYLCYSTEINE 4 ML: 200 SOLUTION ORAL; RESPIRATORY (INHALATION) at 01:07

## 2022-07-12 RX ADMIN — CIPROFLOXACIN 400 MG: 2 INJECTION, SOLUTION INTRAVENOUS at 07:07

## 2022-07-12 RX ADMIN — ACETAMINOPHEN 650 MG: 325 TABLET, FILM COATED ORAL at 08:07

## 2022-07-12 RX ADMIN — METRONIDAZOLE 500 MG: 500 INJECTION, SOLUTION INTRAVENOUS at 11:07

## 2022-07-12 NOTE — PLAN OF CARE
07/12/22 1316   Discharge Reassessment   Assessment Type Discharge Planning Reassessment   Did the patient's condition or plan change since previous assessment? Yes   Discharge Plan discussed with: Adult children;Spouse/sig other   Discharge Plan A Hospice/home   Discharge Plan B Hospice/home   DME Needed Upon Discharge  none   Discharge Barriers Identified None   Why the patient remains in the hospital Requires continued medical care   Post-Acute Status   Post-Acute Authorization Hospice   Hospice Status Referrals Sent  (Gisela Prompton)

## 2022-07-12 NOTE — ASSESSMENT & PLAN NOTE
Encephalopathy     Will attempt to repeat MRI brain   Will call lab about adding CSF cytology and RT-QUIC  Would recommend stopping Aricept and scopolamine patch   ID following   CSF results reviewed per MD     Further recommendations to follow from MD

## 2022-07-12 NOTE — SUBJECTIVE & OBJECTIVE
Subjective:     Interval History:   Neurology reconsulted for worsening encephalopathy and increasing confusion on 7/11. No family at bedside, worsening encephalopathy over several days it seems according to the EMR. Appears encephalopathic. LP reviewed per MD.     Current Neurological Medications:     Current Facility-Administered Medications   Medication Dose Route Frequency Provider Last Rate Last Admin    acetaminophen tablet 650 mg  650 mg Oral Q4H PRN Dominique Ferguson PA-C   650 mg at 07/12/22 0808    acetylcysteine 200 mg/ml (20%) solution 4 mL  4 mL Nebulization TID Cyril Goodwin MD   4 mL at 07/12/22 0830    albuterol nebulizer solution 2.5 mg  2.5 mg Nebulization Q4H PRN Kraig Shen MD   2.5 mg at 07/11/22 1334    ceFEPIme (MAXIPIME) 2 g in dextrose 5 % in water (D5W) 5 % 50 mL IVPB (MB+)  2 g Intravenous Q8H Vitalis MD Andrzej 100 mL/hr at 07/12/22 1135 2 g at 07/12/22 1135    dextrose 10 % infusion  12.5 g Intravenous PRN Dominique Ferguson PA-C        dextrose 10 % infusion  25 g Intravenous PRN Dominique Ferguson PA-C        digoxin tablet 0.25 mg  0.25 mg Oral Daily Kraig Shen MD   0.25 mg at 07/11/22 1603    enoxaparin injection 60 mg  60 mg Subcutaneous BID Cyril Goodwin MD   60 mg at 07/12/22 0805    glucagon (human recombinant) injection 1 mg  1 mg Intramuscular PRN Dominique Ferguson PA-C        glucose chewable tablet 16 g  16 g Oral PRN Dominique Ferguson PA-C        glucose chewable tablet 24 g  24 g Oral PRN Dominique Ferguson PA-C        hydrALAZINE injection 10 mg  10 mg Intravenous Q2H PRN Dominique Ferguson PA-C        levetiracetam 500 mg/5 mL (5 mL) liquid Soln 1,000 mg  1,000 mg Per NG tube BID Cyril Goodwin MD   1,000 mg at 07/12/22 0805    methIMAzole tablet 20 mg  20 mg Oral TID Kraig Shen MD   20 mg at 07/12/22 0804    metoprolol injection 5 mg  5 mg Intravenous Q5 Min PRN Cyril Goodwin MD   5 mg at 07/09/22 1438    metoprolol tartrate (LOPRESSOR) tablet  100 mg  100 mg Oral BID Cyril Goodwin MD   100 mg at 07/12/22 0805    metronidazole IVPB 500 mg  500 mg Intravenous Q8H Alfreda Donnelly  mL/hr at 07/12/22 1136 500 mg at 07/12/22 1136    miconazole NITRATE 2 % top powder   Topical (Top) BID Kraig Shen MD   Given at 07/12/22 0900    mirtazapine tablet 15 mg  15 mg Oral QHS Kriag Shen MD   15 mg at 07/11/22 2100    [START ON 7/13/2022] predniSONE tablet 10 mg  10 mg Oral Daily Cyril Goodwin MD        scopolamine 1.3-1.5 mg (1 mg over 3 days) 1 patch  1 patch Transdermal Q3 Days Cyril Goodwin MD   1 patch at 07/09/22 1545    sodium chloride 0.9% flush 10 mL  10 mL Intravenous Q12H PRN Dominique Ferguson PA-C        zinc oxide-cod liver oil 40 % paste   Topical (Top) BID Kraig Shen MD   Given at 07/12/22 0900       Review of Systems  Objective:     Vital Signs (Most Recent):  Temp: 97.8 °F (36.6 °C) (07/12/22 1117)  Pulse: 109 (07/12/22 1117)  Resp: 18 (07/12/22 0830)  BP: 95/64 (07/12/22 1117)  SpO2: (!) 94 % (07/12/22 1117)   Vital Signs (24h Range):  Temp:  [97.8 °F (36.6 °C)-102.5 °F (39.2 °C)] 97.8 °F (36.6 °C)  Pulse:  [] 109  Resp:  [18-26] 18  SpO2:  [94 %-96 %] 94 %  BP: ()/(64-80) 95/64     Weight: 62.1 kg (137 lb)  Body mass index is 18.08 kg/m².    Physical Exam  Vitals reviewed.   Constitutional:       Appearance: He is ill-appearing.   HENT:      Head: Normocephalic.   Eyes:      General: Gaze aligned appropriately.   Pulmonary:      Effort: Pulmonary effort is normal.   Musculoskeletal:      Right lower leg: No edema.      Left lower leg: No edema.   Skin:     General: Skin is warm and dry.      Capillary Refill: Capillary refill takes less than 2 seconds.   Neurological:      Mental Status: He is lethargic.      Comments:     Oriented to self   Lethargic but arouses to stim   No obvious facial asymmetry  No gaze preference, tracks examiner in room   Does not follow commands   Appears to move all  extremities    Limited PE 2/2 clinical condition             Significant Labs:   Recent Lab Results         07/12/22  0355        Albumin/Globulin Ratio 0.5       Albumin 2.0       Alkaline Phosphatase 108       ALT 54       AST 32       Baso # 0.03       Basophil % 0.2       BILIRUBIN TOTAL 0.6       BUN 12.5       Calcium 8.5       Chloride 104       CO2 20       Creatinine 0.48       eGFR if non African American >60       Eos # 0.01       Eosinophil % 0.1       Globulin, Total 3.7       Glucose 115       Hematocrit 38.1       Hemoglobin 12.2       Immature Grans (Abs) 0.05       Immature Granulocytes 0.4       Lymph # 3.47       LYMPH % 28.7       Magnesium 1.70       MCH 28.6       MCHC 32.0       MCV 89.4       Mono # 1.14       Mono % 9.4       MPV 10.6       Neut # 7.4       Neut % 61.2       nRBC 0.0       Phosphorus 2.9       Platelets 355       Potassium 4.2       PROTEIN TOTAL 5.7       RBC 4.26       RDW 16.6       Sodium 135       WBC 12.1               Significant Imaging: I have reviewed all pertinent imaging results/findings within the past 24 hours.  No new imaging

## 2022-07-12 NOTE — PROGRESS NOTES
[facility] [address] [facility]  Neurology  Progress Note    Patient Name: Quan Contreras  MRN: [id number]  Admission Date: 7/6/2022  Hospital Length of Stay: 6 days  Code Status: DNR   Attending Provider: Kraig Shen MD  Primary Care Physician: Bran Hansen MD   Principal Problem:Encephalopathy    HPI:   Mr Contreras is a 71-year-old male with past medical history of PAF (on Eliquis), HTN, CAD, cardiomyopathy, hyperthyroidism, stroke, seizure, rheumatoid arthritis, presented to ED on 7/6 as a transfer from LT for evaluation of altered mental status.      Overview/Hospital Course:  4/22   -stroke, went to rehab and d/c home, developed seizures at home and started on AED. Developed fever and diagnosed with PNA. He was admitted and diagnosed with meningitis.   6/28/22   -he was discharged to LTAC for encephalopathy 2/2 meningitis.  7/5/22   -fever, started on Vanc and Merrem, had increasing WBCs and increasing confusion. Transferred to [facility] for neuro workup and ID consult.  7/7  -he was seen by neurology for acute on chronic AMS, thought to have underlying neurodegenerative disease MSA, LBD, CBD, PSP, CJD or vascular dementia, encephalitis, and CNS lymphoma as possibilities for differentials    Subjective:     Interval History:   Neurology reconsulted for worsening encephalopathy and increasing confusion on 7/11. No family at bedside, worsening encephalopathy over several days it seems according to the EMR. Appears encephalopathic. LP reviewed per MD.     Current Neurological Medications:     Current Facility-Administered Medications   Medication Dose Route Frequency Provider Last Rate Last Admin    acetaminophen tablet 650 mg  650 mg Oral Q4H PRN Dominique Ferguson PA-C   650 mg at 07/12/22 0808    acetylcysteine 200 mg/ml (20%) solution 4 mL  4 mL Nebulization TID Cyril Goodwin MD   4 mL at 07/12/22 0830    albuterol nebulizer solution 2.5 mg  2.5 mg Nebulization Q4H PRN  Kraig Shen MD   2.5 mg at 07/11/22 1334    ceFEPIme (MAXIPIME) 2 g in dextrose 5 % in water (D5W) 5 % 50 mL IVPB (MB+)  2 g Intravenous Q8H Alfreda Donnelly  mL/hr at 07/12/22 1135 2 g at 07/12/22 1135    dextrose 10 % infusion  12.5 g Intravenous PRN Dominique Ferguson PA-C        dextrose 10 % infusion  25 g Intravenous PRN Dominique Ferguson PA-C        digoxin tablet 0.25 mg  0.25 mg Oral Daily Kraig Shen MD   0.25 mg at 07/11/22 1603    enoxaparin injection 60 mg  60 mg Subcutaneous BID Cyril Goodwin MD   60 mg at 07/12/22 0805    glucagon (human recombinant) injection 1 mg  1 mg Intramuscular PRN Dominique Ferguson PA-C        glucose chewable tablet 16 g  16 g Oral PRN Dominique Ferguson PA-C        glucose chewable tablet 24 g  24 g Oral PRN Dominique Ferguson PA-C        hydrALAZINE injection 10 mg  10 mg Intravenous Q2H PRN Dominique Ferguson PA-C        levetiracetam 500 mg/5 mL (5 mL) liquid Soln 1,000 mg  1,000 mg Per NG tube BID Cyril Goodwin MD   1,000 mg at 07/12/22 0805    methIMAzole tablet 20 mg  20 mg Oral TID Kraig Shen MD   20 mg at 07/12/22 0804    metoprolol injection 5 mg  5 mg Intravenous Q5 Min PRN Cyril Goodwin MD   5 mg at 07/09/22 1438    metoprolol tartrate (LOPRESSOR) tablet 100 mg  100 mg Oral BID Cyril Goodwin MD   100 mg at 07/12/22 0805    metronidazole IVPB 500 mg  500 mg Intravenous Q8H Alfreda Donnelly  mL/hr at 07/12/22 1136 500 mg at 07/12/22 1136    miconazole NITRATE 2 % top powder   Topical (Top) BID Kraig Shen MD   Given at 07/12/22 0900    mirtazapine tablet 15 mg  15 mg Oral QHS Kraig Shen MD   15 mg at 07/11/22 2100    [START ON 7/13/2022] predniSONE tablet 10 mg  10 mg Oral Daily Cyril Goodwin MD        scopolamine 1.3-1.5 mg (1 mg over 3 days) 1 patch  1 patch Transdermal Q3 Days Cyril Goodwin MD   1 patch at 07/09/22 1545    sodium chloride 0.9% flush 10 mL  10 mL Intravenous Q12H PRN  Dominique Ferguson PA-C        zinc oxide-cod liver oil 40 % paste   Topical (Top) BID Kraig Shen MD   Given at 07/12/22 0900       Review of Systems  Objective:     Vital Signs (Most Recent):  Temp: 97.8 °F (36.6 °C) (07/12/22 1117)  Pulse: 109 (07/12/22 1117)  Resp: 18 (07/12/22 0830)  BP: 95/64 (07/12/22 1117)  SpO2: (!) 94 % (07/12/22 1117)   Vital Signs (24h Range):  Temp:  [97.8 °F (36.6 °C)-102.5 °F (39.2 °C)] 97.8 °F (36.6 °C)  Pulse:  [] 109  Resp:  [18-26] 18  SpO2:  [94 %-96 %] 94 %  BP: ()/(64-80) 95/64     Weight: 62.1 kg (137 lb)  Body mass index is 18.08 kg/m².    Physical Exam  Vitals reviewed.   Constitutional:       Appearance: He is ill-appearing.   HENT:      Head: Normocephalic.   Eyes:      General: Gaze aligned appropriately.   Pulmonary:      Effort: Pulmonary effort is normal.   Musculoskeletal:      Right lower leg: No edema.      Left lower leg: No edema.   Skin:     General: Skin is warm and dry.      Capillary Refill: Capillary refill takes less than 2 seconds.   Neurological:      Mental Status: He is lethargic.      Comments:     Oriented to self   Lethargic but arouses to stim   No obvious facial asymmetry  No gaze preference, tracks examiner in room   Does not follow commands   Appears to move all extremities    Limited PE 2/2 clinical condition             Significant Labs:   Recent Lab Results         07/12/22  0355        Albumin/Globulin Ratio 0.5       Albumin 2.0       Alkaline Phosphatase 108       ALT 54       AST 32       Baso # 0.03       Basophil % 0.2       BILIRUBIN TOTAL 0.6       BUN 12.5       Calcium 8.5       Chloride 104       CO2 20       Creatinine 0.48       eGFR if non African American >60       Eos # 0.01       Eosinophil % 0.1       Globulin, Total 3.7       Glucose 115       Hematocrit 38.1       Hemoglobin 12.2       Immature Grans (Abs) 0.05       Immature Granulocytes 0.4       Lymph # 3.47       LYMPH % 28.7       Magnesium 1.70        MCH 28.6       MCHC 32.0       MCV 89.4       Mono # 1.14       Mono % 9.4       MPV 10.6       Neut # 7.4       Neut % 61.2       nRBC 0.0       Phosphorus 2.9       Platelets 355       Potassium 4.2       PROTEIN TOTAL 5.7       RBC 4.26       RDW 16.6       Sodium 135       WBC 12.1               Significant Imaging: I have reviewed all pertinent imaging results/findings within the past 24 hours.  No new imaging     Assessment and Plan:     * Encephalopathy  Encephalopathy     Will attempt to repeat MRI brain   Will call lab about adding CSF cytology and RT-QUIC  Would recommend stopping Aricept and scopolamine patch   ID following   CSF results reviewed per MD     Further recommendations to follow from MD     Acute ischemic right MCA stroke  Continue Eliquis for stroke prevention due to AFib        VTE Risk Mitigation (From admission, onward)         Ordered     enoxaparin injection 60 mg  2 times daily         07/08/22 0633                Kathleen Jason NP  Neurology  [facility] [address] [facility]

## 2022-07-12 NOTE — CONSULTS
Consults  Patient Name: Quan Contreras   MRN: [id number]   Admission Date: 7/6/2022   Hospital Length of Stay: 6   Attending Provider: Kraig Shen MD   Consulting Provider: Javi Corley M.D.  Reason for Consult: Goals of Care  Primary Care Physician: Bran Hansen MD     Principal Problem: Encephalopathy     Patient information was obtained from spouse/SO and ER records.      Final diagnoses:  [R41.82] AMS (altered mental status)  [R50.9] Fever  [G93.40] Encephalopathy (Primary)  [I48.91] Atrial fibrillation with RVR     Assessment/Plan:     I reviewed the patient's current clinical status with his nurse and discussed his care with the Pulmonologist and hospital medicine physician. I met with the patient with his wife and 3 children at bedside. Dr. Goodwin met with the patient this AM. He said that he appeared more alert and he tried to engage with him about his wishes about placement of trach and peg. He said that the patient refused these options.    I evaluated the patient today.. He appears lethargic but he was able to answer some simple questions concerning symptoms. With his family present, I tried to assess if he was able to communicate a choice or understand the risks and benefits (consequences) of whether or not he wished to proceed with the proposed treatment options (trach/ peg). He did not appear to understand or be able to communicate a choice, his family agreed.    The patient has 3 children. 2 daughters and 1 son. They were all present today. I explained also that the patient has had fever upt to 102 this AM. ID has made adjustments in his antibiotic regimen to cover for anerobes. We then discussed the proposed treatment options offered by Pulmonology, including also bronchoscopy for severe mucus plugging. His family is having a hard time making a decision on behalf of the patient, but they understand that he is unable. They agree, however, that if the patient were able, he would  reject further aggressive treatment options, including trach and peg placement. They feel that although these measures may help his overall condition, these will not regain him the quality of life he once had. They are concerned that these may prolong his life with the continued risk of further complications from a bedbound state, including the possible need for continued restraints. The patient remains confused and lethargic and continues to try to pull out NG and IVs. With trach and PEG, this will become more of a concern, including once he is discharged from LTAC to home. His family does not feel that they can care for him at home in his current state and they would not want him to live in a nursing home if he is to have little potential for recovery. I verified that medically we we have the potential to provide him with care that can extend his life, but we can not guarantee better quality of life. In fact, long term PEG has no potential to improve quality of life.    They would like to speak with Pulmonology further but feel that they will likely elect hospice care with a focus on symptom management.  I contacted Dr. Klein, who will give his wife a call. He said that if the family elects hospice care, he does not feel that the bronchoscopy and suction will be worth while. He feels that he is stable at present and their is a risk of the need to intubate patient during the procedure. He will convey this to his wife.     We discussed prognosis. His family understands that he will not be able to eat/ drink without a high risk of aspiration. I explained that he will likely decline until end of life in the next 2-3 weeks, but he can receive medications to manage secretions, including suctioning and to manage pain, dyspnea, anxiety. I offered the option of inpatient hospice over home hospice due to the patient's needs, NPO status and likely need for IV medications to manage symptoms before death. They are interested  in this option. I will ask case management to review the 2 options in [place] available.       History of Present Illness:     71-year-old male with history cardiomyopathy TIA, CVA, seizure disorder, rheumatoid arthritis, atrial fibrillation, coronary artery disease and recent acute CVA April 2022 and subsequent rehab and return home.  He was apparently hospitalized with pneumonia and associated acute bacterial meningitis with encephalopathy subsequently sent to TCU on 06/28/2022 but transfer to acute care on 07/06/2022 with acute metabolic encephalopathy with fever.  He has had workup including lumbar puncture with possible fungal or mycobacterial infection pending.  Is also a seizure episodes and has developed bilateral left greater than right aspiration superior mucous plugging left.  He remains severely weak and lethargic.  I was consulted to review goals of care with patient and family.      Active Ambulatory Problems     Diagnosis Date Noted    Acute left arterial ischemic stroke, KINGA (anterior cerebral artery) 05/03/2022    Thyrotoxicosis 05/03/2022    Benign essential hypertension 05/03/2022    Atrial fibrillation 05/03/2022    Ischemic cardiomyopathy 05/03/2022    Atherosclerosis of coronary artery 05/11/2022    Esophageal reflux 05/11/2022    Idiopathic peripheral neuropathy 05/11/2022    Rheumatoid arthritis involving multiple sites with positive rheumatoid factor 05/11/2022    Mixed hyperlipidemia 05/11/2022    Other cardiomyopathy 05/11/2022    Focal seizure 05/17/2022    Other sequelae following unspecified cerebrovascular disease 05/09/2022    Hemiplga following cerebral infrc affecting left nondom side 05/08/2022    Encephalopathy 06/15/2022    Meningitis 06/27/2022    Bacterial meningitis 06/27/2022     Resolved Ambulatory Problems     Diagnosis Date Noted    Acute osteomyelitis 05/11/2022    Fever 06/14/2022     Past Medical History:   Diagnosis Date    Cardiomyopathy      Coronary artery disease     Diverticulosis     Fatigue     Generalized anxiety disorder     GERD (gastroesophageal reflux disease)     Graves disease     HTN (hypertension)     Hyperthyroidism     Irritable bowel syndrome without diarrhea     Paroxysmal atrial fibrillation     Rheumatoid arthritis 5/11/2022    Rheumatoid arthritis, unspecified     Shingles     Staph aureus infection     Stroke     Thyroiditis, unspecified         Past Surgical History:   Procedure Laterality Date    CATARACT EXTRACTION      CYST REMOVAL Left     TONSILLECTOMY      TOTAL KNEE ARTHROPLASTY      VASECTOMY          Review of patient's allergies indicates:   Allergen Reactions    Ace inhibitors Swelling     Lip swelling    Morphine     Morphine sulfate     Nafcillin Itching    Pradaxa [dabigatran etexilate] Other (See Comments)     Abdominal cramping    Sulfamethoxazole Rash          Current Facility-Administered Medications:     acetaminophen tablet 650 mg, 650 mg, Oral, Q4H PRN, Dominique Ferguson PA-C, 650 mg at 07/12/22 0808    acetylcysteine 200 mg/ml (20%) solution 4 mL, 4 mL, Nebulization, TID, Cyril Goodwin MD, 4 mL at 07/12/22 1324    albuterol nebulizer solution 2.5 mg, 2.5 mg, Nebulization, Q4H PRN, Kraig Shen MD, 2.5 mg at 07/11/22 1334    ceFEPIme (MAXIPIME) 2 g in dextrose 5 % in water (D5W) 5 % 50 mL IVPB (MB+), 2 g, Intravenous, Q8H, Alfreda Donnelly MD, Stopped at 07/12/22 1205    dextrose 10 % infusion, 12.5 g, Intravenous, PRN, Dominique Ferguson PA-C    dextrose 10 % infusion, 25 g, Intravenous, PRN, Dominique Ferguson PA-C    digoxin tablet 0.25 mg, 0.25 mg, Oral, Daily, Kraig Shen MD, 0.25 mg at 07/11/22 1603    enoxaparin injection 60 mg, 60 mg, Subcutaneous, BID, Cyril Goodwin MD, 60 mg at 07/12/22 0805    glucagon (human recombinant) injection 1 mg, 1 mg, Intramuscular, PRN, Dominique Ferguson PA-C    glucose chewable tablet 16 g, 16 g, Oral, PRN, Dominique JACOBSEN  "ANN Ferguson    glucose chewable tablet 24 g, 24 g, Oral, PRN, Dominique Ferguson PA-C    hydrALAZINE injection 10 mg, 10 mg, Intravenous, Q2H PRN, Dominique Ferguson PA-C    levetiracetam 500 mg/5 mL (5 mL) liquid Soln 1,000 mg, 1,000 mg, Per NG tube, BID, Cyril Goodwin MD, 1,000 mg at 07/12/22 0805    methIMAzole tablet 20 mg, 20 mg, Oral, TID, Kraig Shen MD, 20 mg at 07/12/22 0804    metoprolol injection 5 mg, 5 mg, Intravenous, Q5 Min PRN, Cyril Goodwin MD, 5 mg at 07/09/22 1438    metoprolol tartrate (LOPRESSOR) tablet 100 mg, 100 mg, Oral, BID, Cyril Goodwin MD, 100 mg at 07/12/22 0805    metronidazole IVPB 500 mg, 500 mg, Intravenous, Q8H, Alfreda Donnelly MD, Stopped at 07/12/22 1236    miconazole NITRATE 2 % top powder, , Topical (Top), BID, Kraig Shen MD, Given at 07/12/22 0900    mirtazapine tablet 15 mg, 15 mg, Oral, QHS, Kraig Shen MD, 15 mg at 07/11/22 2100    [START ON 7/13/2022] predniSONE tablet 10 mg, 10 mg, Oral, Daily, Cyril Goodwin MD    scopolamine 1.3-1.5 mg (1 mg over 3 days) 1 patch, 1 patch, Transdermal, Q3 Days, Cyril Goodwin MD, 1 patch at 07/09/22 1545    sodium chloride 0.9% flush 10 mL, 10 mL, Intravenous, Q12H PRN, Dominique Ferguson PA-C    zinc oxide-cod liver oil 40 % paste, , Topical (Top), BID, Kraig Shen MD, Given at 07/12/22 0900     acetaminophen, albuterol sulfate, dextrose 10 % in water (D10W), dextrose 10 % in water (D10W), glucagon (human recombinant), glucose, glucose, hydrALAZINE, metoprolol, sodium chloride 0.9%     Family History   Family history unknown: Yes        Review of Systems   Unable to perform ROS: Mental status change            Objective:   BP 95/64   Pulse (!) 120   Temp 97.8 °F (36.6 °C) (Axillary)   Resp 18   Ht 6' 0.99" (1.854 m)   Wt 62.1 kg (137 lb)   SpO2 (!) 94%   BMI 18.08 kg/m²      Physical Exam  Constitutional:       Appearance: He is ill-appearing.   HENT:      Head:      Comments: " cachectic     Nose: Nose normal.      Mouth/Throat:      Mouth: Mucous membranes are dry.      Pharynx: Oropharynx is clear.   Eyes:      Pupils: Pupils are equal, round, and reactive to light.   Cardiovascular:      Rate and Rhythm: Normal rate and regular rhythm.      Pulses: Normal pulses.      Heart sounds: Normal heart sounds.   Pulmonary:      Breath sounds: Rhonchi and rales present.      Comments: Wet crackles L>R, severely diminished on Left  Abdominal:      General: Abdomen is flat. Bowel sounds are normal. There is no distension.      Palpations: Abdomen is soft.      Tenderness: There is no abdominal tenderness.   Musculoskeletal:         General: Normal range of motion.      Cervical back: Normal range of motion.   Skin:     General: Skin is warm.      Findings: Lesion present.   Neurological:      Mental Status: He is alert. He is disoriented.      Motor: Weakness present.      Comments: Rt sided hemiparesis, raises left arm without intent, no follow commands, expressive aphasia            Review of Symptoms  Review of Symptoms    Symptom Assessment (ESAS 0-10 Scale)  Pain:  0  Dyspnea:  0  Anxiety:  0  Nausea:  0  Depression:  0  Anorexia:  0  Fatigue:  0  Insomnia:  0  Restlessness:  0  Agitation:  0     CAM / Delirium:  Positive  Constipation:  Negative  Diarrhea:  Negative    Bowel Management Plan (BMP):  Yes      Modified Nima Scale:  0    Performance Status:  20    Living Arrangements:  Lives with spouse    Spiritual:  F - Sharri and Belief:  Christianity      Advance Care Planning   Advance Directives:   Living Will: No    [place]: No    Do Not Resuscitate Status: Yes    Medical Power of : No    Agent's Name:  WifeJean-Pierre Contreras   Agent's Contact Number:  [phone]    Decision Making:  Family answered questions and Patient unable to communicate due to disease severity/cognitive impairment            PAINAD: 0     Caregiver burden formerly assessed: yes        > 50% of 90 min of encounter was  spent in chart review, face to face discussion of goals of care, symptom assessment, coordination of care and emotional support. This includes >46 min of time spent in Advanced Care Planning discussion    Javi Corley M.D.  Palliative Medicine  [facility] - Observation Unit

## 2022-07-12 NOTE — PROGRESS NOTES
[facility] - [facility] Tele[facility]  Pulmonary Critical Care Note    Patient Name: Quan Contreras  MRN: [id number]  Admission Date: 7/6/2022  Hospital Length of Stay: 6 days  Code Status: DNR  Attending Provider: Kraig Shen MD  Primary Care Provider: Bran Hansen MD     Subjective:     HPI:  This is a 71-year-old flu in April of this year patient suffered a stroke with residual right-sided weakness and was sent to rehab and ultimately discharged home after 2 weeks of hospitalization.   He was readmitted to [facility] in May after developing seizures at home.   He was treated for suspected meningitis and pneumonia.  He was discharged after 2 days to home but returned last month with recurring fever and was treated again for pneumonia.  Ultimately discharged to [facility] on 06/28/2022 where he was diagnosed with hyperthyroidism.  He was transferred back to [facility] on 07/06/2022 due to recurring fever and worsening mental status.  Pulmonary consult was placed for evaluation of airway control and possible need for tracheostomy.  Patient awakens and answers questions but in a very wet moist voice and quickly goes back to sleep.  Unable to give a reliable history and no family at the bedside this morning.  As mentioned above I did review his EMR in detail.    24 Hour Interval History:  I reviewed palliative care consult performed yesterday.  No real change in his clinical status overnight.  He remains lethargic.  Very debilitated and weak.    Past Medical History:   Diagnosis Date    Acute left arterial ischemic stroke, KINGA (anterior cerebral artery) 5/3/2022    Acute osteomyelitis 5/11/2022    Atherosclerosis of coronary artery 5/11/2022    Atrial fibrillation 5/3/2022    Benign essential hypertension 5/3/2022    Cardiomyopathy     Coronary artery disease     Diverticulosis     Esophageal reflux 5/11/2022    Fatigue     Focal seizure 5/17/2022    Generalized  anxiety disorder     GERD (gastroesophageal reflux disease)     Graves disease     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/2022    HTN (hypertension)     Hyperthyroidism     Idiopathic peripheral neuropathy 5/11/2022    Irritable bowel syndrome without diarrhea     Ischemic cardiomyopathy 5/3/2022    Mixed hyperlipidemia 5/11/2022    Other sequelae following unspecified cerebrovascular disease 5/9/2022    Paroxysmal atrial fibrillation     Rheumatoid arthritis 5/11/2022    Rheumatoid arthritis, unspecified     Shingles     Staph aureus infection     Stroke     Thyroiditis, unspecified     Thyrotoxicosis 5/3/2022       Past Surgical History:   Procedure Laterality Date    CATARACT EXTRACTION      CYST REMOVAL Left     TONSILLECTOMY      TOTAL KNEE ARTHROPLASTY      VASECTOMY         Social History     Socioeconomic History    Marital status:    Tobacco Use    Smoking status: Never Smoker    Smokeless tobacco: Never Used   Substance and Sexual Activity    Alcohol use: Never    Drug use: Never    Sexual activity: Yes           Objective:     Current Outpatient Medications   Medication Instructions    acetaminophen (TYLENOL) 650 mg, Rectal, Every 6 hours PRN, Fever >101 F    acetylcysteine 200 mg/ml, 20%, (MUCOMYST) 200 mg/mL (20 %) nebulizer solution 2 mLs, Nebulization, 3 times daily    albuterol-ipratropium (DUO-NEB) 2.5 mg-0.5 mg/3 mL nebulizer solution 3 mLs, Nebulization, Every 4 hours    apixaban (ELIQUIS) 5 mg, Oral, 2 times daily    aspirin 325 mg, Oral, Daily    atorvastatin (LIPITOR) 10 mg, Oral, Daily    benzonatate (TESSALON) 100 mg, Oral, 3 times daily PRN    dextrose 5 % SolP 250 mL with vancomycin 750 mg SolR 750 mg 750 mg, Intravenous, Every 8 hours    DIGITEK 250 mcg (0.25 mg) tablet 1 tablet, Oral, Daily    donepeziL (ARICEPT) 5 mg, Oral, Nightly    ezetimibe (ZETIA) 10 mg tablet 1 tablet, Oral, Daily    ferrous gluconate 324 mg,  Oral, With breakfast    HYDROcodone-acetaminophen (NORCO)  mg per tablet 1 tablet, Oral, 3 times daily, PRN    hydrOXYzine pamoate (VISTARIL) 50 mg, Oral, Nightly    ipratropium (ATROVENT) 500 mcg, Nebulization, Every 4 hours    methIMAzole (TAPAZOLE) 20 mg, Oral, 3 times daily    miconazole NITRATE 2 % (MICOTIN) 2 % top powder Topical (Top), 2 times daily    mirtazapine (REMERON) 15 mg, Oral, Nightly    montelukast (SINGULAIR) 10 mg tablet 1 tablet, Oral, Daily    pantoprazole (PROTONIX) 40 mg, Oral, Daily    predniSONE (DELTASONE) 5 mg, Oral, Daily, At bedtime    propranoloL (INDERAL LA) 60 mg, Oral, 2 times daily    rosuvastatin (CRESTOR) 40 mg, Oral, Nightly    sodium chloride 0.9 % PgBk 100 mL with levETIRAcetam 500 mg/5 mL Soln 500 mg 500 mg, Intravenous, Every 12 hours    sodium chloride 0.9 % PgBk 100 mL with meropenem 1 gram SolR 1 g 1 g, Intravenous, Every 8 hours    traZODone (DESYREL) 150 mg, Oral, Nightly    vancomycin HCl (VANCOMYCIN 750 MG/250 ML D5W, READY TO MIX SYSTEM,) 750 mg, Intravenous, Every 8 hours       Current Inpatient Medications   acetylcysteine 200 mg/ml (20%)  4 mL Nebulization TID    ciprofloxacin  400 mg Intravenous Q12H    digoxin  0.25 mg Oral Daily    enoxaparin  60 mg Subcutaneous BID    levetiracetam  1,000 mg Per NG tube BID    methIMAzole  20 mg Oral TID    metoprolol tartrate  100 mg Oral BID    metroNIDAZOLE  500 mg Oral TID    miconazole NITRATE 2 %   Topical (Top) BID    mirtazapine  15 mg Oral QHS    predniSONE  20 mg Oral Daily    scopolamine  1 patch Transdermal Q3 Days    zinc oxide-cod liver oil   Topical (Top) BID           Intake/Output Summary (Last 24 hours) at 7/12/2022 0650  Last data filed at 7/12/2022 0100  Gross per 24 hour   Intake 1547 ml   Output 1000 ml   Net 547 ml       Vital Signs (Most Recent):  Temp: 100 °F (37.8 °C) (07/12/22 0420)  Pulse: 105 (07/12/22 0420)  Resp: (!) 26 (07/12/22 0420)  BP: 114/70 (07/12/22  0420)  SpO2: (!) 94 % (07/12/22 0000)  Body mass index is 18.08 kg/m².  Weight: 62.1 kg (137 lb) Vital Signs (24h Range):  Temp:  [99.3 °F (37.4 °C)-102.3 °F (39.1 °C)] 100 °F (37.8 °C)  Pulse:  [] 105  Resp:  [18-26] 26  SpO2:  [94 %-98 %] 94 %  BP: (114-135)/(70-85) 114/70     Physical Exam  Constitutional:       General: He is not in acute distress.     Appearance: He is ill-appearing.   HENT:      Head: Normocephalic and atraumatic.      Nose:      Comments: NG tube in the right nares  Eyes:      General: No scleral icterus.     Conjunctiva/sclera: Conjunctivae normal.   Cardiovascular:      Rate and Rhythm: Normal rate. Rhythm irregular.      Heart sounds: No murmur heard.    No gallop.   Pulmonary:      Effort: No respiratory distress.      Breath sounds: No stridor. Rhonchi and rales present.   Abdominal:      General: Abdomen is flat. Bowel sounds are normal.      Palpations: Abdomen is soft.      Tenderness: There is no abdominal tenderness. There is no rebound.   Musculoskeletal:      Right lower leg: Edema present.      Left lower leg: Edema present.   Skin:     General: Skin is warm and dry.   Neurological:      Sensory: Sensory deficit present.      Motor: Weakness present.     Mechanical ventilation support:       Lines/Drains/Airways     Drain  Duration                NG/OG Tube 07/07/22 1700 nasogastric 16 Fr. Right nostril 4 days          Peripheral Intravenous Line  Duration                Midline Catheter Insertion/Assessment  - Single Lumen 07/05/22 1516 Right cephalic vein (lateral side of arm) other (see comments) 6 days                Significant Labs:    Lab Results   Component Value Date    WBC 12.1 (H) 07/12/2022    HGB 12.2 (L) 07/12/2022    HCT 38.1 (L) 07/12/2022    MCV 89.4 07/12/2022     07/12/2022         BMP  Lab Results   Component Value Date     (L) 07/12/2022    K 4.2 07/12/2022    CO2 20 (L) 07/12/2022    BUN 12.5 07/12/2022    CREATININE 0.48 (L) 07/12/2022     CALCIUM 8.5 (L) 07/12/2022    EGFRNONAA >60 07/12/2022       ABG  No results for input(s): PH, PO2, PCO2, HCO3, BE in the last 168 hours.        Significant Imaging:  I have reviewed all pertinent imaging within the past 24 hours.        Assessment/Plan:     Assessment  Encephalopathy with history of stroke in April and then suspected meningitis treated with antibiotic therapy.  Likely recurring aspiration with pneumonia.  Patient has significant mucus within the trachea and left mainstem bronchus.   Would benefit from a PEG tube.  Also recommend ENT evaluation and possible trach.  Await family decision regarding aggressive intervention.       JOHN Klein MD  Pulmonary Critical Care Medicine  [facility] - [facility] Tele[facility]

## 2022-07-12 NOTE — PROGRESS NOTES
[facility] MEDICINE   PROGRESS NOTE      CHIEF COMPLAINT  Hospital follow up    HOSPITAL COURSE  Quan Contreras is a 71 y.o. White male with a past medical history of paroxysmal atrial fibrillation on Eliquis, hypertension, coronary artery disease, cardiomyopathy, hyperthyroidism on methimazole, prior CVA/TIA, seizure disorder and rheumatoid arthritis. In April 2022 patient suffered a stroke. He spent a week in rehab and was discharged home. He then developed seizures and was started on seizure medication. Patient progesively worsened and developed a fever and was diagnosed with pneumonia. Patient improved overall but confusion worsened. He was admitted and diagnosed with meningitis. CSF was negative. He was discharged to [facility] 06/28/2022 for acute encephalopathy secondary to meningitis. TSH was less than 0.0083 with a Free T3 11.61 and free T4 2.68 on 6/29/2022 and Methimazole was increased to 20 mg TID. Yesterday (07/05/2022) patient began running a fever of 101 F. He was started on Vancomycin and Merrem. In addition leukocytosis increased from 11.7 yesterday to 22.3 today (7/6/2022) with worsening mental status. Blood culture from 7/5/2022 returned positive for gram positive cocci in 1 of 2 bottles. He was transferred to [facility] for further neurological workup in ID consult.   Wife and daughter at bedside reports prior to the stroke in April patient was ambulating and completing activities of daily living without assistance and has not returned to baseline. In rehab he was responding to questions appropriately and recognizing family. NG tube was placed a few days ago.   Upon presentation to the ED, patient afebrile and hemodynamically stable.  Labs notable for WBC 22.3, H&H 13.5/39.6, MCV 85, creatinine 0.58, AST 46, ALT 65, TSH less than 0.0083, free T4 2.13.  Influenza A and B and SARS-CoV-2 PCR negative.  UA negative for infection.  Chest  "x-ray negative.  CT head negative for acute intracranial abnormalities.  Patient admitted to hospital medicine services for further medical management.    Today  Seen examined this morning.  Seen this morning again and responding appropriately following commands but of course weak and very deconditioned appearing.  Of course still not able to control his secretions encouraged him to try to have a cough.        OBJECTIVE/PHYSICAL EXAM  /70   Pulse 109   Temp (!) 102.5 °F (39.2 °C)   Resp 18   Ht 6' 0.99" (1.854 m)   Wt 62.1 kg (137 lb)   SpO2 (!) 94%   BMI 18.08 kg/m²   General: In no acute distress, afebrile  Chest:  Bibasilar coarse crackle  Heart: S1, S2, no appreciable murmur  Abdomen: Soft, nontender, BS +  MSK: Warm, no lower extremity edema, no clubbing or cyanosis  Neurologic:  Awake and oriented and following commands appropriately, left lower extremity 3/5, bilateral upper extremity and right lower extremity moving up against resistance      ASSESSMENT/PLAN  Acute encephalopathy-infectious versus metabolic  Encephalitis/meningitis-infectious versus others  Acute ischemic CVA-right MCA vascular territory  Acute hypoxemic respiratory failure  Bibasilar aspiration pneumonia  Severe oropharyngeal dysphagia  Leukocytosis-steroid versus sepsis  Thyrotoxicosis  Atrial fibrillation with RVR  Severe protein caloric malnutrition    History of:  Seizures, thyrotoxicosis diffuse goiter, stroke April 2022, HTN, CAD, ischemic cardiomyopathy, rheumatoid arthritis, HLD, atrial fibrillation      Neurology following.  Pending evaluation, has not been seen since the 7th.  Still pending follow-up from Neurology.  Encephalopathy 2/to seizures versus encephalitis?  Pending repeat prolonged EEG.  Increase Keppra to 1000 twice daily in case seizures related fluctuating mental status.  Was getting Eliquis 5 bid and  at LTAC for the last 7 days so wouldn't think AC failure stroke.  Continue full-dose Lovenox in " the meantime.  Palliative MD following.  Pending further plans regarding PEG and trach.  ID following.  Change to cefepime and Flagyl.  Follow up on LP results, CSF lactate elevated could be from stroke.  Negative procalcitonin.  Follow-up fungal CSF culture and fungal blood culture.  NMDA receptor on CSF is negative, 14-3-3 protein pending.  ALFONZO positive, ANCA negative.  Metoprolol 100 b.i.d.., methimazole 20 t.i.d. May need to change to coumadin since recurring strokes on NOAC.    Continue prednisone 10 mg daily for now for stress dose steroids and should help with thyrotoxicosis as well.  Starting to taper back down on prednisone.  Continue current tube feeds.    DVT prophylaxis:  FD Lovenox as above    Critical care diagnosis:  Febrile illness likely from infectious  Critical care time spent:  35 minutes      Anticipated discharge and disposition:   __________________________________________________________________________    LABS/MICROBIOLOGY/MEDICATIONS/DIAGNOSTICS    LABS  Recent Labs     07/12/22  0355   *   K 4.2   CHLORIDE 104   CO2 20*   BUN 12.5   CREATININE 0.48*   GLUCOSE 115   CALCIUM 8.5*   ALKPHOS 108   AST 32   ALT 54   ALBUMIN 2.0*     Recent Labs     07/12/22  0355   WBC 12.1*   RBC 4.26*   HCT 38.1*   MCV 89.4          MICROBIOLOGY  Microbiology Results (last 7 days)     Procedure Component Value Units Date/Time    Cerebrospinal Fluid Culture [[id number]] Collected: 07/07/22 1050    Order Status: Completed Specimen: CSF (Spinal Fluid) from Cerebrospinal Fluid Updated: 07/12/22 0906     CULTURE, CSF ([facility]) No Growth at 5 days     GRAM STAIN No WBCs, No bacteria seen     Blood culture #2 **CANNOT BE ORDERED STAT** [[id number]]  (Normal) Collected: 07/06/22 1322    Order Status: Completed Specimen: Blood from Arm, Left Updated: 07/11/22 1502     CULTURE, BLOOD ([facility]) No Growth at 5 days    Fungal Culture Blood [[id number]] Collected: 07/11/22 0543    Order Status: Sent Specimen: Blood from  Hand, Left Updated: 07/11/22 0746    Blood culture #1 **CANNOT BE ORDERED STAT** [[id number]]  (Abnormal)  (Susceptibility) Collected: 07/06/22 1326    Order Status: Completed Specimen: Blood from PICC Line Updated: 07/11/22 0740     CULTURE, BLOOD ([facility]) Staphylococcus epidermidis     GRAM STAIN Gram Positive Cocci, probable Staphylococcus      Seen in gram stain of broth only      1 of 2 Aerobic bottles positive     Fungal Culture Blood [[id number]] Collected: 07/11/22 0544    Order Status: Sent Specimen: Blood from Arm, Left Updated: 07/11/22 0610    Fungal Culture [[id number]] Collected: 07/07/22 1050    Order Status: Sent Specimen: CSF (Spinal Fluid) from Cerebrospinal Fluid Updated: 07/09/22 1457    Blood Culture [[id number]]     Order Status: Canceled Specimen: Blood     Blood Culture [[id number]]     Order Status: Canceled Specimen: Blood           MEDICATIONS   acetylcysteine 200 mg/ml (20%)  4 mL Nebulization TID    ceFEPime (MAXIPIME) IVPB  2 g Intravenous Q8H    digoxin  0.25 mg Oral Daily    enoxaparin  60 mg Subcutaneous BID    levetiracetam  1,000 mg Per NG tube BID    methIMAzole  20 mg Oral TID    metoprolol tartrate  100 mg Oral BID    metronidazole  500 mg Intravenous Q8H    miconazole NITRATE 2 %   Topical (Top) BID    mirtazapine  15 mg Oral QHS    predniSONE  20 mg Oral Daily    scopolamine  1 patch Transdermal Q3 Days    zinc oxide-cod liver oil   Topical (Top) BID      INFUSIONS      DIAGNOSTIC TESTS  CT Chest Abdomen Pelvis Without Contrast (XPD)   Final Result      1. Findings are consistent with aspiration pneumonitis greater on the left than right with diffuse mucoid impaction of the trachea, and airways.   2. Other secondary findings of the abdomen and pelvis as above.         Electronically signed by: Ha Fleming MD   Date:    07/09/2022   Time:    18:04      XR Non-Rad Performed NG/Gastric Tube Check   Final Result      Standard NG tube placement         Electronically signed  by: Nehemias Chirinos MD   Date:    07/09/2022   Time:    14:40      X-Ray Chest 1 View   Final Result      No acute abnormality of the chest.         Electronically signed by: Victoria Corley   Date:    07/09/2022   Time:    11:55      XR Non-Rad Performed NG/Gastric Tube Check   Final Result      Nasogastric tube as above         Electronically signed by: De Conte   Date:    07/08/2022   Time:    12:31      US Thyroid   Final Result      Thyromegaly with heterogeneous gland without discrete thyroid nodule..      Reference: ACR Thyroid Imaging, Reporting and Data System (TI-RADS): White Paper of the ACR TI-RADS Committee.  2017.         Electronically signed by: Lisa Soto   Date:    07/08/2022   Time:    11:55      XR Non-Rad Performed NG/Gastric Tube Check   Final Result      XR Non-Rad Performed NG/Gastric Tube Check   Final Result      Enteric tube in the airway.      Finding relayed to patient's nurse Alice Dickey at the time of this dictation.         Electronically signed by: Lisa Soto   Date:    07/07/2022   Time:    16:28      X-Ray Chest 1 View   Final Result      No acute cardiopulmonary abnormality.         Electronically signed by: Lisa Soto   Date:    07/07/2022   Time:    12:02      FL LUMBAR PUNCTURE DIAGNOSTIC WITH IMAGING   Final Result      Technically successful fluoroscopic guided lumbar puncture.      Opening pressure = less than 10 cm H2O      CSF drained = 6.5 cc      Closing pressure = less than 10 cm H2O         Electronically signed by: Michel Ge   Date:    07/07/2022   Time:    13:37      MRI Brain Limited Without Contrast   Final Result      Motion degraded exam.      In spite of this limitation:      1. Acute cortical based ischemia in the right MCA vascular territory as exemplified by a right middle frontal 2.9 x 1.9 cm focus.   2. Extra-axial diffusion restriction about the falx persists but has decreased, with similar findings about the cerebral  convexities, again suspicious for meningitis/infection.         Electronically signed by: Michel Ge   Date:    07/06/2022   Time:    18:31      CT Head Without Contrast   Final Result      No acute intracranial abnormalities.         Electronically signed by: Michel Ge   Date:    07/06/2022   Time:    14:13      X-Ray Chest 1 View   Final Result      NO ACUTE CARDIOPULMONARY PROCESS IDENTIFIED.         Electronically signed by: Ethan Molina   Date:    07/06/2022   Time:    13:17               All diagnosis and differential diagnosis have been reviewed; assessment and plan has been documented. I have personally reviewed the labs and test results that are presently available; I have reviewed the patients medication list. I have reviewed the consulting providers response and recommendations. I have reviewed or attempted to review medical records based upon their availability.  All of the patient's questions have been addressed and answered. Patient's is agreeable to the above stated plan. I will continue to monitor closely and make adjustments to medical management as needed.  This document was created using M*Modal Fluency Direct.  Transcription errors may have been made.  Please contact me if any questions may rise regarding documentation to clarify verbiage.        Cyril Goodwin MD   07/12/2022   Internal Medicine

## 2022-07-13 LAB
ALBUMIN SERPL-MCNC: 2.2 GM/DL (ref 3.4–4.8)
ALBUMIN/GLOB SERPL: 0.5 RATIO (ref 1.1–2)
ALP SERPL-CCNC: 113 UNIT/L (ref 40–150)
ALT SERPL-CCNC: 40 UNIT/L (ref 0–55)
AST SERPL-CCNC: 30 UNIT/L (ref 5–34)
BASOPHILS # BLD AUTO: 0.05 X10(3)/MCL (ref 0–0.2)
BASOPHILS NFR BLD AUTO: 0.3 %
BILIRUBIN DIRECT+TOT PNL SERPL-MCNC: 0.5 MG/DL
BUN SERPL-MCNC: 15 MG/DL (ref 8.4–25.7)
CALCIUM SERPL-MCNC: 8.9 MG/DL (ref 8.8–10)
CHLORIDE SERPL-SCNC: 107 MMOL/L (ref 98–107)
CO2 SERPL-SCNC: 17 MMOL/L (ref 23–31)
CREAT SERPL-MCNC: 0.54 MG/DL (ref 0.73–1.18)
EOSINOPHIL # BLD AUTO: 0.03 X10(3)/MCL (ref 0–0.9)
EOSINOPHIL NFR BLD AUTO: 0.2 %
ERYTHROCYTE [DISTWIDTH] IN BLOOD BY AUTOMATED COUNT: 17.1 % (ref 11.5–17)
GLOBULIN SER-MCNC: 4.2 GM/DL (ref 2.4–3.5)
GLUCOSE SERPL-MCNC: 109 MG/DL (ref 82–115)
HCT VFR BLD AUTO: 41.8 % (ref 42–52)
HGB BLD-MCNC: 13.2 GM/DL (ref 14–18)
IMM GRANULOCYTES # BLD AUTO: 0.16 X10(3)/MCL (ref 0–0.04)
IMM GRANULOCYTES NFR BLD AUTO: 0.9 %
LYMPHOCYTES # BLD AUTO: 2.86 X10(3)/MCL (ref 0.6–4.6)
LYMPHOCYTES NFR BLD AUTO: 16.2 %
MCH RBC QN AUTO: 28.4 PG (ref 27–31)
MCHC RBC AUTO-ENTMCNC: 31.6 MG/DL (ref 33–36)
MCV RBC AUTO: 89.9 FL (ref 80–94)
MONOCYTES # BLD AUTO: 1.01 X10(3)/MCL (ref 0.1–1.3)
MONOCYTES NFR BLD AUTO: 5.7 %
NEUTROPHILS # BLD AUTO: 13.5 X10(3)/MCL (ref 2.1–9.2)
NEUTROPHILS NFR BLD AUTO: 76.7 %
NRBC BLD AUTO-RTO: 0 %
PLATELET # BLD AUTO: 392 X10(3)/MCL (ref 130–400)
PMV BLD AUTO: 10.2 FL (ref 7.4–10.4)
POTASSIUM SERPL-SCNC: 4.5 MMOL/L (ref 3.5–5.1)
PROT SERPL-MCNC: 6.4 GM/DL (ref 5.8–7.6)
RBC # BLD AUTO: 4.65 X10(6)/MCL (ref 4.7–6.1)
SODIUM SERPL-SCNC: 136 MMOL/L (ref 136–145)
THYROID PEROXIDASE (OLG): POSITIVE
THYROID PEROXIDASE QUANT (OLG): ABNORMAL
WBC # SPEC AUTO: 17.6 X10(3)/MCL (ref 4.5–11.5)

## 2022-07-13 PROCEDURE — 87798 DETECT AGENT NOS DNA AMP: CPT | Performed by: INTERNAL MEDICINE

## 2022-07-13 PROCEDURE — 25000242 PHARM REV CODE 250 ALT 637 W/ HCPCS: Performed by: INTERNAL MEDICINE

## 2022-07-13 PROCEDURE — 99900026 HC AIRWAY MAINTENANCE (STAT)

## 2022-07-13 PROCEDURE — 63600175 PHARM REV CODE 636 W HCPCS: Performed by: INTERNAL MEDICINE

## 2022-07-13 PROCEDURE — 80053 COMPREHEN METABOLIC PANEL: CPT | Performed by: INTERNAL MEDICINE

## 2022-07-13 PROCEDURE — 94761 N-INVAS EAR/PLS OXIMETRY MLT: CPT

## 2022-07-13 PROCEDURE — S0030 INJECTION, METRONIDAZOLE: HCPCS | Performed by: INTERNAL MEDICINE

## 2022-07-13 PROCEDURE — 99233 SBSQ HOSP IP/OBS HIGH 50: CPT | Mod: 95,,, | Performed by: PSYCHIATRY & NEUROLOGY

## 2022-07-13 PROCEDURE — 25000003 PHARM REV CODE 250: Performed by: PHYSICIAN ASSISTANT

## 2022-07-13 PROCEDURE — 36415 COLL VENOUS BLD VENIPUNCTURE: CPT | Performed by: INTERNAL MEDICINE

## 2022-07-13 PROCEDURE — 11000001 HC ACUTE MED/SURG PRIVATE ROOM

## 2022-07-13 PROCEDURE — 99233 PR SUBSEQUENT HOSPITAL CARE,LEVL III: ICD-10-PCS | Mod: ,,, | Performed by: INTERNAL MEDICINE

## 2022-07-13 PROCEDURE — 86361 T CELL ABSOLUTE COUNT: CPT | Performed by: INTERNAL MEDICINE

## 2022-07-13 PROCEDURE — 99233 PR SUBSEQUENT HOSPITAL CARE,LEVL III: ICD-10-PCS | Mod: 95,,, | Performed by: PSYCHIATRY & NEUROLOGY

## 2022-07-13 PROCEDURE — 25000003 PHARM REV CODE 250: Performed by: INTERNAL MEDICINE

## 2022-07-13 PROCEDURE — 99233 SBSQ HOSP IP/OBS HIGH 50: CPT | Mod: ,,, | Performed by: INTERNAL MEDICINE

## 2022-07-13 PROCEDURE — 99497 ADVNCD CARE PLAN 30 MIN: CPT | Mod: ,,, | Performed by: INTERNAL MEDICINE

## 2022-07-13 PROCEDURE — 99497 PR ADVNCD CARE PLAN 30 MIN: ICD-10-PCS | Mod: ,,, | Performed by: INTERNAL MEDICINE

## 2022-07-13 PROCEDURE — 94640 AIRWAY INHALATION TREATMENT: CPT

## 2022-07-13 PROCEDURE — 85025 COMPLETE CBC W/AUTO DIFF WBC: CPT | Performed by: INTERNAL MEDICINE

## 2022-07-13 PROCEDURE — 99900035 HC TECH TIME PER 15 MIN (STAT)

## 2022-07-13 PROCEDURE — 27200966 HC CLOSED SUCTION SYSTEM

## 2022-07-13 RX ORDER — LORAZEPAM 0.5 MG/1
0.5 TABLET ORAL EVERY 4 HOURS PRN
Status: DISCONTINUED | OUTPATIENT
Start: 2022-07-14 | End: 2022-07-14 | Stop reason: HOSPADM

## 2022-07-13 RX ADMIN — METRONIDAZOLE 500 MG: 500 INJECTION, SOLUTION INTRAVENOUS at 06:07

## 2022-07-13 RX ADMIN — LEVETIRACETAM 1000 MG: 500 SOLUTION ORAL at 08:07

## 2022-07-13 RX ADMIN — Medication: at 08:07

## 2022-07-13 RX ADMIN — HYDROCODONE BITARTRATE AND ACETAMINOPHEN 1 TABLET: 5; 325 TABLET ORAL at 10:07

## 2022-07-13 RX ADMIN — METHIMAZOLE 20 MG: 10 TABLET ORAL at 08:07

## 2022-07-13 RX ADMIN — MICONAZOLE NITRATE 2 % TOPICAL POWDER: at 08:07

## 2022-07-13 RX ADMIN — METOPROLOL TARTRATE 100 MG: 50 TABLET, FILM COATED ORAL at 08:07

## 2022-07-13 RX ADMIN — ACETYLCYSTEINE 4 ML: 200 SOLUTION ORAL; RESPIRATORY (INHALATION) at 08:07

## 2022-07-13 RX ADMIN — PREDNISONE 10 MG: 10 TABLET ORAL at 08:07

## 2022-07-13 RX ADMIN — CEFEPIME 2 G: 2 INJECTION, POWDER, FOR SOLUTION INTRAVENOUS at 05:07

## 2022-07-13 RX ADMIN — MIRTAZAPINE 15 MG: 15 TABLET, FILM COATED ORAL at 08:07

## 2022-07-13 RX ADMIN — LORAZEPAM 0.5 MG: 0.5 TABLET ORAL at 11:07

## 2022-07-13 RX ADMIN — METRONIDAZOLE 500 MG: 500 INJECTION, SOLUTION INTRAVENOUS at 01:07

## 2022-07-13 RX ADMIN — METHIMAZOLE 20 MG: 10 TABLET ORAL at 03:07

## 2022-07-13 RX ADMIN — METRONIDAZOLE 500 MG: 500 INJECTION, SOLUTION INTRAVENOUS at 09:07

## 2022-07-13 RX ADMIN — DIGOXIN 0.25 MG: 250 TABLET ORAL at 05:07

## 2022-07-13 RX ADMIN — ENOXAPARIN SODIUM 60 MG: 100 INJECTION SUBCUTANEOUS at 08:07

## 2022-07-13 RX ADMIN — CEFEPIME 2 G: 2 INJECTION, POWDER, FOR SOLUTION INTRAVENOUS at 09:07

## 2022-07-13 RX ADMIN — ACETYLCYSTEINE 4 ML: 200 SOLUTION ORAL; RESPIRATORY (INHALATION) at 01:07

## 2022-07-13 RX ADMIN — CEFEPIME 2 G: 2 INJECTION, POWDER, FOR SOLUTION INTRAVENOUS at 01:07

## 2022-07-13 RX ADMIN — ALBUTEROL SULFATE 2.5 MG: 2.5 SOLUTION RESPIRATORY (INHALATION) at 08:07

## 2022-07-13 RX ADMIN — ALBUTEROL SULFATE 2.5 MG: 2.5 SOLUTION RESPIRATORY (INHALATION) at 01:07

## 2022-07-13 NOTE — SUBJECTIVE & OBJECTIVE
Subjective:     Interval History: No new issues overnight.  Multiple family members at bedside.      Current Facility-Administered Medications   Medication Dose Route Frequency Provider Last Rate Last Admin    acetaminophen tablet 650 mg  650 mg Oral Q4H PRN Dominique Ferguson PA-C   650 mg at 07/12/22 0808    acetylcysteine 200 mg/ml (20%) solution 4 mL  4 mL Nebulization TID Cyril Goodwin MD   4 mL at 07/13/22 1316    albuterol nebulizer solution 2.5 mg  2.5 mg Nebulization Q4H PRN Kraig Shen MD   2.5 mg at 07/13/22 1316    ceFEPIme (MAXIPIME) 2 g in dextrose 5 % in water (D5W) 5 % 50 mL IVPB (MB+)  2 g Intravenous Q8H Alfreda Donnelly  mL/hr at 07/13/22 1723 2 g at 07/13/22 1723    dextrose 10 % infusion  12.5 g Intravenous PRN Dominique Ferguson PA-C        dextrose 10 % infusion  25 g Intravenous PRN Dominique Ferguson PA-C        digoxin tablet 0.25 mg  0.25 mg Oral Daily Kraig Shen MD   0.25 mg at 07/13/22 1723    enoxaparin injection 60 mg  60 mg Subcutaneous BID Cyril Goodwin MD   60 mg at 07/13/22 0850    glucagon (human recombinant) injection 1 mg  1 mg Intramuscular PRN Dominique Ferguson PA-C        glucose chewable tablet 16 g  16 g Oral PRN Dominique Ferguson PA-C        glucose chewable tablet 24 g  24 g Oral PRN Dominique Ferguson PA-C        glycopyrrolate (PF) injection 0.2 mg  0.2 mg Intravenous Q6H PRN Kraig Shen MD        haloperidol lactate injection 5 mg  5 mg Intravenous Q6H PRN Cyril Goodwin MD        hydrALAZINE injection 10 mg  10 mg Intravenous Q2H PRN Dominique Ferguson PA-C        HYDROcodone-acetaminophen 5-325 mg per tablet 1 tablet  1 tablet Oral Q6H PRN LORETTA Palacios   1 tablet at 07/12/22 2112    levetiracetam 500 mg/5 mL (5 mL) liquid Soln 1,000 mg  1,000 mg Per NG tube BID Cyril Goodwin MD   1,000 mg at 07/13/22 0850    lorazepam (ATIVAN) injection 0.25 mg  0.25 mg Intravenous Q4H PRN ADELE Noel        methIMAzole tablet 20 mg  20 mg  Oral TID Kraig Shen MD   20 mg at 07/13/22 1548    metoprolol injection 5 mg  5 mg Intravenous Q5 Min PRN Cyril Goodwin MD   5 mg at 07/09/22 1438    metoprolol tartrate (LOPRESSOR) tablet 100 mg  100 mg Oral BID Cyril Goodwin MD   100 mg at 07/13/22 0850    metronidazole IVPB 500 mg  500 mg Intravenous Q8H Alfreda Donnelly MD   Stopped at 07/13/22 1048    miconazole NITRATE 2 % top powder   Topical (Top) BID Kraig Shen MD   Given at 07/13/22 0856    mirtazapine tablet 15 mg  15 mg Oral QHS Kraig Shen MD   15 mg at 07/12/22 2113    predniSONE tablet 10 mg  10 mg Oral Daily Cyril Goodwin MD   10 mg at 07/13/22 0850    scopolamine 1.3-1.5 mg (1 mg over 3 days) 1 patch  1 patch Transdermal Q3 Days Cyril Goodwin MD   1 patch at 07/12/22 1712    sodium chloride 0.9% flush 10 mL  10 mL Intravenous Q12H PRN Dominique Ferguson PA-C        zinc oxide-cod liver oil 40 % paste   Topical (Top) BID Kraig Shen MD   Given at 07/13/22 0857       Review of Systems   Unable to perform ROS: Acuity of condition     Objective:     Vital Signs (Most Recent):  Temp: 98.1 °F (36.7 °C) (07/13/22 1549)  Pulse: 105 (07/13/22 1549)  Resp: (!) 22 (07/13/22 1339)  BP: 109/72 (07/13/22 1549)  SpO2: 95 % (07/13/22 1549)   Vital Signs (24h Range):  Temp:  [98.1 °F (36.7 °C)-101.4 °F (38.6 °C)] 98.1 °F (36.7 °C)  Pulse:  [] 105  Resp:  [18-22] 22  SpO2:  [92 %-99 %] 95 %  BP: ()/(65-79) 109/72     Weight: 62.1 kg (137 lb)  Body mass index is 18.08 kg/m².    Physical Exam  Neurological:      Comments: Patient lying in bed with several family members at bedside.  Opens his eyes to verbal stimuli and follows some simple commands.  Full exam to follow by MD.       Significant Labs: BMP:   Recent Labs   Lab 07/12/22 0355 07/13/22  0440   * 136   K 4.2 4.5   CO2 20* 17*   BUN 12.5 15.0   CREATININE 0.48* 0.54*   CALCIUM 8.5* 8.9   MG 1.70  --      CBC:   Recent Labs   Lab 07/12/22 0355  07/13/22  0440   WBC 12.1* 17.6*   HGB 12.2* 13.2*   HCT 38.1* 41.8*    392       Significant Imaging: I have reviewed all pertinent imaging results/findings within the past 24 hours.

## 2022-07-13 NOTE — ASSESSMENT & PLAN NOTE
Encephalopathy     Will attempt to repeat MRI brain   Will call lab about adding CSF cytology and RT-QUIC  Would recommend stopping Aricept and scopolamine patch   ID following        Further recommendations to follow from MD

## 2022-07-13 NOTE — PROGRESS NOTES
[facility] Tele[facility]  Pulmonary/Critical Care - Medicine  Progress Note    Patient Name: Quan Contreras  MRN: [id number]  Admission Date: 7/6/2022  Hospital Length of Stay: 7 days  Code Status: DNR  Attending Provider: Kraig Shen MD  Primary Care Provider: Bran Hansen MD     Subjective:     HPI: 71-year-old male with past medical history of paroxysmal AFib on Eliquis, CAD, HTN, hyperthyroidism, cardiomyopathy, CVA/TIA, seizure disorder, rheumatoid arthritis, CVA in April this year, 2022, seizure disorder, fevers with diagnosis of pneumonia and progressively worsening confusion, recently diagnosed with meningitis with negative CSF cultures and discharged to [facility] in extended care on 06/28/2022. He was readmitted to [facility] hospitalist service on 07/06/2022.  He has started to have fevers with a documented fever of 102.4 on 07/04 I will 1.7 on 07/05, and associated leukocytosis which on admission 7/6 up to 22.3, worsening mental status.   1 of 2 blood culture sets on 07/05 showing Gram-positive cocci probable Staphylococcus and a follow-up blood cultures on 07/06 negative so far. He is anemic with low albumin and prealbumin.  Had a lumbar puncture  07/07 with CSF WBC 50, 11% neutrophils, 56% mono sites, 33% lymphs, glucose at 36 and protein 82.6.  CSF Gram stain with no bacteria and no WBC and culture pending.  Cryptococcal antigen of the CSF has been noted to be negative and AFB negative on CSF of 6/15. CT scan of the head without no acute intracranial process.  MRI of the brain shows acute ischemia with involvement of the right MCA territory.  Pulmonary Services were called for assistance with airway management due to his persistent encephalopathy.  Patient is a do not resuscitate status.       Interval History/Significant Events:  He is a do not resuscitate status.  Family is now considering inpatient hospice, and is working with social  Services to decide facility.  He has left cough, has been comfortable through the night on nasal cannula O2.  He is receiving enteral nasogastric feedings, remains NPO.  He is receiving IV antibiotics with cefepime, vancomycin, and Flagyl.      Objective:     Current Medications:    Current Facility-Administered Medications:     acetaminophen tablet 650 mg, 650 mg, Oral, Q4H PRN, Dominique Ferguson PA-C, 650 mg at 07/12/22 0808    acetylcysteine 200 mg/ml (20%) solution 4 mL, 4 mL, Nebulization, TID, Cyril Goodwin MD, 4 mL at 07/12/22 1324    albuterol nebulizer solution 2.5 mg, 2.5 mg, Nebulization, Q4H PRN, Kraig Shen MD, 2.5 mg at 07/11/22 1334    ceFEPIme (MAXIPIME) 2 g in dextrose 5 % in water (D5W) 5 % 50 mL IVPB (MB+), 2 g, Intravenous, Q8H, Alfreda Donnelly MD, Stopped at 07/13/22 0215    dextrose 10 % infusion, 12.5 g, Intravenous, PRN, Dominique Ferguson PA-C    dextrose 10 % infusion, 25 g, Intravenous, PRN, Dominique Ferguson PA-C    digoxin tablet 0.25 mg, 0.25 mg, Oral, Daily, Kraig Shen MD, 0.25 mg at 07/12/22 1710    enoxaparin injection 60 mg, 60 mg, Subcutaneous, BID, Cyril Goodwin MD, 60 mg at 07/12/22 2114    glucagon (human recombinant) injection 1 mg, 1 mg, Intramuscular, PRN, Dominique Ferguson PA-C    glucose chewable tablet 16 g, 16 g, Oral, PRN, Dominique Ferguson PA-C    glucose chewable tablet 24 g, 24 g, Oral, PRN, Dominique Ferguson PA-C    glycopyrrolate (PF) injection 0.2 mg, 0.2 mg, Intravenous, Q6H PRN, Kraig Shen MD    haloperidol lactate injection 5 mg, 5 mg, Intravenous, Q6H PRN, Cyril Goodwin MD    hydrALAZINE injection 10 mg, 10 mg, Intravenous, Q2H PRN, TALA RiverC    HYDROcodone-acetaminophen 5-325 mg per tablet 1 tablet, 1 tablet, Oral, Q6H PRN, LORETTA Palacios, 1 tablet at 07/12/22 2112    levetiracetam 500 mg/5 mL (5 mL) liquid Soln 1,000 mg, 1,000 mg, Per NG tube, BID, Cyril Goodwin MD, 1,000 mg at 07/12/22 2113     lorazepam (ATIVAN) injection 0.25 mg, 0.25 mg, Intravenous, Q4H PRN, Tanna Stovall, AMBERP    methIMAzole tablet 20 mg, 20 mg, Oral, TID, Kraig Shen MD, 20 mg at 07/12/22 2113    metoprolol injection 5 mg, 5 mg, Intravenous, Q5 Min PRN, Cyril Goodwin MD, 5 mg at 07/09/22 1438    metoprolol tartrate (LOPRESSOR) tablet 100 mg, 100 mg, Oral, BID, Cyril Goodwin MD, 100 mg at 07/12/22 2114    metronidazole IVPB 500 mg, 500 mg, Intravenous, Q8H, Alfreda Donnelly MD, Stopped at 07/13/22 0245    miconazole NITRATE 2 % top powder, , Topical (Top), BID, Kraig Shen MD, Given at 07/12/22 2100    mirtazapine tablet 15 mg, 15 mg, Oral, QHS, Kraig Shen MD, 15 mg at 07/12/22 2113    predniSONE tablet 10 mg, 10 mg, Oral, Daily, Cyril Goodwin MD    scopolamine 1.3-1.5 mg (1 mg over 3 days) 1 patch, 1 patch, Transdermal, Q3 Days, Cyril Goodwin MD, 1 patch at 07/12/22 1712    sodium chloride 0.9% flush 10 mL, 10 mL, Intravenous, Q12H PRN, Dominique Ferguson PA-C    zinc oxide-cod liver oil 40 % paste, , Topical (Top), BID, Kraig Shen MD, Given at 07/12/22 2100    Input/output:    Intake/Output Summary (Last 24 hours) at 7/13/2022 0632  Last data filed at 7/12/2022 1710  Gross per 24 hour   Intake --   Output 852 ml   Net -852 ml       Vital Signs (Most Recent):  Temp: 98.4 °F (36.9 °C) (07/13/22 0400)  Pulse: 72 (07/13/22 0400)  Resp: 18 (07/13/22 0400)  BP: 111/79 (07/13/22 0400)  SpO2: (!) 93 % (07/13/22 0400)  Body mass index is 18.08 kg/m².  Weight: 62.1 kg (137 lb) Vital Signs (24h Range):  Temp:  [97.4 °F (36.3 °C)-102.5 °F (39.2 °C)] 98.4 °F (36.9 °C)  Pulse:  [] 72  Resp:  [18] 18  SpO2:  [93 %-97 %] 93 %  BP: ()/(64-85) 111/79     Physical Exam  Constitutional:       Comments: He is somnolent and arousable with no respiratory distress   HENT:      Nose:      Comments: NG tube in place     Mouth/Throat:      Mouth: Mucous membranes are dry.   Cardiovascular:      Rate  and Rhythm: Normal rate and regular rhythm.   Pulmonary:      Effort: Pulmonary effort is normal.      Breath sounds: Rhonchi (Few bilateral coarse rhonchi throughout) present. No wheezing.   Abdominal:      General: Bowel sounds are normal. There is no distension.   Skin:     General: Skin is warm and dry.   Neurological:      Comments: He is somnolent and arousable, does not follow commands for me, opens his eyes and fixes and follows           Lines/Drains/Airways     Drain  Duration                NG/OG Tube 07/07/22 1700 nasogastric 16 Fr. Right nostril 5 days          Peripheral Intravenous Line  Duration                Midline Catheter Insertion/Assessment  - Single Lumen 07/05/22 1516 Right cephalic vein (lateral side of arm) other (see comments) 7 days                  Vent:       ABGs:  Lab Results   Component Value Date    PH 7.493 (H) 07/05/2022    PO2 71 (L) 07/05/2022    PCO2 29.5 (L) 07/05/2022           Significant Labs:    Lab Results   Component Value Date    WBC 17.6 (H) 07/13/2022    HGB 13.2 (L) 07/13/2022    HCT 41.8 (L) 07/13/2022    MCV 89.9 07/13/2022     07/13/2022         Recent Labs   Lab 07/13/22  0440      K 4.5   CO2 17*   BUN 15.0   CREATININE 0.54*   CALCIUM 8.9   AST 30   ALT 40   ALKPHOS 113   ALBUMIN 2.2*   EGFRNONAA >60       Imaging:  Chest x-ray (07/12/2022, my reading of images):  There is mild prominence of reticular markings in the bases bilateral, costophrenic angles are sharp bilateral lungs are well inflated.      Assessment/Plan:     1. Persistent encephalopathy.  Patient noted with possible meningitis with pleocytosis and elevated CSF protein, acute ischemic stroke  2. Poor airway control due to decreased level of consciousness and difficulty handling secretions.  This appears significantly improved with current medical regimen.  3. Patient is a do not resuscitate status, family planning inpatient hospice     Plan:  1. Continue palliative care above all and  current medical regimen from a Pulmonary secretion aspect.  2. Transfer to inpatient hospice when family has decided upon facility.  Nothing additional to add from a pulmonary aspect at this point, will sign off.       Cindi Jeffries MD, [facility]  Pulmonary/Critical Care  [facility] - [address] [place] [address]

## 2022-07-13 NOTE — CONSULTS
"Consults  Patient Name: Quan Contreras   MRN: [id number]   Admission Date: 7/6/2022   Hospital Length of Stay: 7   Attending Provider: Kraig Shen MD   Consulting Provider: Javi Corley M.D.  Reason for Consult: Goals of Care  Primary Care Physician: Bran Hansen MD     Principal Problem: Encephalopathy     Patient information was obtained from spouse/SO and ER records.      Final diagnoses:  [R41.82] AMS (altered mental status)  [R50.9] Fever  [G93.40] Encephalopathy (Primary)  [I48.91] Atrial fibrillation with RVR     Assessment/Plan:     I reviewed the patient's current clinical status with his nurse and discussed his care with the nurse and reviewed clinical documentation, labs and imaging. He is afebrile, but no improvement in mental status continued wet cough and congestion, though improved with scopolamine patch.     I met with the patient with his wife and children at bedside. They feel that the patient is less alert and oriented today. His wife and son say that they know what they wish to do (have elected inpatient hospice at [facility]) but are having difficulty "pulling the trigger."  We talked for some time about their fears that he is too alert and it will be hard to pull out his NG tube and, "just let him die." I provided them support and explained that although his pneumonia might improve given time, with antibiotics, however,  the underlying problem will not improve. He will continue to have recurrent aspiration and pneumonia due to his inability to manage secretions, or safely eat or drink (due to his multiple CVAs and persistent AMS.)  I told them that he will likely never leave the hospital due to these deficits. His wife said that it would be easier if he would just decline and die. I told her of his expected prognosis once comfort measures in hospice are elected. I believe no more than 2 weeks. He can not eat/ drink safely enough to gain sufficient calories. He can not " keep an NG tube long-term due to risks of nasal ulcers/ sinus infections. They have elected against PEG. Due to his mental status he remains confused and with NG tube and mittens, he is uncomfortable. He has said that his throat hurts and grimaces when he coughs. Removal of NG and other restraints may provide him with some relief. They said that the hospice representative told them that in his current state, they would probably send him home. I corrected this statement to explain that he is eligible for GIP hospice until end of life as evidenced by the inability to eat/ drink, take oral meds. SL meds would likely not be well absorbed due to excessive secretions. He will undoubtedly required IV meds for symptom management and his prognosis is <2 wks. I will contact the hospice to explain the patient's current clinical status and prognosis. They family thanked me for this.    I answered all questions. They will take another day to think on what we discussed.       History of Present Illness:     71-year-old male with history cardiomyopathy TIA, CVA, seizure disorder, rheumatoid arthritis, atrial fibrillation, coronary artery disease and recent acute CVA April 2022 and subsequent rehab and return home.  He was apparently hospitalized with pneumonia and associated acute bacterial meningitis with encephalopathy subsequently sent to TCU on 06/28/2022 but transfer to acute care on 07/06/2022 with acute metabolic encephalopathy with fever.  He has had workup including lumbar puncture with possible fungal or mycobacterial infection pending.  Is also a seizure episodes and has developed bilateral left greater than right aspiration superior mucous plugging left.  He remains severely weak and lethargic.  I was consulted to review goals of care with patient and family.      Active Ambulatory Problems     Diagnosis Date Noted    Acute left arterial ischemic stroke, KINGA (anterior cerebral artery) 05/03/2022    Thyrotoxicosis  05/03/2022    Benign essential hypertension 05/03/2022    Atrial fibrillation 05/03/2022    Ischemic cardiomyopathy 05/03/2022    Atherosclerosis of coronary artery 05/11/2022    Esophageal reflux 05/11/2022    Idiopathic peripheral neuropathy 05/11/2022    Rheumatoid arthritis involving multiple sites with positive rheumatoid factor 05/11/2022    Mixed hyperlipidemia 05/11/2022    Other cardiomyopathy 05/11/2022    Focal seizure 05/17/2022    Other sequelae following unspecified cerebrovascular disease 05/09/2022    Hemiplga following cerebral infrc affecting left nondom side 05/08/2022    Encephalopathy 06/15/2022    Meningitis 06/27/2022    Bacterial meningitis 06/27/2022     Resolved Ambulatory Problems     Diagnosis Date Noted    Acute osteomyelitis 05/11/2022    Fever 06/14/2022     Past Medical History:   Diagnosis Date    Cardiomyopathy     Coronary artery disease     Diverticulosis     Fatigue     Generalized anxiety disorder     GERD (gastroesophageal reflux disease)     Graves disease     HTN (hypertension)     Hyperthyroidism     Irritable bowel syndrome without diarrhea     Paroxysmal atrial fibrillation     Rheumatoid arthritis 5/11/2022    Rheumatoid arthritis, unspecified     Shingles     Staph aureus infection     Stroke     Thyroiditis, unspecified         Past Surgical History:   Procedure Laterality Date    CATARACT EXTRACTION      CYST REMOVAL Left     TONSILLECTOMY      TOTAL KNEE ARTHROPLASTY      VASECTOMY          Review of patient's allergies indicates:   Allergen Reactions    Ace inhibitors Swelling     Lip swelling    Morphine     Morphine sulfate     Nafcillin Itching    Pradaxa [dabigatran etexilate] Other (See Comments)     Abdominal cramping    Sulfamethoxazole Rash          Current Facility-Administered Medications:     acetaminophen tablet 650 mg, 650 mg, Oral, Q4H PRN, Dominique Ferguson PA-C, 650 mg at 07/12/22 0808    acetylcysteine  200 mg/ml (20%) solution 4 mL, 4 mL, Nebulization, TID, Cyril Goodwin MD, 4 mL at 07/13/22 1316    albuterol nebulizer solution 2.5 mg, 2.5 mg, Nebulization, Q4H PRN, Kraig Shen MD, 2.5 mg at 07/13/22 1316    ceFEPIme (MAXIPIME) 2 g in dextrose 5 % in water (D5W) 5 % 50 mL IVPB (MB+), 2 g, Intravenous, Q8H, Alfreda Donnelly MD, Stopped at 07/13/22 0937    dextrose 10 % infusion, 12.5 g, Intravenous, PRN, Dominique Ferguson PA-C    dextrose 10 % infusion, 25 g, Intravenous, PRN, Dominique Ferguson PA-C    digoxin tablet 0.25 mg, 0.25 mg, Oral, Daily, Kraig Shen MD, 0.25 mg at 07/12/22 1710    enoxaparin injection 60 mg, 60 mg, Subcutaneous, BID, Cyril Goodwin MD, 60 mg at 07/13/22 0850    glucagon (human recombinant) injection 1 mg, 1 mg, Intramuscular, PRN, Dominique Ferguson PA-C    glucose chewable tablet 16 g, 16 g, Oral, PRN, Dominique Ferguson PA-C    glucose chewable tablet 24 g, 24 g, Oral, PRN, Dominique Ferguson PA-C    glycopyrrolate (PF) injection 0.2 mg, 0.2 mg, Intravenous, Q6H PRN, Kraig Shen MD    haloperidol lactate injection 5 mg, 5 mg, Intravenous, Q6H PRN, Cyril Goodwin MD    hydrALAZINE injection 10 mg, 10 mg, Intravenous, Q2H PRN, Dominique Ferguson PA-C    HYDROcodone-acetaminophen 5-325 mg per tablet 1 tablet, 1 tablet, Oral, Q6H PRN, LORETTA Palacios, 1 tablet at 07/12/22 2112    levetiracetam 500 mg/5 mL (5 mL) liquid Soln 1,000 mg, 1,000 mg, Per NG tube, BID, Cyril Goodwin MD, 1,000 mg at 07/13/22 0850    lorazepam (ATIVAN) injection 0.25 mg, 0.25 mg, Intravenous, Q4H PRN, ADELE Noel    methIMAzole tablet 20 mg, 20 mg, Oral, TID, Kraig Shen MD, 20 mg at 07/13/22 0850    metoprolol injection 5 mg, 5 mg, Intravenous, Q5 Min PRN, Cyril Goodwin MD, 5 mg at 07/09/22 1438    metoprolol tartrate (LOPRESSOR) tablet 100 mg, 100 mg, Oral, BID, Cyril Goodwin MD, 100 mg at 07/13/22 0850    metronidazole IVPB 500 mg, 500 mg, Intravenous,  "Q8H, Alfreda Donnelly MD, Stopped at 07/13/22 1048    miconazole NITRATE 2 % top powder, , Topical (Top), BID, Kraig Shen MD, Given at 07/13/22 0856    mirtazapine tablet 15 mg, 15 mg, Oral, QHS, Kraig Shen MD, 15 mg at 07/12/22 2113    predniSONE tablet 10 mg, 10 mg, Oral, Daily, Cyril Goodwin MD, 10 mg at 07/13/22 0850    scopolamine 1.3-1.5 mg (1 mg over 3 days) 1 patch, 1 patch, Transdermal, Q3 Days, Cyril Goodwin MD, 1 patch at 07/12/22 1712    sodium chloride 0.9% flush 10 mL, 10 mL, Intravenous, Q12H PRN, Dominique Ferguson PA-C    zinc oxide-cod liver oil 40 % paste, , Topical (Top), BID, Kraig Shen MD, Given at 07/13/22 0857     acetaminophen, albuterol sulfate, dextrose 10 % in water (D10W), dextrose 10 % in water (D10W), glucagon (human recombinant), glucose, glucose, glycopyrrolate (PF), haloperidol lactate, hydrALAZINE, HYDROcodone-acetaminophen, lorazepam, metoprolol, sodium chloride 0.9%     Family History   Family history unknown: Yes        Review of Systems   Unable to perform ROS: Mental status change            Objective:   BP 94/65   Pulse 109   Temp (!) 101.4 °F (38.6 °C) (Axillary)   Resp (!) 22   Ht 6' 0.99" (1.854 m)   Wt 62.1 kg (137 lb)   SpO2 96%   BMI 18.08 kg/m²      Physical Exam  Constitutional:       Appearance: He is ill-appearing.   HENT:      Head:      Comments: cachectic     Nose: Nose normal.      Mouth/Throat:      Mouth: Mucous membranes are dry.      Pharynx: Oropharynx is clear.   Eyes:      Pupils: Pupils are equal, round, and reactive to light.   Cardiovascular:      Rate and Rhythm: Normal rate and regular rhythm.      Pulses: Normal pulses.      Heart sounds: Normal heart sounds.   Pulmonary:      Breath sounds: Rhonchi and rales present.      Comments: Wet crackles L>R, severely diminished on Left  Abdominal:      General: Abdomen is flat. Bowel sounds are normal. There is no distension.      Palpations: Abdomen is soft.     "  Tenderness: There is no abdominal tenderness.   Musculoskeletal:         General: Normal range of motion.      Cervical back: Normal range of motion.   Skin:     General: Skin is warm.      Findings: Lesion present.   Neurological:      Mental Status: He is alert. He is disoriented.      Motor: Weakness present.      Comments: Rt sided hemiparesis, raises left arm without intent, no follow commands, expressive aphasia            Review of Symptoms  Review of Symptoms    Symptom Assessment (ESAS 0-10 Scale)  Pain:  0  Dyspnea:  0  Anxiety:  0  Nausea:  0  Depression:  0  Anorexia:  0  Fatigue:  0  Insomnia:  0  Restlessness:  0  Agitation:  0     CAM / Delirium:  Positive  Constipation:  Negative  Diarrhea:  Negative    Bowel Management Plan (BMP):  Yes      Modified Nima Scale:  0    Performance Status:  20    Living Arrangements:  Lives with spouse    Spiritual:  F - Sharri and Belief:  Mormonism      Advance Care Planning   Advance Directives:   Living Will: No    LaPOST: No    Do Not Resuscitate Status: Yes    Medical Power of : No    Agent's Name:  Wife-Elizabeth Contreras   Agent's Contact Number:  [phone]    Decision Making:  Family answered questions and Patient unable to communicate due to disease severity/cognitive impairment            PAINAD: 0     Caregiver burden formerly assessed: yes        > 50% of 55 min of encounter was spent in chart review, face to face discussion of goals of care, symptom assessment, coordination of care and emotional support. This includes >16 min of time spent in Advanced Care Planning discussion    Javi Corley M.D.  Palliative Medicine  [facility]

## 2022-07-13 NOTE — PROGRESS NOTES
[facility] MEDICINE   PROGRESS NOTE      CHIEF COMPLAINT  Hospital follow up    HOSPITAL COURSE  Quan Contreras is a 71 y.o. White male with a past medical history of paroxysmal atrial fibrillation on Eliquis, hypertension, coronary artery disease, cardiomyopathy, hyperthyroidism on methimazole, prior CVA/TIA, seizure disorder and rheumatoid arthritis. In April 2022 patient suffered a stroke. He spent a week in rehab and was discharged home. He then developed seizures and was started on seizure medication. Patient progesively worsened and developed a fever and was diagnosed with pneumonia. Patient improved overall but confusion worsened. He was admitted and diagnosed with meningitis. CSF was negative. He was discharged to [facility] TCU 06/28/2022 for acute encephalopathy secondary to meningitis. TSH was less than 0.0083 with a Free T3 11.61 and free T4 2.68 on 6/29/2022 and Methimazole was increased to 20 mg TID. Yesterday (07/05/2022) patient began running a fever of 101 F. He was started on Vancomycin and Merrem. In addition leukocytosis increased from 11.7 yesterday to 22.3 today (7/6/2022) with worsening mental status. Blood culture from 7/5/2022 returned positive for gram positive cocci in 1 of 2 bottles. He was transferred to [facility] for further neurological workup in ID consult.   Wife and daughter at bedside reports prior to the stroke in April patient was ambulating and completing activities of daily living without assistance and has not returned to baseline. In rehab he was responding to questions appropriately and recognizing family. NG tube was placed a few days ago.   Upon presentation to the ED, patient afebrile and hemodynamically stable.  Labs notable for WBC 22.3, H&H 13.5/39.6, MCV 85, creatinine 0.58, AST 46, ALT 65, TSH less than 0.0083, free T4 2.13.  Influenza A and B and SARS-CoV-2 PCR negative.  UA negative for infection.  Chest  "x-ray negative.  CT head negative for acute intracranial abnormalities.  Patient admitted to hospital medicine services for further medical management.    Today  Seen examined this morning.  Reviewed palliative doctor's note it seems that the family is now considering inpatient hospice.  I did speak with the wife at bedside and she just does not want him to be seen in pain.  Seen by Pulmonary this morning and signed off as well recommends continuing medical care as current.  Waiting to see further recommendations from palliative regarding goals of care now as they are still in limbo about hospice or not.  I did discuss further with wife and she stated that in the event his oxygen were to drop she did not want us to place on oxygen and would rather make comfortable with anti dyspneic medications such as IV morphine and Ativan.        OBJECTIVE/PHYSICAL EXAM  /79   Pulse (!) 122   Temp 99.6 °F (37.6 °C) (Oral)   Resp (!) 22   Ht 6' 0.99" (1.854 m)   Wt 62.1 kg (137 lb)   SpO2 (!) 94%   BMI 18.08 kg/m²   General: In no acute distress, afebrile  Chest:  Bibasilar coarse crackle  Heart: S1, S2, no appreciable murmur  Abdomen: Soft, nontender, BS +  MSK: Warm, no lower extremity edema, no clubbing or cyanosis  Neurologic:  Awake and oriented and following commands intermittently, left lower extremity 3/5, bilateral upper extremity and right lower extremity moving up against resistance      ASSESSMENT/PLAN  Acute encephalopathy-infectious versus metabolic  Encephalitis/meningitis-infectious versus others  Acute ischemic CVA-right MCA vascular territory  Acute hypoxemic respiratory failure  Bibasilar aspiration pneumonia  Severe oropharyngeal dysphagia  Leukocytosis-steroid versus sepsis  Thyrotoxicosis  Atrial fibrillation with RVR  Severe protein caloric malnutrition    History of:  Seizures, thyrotoxicosis diffuse goiter, stroke April 2022, HTN, CAD, ischemic cardiomyopathy, rheumatoid arthritis, HLD, atrial " fibrillation      Neurology following.   Encephalopathy 2/to seizures versus encephalitis?  Pending repeat prolonged EEG.  Increase Keppra to 1000 twice daily in case seizures related fluctuating mental status.  Differential also includes CNS lymphoma.  Was getting Eliquis 5 bid and  at LTAC for the last 7 days so wouldn't think AC failure stroke.  Continue full-dose Lovenox in the meantime.  Palliative MD following.  Small inpatient hospice coming soon?  ID following.  Change to cefepime and Flagyl.  Follow up on LP results, CSF lactate elevated could be from stroke.  Negative procalcitonin.  Follow-up fungal CSF culture and fungal blood culture.  I have also added JIAN virus, toxoplasmosis antibody, CD4 as he is immunocompromised being on Cimzia.  NMDA receptor on CSF is negative, 14-3-3 protein pending.  ALFONZO positive, ANCA negative.  P-ANCA positive.  Metoprolol 100 b.i.d.., methimazole 20 t.i.d. May need to change to coumadin since recurring strokes on NOAC.    Continue prednisone 10 mg daily for now for stress dose steroids and should help with thyrotoxicosis as well.  Starting to taper back down on prednisone.  Continue current tube feeds.    DVT prophylaxis:  FD Lovenox as above    Critical care diagnosis:  Febrile illness likely from infectious  Critical care time spent:  35 minutes      Anticipated discharge and disposition:   __________________________________________________________________________    LABS/MICROBIOLOGY/MEDICATIONS/DIAGNOSTICS    LABS  Recent Labs     07/13/22  0440      K 4.5   CHLORIDE 107   CO2 17*   BUN 15.0   CREATININE 0.54*   GLUCOSE 109   CALCIUM 8.9   ALKPHOS 113   AST 30   ALT 40   ALBUMIN 2.2*     Recent Labs     07/13/22  0440   WBC 17.6*   RBC 4.65*   HCT 41.8*   MCV 89.9          MICROBIOLOGY  Microbiology Results (last 7 days)     Procedure Component Value Units Date/Time    Cerebrospinal Fluid Culture [[id number]] Collected: 07/07/22 1050    Order Status:  Completed Specimen: CSF (Spinal Fluid) from Cerebrospinal Fluid Updated: 07/12/22 0906     CULTURE, CSF ([facility]) No Growth at 5 days     GRAM STAIN No WBCs, No bacteria seen     Blood culture #2 **CANNOT BE ORDERED STAT** [[id number]]  (Normal) Collected: 07/06/22 1322    Order Status: Completed Specimen: Blood from Arm, Left Updated: 07/11/22 1502     CULTURE, BLOOD ([facility]) No Growth at 5 days    Fungal Culture Blood [[id number]] Collected: 07/11/22 0543    Order Status: Sent Specimen: Blood from Hand, Left Updated: 07/11/22 0746    Blood culture #1 **CANNOT BE ORDERED STAT** [[id number]]  (Abnormal)  (Susceptibility) Collected: 07/06/22 1326    Order Status: Completed Specimen: Blood from PICC Line Updated: 07/11/22 0740     CULTURE, BLOOD ([facility]) Staphylococcus epidermidis     GRAM STAIN Gram Positive Cocci, probable Staphylococcus      Seen in gram stain of broth only      1 of 2 Aerobic bottles positive     Fungal Culture Blood [[id number]] Collected: 07/11/22 0544    Order Status: Sent Specimen: Blood from Arm, Left Updated: 07/11/22 0610    Fungal Culture [[id number]] Collected: 07/07/22 1050    Order Status: Sent Specimen: CSF (Spinal Fluid) from Cerebrospinal Fluid Updated: 07/09/22 1457    Blood Culture [[id number]]     Order Status: Canceled Specimen: Blood     Blood Culture [[id number]]     Order Status: Canceled Specimen: Blood           MEDICATIONS   acetylcysteine 200 mg/ml (20%)  4 mL Nebulization TID    ceFEPime (MAXIPIME) IVPB  2 g Intravenous Q8H    digoxin  0.25 mg Oral Daily    enoxaparin  60 mg Subcutaneous BID    levetiracetam  1,000 mg Per NG tube BID    methIMAzole  20 mg Oral TID    metoprolol tartrate  100 mg Oral BID    metronidazole  500 mg Intravenous Q8H    miconazole NITRATE 2 %   Topical (Top) BID    mirtazapine  15 mg Oral QHS    predniSONE  10 mg Oral Daily    scopolamine  1 patch Transdermal Q3 Days    zinc oxide-cod liver oil   Topical (Top) BID      INFUSIONS      DIAGNOSTIC  TESTS  X-Ray Chest 1 View   Final Result      NG tube is described above.         Electronically signed by: Naif Awan MD   Date:    07/12/2022   Time:    19:40      MRI Brain Without Contrast   Final Result      Findings seen consistent with a subacute area of ischemia in the right frontal probable region which were seen on the immediate previous examination from 07/06/2022 as well.  No other new areas of ischemia are seen      Old area of ischemia in the frontal lobes bilaterally      Chronic age related changes         Electronically signed by: Luisa Castillo   Date:    07/12/2022   Time:    18:15      CT Chest Abdomen Pelvis Without Contrast (XPD)   Final Result      1. Findings are consistent with aspiration pneumonitis greater on the left than right with diffuse mucoid impaction of the trachea, and airways.   2. Other secondary findings of the abdomen and pelvis as above.         Electronically signed by: Ha Fleming MD   Date:    07/09/2022   Time:    18:04      XR Non-Rad Performed NG/Gastric Tube Check   Final Result      Standard NG tube placement         Electronically signed by: Nehemias Chirinos MD   Date:    07/09/2022   Time:    14:40      X-Ray Chest 1 View   Final Result      No acute abnormality of the chest.         Electronically signed by: Victoria Corley   Date:    07/09/2022   Time:    11:55      XR Non-Rad Performed NG/Gastric Tube Check   Final Result      Nasogastric tube as above         Electronically signed by: De Conte   Date:    07/08/2022   Time:    12:31      US Thyroid   Final Result      Thyromegaly with heterogeneous gland without discrete thyroid nodule..      Reference: ACR Thyroid Imaging, Reporting and Data System (TI-RADS): White Paper of the ACR TI-RADS Committee.  2017.         Electronically signed by: Lisa Soto   Date:    07/08/2022   Time:    11:55      XR Non-Rad Performed NG/Gastric Tube Check   Final Result      XR Non-Rad Performed NG/Gastric Tube  Check   Final Result      Enteric tube in the airway.      Finding relayed to patient's nurse Alice Dickey at the time of this dictation.         Electronically signed by: Lisa Soto   Date:    07/07/2022   Time:    16:28      X-Ray Chest 1 View   Final Result      No acute cardiopulmonary abnormality.         Electronically signed by: Lisa Soto   Date:    07/07/2022   Time:    12:02      FL LUMBAR PUNCTURE DIAGNOSTIC WITH IMAGING   Final Result      Technically successful fluoroscopic guided lumbar puncture.      Opening pressure = less than 10 cm H2O      CSF drained = 6.5 cc      Closing pressure = less than 10 cm H2O         Electronically signed by: Michel Ge   Date:    07/07/2022   Time:    13:37      MRI Brain Limited Without Contrast   Final Result      Motion degraded exam.      In spite of this limitation:      1. Acute cortical based ischemia in the right MCA vascular territory as exemplified by a right middle frontal 2.9 x 1.9 cm focus.   2. Extra-axial diffusion restriction about the falx persists but has decreased, with similar findings about the cerebral convexities, again suspicious for meningitis/infection.         Electronically signed by: Michel Ge   Date:    07/06/2022   Time:    18:31      CT Head Without Contrast   Final Result      No acute intracranial abnormalities.         Electronically signed by: Michel Ge   Date:    07/06/2022   Time:    14:13      X-Ray Chest 1 View   Final Result      NO ACUTE CARDIOPULMONARY PROCESS IDENTIFIED.         Electronically signed by: Ethan Molina   Date:    07/06/2022   Time:    13:17      MRA Brain without contrast    (Results Pending)            All diagnosis and differential diagnosis have been reviewed; assessment and plan has been documented. I have personally reviewed the labs and test results that are presently available; I have reviewed the patients medication list. I have reviewed the consulting providers response and  recommendations. I have reviewed or attempted to review medical records based upon their availability.  All of the patient's questions have been addressed and answered. Patient's is agreeable to the above stated plan. I will continue to monitor closely and make adjustments to medical management as needed.  This document was created using M*Modal Fluency Direct.  Transcription errors may have been made.  Please contact me if any questions may rise regarding documentation to clarify verbiage.        Cyril Goodwin MD   07/13/2022   Internal Medicine

## 2022-07-13 NOTE — PROGRESS NOTES
[facility] - [facility] Telemetry  Neurology  Progress Note    Patient Name: Quan Contreras  MRN: [id number]  Admission Date: 7/6/2022  Hospital Length of Stay: 7 days  Code Status: DNR   Attending Provider: Kraig Shen MD  Primary Care Physician: Bran Hansen MD   Principal Problem:Encephalopathy    HPI:   Mr Contreras is a 71-year-old male with past medical history of PAF (on Eliquis), HTN, CAD, cardiomyopathy, hyperthyroidism, stroke, seizure, rheumatoid arthritis, presented to ED on 7/6 as a transfer from [facility] for evaluation of altered mental status.    In April 2022 patient suffered a stroke. He spent a week in rehab and was discharged home. He then developed seizures and was started on seizure medication. Patient progesively worsened and developed a fever and was diagnosed with pneumonia. Patient improved overall but confusion worsened. He was admitted and diagnosed with meningitis. CSF was negative. He was discharged to [facility] 06/28/2022 for acute encephalopathy secondary to meningitis. TSH was less than 0.0083 with a Free T3 11.61 and free T4 2.68 on 6/29/2022 and Methimazole was increased to 20 mg TID. Yesterday (07/05/2022) patient began running a fever of 101 F. He was started on Vancomycin and Merrem. In addition leukocytosis increased from 11.7 yesterday to 22.3 today (7/6/2022) with worsening mental status. Blood culture from 7/5/2022 returned positive for gram positive cocci in 1 of 2 bottles. He was transferred to [facility] for further neurological workup in ID consult.       Overview/Hospital Course:  No notes on file        Subjective:     Interval History: No new issues overnight.  Multiple family members at bedside.      Current Facility-Administered Medications   Medication Dose Route Frequency Provider Last Rate Last Admin    acetaminophen tablet 650 mg  650 mg Oral Q4H PRN Dominique Ferguson PA-C   650 mg at 07/12/22 0808     acetylcysteine 200 mg/ml (20%) solution 4 mL  4 mL Nebulization TID Cyril Goodwin MD   4 mL at 07/13/22 1316    albuterol nebulizer solution 2.5 mg  2.5 mg Nebulization Q4H PRN Kraig Shen MD   2.5 mg at 07/13/22 1316    ceFEPIme (MAXIPIME) 2 g in dextrose 5 % in water (D5W) 5 % 50 mL IVPB (MB+)  2 g Intravenous Q8H Alfreda Donnelly  mL/hr at 07/13/22 1723 2 g at 07/13/22 1723    dextrose 10 % infusion  12.5 g Intravenous PRN Dominique Ferguson PA-C        dextrose 10 % infusion  25 g Intravenous PRN Dominique Ferguson PA-C        digoxin tablet 0.25 mg  0.25 mg Oral Daily Kraig Shen MD   0.25 mg at 07/13/22 1723    enoxaparin injection 60 mg  60 mg Subcutaneous BID Cyril Goodwin MD   60 mg at 07/13/22 0850    glucagon (human recombinant) injection 1 mg  1 mg Intramuscular PRN Dominique Ferguson PA-C        glucose chewable tablet 16 g  16 g Oral PRN Dominique Ferguson PA-C        glucose chewable tablet 24 g  24 g Oral PRN Dominique Ferguson PA-C        glycopyrrolate (PF) injection 0.2 mg  0.2 mg Intravenous Q6H PRN Kraig Shen MD        haloperidol lactate injection 5 mg  5 mg Intravenous Q6H PRN Cyril Goodwin MD        hydrALAZINE injection 10 mg  10 mg Intravenous Q2H PRN Dominique Ferguson PA-C        HYDROcodone-acetaminophen 5-325 mg per tablet 1 tablet  1 tablet Oral Q6H PRN LORETTA Palacios   1 tablet at 07/12/22 2112    levetiracetam 500 mg/5 mL (5 mL) liquid Soln 1,000 mg  1,000 mg Per NG tube BID Cyril Goodwin MD   1,000 mg at 07/13/22 0850    lorazepam (ATIVAN) injection 0.25 mg  0.25 mg Intravenous Q4H PRN Tanna D Sia, FNP        methIMAzole tablet 20 mg  20 mg Oral TID Kraig Shen MD   20 mg at 07/13/22 1548    metoprolol injection 5 mg  5 mg Intravenous Q5 Min PRN Cyril Goodwin MD   5 mg at 07/09/22 1438    metoprolol tartrate (LOPRESSOR) tablet 100 mg  100 mg Oral BID Cyril Goodwin MD   100 mg at 07/13/22 0850    metronidazole IVPB 500 mg   500 mg Intravenous Q8H Alfreda Donnelly MD   Stopped at 07/13/22 1048    miconazole NITRATE 2 % top powder   Topical (Top) BID Kraig Shen MD   Given at 07/13/22 0856    mirtazapine tablet 15 mg  15 mg Oral QHS Kraig Shen MD   15 mg at 07/12/22 2113    predniSONE tablet 10 mg  10 mg Oral Daily Cyril Goodwin MD   10 mg at 07/13/22 0850    scopolamine 1.3-1.5 mg (1 mg over 3 days) 1 patch  1 patch Transdermal Q3 Days Cyril Goodwin MD   1 patch at 07/12/22 1712    sodium chloride 0.9% flush 10 mL  10 mL Intravenous Q12H PRN Dominique Ferguson PA-C        zinc oxide-cod liver oil 40 % paste   Topical (Top) BID Kraig Shen MD   Given at 07/13/22 0857       Review of Systems   Unable to perform ROS: Acuity of condition     Objective:     Vital Signs (Most Recent):  Temp: 98.1 °F (36.7 °C) (07/13/22 1549)  Pulse: 105 (07/13/22 1549)  Resp: (!) 22 (07/13/22 1339)  BP: 109/72 (07/13/22 1549)  SpO2: 95 % (07/13/22 1549)   Vital Signs (24h Range):  Temp:  [98.1 °F (36.7 °C)-101.4 °F (38.6 °C)] 98.1 °F (36.7 °C)  Pulse:  [] 105  Resp:  [18-22] 22  SpO2:  [92 %-99 %] 95 %  BP: ()/(65-79) 109/72     Weight: 62.1 kg (137 lb)  Body mass index is 18.08 kg/m².    Physical Exam  Neurological:      Comments: Patient lying in bed with several family members at bedside.  Opens his eyes to verbal stimuli and follows some simple commands.  Full exam to follow by MD.       Significant Labs: BMP:   Recent Labs   Lab 07/12/22  0355 07/13/22  0440   * 136   K 4.2 4.5   CO2 20* 17*   BUN 12.5 15.0   CREATININE 0.48* 0.54*   CALCIUM 8.5* 8.9   MG 1.70  --      CBC:   Recent Labs   Lab 07/12/22  0355 07/13/22  0440   WBC 12.1* 17.6*   HGB 12.2* 13.2*   HCT 38.1* 41.8*    392       Significant Imaging: I have reviewed all pertinent imaging results/findings within the past 24 hours.    Assessment and Plan:     * Encephalopathy  Encephalopathy     Consider repeat MRI brain   Consider  repeat LP to obtain CSF cytology and RT-QUIC  Would recommend stopping Aricept and scopolamine patch   ID following      Per Palliative team note, family is considering inpatient hospice.        Further recommendations to follow from MD Helena Briggs, ADELE  Neurology  [facility] - [facility] Tele[facility]

## 2022-07-13 NOTE — PROGRESS NOTES
Infectious Diseases Progress Note  71-year-old male with past medical history of paroxysmal AFib on Eliquis, CAD, HTN, hyperthyroidism, cardiomyopathy, CVA/TIA, seizure disorder, rheumatoid arthritis with a recent overall health decline, with CVA in April this year, 2022, then seizures, fevers with diagnosis of pneumonia and progressively worsening confusion, recently diagnosed with meningitis with negative CSF cultures and discharged to [facility] in extended care on 06/28/2022 and is readmitted to [facility] on 07/06/2022.  He has started to have fevers with a documented fever of 102.4 on 07/04 I will 1.7 on 07/05, and associated leukocytosis which on admission 7/6 up to 22.3, worsening mental status.  He has been extensively evaluated with 1 of 2 blood culture sets on 07/05 showing Gram-positive cocci probable Staphylococcus and a follow-up blood cultures on 07/06 negative so far.  Urinalysis is not impressive.  He is anemic with low albumin and prealbumin.  Had a lumbar puncture today 07/07 with CSF WBC 50, 11% neutrophils, 56% mono sites, 33% lymphs, glucose at 36 and protein 82.6.  CSF Gram stain with no bacteria and no WBC and culture pending.  Cryptococcal antigen of the CSF has been noted to be negative and AFB negative on CSF of 6/15.te cardiopulmonary disease.  CT scan of the head without no acute intracranial process.  MRI of the brain shows acute ischemia with involvement of the right MCA territory.   He is currently on Cefepime and Flagyl      Subjective:  Lying in bed in no acute distress, no new complaints reported. Fevers noted. Family at bedside    Review of Systems   Unable to perform ROS: Medical condition       Review of patient's allergies indicates:   Allergen Reactions    Ace inhibitors Swelling     Lip swelling    Morphine     Morphine sulfate     Nafcillin Itching    Pradaxa [dabigatran etexilate] Other (See Comments)     Abdominal cramping    Sulfamethoxazole  Rash       Past Medical History:   Diagnosis Date    Acute left arterial ischemic stroke, KINGA (anterior cerebral artery) 5/3/2022    Acute osteomyelitis 5/11/2022    Atherosclerosis of coronary artery 5/11/2022    Atrial fibrillation 5/3/2022    Benign essential hypertension 5/3/2022    Cardiomyopathy     Coronary artery disease     Diverticulosis     Esophageal reflux 5/11/2022    Fatigue     Focal seizure 5/17/2022    Generalized anxiety disorder     GERD (gastroesophageal reflux disease)     Graves disease     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/2022    HTN (hypertension)     Hyperthyroidism     Idiopathic peripheral neuropathy 5/11/2022    Irritable bowel syndrome without diarrhea     Ischemic cardiomyopathy 5/3/2022    Mixed hyperlipidemia 5/11/2022    Other sequelae following unspecified cerebrovascular disease 5/9/2022    Paroxysmal atrial fibrillation     Rheumatoid arthritis 5/11/2022    Rheumatoid arthritis, unspecified     Shingles     Staph aureus infection     Stroke     Thyroiditis, unspecified     Thyrotoxicosis 5/3/2022       Past Surgical History:   Procedure Laterality Date    CATARACT EXTRACTION      CYST REMOVAL Left     TONSILLECTOMY      TOTAL KNEE ARTHROPLASTY      VASECTOMY         Social History     Socioeconomic History    Marital status:    Tobacco Use    Smoking status: Never Smoker    Smokeless tobacco: Never Used   Substance and Sexual Activity    Alcohol use: Never    Drug use: Never    Sexual activity: Yes         Scheduled Meds:   acetylcysteine 200 mg/ml (20%)  4 mL Nebulization TID    ceFEPime (MAXIPIME) IVPB  2 g Intravenous Q8H    digoxin  0.25 mg Oral Daily    enoxaparin  60 mg Subcutaneous BID    levetiracetam  1,000 mg Per NG tube BID    methIMAzole  20 mg Oral TID    metoprolol tartrate  100 mg Oral BID    metronidazole  500 mg Intravenous Q8H    miconazole NITRATE 2 %   Topical (Top) BID  "   mirtazapine  15 mg Oral QHS    [START ON 7/13/2022] predniSONE  10 mg Oral Daily    scopolamine  1 patch Transdermal Q3 Days    zinc oxide-cod liver oil   Topical (Top) BID     Continuous Infusions:  PRN Meds:acetaminophen, albuterol sulfate, dextrose 10 % in water (D10W), dextrose 10 % in water (D10W), glucagon (human recombinant), glucose, glucose, glycopyrrolate (PF), haloperidol lactate, hydrALAZINE, HYDROcodone-acetaminophen, lorazepam, metoprolol, sodium chloride 0.9%    Objective:  /69   Pulse (!) 112   Temp 99 °F (37.2 °C) (Axillary)   Resp 18   Ht 6' 0.99" (1.854 m)   Wt 62.1 kg (137 lb)   SpO2 (!) 94%   BMI 18.08 kg/m²     Physical Exam:   Physical Exam  Vitals reviewed.   Constitutional:       General: He is not in acute distress.     Appearance: He is ill-appearing.      Comments: Confused   HENT:      Head: Normocephalic and atraumatic.      Nose:      Comments: NG tube in place  Cardiovascular:      Rate and Rhythm: Normal rate and regular rhythm.      Heart sounds: Normal heart sounds.   Pulmonary:      Effort: Pulmonary effort is normal. No respiratory distress.      Breath sounds: Normal breath sounds.   Abdominal:      General: Bowel sounds are normal. There is no distension. R nare NGT     Palpations: Abdomen is soft.      Tenderness: There is no abdominal tenderness.   Musculoskeletal:         General: No deformity.      Cervical back: Neck supple.  For  Skin:     Findings: No erythema or rash.   Neurological:      Comments: Drowsy, arouses to verbal stimuli. Confused but follows some simple commands   Psychiatric:      Comments: Non communicative     Imaging  7/12/22 MRI Brain without  Impression:     Findings seen consistent with a subacute area of ischemia in the right frontal probable region which were seen on the immediate previous examination from 07/06/2022 as well.  No other new areas of ischemia are seen   Old area of ischemia in the frontal lobes bilaterally "   Chronic age related changes      Lab Review   Recent Results (from the past 24 hour(s))   Comprehensive Metabolic Panel    Collection Time: 07/12/22  3:55 AM   Result Value Ref Range    Sodium Level 135 (L) 136 - 145 mmol/L    Potassium Level 4.2 3.5 - 5.1 mmol/L    Chloride 104 98 - 107 mmol/L    Carbon Dioxide 20 (L) 23 - 31 mmol/L    Glucose Level 115 82 - 115 mg/dL    Blood Urea Nitrogen 12.5 8.4 - 25.7 mg/dL    Creatinine 0.48 (L) 0.73 - 1.18 mg/dL    Calcium Level Total 8.5 (L) 8.8 - 10.0 mg/dL    Protein Total 5.7 (L) 5.8 - 7.6 gm/dL    Albumin Level 2.0 (L) 3.4 - 4.8 gm/dL    Globulin 3.7 (H) 2.4 - 3.5 gm/dL    Albumin/Globulin Ratio 0.5 (L) 1.1 - 2.0 ratio    Bilirubin Total 0.6 <=1.5 mg/dL    Alkaline Phosphatase 108 40 - 150 unit/L    Alanine Aminotransferase 54 0 - 55 unit/L    Aspartate Aminotransferase 32 5 - 34 unit/L    Estimated GFR-Non  >60 mls/min/1.73/m2   Phosphorus    Collection Time: 07/12/22  3:55 AM   Result Value Ref Range    Phosphorus Level 2.9 2.3 - 4.7 mg/dL   Magnesium    Collection Time: 07/12/22  3:55 AM   Result Value Ref Range    Magnesium Level 1.70 1.60 - 2.60 mg/dL   CBC with Differential    Collection Time: 07/12/22  3:55 AM   Result Value Ref Range    WBC 12.1 (H) 4.5 - 11.5 x10(3)/mcL    RBC 4.26 (L) 4.70 - 6.10 x10(6)/mcL    Hgb 12.2 (L) 14.0 - 18.0 gm/dL    Hct 38.1 (L) 42.0 - 52.0 %    MCV 89.4 80.0 - 94.0 fL    MCH 28.6 27.0 - 31.0 pg    MCHC 32.0 (L) 33.0 - 36.0 mg/dL    RDW 16.6 11.5 - 17.0 %    Platelet 355 130 - 400 x10(3)/mcL    MPV 10.6 (H) 7.4 - 10.4 fL    Neut % 61.2 %    Lymph % 28.7 %    Mono % 9.4 %    Eos % 0.1 %    Basophil % 0.2 %    Lymph # 3.47 0.6 - 4.6 x10(3)/mcL    Neut # 7.4 2.1 - 9.2 x10(3)/mcL    Mono # 1.14 0.1 - 1.3 x10(3)/mcL    Eos # 0.01 0 - 0.9 x10(3)/mcL    Baso # 0.03 0 - 0.2 x10(3)/mcL    IG# 0.05 (H) 0 - 0.04 x10(3)/mcL    IG% 0.4 %    NRBC% 0.0 %       Assessment/Plan:  1. Meningitis with encephalitis/encephalopathy  2.  Acute ischemic CVA  3. Leukocytosis with recent high-dose steroids  4. Staphylococcus positive blood culture  5. Recent thyrotoxicosis  6. Anemia  7. Protein calorie malnutrition     -Continue cefepime #1 and IV Flagyl #1  -Fevers noted and with leukocytosis about the same, on steroids, follow  -p-ANCA (+) and c-ANCA (-)  -Follow blood cultures from 7/11  -Repeat MRI Brain today with findings consistent with subacute area of ischemia in the R frontal probable region   -7/9 CT abdomen and pelvis with findings of aspiration pneumonia, L >R but chest x-ray with no acute changes  -Abnormal CSF analysis consistent meningitis with low glucose predominantly monocytes with negative Gram stain for bacteria could be indicative of a fungal or mycobacterial infection as well as pretreated bacterial meningitis or of non infectious etiology  -The presentation is further complicated by the findings of acute ischemic CVA in the right MCA territory  -CSF meningitis/encephalitis panel (-), CSF HSV and VZV (-) and CMV (-) as well. Follow CSF AFB, fungal cultures, MTB PCR, West Nile and VDRL  -7/5 and 7/6 blood cultures 1 of 2 sets with Staph Epi 1/2 sets  -Neurology is on board with inputs noted with plan for adding CSF cytology  -Discussed with family and nursing staff.

## 2022-07-14 VITALS
DIASTOLIC BLOOD PRESSURE: 65 MMHG | WEIGHT: 137 LBS | RESPIRATION RATE: 18 BRPM | BODY MASS INDEX: 18.16 KG/M2 | HEART RATE: 120 BPM | TEMPERATURE: 98 F | OXYGEN SATURATION: 96 % | HEIGHT: 73 IN | SYSTOLIC BLOOD PRESSURE: 99 MMHG

## 2022-07-14 PROBLEM — Z51.5 COMFORT MEASURES ONLY STATUS: Status: ACTIVE | Noted: 2022-07-14

## 2022-07-14 PROCEDURE — 25000003 PHARM REV CODE 250: Performed by: PHYSICIAN ASSISTANT

## 2022-07-14 PROCEDURE — 63600175 PHARM REV CODE 636 W HCPCS: Performed by: HOSPITALIST

## 2022-07-14 PROCEDURE — 25000242 PHARM REV CODE 250 ALT 637 W/ HCPCS: Performed by: INTERNAL MEDICINE

## 2022-07-14 PROCEDURE — 93010 ELECTROCARDIOGRAM REPORT: CPT | Mod: ,,, | Performed by: INTERNAL MEDICINE

## 2022-07-14 PROCEDURE — 25000003 PHARM REV CODE 250: Performed by: NURSE PRACTITIONER

## 2022-07-14 PROCEDURE — 25000003 PHARM REV CODE 250: Performed by: INTERNAL MEDICINE

## 2022-07-14 PROCEDURE — 93005 ELECTROCARDIOGRAM TRACING: CPT

## 2022-07-14 PROCEDURE — 94640 AIRWAY INHALATION TREATMENT: CPT

## 2022-07-14 PROCEDURE — 27000221 HC OXYGEN, UP TO 24 HOURS

## 2022-07-14 PROCEDURE — 63600175 PHARM REV CODE 636 W HCPCS: Performed by: INTERNAL MEDICINE

## 2022-07-14 PROCEDURE — S0030 INJECTION, METRONIDAZOLE: HCPCS | Performed by: INTERNAL MEDICINE

## 2022-07-14 PROCEDURE — 93010 EKG 12-LEAD: ICD-10-PCS | Mod: ,,, | Performed by: INTERNAL MEDICINE

## 2022-07-14 PROCEDURE — 99900031 HC PATIENT EDUCATION (STAT)

## 2022-07-14 RX ORDER — ONDANSETRON 4 MG/1
8 TABLET, ORALLY DISINTEGRATING ORAL EVERY 8 HOURS PRN
Status: DISCONTINUED | OUTPATIENT
Start: 2022-07-14 | End: 2022-07-14 | Stop reason: HOSPADM

## 2022-07-14 RX ORDER — MORPHINE SULFATE 4 MG/ML
4 INJECTION, SOLUTION INTRAMUSCULAR; INTRAVENOUS
Status: DISCONTINUED | OUTPATIENT
Start: 2022-07-14 | End: 2022-07-14 | Stop reason: HOSPADM

## 2022-07-14 RX ORDER — LORAZEPAM 1 MG/1
1 TABLET ORAL EVERY 30 MIN PRN
Status: DISCONTINUED | OUTPATIENT
Start: 2022-07-14 | End: 2022-07-14 | Stop reason: HOSPADM

## 2022-07-14 RX ADMIN — MICONAZOLE NITRATE 2 % TOPICAL POWDER: at 09:07

## 2022-07-14 RX ADMIN — PREDNISONE 10 MG: 10 TABLET ORAL at 09:07

## 2022-07-14 RX ADMIN — Medication: at 09:07

## 2022-07-14 RX ADMIN — CEFEPIME 2 G: 2 INJECTION, POWDER, FOR SOLUTION INTRAVENOUS at 01:07

## 2022-07-14 RX ADMIN — METOPROLOL TARTRATE 5 MG: 5 INJECTION, SOLUTION INTRAVENOUS at 06:07

## 2022-07-14 RX ADMIN — ACETYLCYSTEINE 4 ML: 200 SOLUTION ORAL; RESPIRATORY (INHALATION) at 08:07

## 2022-07-14 RX ADMIN — HYDROCODONE BITARTRATE AND ACETAMINOPHEN 1 TABLET: 5; 325 TABLET ORAL at 05:07

## 2022-07-14 RX ADMIN — METOPROLOL TARTRATE 100 MG: 50 TABLET, FILM COATED ORAL at 09:07

## 2022-07-14 RX ADMIN — MORPHINE SULFATE 4 MG: 4 INJECTION INTRAVENOUS at 11:07

## 2022-07-14 RX ADMIN — ALBUTEROL SULFATE 2.5 MG: 2.5 SOLUTION RESPIRATORY (INHALATION) at 04:07

## 2022-07-14 RX ADMIN — CEFEPIME 2 G: 2 INJECTION, POWDER, FOR SOLUTION INTRAVENOUS at 09:07

## 2022-07-14 RX ADMIN — METRONIDAZOLE 500 MG: 500 INJECTION, SOLUTION INTRAVENOUS at 02:07

## 2022-07-14 RX ADMIN — METHIMAZOLE 20 MG: 10 TABLET ORAL at 09:07

## 2022-07-14 RX ADMIN — METRONIDAZOLE 500 MG: 500 INJECTION, SOLUTION INTRAVENOUS at 09:07

## 2022-07-14 RX ADMIN — METOPROLOL TARTRATE 5 MG: 5 INJECTION, SOLUTION INTRAVENOUS at 04:07

## 2022-07-14 NOTE — PLAN OF CARE
07/14/22 1320   Final Note   Assessment Type Final Discharge Note   Anticipated Discharge Disposition [facility]   Post-Acute Status   Post-Acute Authorization Hospice   Hospice Status Set-up Complete/Auth obtained  ([facility])   Will DC today via [facility].

## 2022-07-14 NOTE — NURSING
Patient being transported to [facility] via [facility] ambulance. Tele discontinued. Midline remains in place. Report given to Tanna @ [facility]. DNR given to Jani. Wife and son @ bedside with patient.

## 2022-07-14 NOTE — DISCHARGE SUMMARY
[facility] Hospital Medicine Discharge Summary    Admit Date: 7/6/2022  Discharge Date and Time: 7/14/20227:20 AM  Admitting Physician: Hospitalist team   Discharging Physician: Franklin Dave MD.  Primary Care Physician: Bran Hansen MD      Discharge Diagnoses:  Acute encephalopathy-infectious versus metabolic  Encephalitis/meningitis-infectious versus others  Acute ischemic CVA-right MCA vascular territory  Acute hypoxemic respiratory failure  Bibasilar aspiration pneumonia  Severe oropharyngeal dysphagia  Leukocytosis-steroid versus sepsis  Thyrotoxicosis  Atrial fibrillation with RVR  Severe protein caloric malnutrition    Hospital Course:   71 y.o. White male with a past medical history of paroxysmal atrial fibrillation on Eliquis, hypertension, coronary artery disease, cardiomyopathy, hyperthyroidism on methimazole, prior CVA/TIA, seizure disorder and rheumatoid arthritis. In April 2022 patient suffered a stroke. He spent a week in rehab and was discharged home. He then developed seizures and was started on seizure medication. Patient progesively worsened and developed a fever and was diagnosed with pneumonia. Patient improved overall but confusion worsened. He was admitted and diagnosed with meningitis. CSF was negative. He was discharged to [facility] 06/28/2022 for acute encephalopathy secondary to meningitis. TSH was less than 0.0083 with a Free T3 11.61 and free T4 2.68 on 6/29/2022 and Methimazole was increased to 20 mg TID. Yesterday (07/05/2022) patient began running a fever of 101 F. He was started on Vancomycin and Merrem. In addition leukocytosis increased from 11.7 yesterday to 22.3 today (7/6/2022) with worsening mental status. Blood culture from 7/5/2022 returned positive for gram positive cocci in 1 of 2 bottles. He was transferred to [facility] for further neurological workup in ID consult.   Wife and daughter at bedside reports prior to  "the stroke in April patient was ambulating and completing activities of daily living without assistance and has not returned to baseline. In rehab he was responding to questions appropriately and recognizing family. NG tube was placed a few days ago.   Upon presentation to the ED, patient afebrile and hemodynamically stable.  Labs notable for WBC 22.3, H&H 13.5/39.6, MCV 85, creatinine 0.58, AST 46, ALT 65, TSH less than 0.0083, free T4 2.13.  Influenza A and B and SARS-CoV-2 PCR negative.  UA negative for infection.  Chest x-ray negative.  CT head negative for acute intracranial abnormalities.  Patient admitted to hospital medicine services for further medical management.     Patient continued to decline while in the hospital setting.  Thus Neurology, pulmonology, Rheumatology were all consult.  Unfortunately his mental status never improved.  He has had a long victor over the last year and after multiple conversations with the patient's wife she has decided to pursue comfort care measures only.  She feels like she is torturing him with continued workup.  Neurology admission possibly doing a brain biopsy she said absolutely not.  At this point she would like to withdraw any life-sustaining care and focus on symptom control only.  She spoke with palliative Care yesterday who helped being  Her guide her through the process.  She is interested in inpatient hospice setting.  Patient is attended and no longer able to take anything oral.  He is not able to make his needs known.  Suspect that his life expectancy is 2 weeks or less.  He is requiring Ativan and morphine to keep agitation and dyspnea control.  He is medically appropriate for general inpatient hospice.    Vitals:  Blood pressure 102/66, pulse (!) 123, temperature (!) 100.6 °F (38.1 °C), temperature source Axillary, resp. rate 20, height 6' 0.99" (1.854 m), weight 62.1 kg (137 lb), SpO2 96 %..    Physical Exam:  Obtunded (does have some intermittent alertness " but limited)  NC/AT, PERRLA, EOMI  Supple neck, no JVD, No cervical lymphadenopathy  Symmetrical chest, Good air entry bilaterally. No rhonchi, wheezes, crackles appreciated  Tachycardiac, No gallop, rub or murmur  +ve Bowel sounds x4, Abd soft and non tender, no rebound, guarding or rigidity  No Cyanosis, cludding or edema, No new rash or bruises    Procedures Performed: No admission procedures for hospital encounter.     Significant Diagnostic Studies: See Full reports for all details  No results displayed because visit has over 200 results.           Microbiology Results (last 7 days)     Procedure Component Value Units Date/Time    Cerebrospinal Fluid Culture [[id number]] Collected: 07/07/22 1050    Order Status: Completed Specimen: CSF (Spinal Fluid) from Cerebrospinal Fluid Updated: 07/12/22 0906     CULTURE, CSF ([facility]) No Growth at 5 days     GRAM STAIN No WBCs, No bacteria seen     Blood culture #2 **CANNOT BE ORDERED STAT** [[id number]]  (Normal) Collected: 07/06/22 1322    Order Status: Completed Specimen: Blood from Arm, Left Updated: 07/11/22 1502     CULTURE, BLOOD ([facility]) No Growth at 5 days    Fungal Culture Blood [[id number]] Collected: 07/11/22 0543    Order Status: Sent Specimen: Blood from Hand, Left Updated: 07/11/22 0746    Blood culture #1 **CANNOT BE ORDERED STAT** [[id number]]  (Abnormal)  (Susceptibility) Collected: 07/06/22 1326    Order Status: Completed Specimen: Blood from PICC Line Updated: 07/11/22 0740     CULTURE, BLOOD ([facility]) Staphylococcus epidermidis     GRAM STAIN Gram Positive Cocci, probable Staphylococcus      Seen in gram stain of broth only      1 of 2 Aerobic bottles positive     Fungal Culture Blood [[id number]] Collected: 07/11/22 0544    Order Status: Sent Specimen: Blood from Arm, Left Updated: 07/11/22 0610    Fungal Culture [[id number]] Collected: 07/07/22 1050    Order Status: Sent Specimen: CSF (Spinal Fluid) from Cerebrospinal Fluid Updated: 07/09/22 1457           X-Ray  Chest 1 View    Result Date: 7/14/2022  EXAMINATION: XR CHEST 1 VIEW CLINICAL HISTORY: SOB, desat; COMPARISON: 12 July 2022 FINDINGS: Portable frontal view of the chest was obtained. The heart is not significantly enlarged.  There is aortic atherosclerosis.  Similar interstitial prominent opacities favoring the lung bases.  There is no new dense consolidation.  No pneumothorax.     Similar appearance of the chest. Electronically signed by: Joe Birmingham Date:    07/14/2022 Time:    07:13    X-Ray Chest 1 View    Result Date: 7/12/2022  EXAMINATION: XR CHEST 1 VIEW CLINICAL HISTORY: ng tube placement; TECHNIQUE: Single frontal view of the chest was performed. COMPARISON: 07/09/2022 FINDINGS: The distal end of the NG tube is just below the GE junction.  The side hole appears to be in the distal esophagus.     NG tube is described above. Electronically signed by: Naif Awan MD Date:    07/12/2022 Time:    19:40    X-Ray Chest 1 View    Result Date: 7/9/2022  EXAMINATION: XR CHEST 1 VIEW CLINICAL HISTORY: aspiration?; TECHNIQUE: AP chest COMPARISON: X-ray dated 07/07/2022 FINDINGS: The heart is normal in size.  There is no focal airspace consolidation.  There is no definite pleural effusion or visible pneumothorax.     No acute abnormality of the chest. Electronically signed by: Victoria Corley Date:    07/09/2022 Time:    11:55    X-Ray Chest 1 View    Result Date: 7/7/2022  EXAMINATION: XR CHEST 1 VIEW CLINICAL HISTORY: aspiration; TECHNIQUE: Single frontal view of the chest was performed. COMPARISON: 07/06/2022 FINDINGS: LINES AND TUBES: Enteric tube courses below the diaphragm. MEDIASTINUM AND JOJO: The cardiac silhouette is normal. EKG/telemetry leads overlie the chest. LUNGS: No lobar consolidation. No edema. PLEURA:No pleural effusion. No pneumothorax. BONES: No acute osseous abnormality.     No acute cardiopulmonary abnormality. Electronically signed by: Lisa Soto Date:    07/07/2022  Time:    12:02    X-Ray Chest 1 View    Result Date: 7/6/2022  EXAMINATION: XR CHEST 1 VIEW CLINICAL HISTORY: Fever, unspecified TECHNIQUE: One view COMPARISON: July 1, 2022. FINDINGS: Cardiopericardial silhouette is within normal limits.  No acute dense focal or segmental consolidation, congestive process, pleural effusions or pneumothorax.  Nasogastric tube extends into the gastric fundus.     NO ACUTE CARDIOPULMONARY PROCESS IDENTIFIED. Electronically signed by: Ethan Molina Date:    07/06/2022 Time:    13:17    X-Ray Chest 1 View    Result Date: 7/5/2022  EXAMINATION: XR CHEST 1 VIEW CLINICAL HISTORY: dyspnea;, . COMPARISON: June 18, 2022 FINDINGS: No alveolar consolidation, effusion, or pneumothorax is seen.   The thoracic aorta is normal  cardiac silhouette, central pulmonary vessels and mediastinum are normal in size and are grossly unremarkable.   visualized osseous structures are grossly unremarkable.     No acute chest disease is identified. Electronically signed by: De Conte Date:    07/05/2022 Time:    08:07    X-Ray Chest 1 View    Result Date: 6/19/2022  EXAMINATION: XR CHEST 1 VIEW CPT 68444 CLINICAL HISTORY: PICC Placement; COMPARISON: June 16, 2022 FINDINGS: Cardiomediastinal silhouette improved parenchymal changes to be essentially unchanged as compared with the previous exam. Right-sided PICC line is identified tip projecting at the junction of the superior vena cava and right atrium     No significant change since the previous exam. PICC line as above Electronically signed by: De Conte Date:    06/19/2022 Time:    05:31    X-Ray Chest 1 View    Result Date: 6/16/2022  EXAMINATION: XR CHEST 1 VIEW CLINICAL HISTORY: PICC; TECHNIQUE: AP chest COMPARISON: Chest x-ray dated 06/13/2022 FINDINGS: Right-sided PICC line has its tip over the superior vena cava.  The heart is normal in size.  There is no focal airspace consolidation.  There is no pleural effusion or visible pneumothorax.      Right-sided PICC line with tip over the superior vena cava. Electronically signed by: Victoria Corley Date:    06/16/2022 Time:    16:53    CT Head Without Contrast    Result Date: 7/6/2022  EXAMINATION: CT HEAD WITHOUT CONTRAST CLINICAL HISTORY: Mental status change, unknown cause; TECHNIQUE: Axial scans were obtained from skull base to the vertex. Coronal and sagittal reconstructions obtained from the axial data. Automatic exposure control was utilized to limit radiation dose. Contrast: None Radiation Dose: Total DLP: 1097 mGy*cm COMPARISON: Brain MRI 06/15/2022 FINDINGS: Scalp/Skull: No abnormalities. Brain sulci: Appropriate for patient's age. Ventricles: Normal in size and configuration. No hydrocephalus. Extra-axial spaces: No masses or fluid collections. Parenchyma: Bifrontal foci of cortical based encephalomalacia compatible with remote insults. Mild-moderate chronic microangiopathy in the supratentorial white matter. No mass, hemorrhage or CT evidence of an acute vascular insult. Dural sinuses: No abnormal densities. Sellar/Suprasellar region: No abnormalities. Skull base and Craniocervical junction: No abnormalities. Incidental findings: Carotid siphon and vertebrobasilar atherosclerotic vascular calcifications. Status post bilateral cataract surgery. Partially visualized nasoenteric catheter.     No acute intracranial abnormalities. Electronically signed by: Michel Ge Date:    07/06/2022 Time:    14:13    MRI Brain Without Contrast    Result Date: 7/12/2022  EXAMINATION: MRI BRAIN WITHOUT CONTRAST CLINICAL HISTORY: Mental status change, unknown cause; TECHNIQUE: Multiplanar multisequence MR imaging of the brain was performed without contrast. COMPARISON: 07/06/2022 FINDINGS: There is diffuse cerebral atrophy seen along with some compensatory ventricular dilatation and periventricular white matter change consistent with the patient's age.  There is an old area of ischemia in the frontal lobes  bilaterally with the new area of ischemia in the right frontal parietal region which was seen on the prior examination as well.  No additional new areas of ischemia is seen when compared to 07/06/2022.  No hemorrhage is seen.  Posterior fossa appears grossly unremarkable.  Calvarium is intact.  Paranasal sinuses appear unremarkable.     Findings seen consistent with a subacute area of ischemia in the right frontal probable region which were seen on the immediate previous examination from 07/06/2022 as well.  No other new areas of ischemia are seen Old area of ischemia in the frontal lobes bilaterally Chronic age related changes Electronically signed by: Luisa Castillo Date:    07/12/2022 Time:    18:15    MRI Brain Without Contrast    Result Date: 6/15/2022  EXAMINATION: MRI BRAIN WITHOUT CONTRAST CLINICAL HISTORY: Neuro deficit, acute, stroke suspected; TECHNIQUE: Multiplanar, multisequence MR images of the brain were obtained without the administration of intravenous contrast. COMPARISON: CT head dated 06/13/2022, MRI brain dated 04/27/2022 FINDINGS: There is no acute infarct identified.  There is encephalomalacia in the bilateral KINGA territories from prior infarct.  There are mild scattered T2/FLAIR hyperintensities in the subcortical and periventricular white matter, basal ganglia and john, likely representing chronic microvascular ischemic changes.  There is abnormal extra-axial diffusion signal along the falx and bilateral cerebral convexities, with associated abnormal FLAIR signal.  There is no corresponding acute hemorrhage on CT or T1/T2 images. There is no mass effect or midline shift.  The basal cisterns are patent.  There is diffuse parenchymal volume loss.  There is no hydrocephalus.  The major intracranial flow voids are patent.  The paranasal sinuses and mastoid air cells are clear     1. No acute infarct identified. 2. Abnormal diffusion signal in the extra-axial spaces raises the concern for a  meningitis/infection.  Correlation is needed with CSF studies and clinical findings. 3. Bilateral KINGA territory encephalomalacia. Electronically signed by: Victoria Corley Date:    06/15/2022 Time:    12:00    Fl Modified Barium Swallow Speech    Result Date: 6/23/2022  See Speech Therapist Notes This procedure was auto-finalized.    US Thyroid    Result Date: 7/8/2022  EXAMINATION: US THYROID CLINICAL HISTORY: high thyroid levels;. TECHNIQUE: Ultrasound of the thyroid and cervical lymph nodes was performed. COMPARISON: Ultrasound 06/08/2018 FINDINGS: SIZE: Right lobe: 5 x 3.7 x 2.6 cm Left lobe: 5.8 x 3 x 3 cm Isthmus: 0.8 cm PARENCHYMA: Heterogeneous thyroid parenchyma. NODULES: No discrete thyroid nodule seen. LYMPH NODES: Morphologically normal cervical chain lymph nodes seen.     Thyromegaly with heterogeneous gland without discrete thyroid nodule.. Reference: ACR Thyroid Imaging, Reporting and Data System (TI-RADS): White Paper of the ACR TI-RADS Committee.  2017. Electronically signed by: Lisa Soto Date:    07/08/2022 Time:    11:55    CT Maxillofacial With Contrast    Result Date: 6/16/2022  EXAMINATION: CT MAXILLOFACIAL WITH CONTRAST CLINICAL HISTORY: Maxillary/facial abscess; TECHNIQUE: Volumetric CT acquisition of the facial bones with contrast.  Axial, coronal and sagittal reconstructions. Automatic exposure control was utilized to limit radiation dose. DLP: 506 mGy-cm COMPARISON: None FINDINGS: Evaluation of the oral cavity is limited by artifact from dental hardware. There are small periapical lucencies around the right mandibular molars.  There is no acute fracture identified.  The paranasal sinuses and mastoid air cells are clear. There is no drainable fluid collection.  The orbits and soft tissues are otherwise unremarkable.     No drainable fluid collection identified. Electronically signed by: Victoria Corley Date:    06/16/2022 Time:    15:13    CT Chest Abdomen Pelvis Without Contrast  (XPD)    Result Date: 7/9/2022  EXAMINATION: CT CHEST ABDOMEN PELVIS WITHOUT CONTRAST(XPD) CLINICAL HISTORY: No adequate history is provided by the ordering personality TECHNIQUE: Multiple cross-sectional images of the chest, abdomen, and pelvis were obtained without the use of contrast.  Coronal and sagittal reformatted images were obtained.  An automated dose exposure technique was utilized.  This limits radiation does the patient. COMPARISON: None FINDINGS: Chest: Heart size enlarged with coronary artery calcifications.  Course and caliber of the thoracic aorta is normal.  A triple vessel aortic arch is identified.  Main pulmonary artery is of normal caliber.  Shotty lymph nodes are identified in the mediastinum as well as likely reactive lymph nodes. Mucoid impaction is identified with diffuse mucoid plugging of the left mainstem bronchus with consolidative changes a similar appearance is identified on the right, however, less apparent.  Mucoid debris is identified within the dependent aspect of the trachea.  No pleural thickening or effusion. Abdomen/pelvis: Evaluation of hollow and solid viscera is limited secondary to technique. Liver appears to be hypodense and enlarged consistent hepatic steatosis.  Gallbladder is present with cholelithiasis.  The spleen, adrenals, kidneys, and pancreas are normal. Enteric tube is identified coiled within the gastric body with the distal tip in the gastric cardia.  Small bowel is of normal caliber.  Colon is of normal caliber with scattered colonic diverticula.  No adjacent inflammatory changes.  Normal appendix. Bladder and prostate are grossly normal.  Course and caliber of the abdominal aorta is normal with scattered calcified atheromatous disease.  Questionable ectasia is identified.  No free fluid in the abdomen pelvis.  Hypertrophied superior mesenteric artery is identified.  Reactive lymph nodes are identified in the brown hepatis. No suspicious osseous lesions.   "Diffuse osteopenia is identified.  Soft tissues are grossly normal.     1. Findings are consistent with aspiration pneumonitis greater on the left than right with diffuse mucoid impaction of the trachea, and airways. 2. Other secondary findings of the abdomen and pelvis as above. Electronically signed by: Ha Fleming MD Date:    07/09/2022 Time:    18:04    MRI Brain Limited Without Contrast    Result Date: 7/6/2022  EXAMINATION: MRI BRAIN LIMITED WITHOUT CONTRAST CLINICAL HISTORY: Mental status change, unknown cause;Mental status change;; TECHNIQUE: Limited multisequence MR images of the brain were obtained WITHOUT the administration of intravenous contrast. Axial DWI Axial T2 FLAIR COMPARISON: Head CT earlier the same day. Brain MRI 06/15/2022.     Motion degraded exam. In spite of this limitation: 1. Acute cortical based ischemia in the right MCA vascular territory as exemplified by a right middle frontal 2.9 x 1.9 cm focus. 2. Extra-axial diffusion restriction about the falx persists but has decreased, with similar findings about the cerebral convexities, again suspicious for meningitis/infection. Electronically signed by: Michel Ge Date:    07/06/2022 Time:    18:31    FL LUMBAR PUNCTURE DIAGNOSTIC WITH IMAGING    Result Date: 7/7/2022  EXAMINATION: FL LUMBAR PUNCTURE DIAGNOSTIC WITH IMAGING CLINICAL HISTORY: history of meningitis; TECHNIQUE: A preprocedure "Time-out" was performed by the radiologist and radiology technologist confirming patient identification and procedure location. The patient was brought into the interventional radiology suite and placed in the prone position on the fluoroscopy table. A nikki was placed one the patient's back overlying the right L3-L4 intralaminar space. The patient's back was then prepped and draped in the usual aseptic fashion. 1% plain lidocaine was used to anesthetize the skin and subcutaneous tissues to deep to the previously made nikki. Under fluoroscopic guidance, a " 22 gauge spinal needle was used to access the thecal sac on the first pass with immediate return of clear CSF.  Opening pressure was less than 10 cm H2O.  Approximately 6.5 ml of CSF was drained and submitted to the laboratory for analysis.  Closing pressure was less than 10 cm H2O.  The needle was removed, and the puncture site was aseptically bandage. There were no immediate complaints or complications. Total fluoroscopic time: 6 seconds Total # images: 1     Technically successful fluoroscopic guided lumbar puncture. Opening pressure = less than 10 cm H2O CSF drained = 6.5 cc Closing pressure = less than 10 cm H2O Electronically signed by: Michel Ge Date:    07/07/2022 Time:    13:37    FL LUMBAR PUNCTURE DIAGNOSTIC WITH IMAGING    Result Date: 6/15/2022  EXAMINATION: FL LUMBAR PUNCTURE DIAGNOSTIC WITH IMAGING CLINICAL HISTORY: Meningitis; TECHNIQUE: The risks and benefits of the procedure were explained to the patient's wife and written informed consent was obtained. A suitable skin entry site was chosen under fluoroscopy. The lower back was then prepped and draped in sterile fashion.  Lidocaine was used for local anesthesia. COMPARISON: Head CT 06/13/2022 FINDINGS: Under intermittent fluoroscopic guidance, a 22 gauge spinal needle was advanced into the thecal sac with position confirmed by flow of CSF.  Opening pressure of 11 cm CSF.  Approximately 8 mL of clear CSF collected and sent to the lab for further evaluation.  Closing pressure less than 9 cm CSF.  The needle was removed and hemostasis achieved at the skin puncture site. No immediate complication. Estimated blood loss was negligible. Total fluoroscopy time is 13 seconds with absorbed dose of 2.41 mGy.     Successful fluoroscopic guided lumbar puncture. Electronically signed by: Liu Gomes Date:    06/15/2022 Time:    13:36    XR Non-Rad Performed NG/Gastric Tube Check    Result Date: 7/9/2022  EXAMINATION: One-view upper abdomen CLINICAL  HISTORY: NG tube placement COMPARISON: None FINDINGS: Enteric tube projects past the gastroesophageal junction curled over the left upper quadrant.     Standard NG tube placement Electronically signed by: Nehemias Chirinos MD Date:    07/09/2022 Time:    14:40    XR Non-Rad Performed NG/Gastric Tube Check    Result Date: 7/8/2022  EXAMINATION: XR NON-RADIOLOGIST PERFORMED NG/GASTRIC TUBE CHECK CLINICAL HISTORY: NG tubed pulled slightly by pt, advanced back by RN; COMPARISON: July 7, 2022 FINDINGS: Nasogastric tube is seen with the tip in the fundus of the stomach     Nasogastric tube as above Electronically signed by: De Conte Date:    07/08/2022 Time:    12:31    XR Non-Rad Performed NG/Gastric Tube Check    Result Date: 7/7/2022  EXAMINATION: XR NON-RADIOLOGIST PERFORMED NG/GASTRIC TUBE CHECK CLINICAL HISTORY: NG tube placement; TECHNIQUE: AP View(s) of the abdomen was performed. COMPARISON: 07/07/2022 FINDINGS: Enteric tube terminates in the stomach.  Side port is beyond the GE junction. Electronically signed by: Lisa Soto Date:    07/07/2022 Time:    18:30    XR Non-Rad Performed NG/Gastric Tube Check    Result Date: 7/7/2022  EXAMINATION: XR NON-RADIOLOGIST PERFORMED NG/GASTRIC TUBE CHECK CLINICAL HISTORY: NG tube placement; TECHNIQUE: AP View(s) of the abdomen was performed. COMPARISON: None. FINDINGS: Enteric tube extends into the right bronchus intermedius..     Enteric tube in the airway. Finding relayed to patient's nurse Alice Dickey at the time of this dictation. Electronically signed by: Lisa Soto Date:    07/07/2022 Time:    16:28    X-ray Abdomen for NG Tube Placement (Nursing should notify Radiology after placement)    Result Date: 7/5/2022  EXAMINATION: XR NON-RADIOLOGIST PERFORMED NG/GASTRIC TUBE CHECK CLINICAL HISTORY: NGT placement; COMPARISON: None FINDINGS: Examination reveals a nasogastric tube with the tip in the fundus of the stomach     As above Electronically signed  by: De Conte Date:    07/05/2022 Time:    10:20  - pulls last radiology orders        Medication List      STOP taking these medications    acetaminophen 650 MG Supp  Commonly known as: TYLENOL     albuterol-ipratropium 2.5 mg-0.5 mg/3 mL nebulizer solution  Commonly known as: DUO-NEB     apixaban 5 mg Tab  Commonly known as: ELIQUIS     aspirin 325 MG tablet     atorvastatin 10 MG tablet  Commonly known as: LIPITOR     baclofen 10 MG tablet  Commonly known as: LIORESAL     benzonatate 100 MG capsule  Commonly known as: TESSALON     DIGITEK 250 mcg tablet  Generic drug: digoxin     ezetimibe 10 mg tablet  Commonly known as: ZETIA     HYDROcodone-acetaminophen  mg per tablet  Commonly known as: NORCO     ipratropium 0.02 % nebulizer solution  Commonly known as: ATROVENT     levETIRAcetam 500 MG Tab  Commonly known as: KEPPRA     methIMAzole 10 MG Tab  Commonly known as: TAPAZOLE     metoprolol succinate 100 MG 24 hr tablet  Commonly known as: TOPROL-XL     miconazole NITRATE 2 % 2 % top powder  Commonly known as: MICOTIN     montelukast 10 mg tablet  Commonly known as: SINGULAIR     mv, min #36-iron,carbonyl-FA 16 mg iron- 0.38 mg Tab     pantoprazole 40 MG tablet  Commonly known as: PROTONIX     predniSONE 5 MG tablet  Commonly known as: DELTASONE     propranoloL 60 MG 24 hr capsule  Commonly known as: INDERAL LA     rosuvastatin 40 MG Tab  Commonly known as: CRESTOR     VANCOMYCIN 750 MG/250 ML D5W (READY TO MIX SYSTEM)             Explained in detail to the patient about the discharge plan, medications, and follow-up visits. Pt understands and agrees with the treatment plan  Discharged Condition: stable  Diet: NPO  Disposition: Inpatient hospice    Medications Per DC med rec  Activities as tolerated  Follow up with your PCP in 2 wks   For further questions contact hospitalist office    Discharge time 33 minutes    For worsening symptoms, chest pain, shortness of breath, increased abdominal pain,  high grade fever, stroke or stroke like symptoms, immediately go to the nearest Emergency Room or call 911 as soon as possible.      Franklin Dasilva M.D, on 7/14/2022. at 7:20 AM.

## 2022-07-14 NOTE — PROGRESS NOTES
Infectious Diseases Progress Note  71-year-old male with past medical history of paroxysmal AFib on Eliquis, CAD, HTN, hyperthyroidism, cardiomyopathy, CVA/TIA, seizure disorder, rheumatoid arthritis with a recent overall health decline, with CVA in April this year, 2022, then seizures, fevers with diagnosis of pneumonia and progressively worsening confusion, recently diagnosed with meningitis with negative CSF cultures and discharged to [facility] in extended care on 06/28/2022 and is readmitted to [facility] on 07/06/2022.  He has started to have fevers with a documented fever of 102.4 on 07/04 I will 1.7 on 07/05, and associated leukocytosis which on admission 7/6 up to 22.3, worsening mental status.  He has been extensively evaluated with 1 of 2 blood culture sets on 07/05 showing Gram-positive cocci probable Staphylococcus and a follow-up blood cultures on 07/06 negative so far.  Urinalysis is not impressive.  He is anemic with low albumin and prealbumin.  Had a lumbar puncture today 07/07 with CSF WBC 50, 11% neutrophils, 56% mono sites, 33% lymphs, glucose at 36 and protein 82.6.  CSF Gram stain with no bacteria and no WBC and culture pending.  Cryptococcal antigen of the CSF has been noted to be negative and AFB negative on CSF of 6/15.te cardiopulmonary disease.  CT scan of the head without no acute intracranial process.  MRI of the brain shows acute ischemia with involvement of the right MCA territory.   He is currently on Cefepime and Flagyl    Subjective:  No new complaints, continues to have fevers, doing about the same.  Lying in bed in no acute distress.  Wife at the bedside      Past Medical History:   Diagnosis Date    Acute left arterial ischemic stroke, KINGA (anterior cerebral artery) 5/3/2022    Acute osteomyelitis 5/11/2022    Atherosclerosis of coronary artery 5/11/2022    Atrial fibrillation 5/3/2022    Benign essential hypertension 5/3/2022    Cardiomyopathy      Coronary artery disease     Diverticulosis     Esophageal reflux 5/11/2022    Fatigue     Focal seizure 5/17/2022    Generalized anxiety disorder     GERD (gastroesophageal reflux disease)     Graves disease     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/2022    HTN (hypertension)     Hyperthyroidism     Idiopathic peripheral neuropathy 5/11/2022    Irritable bowel syndrome without diarrhea     Ischemic cardiomyopathy 5/3/2022    Mixed hyperlipidemia 5/11/2022    Other sequelae following unspecified cerebrovascular disease 5/9/2022    Paroxysmal atrial fibrillation     Rheumatoid arthritis 5/11/2022    Rheumatoid arthritis, unspecified     Shingles     Staph aureus infection     Stroke     Thyroiditis, unspecified     Thyrotoxicosis 5/3/2022     Past Surgical History:   Procedure Laterality Date    CATARACT EXTRACTION      CYST REMOVAL Left     TONSILLECTOMY      TOTAL KNEE ARTHROPLASTY      VASECTOMY       Social History     Socioeconomic History    Marital status:    Tobacco Use    Smoking status: Never Smoker    Smokeless tobacco: Never Used   Substance and Sexual Activity    Alcohol use: Never    Drug use: Never    Sexual activity: Yes       Review of Systems   Unable to perform ROS: Medical condition         Review of patient's allergies indicates:   Allergen Reactions    Ace inhibitors Swelling     Lip swelling    Morphine     Morphine sulfate     Nafcillin Itching    Pradaxa [dabigatran etexilate] Other (See Comments)     Abdominal cramping    Sulfamethoxazole Rash         Scheduled Meds:   acetylcysteine 200 mg/ml (20%)  4 mL Nebulization TID    ceFEPime (MAXIPIME) IVPB  2 g Intravenous Q8H    digoxin  0.25 mg Oral Daily    enoxaparin  60 mg Subcutaneous BID    levetiracetam  1,000 mg Per NG tube BID    methIMAzole  20 mg Oral TID    metoprolol tartrate  100 mg Oral BID    metronidazole  500 mg Intravenous Q8H    miconazole NITRATE  "2 %   Topical (Top) BID    mirtazapine  15 mg Oral QHS    predniSONE  10 mg Oral Daily    scopolamine  1 patch Transdermal Q3 Days    zinc oxide-cod liver oil   Topical (Top) BID     Continuous Infusions:  PRN Meds:acetaminophen, albuterol sulfate, dextrose 10 % in water (D10W), dextrose 10 % in water (D10W), glucagon (human recombinant), glucose, glucose, glycopyrrolate (PF), haloperidol lactate, hydrALAZINE, HYDROcodone-acetaminophen, lorazepam, metoprolol, sodium chloride 0.9%    Objective:  /79   Pulse 101   Temp 98.8 °F (37.1 °C) (Oral)   Resp (!) 22   Ht 6' 0.99" (1.854 m)   Wt 62.1 kg (137 lb)   SpO2 95%   BMI 18.08 kg/m²     Physical Exam:   Physical Exam  Vitals reviewed.   Constitutional:       General: He is not in acute distress.     Appearance: He is ill-appearing.      Comments: Confused   HENT:      Head: Normocephalic and atraumatic.      Nose:      Comments: NG tube in place  Cardiovascular:      Rate and Rhythm: Normal rate and regular rhythm.      Heart sounds: Normal heart sounds.   Pulmonary:      Effort: Pulmonary effort is normal. No respiratory distress.      Breath sounds: Normal breath sounds.   Abdominal:      General: Bowel sounds are normal. There is no distension. R nare NGT     Palpations: Abdomen is soft.      Tenderness: There is no abdominal tenderness.   Musculoskeletal:         General: No deformity.      Cervical back: Neck supple.  For  Skin:     Findings: No erythema or rash.   Neurological:      Comments: Drowsy, and confused  Psychiatric:      Comments: Non communicative     Imaging  Imaging Results          X-Ray Chest 1 View (Final result)  Result time 07/07/22 12:02:15    Final result by Lisa Soto MD (07/07/22 12:02:15)                 Impression:      No acute cardiopulmonary abnormality.      Electronically signed by: Lisa Soto  Date:    07/07/2022  Time:    12:02             Narrative:    EXAMINATION:  XR CHEST 1 VIEW    CLINICAL " "HISTORY:  aspiration;    TECHNIQUE:  Single frontal view of the chest was performed.    COMPARISON:  07/06/2022    FINDINGS:  LINES AND TUBES: Enteric tube courses below the diaphragm.    MEDIASTINUM AND JOJO: The cardiac silhouette is normal. EKG/telemetry leads overlie the chest.    LUNGS: No lobar consolidation. No edema.    PLEURA:No pleural effusion. No pneumothorax.    BONES: No acute osseous abnormality.                               FL LUMBAR PUNCTURE DIAGNOSTIC WITH IMAGING (Final result)  Result time 07/07/22 13:37:48    Final result by Michel Ge MD (07/07/22 13:37:48)                 Impression:      Technically successful fluoroscopic guided lumbar puncture.    Opening pressure = less than 10 cm H2O    CSF drained = 6.5 cc    Closing pressure = less than 10 cm H2O      Electronically signed by: Michel Ge  Date:    07/07/2022  Time:    13:37             Narrative:    EXAMINATION:  FL LUMBAR PUNCTURE DIAGNOSTIC WITH IMAGING    CLINICAL HISTORY:  history of meningitis;    TECHNIQUE:  A preprocedure "Time-out" was performed by the radiologist and radiology technologist confirming patient identification and procedure location. The patient was brought into the interventional radiology suite and placed in the prone position on the fluoroscopy table. A nikki was placed one the patient's back overlying the right L3-L4 intralaminar space. The patient's back was then prepped and draped in the usual aseptic fashion. 1% plain lidocaine was used to anesthetize the skin and subcutaneous tissues to deep to the previously made nikki. Under fluoroscopic guidance, a 22 gauge spinal needle was used to access the thecal sac on the first pass with immediate return of clear CSF.  Opening pressure was less than 10 cm H2O.  Approximately 6.5 ml of CSF was drained and submitted to the laboratory for analysis.  Closing pressure was less than 10 cm H2O.  The needle was removed, and the puncture site was aseptically " bandage. There were no immediate complaints or complications.    Total fluoroscopic time: 6 seconds    Total # images: 1                               MRI Brain Limited Without Contrast (Final result)  Result time 07/06/22 18:31:13   Procedure changed from MRI Brain W WO Contrast     Final result by Michel Ge MD (07/06/22 18:31:13)                 Impression:      Motion degraded exam.    In spite of this limitation:    1. Acute cortical based ischemia in the right MCA vascular territory as exemplified by a right middle frontal 2.9 x 1.9 cm focus.  2. Extra-axial diffusion restriction about the falx persists but has decreased, with similar findings about the cerebral convexities, again suspicious for meningitis/infection.      Electronically signed by: Michel Ge  Date:    07/06/2022  Time:    18:31             Narrative:    EXAMINATION:  MRI BRAIN LIMITED WITHOUT CONTRAST    CLINICAL HISTORY:  Mental status change, unknown cause;Mental status change;;    TECHNIQUE:  Limited multisequence MR images of the brain were obtained WITHOUT the administration of intravenous contrast.    Axial DWI    Axial T2 FLAIR    COMPARISON:  Head CT earlier the same day.    Brain MRI 06/15/2022.                               CT Head Without Contrast (Final result)  Result time 07/06/22 14:13:35    Final result by Michel Ge MD (07/06/22 14:13:35)                 Impression:      No acute intracranial abnormalities.      Electronically signed by: Michel Ge  Date:    07/06/2022  Time:    14:13             Narrative:    EXAMINATION:  CT HEAD WITHOUT CONTRAST    CLINICAL HISTORY:  Mental status change, unknown cause;    TECHNIQUE:  Axial scans were obtained from skull base to the vertex.    Coronal and sagittal reconstructions obtained from the axial data.    Automatic exposure control was utilized to limit radiation dose.    Contrast: None    Radiation Dose:    Total DLP: 1097 mGy*cm    COMPARISON:  Brain MRI  06/15/2022    FINDINGS:  Scalp/Skull:    No abnormalities.    Brain sulci: Appropriate for patient's age.    Ventricles: Normal in size and configuration. No hydrocephalus.    Extra-axial spaces:    No masses or fluid collections.    Parenchyma:    Bifrontal foci of cortical based encephalomalacia compatible with remote insults.    Mild-moderate chronic microangiopathy in the supratentorial white matter.    No mass, hemorrhage or CT evidence of an acute vascular insult.    Dural sinuses: No abnormal densities.    Sellar/Suprasellar region: No abnormalities.    Skull base and Craniocervical junction: No abnormalities.    Incidental findings:    Carotid siphon and vertebrobasilar atherosclerotic vascular calcifications.    Status post bilateral cataract surgery.    Partially visualized nasoenteric catheter.                               X-Ray Chest 1 View (Final result)  Result time 07/06/22 13:17:46    Final result by Ethan Molina MD (07/06/22 13:17:46)                 Impression:      NO ACUTE CARDIOPULMONARY PROCESS IDENTIFIED.      Electronically signed by: Ethan Molina  Date:    07/06/2022  Time:    13:17             Narrative:    EXAMINATION:  XR CHEST 1 VIEW    CLINICAL HISTORY:  Fever, unspecified    TECHNIQUE:  One view    COMPARISON:  July 1, 2022.    FINDINGS:  Cardiopericardial silhouette is within normal limits.  No acute dense focal or segmental consolidation, congestive process, pleural effusions or pneumothorax.  Nasogastric tube extends into the gastric fundus.                                 Lab Review   Recent Results (from the past 24 hour(s))   Comprehensive Metabolic Panel    Collection Time: 07/13/22  4:40 AM   Result Value Ref Range    Sodium Level 136 136 - 145 mmol/L    Potassium Level 4.5 3.5 - 5.1 mmol/L    Chloride 107 98 - 107 mmol/L    Carbon Dioxide 17 (L) 23 - 31 mmol/L    Glucose Level 109 82 - 115 mg/dL    Blood Urea Nitrogen 15.0 8.4 - 25.7 mg/dL    Creatinine 0.54 (L) 0.73 -  1.18 mg/dL    Calcium Level Total 8.9 8.8 - 10.0 mg/dL    Protein Total 6.4 5.8 - 7.6 gm/dL    Albumin Level 2.2 (L) 3.4 - 4.8 gm/dL    Globulin 4.2 (H) 2.4 - 3.5 gm/dL    Albumin/Globulin Ratio 0.5 (L) 1.1 - 2.0 ratio    Bilirubin Total 0.5 <=1.5 mg/dL    Alkaline Phosphatase 113 40 - 150 unit/L    Alanine Aminotransferase 40 0 - 55 unit/L    Aspartate Aminotransferase 30 5 - 34 unit/L    Estimated GFR-Non  >60 mls/min/1.73/m2   CBC with Differential    Collection Time: 07/13/22  4:40 AM   Result Value Ref Range    WBC 17.6 (H) 4.5 - 11.5 x10(3)/mcL    RBC 4.65 (L) 4.70 - 6.10 x10(6)/mcL    Hgb 13.2 (L) 14.0 - 18.0 gm/dL    Hct 41.8 (L) 42.0 - 52.0 %    MCV 89.9 80.0 - 94.0 fL    MCH 28.4 27.0 - 31.0 pg    MCHC 31.6 (L) 33.0 - 36.0 mg/dL    RDW 17.1 (H) 11.5 - 17.0 %    Platelet 392 130 - 400 x10(3)/mcL    MPV 10.2 7.4 - 10.4 fL    Neut % 76.7 %    Lymph % 16.2 %    Mono % 5.7 %    Eos % 0.2 %    Basophil % 0.3 %    Lymph # 2.86 0.6 - 4.6 x10(3)/mcL    Neut # 13.5 (H) 2.1 - 9.2 x10(3)/mcL    Mono # 1.01 0.1 - 1.3 x10(3)/mcL    Eos # 0.03 0 - 0.9 x10(3)/mcL    Baso # 0.05 0 - 0.2 x10(3)/mcL    IG# 0.16 (H) 0 - 0.04 x10(3)/mcL    IG% 0.9 %    NRBC% 0.0 %             Assessment/Plan:  1. Meningitis with encephalitis/encephalopathy  2. Acute ischemic CVA  3. Leukocytosis with recent high-dose steroids  4. Staphylococcus positive blood culture  5. Recent thyrotoxicosis  6. Anemia  7. Protein calorie malnutrition  8. Aspiration pneumonia     -Continue cefepime #2 and IV Flagyl #2  -Fevers noted and with worse leukocytosis, on steroids, follow  -p-ANCA (+) and c-ANCA (-)  -Follow blood cultures from 7/11  -7/12 Repeat MRI Brain with findings consistent with subacute area of ischemia in the R frontal probable region   -7/9 CT abdomen and pelvis with findings of aspiration pneumonia, L >R but chest x-ray with no acute changes  -Abnormal CSF analysis consistent meningitis with low glucose predominantly  monocytes with negative Gram stain for bacteria could be indicative of a fungal or mycobacterial infection as well as pretreated bacterial meningitis or of non infectious etiology  -The presentation is further complicated by the findings of acute ischemic CVA in the right MCA territory  -CSF meningitis/encephalitis panel (-), CSF HSV and VZV (-) and CMV (-) as well. Follow CSF AFB, fungal cultures, MTB PCR, West Nile and VDRL  -7/5 and 7/6 blood cultures 1 of 2 sets with Staph Epi 1/2 sets  -Neurology is on board with inputs noted with plan for adding CSF cytology  -Discussed with wife at length and she verbalized understanding of severity of illness and poor prognosis.  Family considering inpatient hospice.  Discussed with  nursing staff.

## 2022-07-14 NOTE — PROGRESS NOTES
Pt seen briefly today and discussed with the case with him and his wife.   They have elected to go to hospice now, so I hold off on brain Bx, but the wife is Ok to put him on a week of steroids while he is on hospice.   Will start him today on 60 mg/day for 7 days.

## 2022-07-15 LAB
T GONDII IGG SER QL IA: POSITIVE
T GONDII IGG SER-ACNC: 466 IU/ML
T GONDII IGM SERPL QL IA: NEGATIVE

## 2022-07-17 LAB
JCPYV DNA SPEC QL NAA+PROBE: NOT DETECTED
SPECIMEN SOURCE: NORMAL

## 2022-07-18 LAB — FUNGUS SPEC CULT: NORMAL

## 2022-07-22 LAB
AGE: >18
CD3+CD4+ CELLS # BLD: 16.2 % (ref 28–48)
CD3+CD4+ CELLS # SPEC: 650.07 UNIT/L (ref 589–1505)
CD3+CD4+ CELLS NFR BLD: 22.8 %
LYMPHOCYTES # BLD AUTO: 2851.2 X10(3)/MCL (ref 1260–5520)
LYMPHOMA - T-CELL MARKERS SPEC-IMP: ABNORMAL
WBC # BLD AUTO: ABNORMAL /MM3 (ref 4500–11500)

## 2022-07-27 NOTE — PROGRESS NOTES
Infectious Diseases Progress Note  71-year-old male with past medical history of paroxysmal AFib, HTN, HLD, hypothyroidism, seizure disorder, rheumatoid arthritis, CAD, with the recent hospitalizations for CVA in April, is admitted to [facility] on 06/13/2022, presenting to the ED with report of continued fevers of up to 102 at home.  Apparently had visited the ER a few times since his discharge from the rehab facility .  Including mid May with report of twitching on worsening altered mental status, and seen by Neurology, placed on care and then the week prior to this admission had developed fevers with worsening confusion and chest x-ray indicated for possible left lower lobe infiltrate, treated with oral Levaquin and discharge.  His wife tells been he had been on antibiotics continuously up until this admission including initially ampicillin and then most recently Levaquin.  He does take Cimzia and prednisone rheumatoid arthritis.  On presentation he was evaluated extensively and noted to have no fevers but by the next day had low-grade temperature of up to 99.5.  He did have leukocytosis of 12.8, anemic with low albumin.  He had a lumbar puncture with CSF analysis showing 20 WBC predodaughtminantly lymphocytes at 76%, elevated protein 91.4 and low glucose at 35. CSF cultures have remained negative with Gram stain showing no bacteria and no WBC.  CSF cryptococcal antigen and PCR panel negative.  Blood cultures have remained negative and review of records show 6/6 blood cultures which have been negative as well.  Chest x-ray on admission showed no acute cardiopulmonary process.  CT scan of the head with no acute intracranial findings, but had old infarcts, chronic micro angiopathic ischemia and atrophy.  MRI of the brain showed no acute infarcts, abnormal diffuse signal in the extra-axial space history with concern for meningitis/infection, needed to be correlated with CSF studies and  You recently visited our office for care. Thank you for entrusting our team with your care. Following your appointment, you may receive a survey. We’d sincerely appreciate you taking the time to complete and return the survey, should you receive one. We value your input and closely monitor our survey results to insure we deliver the best care you, and all our patients, expect and deserve. Again, thank you for choosing our team for your care.    If we need to contact you regarding any test results, we will make 2 attempts to reach you at the number you have listed during your office visit today.  If we are unable to reach you, a letter with your results and any further instructions will be mailed to you home.    Please do not hesitate to call our office with any questions or concerns at Dept: [phone].    clinical findings.  CT maxillofacial showed no drainable fluid collection.    He is currently on antibiotic coverage vancomycin and ceftriaxone.    Subjective:  No new complaints, low-grade fever noted, doing about the same.  Lying in bed in no acute distress.  Wife at the bedside      Past Medical History:   Diagnosis Date    Acute left arterial ischemic stroke, KINGA (anterior cerebral artery) 5/3/2022    Acute osteomyelitis 5/11/2022    Atherosclerosis of coronary artery 5/11/2022    Atrial fibrillation 5/3/2022    Benign essential hypertension 5/3/2022    Cardiomyopathy     Coronary artery disease     Diverticulosis     Esophageal reflux 5/11/2022    Fatigue     Focal seizure 5/17/2022    Generalized anxiety disorder     GERD (gastroesophageal reflux disease)     Graves disease     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/2022    HTN (hypertension)     Hyperthyroidism     Idiopathic peripheral neuropathy 5/11/2022    Irritable bowel syndrome without diarrhea     Ischemic cardiomyopathy 5/3/2022    Mixed hyperlipidemia 5/11/2022    Other sequelae following unspecified cerebrovascular disease 5/9/2022    Paroxysmal atrial fibrillation     Rheumatoid arthritis 5/11/2022    Rheumatoid arthritis, unspecified     Shingles     Staph aureus infection     Stroke     Thyroiditis, unspecified     Thyrotoxicosis 5/3/2022     Past Surgical History:   Procedure Laterality Date    CATARACT EXTRACTION      CYST REMOVAL Left     TONSILLECTOMY      TOTAL KNEE ARTHROPLASTY      VASECTOMY       Social History     Socioeconomic History    Marital status:    Tobacco Use    Smoking status: Never Smoker    Smokeless tobacco: Never Used   Substance and Sexual Activity    Alcohol use: Never    Drug use: Never    Sexual activity: Yes       ROS   Constitutional: Positive for malaise/fatigue.   HENT: Negative.    Respiratory: Negative.    Gastrointestinal: Negative.   "  Genitourinary: Negative.    Musculoskeletal: Negative.    Neurological: Positive for weakness.   Endo/Heme/Allergies: Negative.    All other Systems review done and negative.    Review of patient's allergies indicates:   Allergen Reactions    Ace inhibitors Swelling     Lip swelling    Morphine     Morphine sulfate     Nafcillin Itching    Pradaxa [dabigatran etexilate] Other (See Comments)     Abdominal cramping    Sulfamethoxazole Rash         Scheduled Meds:   (Magic mouthwash) 1:1:1 diphenhydramine(Benadryl) 12.5mg/5ml liq, aluminum & magnesium hydroxide-simethicone (Maalox), LIDOcaine viscous 2%  10 mL Swish & Spit Q6H    apixaban  5 mg Oral BID    aspirin  325 mg Oral Daily    atorvastatin  10 mg Oral QHS    cefTRIAXone (ROCEPHIN) IVPB  2 g Intravenous Q12H    digoxin  0.25 mg Oral Daily    ezetimibe  10 mg Oral Daily    levETIRAcetam  500 mg Oral BID    methIMAzole  20 mg Oral Daily    montelukast  10 mg Oral Daily    pantoprazole  40 mg Oral Daily    predniSONE  5 mg Oral Daily    QUEtiapine  25 mg Oral QHS    sodium chloride 0.9%  10 mL Intravenous Q6H    vancomycin (VANCOCIN) IVPB  1,250 mg Intravenous Q12H     Continuous Infusions:  PRN Meds:acetaminophen, acetaminophen, haloperidol lactate, HYDROcodone-acetaminophen, lorazepam, melatonin, ondansetron, sodium chloride 0.9%, Flushing PICC Protocol **AND** sodium chloride 0.9% **AND** sodium chloride 0.9%, Pharmacy to dose Vancomycin consult **AND** vancomycin - pharmacy to dose, ziprasidone    Objective:  /84   Pulse 94   Temp 98.9 °F (37.2 °C) (Oral)   Resp 18   Ht 5' 8" (1.727 m)   Wt 64.2 kg (141 lb 8 oz)   SpO2 98%   BMI 21.51 kg/m²     Physical Exam:   Physical Exam   Vitals reviewed.   Constitutional:       General: He is not in acute distress.     Appearance: He is not toxic-appearing.   HENT:      Head: Normocephalic and atraumatic.   Cardiovascular:      Rate and Rhythm: Normal rate and regular rhythm.      " Heart sounds: Normal heart sounds.   Pulmonary:      Effort: No respiratory distress.      Breath sounds: Normal breath sounds.   Abdominal:      General: Bowel sounds are normal. There is no distension.      Palpations: Abdomen is soft.      Tenderness: There is no abdominal tenderness.   Musculoskeletal:      Cervical back: Neck supple.      Comments: Arthritic deformities of b/l hands   Skin:     Findings: No erythema or rash.   Neurological:      Mental Status: He is alert.      Comments: Follows commands   Psychiatric:      Comments: Calm and cooperative     Imaging  Imaging Results          CT Head Without Contrast (Final result)  Result time 06/13/22 17:27:43    Final result by Ethan Molina MD (06/13/22 17:27:43)                 Impression:      1.  No acute intracranial findings identified.    2.  Old infarcts, chronic microangiopathic ischemia and atrophy.      Electronically signed by: Ethan Molina  Date:    06/13/2022  Time:    17:27             Narrative:    EXAMINATION:  CT HEAD WITHOUT CONTRAST    CLINICAL HISTORY:  Mental status change, unknown cause;    TECHNIQUE:  Sequential axial images were performed of the brain without contrast.    Dose product length of 1167 mGycm. Automated exposure control was utilized to minimize radiation dose.    COMPARISON:  CT brain without contrast May 15, 2022 in MRI brain April 27, 2022.    FINDINGS:  There is no intracranial mass effect, midline shift, hydrocephalus or hemorrhage. There is no sulcal effacement or low attenuation changes to suggest recent large vessel territory infarction.  There are old infarcts which involve the right frontal lobe and the left cingulate cortical/subcortical location.  Chronic microvascular ischemic changes are mild.  The ventricular system and sulcal markings prominence is consistent with atrophy. There is no acute extra axial fluid collection. Visualized paranasal sinuses are clear without mucosal thickening, polypoidal  abnormality or air-fluid levels. Mastoid air cells aeration is optimal.                               X-Ray Chest AP Portable (Final result)  Result time 06/13/22 17:09:45    Final result by Ethan Molina MD (06/13/22 17:09:45)                 Impression:      NO ACUTE CARDIOPULMONARY PROCESS IDENTIFIED.      Electronically signed by: Ethan Molina  Date:    06/13/2022  Time:    17:09             Narrative:    EXAMINATION:  XR CHEST AP PORTABLE    CLINICAL HISTORY:  Fever, unspecified    TECHNIQUE:  One view    COMPARISON:  June 6, 2022.    FINDINGS:  Cardiopericardial silhouette is within normal limits.  No acute dense focal or segmental consolidation, congestive process, pleural effusions or pneumothorax.                                 Lab Review   Recent Results (from the past 24 hour(s))   Basic Metabolic Panel    Collection Time: 06/19/22  4:54 AM   Result Value Ref Range    Sodium Level 135 (L) 136 - 145 mmol/L    Potassium Level 4.2 3.5 - 5.1 mmol/L    Chloride 105 98 - 107 mmol/L    Carbon Dioxide 19 (L) 23 - 31 mmol/L    Glucose Level 88 82 - 115 mg/dL    Blood Urea Nitrogen 6.4 (L) 8.4 - 25.7 mg/dL    Creatinine 0.51 (L) 0.73 - 1.18 mg/dL    BUN/Creatinine Ratio 13     Calcium Level Total 9.0 8.8 - 10.0 mg/dL    Estimated GFR-Non  >60 mls/min/1.73/m2    Anion Gap 11.0 mEq/L   Magnesium    Collection Time: 06/19/22  4:54 AM   Result Value Ref Range    Magnesium Level 1.70 1.60 - 2.60 mg/dL             Assessment/Plan:  A  1. Acute meningitis with encephalopathy  2. SIRS with fevers   3. Immunocompromised on immunosuppressive drugs  4. Rheumatoid arthritis  5. History of CVA  6. Seizure disorder  7. Anemia  8. Protein calorie malnutrition       -Continue vancomycin and ceftriaxone #4  -Low-grade  fever noted and no leukocytosis, follow  -CSF analysis consistent with meningitis with specific pathogen not known, predominantly CSF lymphocytosis with hypoglycorrhachia which could be indicative  of even a fungal meningitis, however a pre-treated bacterial meningitis more likely since improving on antibacterial coverage   -CSF PCR panel with no organisms detected including negative for [place] HSV 1, 2 and enterovirus.  CSF cryptococcal antigen negative, negative Gram stains and cultures, follow  cultures including fungal and AFB  -TB meningitis less likely with no history of TB exposure and unusual presentation.  Moreover, he is on Cimzia and must have been screened for latent TB prior to initiation.  We can confirm with records from Rheumatology  -Immunocompromised and we could evaluate further with T spot and CSF MTB PCR if cultures remain negative but again TB less likely since he is clinically improving   -Discussed with patient and nursing staff

## 2022-07-30 LAB — MYCOBACTERIUM SPEC QL CULT: NORMAL

## 2022-08-02 LAB
BEAKER SEE SCANNED REPORT: NORMAL
NMDAR1 IGG SER QL CBA IFA: NEGATIVE

## 2022-08-08 LAB — FUNGUS SPEC CULT: NORMAL

## 2022-08-15 LAB
FUNGUS BLD CULT: NORMAL
FUNGUS BLD CULT: NORMAL

## 2022-11-05 ENCOUNTER — DOCUMENTATION ONLY (OUTPATIENT)
Dept: FAMILY MEDICINE | Facility: CLINIC | Age: 72
End: 2022-11-05
Payer: MEDICARE